# Patient Record
Sex: FEMALE | Race: WHITE | NOT HISPANIC OR LATINO | Employment: OTHER | URBAN - METROPOLITAN AREA
[De-identification: names, ages, dates, MRNs, and addresses within clinical notes are randomized per-mention and may not be internally consistent; named-entity substitution may affect disease eponyms.]

---

## 2017-01-03 ENCOUNTER — INFUSION (OUTPATIENT)
Dept: INFUSION THERAPY | Facility: HOSPITAL | Age: 77
End: 2017-01-03
Attending: INTERNAL MEDICINE
Payer: MEDICARE

## 2017-01-03 VITALS
SYSTOLIC BLOOD PRESSURE: 145 MMHG | TEMPERATURE: 98 F | RESPIRATION RATE: 18 BRPM | DIASTOLIC BLOOD PRESSURE: 74 MMHG | HEART RATE: 93 BPM

## 2017-01-03 DIAGNOSIS — C50.012 MALIGNANT NEOPLASM INVOLVING BOTH NIPPLE AND AREOLA OF LEFT BREAST IN FEMALE: Primary | ICD-10-CM

## 2017-01-03 PROCEDURE — 63600175 PHARM REV CODE 636 W HCPCS: Mod: PN | Performed by: INTERNAL MEDICINE

## 2017-01-03 PROCEDURE — 96372 THER/PROPH/DIAG INJ SC/IM: CPT | Mod: PN

## 2017-01-03 RX ADMIN — ERYTHROPOIETIN 40000 UNITS: 40000 INJECTION, SOLUTION INTRAVENOUS; SUBCUTANEOUS at 02:01

## 2017-01-03 NOTE — PLAN OF CARE
Problem: Patient Care Overview (Adult)  Goal: Plan of Care Review  Outcome: Ongoing (interventions implemented as appropriate)  Pt on Procrit 1/2 as ordered by   Pt aware   Appointments made [facility]

## 2017-01-03 NOTE — PLAN OF CARE
Problem: Patient Care Overview (Adult)  Goal: Discharge Needs Assessment  Outcome: Ongoing (interventions implemented as appropriate)  PT TOLERATED PROCRIT INJECTION WELL [facility]

## 2017-01-17 ENCOUNTER — INFUSION (OUTPATIENT)
Dept: INFUSION THERAPY | Facility: HOSPITAL | Age: 77
End: 2017-01-17
Attending: INTERNAL MEDICINE
Payer: MEDICARE

## 2017-01-17 ENCOUNTER — OFFICE VISIT (OUTPATIENT)
Dept: HEMATOLOGY/ONCOLOGY | Facility: CLINIC | Age: 77
End: 2017-01-17
Payer: MEDICARE

## 2017-01-17 VITALS
RESPIRATION RATE: 18 BRPM | TEMPERATURE: 98 F | SYSTOLIC BLOOD PRESSURE: 162 MMHG | HEART RATE: 75 BPM | DIASTOLIC BLOOD PRESSURE: 72 MMHG

## 2017-01-17 VITALS
HEIGHT: 65 IN | RESPIRATION RATE: 18 BRPM | TEMPERATURE: 98 F | WEIGHT: 173.31 LBS | SYSTOLIC BLOOD PRESSURE: 122 MMHG | DIASTOLIC BLOOD PRESSURE: 72 MMHG | HEART RATE: 77 BPM | BODY MASS INDEX: 28.87 KG/M2

## 2017-01-17 DIAGNOSIS — G89.21 CHRONIC PAIN DUE TO TRAUMA: ICD-10-CM

## 2017-01-17 DIAGNOSIS — C50.011 BILATERAL MALIGNANT NEOPLASM OF NIPPLE IN FEMALE: Primary | ICD-10-CM

## 2017-01-17 DIAGNOSIS — I10 ESSENTIAL HYPERTENSION: ICD-10-CM

## 2017-01-17 DIAGNOSIS — C50.012 BILATERAL MALIGNANT NEOPLASM OF NIPPLE IN FEMALE: Primary | ICD-10-CM

## 2017-01-17 DIAGNOSIS — C50.012 MALIGNANT NEOPLASM INVOLVING BOTH NIPPLE AND AREOLA OF LEFT BREAST IN FEMALE: ICD-10-CM

## 2017-01-17 DIAGNOSIS — C50.012 MALIGNANT NEOPLASM INVOLVING BOTH NIPPLE AND AREOLA OF LEFT BREAST IN FEMALE: Primary | ICD-10-CM

## 2017-01-17 PROCEDURE — 99215 OFFICE O/P EST HI 40 MIN: CPT | Mod: S$GLB,,, | Performed by: INTERNAL MEDICINE

## 2017-01-17 PROCEDURE — 25000003 PHARM REV CODE 250: Mod: PN | Performed by: INTERNAL MEDICINE

## 2017-01-17 PROCEDURE — 1160F RVW MEDS BY RX/DR IN RCRD: CPT | Mod: S$GLB,,, | Performed by: INTERNAL MEDICINE

## 2017-01-17 PROCEDURE — 96367 TX/PROPH/DG ADDL SEQ IV INF: CPT | Mod: PN

## 2017-01-17 PROCEDURE — 96413 CHEMO IV INFUSION 1 HR: CPT | Mod: PN

## 2017-01-17 PROCEDURE — 3074F SYST BP LT 130 MM HG: CPT | Mod: S$GLB,,, | Performed by: INTERNAL MEDICINE

## 2017-01-17 PROCEDURE — 99999 PR PBB SHADOW E&M-EST. PATIENT-LVL III: CPT | Mod: PBBFAC,,, | Performed by: INTERNAL MEDICINE

## 2017-01-17 PROCEDURE — 1159F MED LIST DOCD IN RCRD: CPT | Mod: S$GLB,,, | Performed by: INTERNAL MEDICINE

## 2017-01-17 PROCEDURE — 99499 UNLISTED E&M SERVICE: CPT | Mod: S$GLB,,, | Performed by: INTERNAL MEDICINE

## 2017-01-17 PROCEDURE — 1126F AMNT PAIN NOTED NONE PRSNT: CPT | Mod: S$GLB,,, | Performed by: INTERNAL MEDICINE

## 2017-01-17 PROCEDURE — 1157F ADVNC CARE PLAN IN RCRD: CPT | Mod: S$GLB,,, | Performed by: INTERNAL MEDICINE

## 2017-01-17 PROCEDURE — 3078F DIAST BP <80 MM HG: CPT | Mod: S$GLB,,, | Performed by: INTERNAL MEDICINE

## 2017-01-17 PROCEDURE — 63600175 PHARM REV CODE 636 W HCPCS: Mod: PN | Performed by: INTERNAL MEDICINE

## 2017-01-17 RX ORDER — HEPARIN 100 UNIT/ML
500 SYRINGE INTRAVENOUS
Status: CANCELLED | OUTPATIENT
Start: 2017-01-17

## 2017-01-17 RX ORDER — SODIUM CHLORIDE 0.9 % (FLUSH) 0.9 %
10 SYRINGE (ML) INJECTION
Status: DISCONTINUED | OUTPATIENT
Start: 2017-01-17 | End: 2017-01-17 | Stop reason: HOSPADM

## 2017-01-17 RX ORDER — FAMOTIDINE 20 MG/50ML
20 INJECTION, SOLUTION INTRAVENOUS
Status: COMPLETED | OUTPATIENT
Start: 2017-01-17 | End: 2017-01-17

## 2017-01-17 RX ORDER — ACETAMINOPHEN 500 MG
1000 TABLET ORAL
Status: COMPLETED | OUTPATIENT
Start: 2017-01-17 | End: 2017-01-17

## 2017-01-17 RX ORDER — FAMOTIDINE 20 MG/50ML
20 INJECTION, SOLUTION INTRAVENOUS
Status: CANCELLED
Start: 2017-01-17 | End: 2017-01-17

## 2017-01-17 RX ORDER — SODIUM CHLORIDE 0.9 % (FLUSH) 0.9 %
10 SYRINGE (ML) INJECTION
Status: CANCELLED | OUTPATIENT
Start: 2017-01-17

## 2017-01-17 RX ORDER — ACETAMINOPHEN 500 MG
1000 TABLET ORAL
Status: CANCELLED
Start: 2017-01-17

## 2017-01-17 RX ADMIN — TRASTUZUMAB 484 MG: KIT at 11:01

## 2017-01-17 RX ADMIN — DIPHENHYDRAMINE HYDROCHLORIDE 50 MG: 50 INJECTION, SOLUTION INTRAMUSCULAR; INTRAVENOUS at 11:01

## 2017-01-17 RX ADMIN — FAMOTIDINE 20 MG: 20 INJECTION, SOLUTION INTRAVENOUS at 10:01

## 2017-01-17 RX ADMIN — SODIUM CHLORIDE: 9 INJECTION, SOLUTION INTRAVENOUS at 10:01

## 2017-01-17 RX ADMIN — ACETAMINOPHEN 1000 MG: 500 TABLET ORAL at 10:01

## 2017-01-17 NOTE — PROGRESS NOTES
Subjective:       Patient ID: Jing Tenorio is a 76 y.o. female.    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 Oncotype DX somehow got done,    came back at 14.previous Hip sx that still hurts,dyslipidemia, HTN sees Dr. Franco for the same,   HPI:   Eyes still twitching and has runny eyes but otherwise doing well feet are swollen and nails are discolored  Eyes getting better, some numbness to feet  PHYSICAL EXAM:     Vitals:    01/17/17 0935   BP: 122/72   Pulse: 77   Resp: 18   Temp: 98.4 °F (36.9 °C)       GENERAL: Comfortable looking patient. Patient is in no distress.  Awake, alert and oriented to time, person and place.  No anxiety, or agitation.      HEENT: Normal conjunctivae and eyelids. WNL.  PERRLA 3 to 4 mm. No icterus, no pallor, no congestion, and no discharge noted.     NECK:  Supple. Trachea is central.  No crepitus.  No JVD or masses.    RESPIRATORY:  No intercostal retractions.  No dullness to percussion.  Chest is clear to auscultation.  No rales, rhonchi or wheezes.  No crepitus.  Good air entry bilaterally.    CARDIOVASCULAR:  S1 and S2 are normally heard without murmurs or gallops.  All peripheral pulses are present.    ABDOMEN:  Normal abdomen.  No hepatosplenomegaly.  No free fluid.  Bowel sounds are present.  No hernia noted. No masses.  No rebound or tenderness.  No guarding or rigidity.  Umbilicus is midline.    LYMPHATICS:  No axillary, cervical, supraclavicular, submental, or inguinal lymphadenopathy.    SKIN/MUSCULOSKELETAL:  There is no evidence of excoriation marks or ecchmosis.  No rashes.  No cyanosis.  No clubbing.  No joint or skeletal deformities noted.  Normal range of motion.    NEUROLOGIC:  Higher functions are appropriate.  No cranial nerve deficits.  Normal [place].  Normal strength.  Motor and sensory functions are  normal.  Deep tendon reflexes are normal.    GENITAL/RECTAL:  Exams are deferred.      Laboratory:     CBC:  Lab Results   Component Value Date    WBC 3.67 (L) 01/17/2017    RBC 3.59 (L) 01/17/2017    HGB 12.4 01/17/2017    HCT 38.6 01/17/2017     (H) 01/17/2017    MCH 34.5 (H) 01/17/2017    MCHC 32.1 01/17/2017    RDW 15.8 (H) 01/17/2017     (L) 01/17/2017    MPV 10.0 01/17/2017    GRAN 2.6 01/17/2017    GRAN 71.4 01/17/2017    LYMPH 0.7 (L) 01/17/2017    LYMPH 18.3 01/17/2017    MONO 0.2 (L) 01/17/2017    MONO 6.5 01/17/2017    EOS 0.1 01/17/2017    BASO 0.02 01/17/2017    EOSINOPHIL 3.3 01/17/2017    BASOPHIL 0.5 01/17/2017       BMP: BMP  Lab Results   Component Value Date     01/17/2017    K 5.1 01/17/2017    CL 98 01/17/2017    CO2 31 01/17/2017    BUN 21 (H) 01/17/2017    CREATININE 1.02 01/17/2017    CALCIUM 9.7 01/17/2017    ANIONGAP 7 (L) 01/17/2017    ESTGFRAFRICA >60 01/17/2017    EGFRNONAA 53 (A) 01/17/2017       LFT:   Lab Results   Component Value Date    ALT 33 01/17/2017    AST 47 (H) 01/17/2017    ALKPHOS 91 01/17/2017    BILITOT 0.7 01/17/2017         Assessment/Plan:     Continue with rx herceptin only to complete one yr.   Pt got iron and procirt  rtc 3 weekls for next herceptin

## 2017-01-17 NOTE — PLAN OF CARE
Problem: Patient Care Overview (Adult)  Goal: Discharge Needs Assessment  Outcome: Ongoing (interventions implemented as appropriate)  Pt tolerated well

## 2017-01-18 ENCOUNTER — TELEPHONE (OUTPATIENT)
Dept: HEMATOLOGY/ONCOLOGY | Facility: CLINIC | Age: 77
End: 2017-01-18

## 2017-01-18 NOTE — TELEPHONE ENCOUNTER
----- Message from Tia Erazo LPN sent at 1/18/2017  1:41 PM CST -----  Contact: Jing      ----- Message -----     From: Arianna Jones LPN     Sent: 1/18/2017   9:17 AM       To: Tia Erazo LPN        ----- Message -----     From: Nelson Garcia     Sent: 1/18/2017   9:05 AM       To: [facility] Staff    Requesting to speak with Tia regarding appointment she had canceled for 01/24/17. Appointment was canceled because she did not know the reason she needed the appointment, now she would like to find out details. Please call [phone]. Thank you!

## 2017-01-18 NOTE — TELEPHONE ENCOUNTER
Spoke with pt. Pt was asking why she had an infusion appointment on 1/24. Pt states she already canceled this . Pt informed that the previous 2 notes by  do not mention needing multiple doses of procrit so to disregard the appointment. Pt verbalized understanding.

## 2017-01-20 NOTE — PROGRESS NOTES
Patient, Jing Tenorio (MRN #[id number]), presented with a recent Platelet count less than 150 K/uL consistent with the definition of thrombocytopenia (ICD10 - D69.6).    Platelets   Date Value Ref Range Status   01/17/2017 125 (L) 150 - 350 K/uL Final     The patient's thrombocytopenia was monitored, evaluated, addressed and/or treated. This addendum to the medical record is made on 01/20/2017.

## 2017-02-01 ENCOUNTER — TELEPHONE (OUTPATIENT)
Dept: HEMATOLOGY/ONCOLOGY | Facility: CLINIC | Age: 77
End: 2017-02-01

## 2017-02-01 NOTE — TELEPHONE ENCOUNTER
----- Message from Arianna Jones LPN sent at 1/30/2017 10:58 AM CST -----  Contact: self [phone]      ----- Message -----     From: Laury Valverde     Sent: 1/30/2017  10:54 AM       To: [facility] Staff    She would like to know if it is OK for her to have a routine eye exam? Thank you!

## 2017-02-07 ENCOUNTER — INFUSION (OUTPATIENT)
Dept: INFUSION THERAPY | Facility: HOSPITAL | Age: 77
End: 2017-02-07
Attending: INTERNAL MEDICINE
Payer: MEDICARE

## 2017-02-07 ENCOUNTER — OFFICE VISIT (OUTPATIENT)
Dept: HEMATOLOGY/ONCOLOGY | Facility: CLINIC | Age: 77
End: 2017-02-07
Payer: MEDICARE

## 2017-02-07 VITALS
RESPIRATION RATE: 20 BRPM | HEART RATE: 80 BPM | WEIGHT: 174.19 LBS | SYSTOLIC BLOOD PRESSURE: 124 MMHG | HEIGHT: 65 IN | BODY MASS INDEX: 29.02 KG/M2 | DIASTOLIC BLOOD PRESSURE: 79 MMHG | TEMPERATURE: 99 F

## 2017-02-07 VITALS — SYSTOLIC BLOOD PRESSURE: 131 MMHG | RESPIRATION RATE: 18 BRPM | HEART RATE: 78 BPM | DIASTOLIC BLOOD PRESSURE: 66 MMHG

## 2017-02-07 DIAGNOSIS — C50.011 MALIGNANT NEOPLASM INVOLVING BOTH NIPPLE AND AREOLA OF RIGHT BREAST IN FEMALE: Primary | ICD-10-CM

## 2017-02-07 DIAGNOSIS — E78.5 HYPERLIPIDEMIA LDL GOAL <130: ICD-10-CM

## 2017-02-07 DIAGNOSIS — I10 ESSENTIAL HYPERTENSION: ICD-10-CM

## 2017-02-07 DIAGNOSIS — C50.012 MALIGNANT NEOPLASM INVOLVING BOTH NIPPLE AND AREOLA OF LEFT BREAST IN FEMALE: Primary | ICD-10-CM

## 2017-02-07 DIAGNOSIS — N18.30 CHRONIC KIDNEY DISEASE, STAGE III (MODERATE): ICD-10-CM

## 2017-02-07 PROCEDURE — 25000003 PHARM REV CODE 250: Mod: PN | Performed by: INTERNAL MEDICINE

## 2017-02-07 PROCEDURE — 1126F AMNT PAIN NOTED NONE PRSNT: CPT | Mod: S$GLB,,, | Performed by: INTERNAL MEDICINE

## 2017-02-07 PROCEDURE — 3074F SYST BP LT 130 MM HG: CPT | Mod: S$GLB,,, | Performed by: INTERNAL MEDICINE

## 2017-02-07 PROCEDURE — 63600175 PHARM REV CODE 636 W HCPCS: Mod: PN | Performed by: INTERNAL MEDICINE

## 2017-02-07 PROCEDURE — 96367 TX/PROPH/DG ADDL SEQ IV INF: CPT | Mod: PN

## 2017-02-07 PROCEDURE — 96413 CHEMO IV INFUSION 1 HR: CPT | Mod: PN

## 2017-02-07 PROCEDURE — 99499 UNLISTED E&M SERVICE: CPT | Mod: S$GLB,,, | Performed by: INTERNAL MEDICINE

## 2017-02-07 PROCEDURE — 1157F ADVNC CARE PLAN IN RCRD: CPT | Mod: S$GLB,,, | Performed by: INTERNAL MEDICINE

## 2017-02-07 PROCEDURE — 3078F DIAST BP <80 MM HG: CPT | Mod: S$GLB,,, | Performed by: INTERNAL MEDICINE

## 2017-02-07 PROCEDURE — 1159F MED LIST DOCD IN RCRD: CPT | Mod: S$GLB,,, | Performed by: INTERNAL MEDICINE

## 2017-02-07 PROCEDURE — 1160F RVW MEDS BY RX/DR IN RCRD: CPT | Mod: S$GLB,,, | Performed by: INTERNAL MEDICINE

## 2017-02-07 PROCEDURE — 99215 OFFICE O/P EST HI 40 MIN: CPT | Mod: S$GLB,,, | Performed by: INTERNAL MEDICINE

## 2017-02-07 PROCEDURE — 99999 PR PBB SHADOW E&M-EST. PATIENT-LVL III: CPT | Mod: PBBFAC,,, | Performed by: INTERNAL MEDICINE

## 2017-02-07 RX ORDER — ACETAMINOPHEN 325 MG/1
1000 TABLET ORAL
Status: CANCELLED
Start: 2017-02-07

## 2017-02-07 RX ORDER — ACETAMINOPHEN 500 MG
1000 TABLET ORAL
Status: COMPLETED | OUTPATIENT
Start: 2017-02-07 | End: 2017-02-07

## 2017-02-07 RX ORDER — SODIUM CHLORIDE 0.9 % (FLUSH) 0.9 %
10 SYRINGE (ML) INJECTION
Status: CANCELLED | OUTPATIENT
Start: 2017-02-07

## 2017-02-07 RX ORDER — HEPARIN 100 UNIT/ML
500 SYRINGE INTRAVENOUS
Status: DISCONTINUED | OUTPATIENT
Start: 2017-02-07 | End: 2017-02-07 | Stop reason: HOSPADM

## 2017-02-07 RX ORDER — FAMOTIDINE 20 MG/50ML
20 INJECTION, SOLUTION INTRAVENOUS
Status: CANCELLED
Start: 2017-02-07 | End: 2017-02-07

## 2017-02-07 RX ORDER — FAMOTIDINE 20 MG/50ML
20 INJECTION, SOLUTION INTRAVENOUS
Status: COMPLETED | OUTPATIENT
Start: 2017-02-07 | End: 2017-02-07

## 2017-02-07 RX ORDER — SODIUM CHLORIDE 0.9 % (FLUSH) 0.9 %
10 SYRINGE (ML) INJECTION
Status: DISCONTINUED | OUTPATIENT
Start: 2017-02-07 | End: 2017-02-07 | Stop reason: HOSPADM

## 2017-02-07 RX ORDER — HEPARIN 100 UNIT/ML
500 SYRINGE INTRAVENOUS
Status: CANCELLED | OUTPATIENT
Start: 2017-02-07

## 2017-02-07 RX ADMIN — SODIUM CHLORIDE, PRESERVATIVE FREE 500 UNITS: 5 INJECTION INTRAVENOUS at 01:02

## 2017-02-07 RX ADMIN — DIPHENHYDRAMINE HYDROCHLORIDE 50 MG: 50 INJECTION, SOLUTION INTRAMUSCULAR; INTRAVENOUS at 11:02

## 2017-02-07 RX ADMIN — ACETAMINOPHEN 1000 MG: 500 TABLET ORAL at 11:02

## 2017-02-07 RX ADMIN — SODIUM CHLORIDE, PRESERVATIVE FREE 10 ML: 5 INJECTION INTRAVENOUS at 11:02

## 2017-02-07 RX ADMIN — SODIUM CHLORIDE, PRESERVATIVE FREE 10 ML: 5 INJECTION INTRAVENOUS at 01:02

## 2017-02-07 RX ADMIN — Medication 484 MG: at 12:02

## 2017-02-07 RX ADMIN — FAMOTIDINE 20 MG: 20 INJECTION, SOLUTION INTRAVENOUS at 12:02

## 2017-02-07 RX ADMIN — SODIUM CHLORIDE: 0.9 INJECTION, SOLUTION INTRAVENOUS at 11:02

## 2017-02-07 NOTE — MR AVS SNAPSHOT
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    -  Primary     Essential hypertension         Chronic kidney disease, stage III (moderate)         Hyperlipidemia LDL goal <130                To Do List           Future Appointments        Provider Department Dept Phone    3/1/2017 10:30 AM LAB, [facility] [phone]    3/1/2017 11:20 AM Otoniel Ohara MD [facility] [phone]    3/1/2017 1:30 PM CHAIR [address], [facility] [phone]    2017 8:00 AM LAB, [place] [facility] [phone]    5/10/2017 9:50 AM Nakul Franco MD [facility] [phone]      Goals (5 Years of Data)     None      [facility]     [facility] Care [facility] - 24/7 Assistance  Registered nurses in the [facility] provide clinical advisement, health education, appointment booking, and other advisory services.  Call for this free service at [phone].             Medications           Message regarding Medications     Verify the changes and/or additions to your medication regime listed below are the same as discussed with your clinician today.  If any of these changes or additions are incorrect, please notify your healthcare provider.             Verify that the below list of medications is an accurate representation of the medications you are currently taking.  If none reported, the list may be blank. If incorrect, please contact your healthcare provider. Carry this list with you in case of emergency.          "  Current Medications     ASCORBATE CALCIUM (VITAMIN C ORAL) Take 500 mg by mouth once daily.     calcium-vitamin D3-vitamin K (VIACTIV) 500-500-40 mg-unit-mcg Chew Take 2 tablets by mouth once daily.     coenzyme Q10 (CO Q-10) 100 mg capsule Take 100 mg by mouth once daily.     cyanocobalamin, vitamin B-12, (VITAMIN B-12) 50 mcg Lozg Every day    DOCUSATE CALCIUM (STOOL SOFTENER ORAL) 2 (two) times daily. Twice a day    ibandronate (BONIVA) 150 mg tablet TK 1 T PO Q 30 DAYS    lidocaine-prilocaine (EMLA) cream Apply to affected area once    lisinopril 10 MG tablet Take 1 tablet (10 mg total) by mouth once daily.    loratadine (CLARITIN) 10 mg tablet Take 10 mg by mouth daily as needed.     MULTIVITAMIN ORAL once daily. Every day    naproxen sodium (ANAPROX) 220 MG tablet Take 220 mg by mouth 2 (two) times daily with meals.    omeprazole (PRILOSEC) 40 MG capsule Take 1 capsule (40 mg total) by mouth once daily.    prochlorperazine (COMPAZINE) 10 MG tablet Take 1 tablet (10 mg total) by mouth every 6 (six) hours as needed.    pyridoxine, vitamin B6, (VITAMIN B-6) 50 MG Tab Take 50 mg by mouth once daily.    simvastatin (ZOCOR) 20 MG tablet Take 1 tablet (20 mg total) by mouth nightly. Every day    tramadol (ULTRAM) 50 mg tablet Take 1 tablet (50 mg total) by mouth every 6 (six) hours as needed for Pain.    trazodone (DESYREL) 100 MG tablet Take 1 tablet (100 mg total) by mouth every evening.    triamcinolone acetonide 0.1% (KENALOG) 0.1 % ointment Apply topically 2 (two) times daily.    UNABLE TO FIND 2,000 Units. VITAMIN D 1000 IU Q DAY     VITAMIN E ACETATE (VITAMIN E ORAL) Take 400 Units by mouth once daily. Every day           Clinical Reference Information           Your Vitals Were     BP Pulse Temp Resp Height Weight    124/79 (BP Location: Right arm, Patient Position: Sitting) 80 98.7 °F (37.1 °C) (Oral) 20 5' 5" (1.651 m) 79 kg (174 lb 2.6 oz)    BMI                28.98 kg/m2          Blood Pressure       "    Most Recent Value    BP  124/79      Allergies as of 2/7/2017     Sulfa (Sulfonamide Antibiotics)    Adhesive      Immunizations Administered on Date of Encounter - 2/7/2017     None      Orders Placed During Today's Visit      Normal Orders This Visit    Ambulatory referral to Radiation Oncology     Future Labs/Procedures Expected by Expires    CBC w/ DIFF  2/7/2017 4/8/2018    CMP  2/7/2017 4/8/2018      Language Assistance Services     ATTENTION: Language assistance services are available, free of charge. Please call [phone].      ATENCIÓN: Si habla dave, tiene a keys disposición servicios gratuitos de asistencia lingüística. Llame al [phone].     CHÚ Ý: N?u b?n nói Ti?ng Vi?t, có các d?ch v? h? tr? ngôn ng? mi?n phí dành cho b?n. G?i s? [phone].         [facility] complies with applicable Federal civil rights laws and does not discriminate on the basis of race, color, national origin, age, disability, or sex.

## 2017-02-07 NOTE — PLAN OF CARE
Problem: Patient Care Overview (Adult)  Goal: Plan of Care Review  Outcome: Ongoing (interventions implemented as appropriate)  Pt. Completed treatment, tolerated without noted distress.Vtial signs stable. Patient discharged from infusion center ambulatory using cane accompanied by . Patient had all present questions answered.

## 2017-02-07 NOTE — MR AVS SNAPSHOT
Patient Information     Patient Name Sex Jing Gimenez Female 1940      Visit Information        Provider Department Dept Phone Center    2017 10:30 AM CHAIR [place] [facility] CHEMO [facility] [phone] [facility]      Patient Instructions      Hyponatremia  Hyponatremia means low sodium levels in the blood. This condition most often occurs after prolonged vomiting or diarrhea, which causes your body to lose too much water and sodium. It can also result from drinking excess amounts of water or the use of diuretics (water pills).  Mild hyponatremia causes no symptoms. It is only discovered with a blood test. As sodium levels in the blood decreases, symptoms begin to appear. This includes weakness, confusion, muscle cramping and seizures.  Home care  · Reduce your daily water intake until the problem is corrected.  · If you have been taking diuretics, you may be asked to stop taking them for a short time.  · If you are having symptoms of weakness or confusion, do not drive or operate dangerous machinery until symptoms resolve.  Follow-up care  Follow up with your healthcare provider for a repeat blood test within the next week or as advised by our staff.  When to seek medical advice  Call your healthcare provider if any of the following occur:  · Increasing weakness  · Dizziness  · Irregular heartbeat, extra beats or very fast heart rate  · Increasing confusion  · Fainting or loss of consciousness  Date Last Reviewed: 2015-2016 [employer]. [address] [place] [address]. All rights reserved. This information is not intended as a substitute for professional medical care. Always follow your healthcare professional's instructions.        Discharge Instructions for Hyponatremia  You were diagnosed with hyponatremia, which means your blood level of sodium (salt) is too low. Salt is needed for the body and brain to work. Very low blood  levels of sodium can be fatal. Symptoms can include headache, confusion, fatigue, muscle cramps, hallucinations, seizures, and coma. You have been treated to raise your blood levels of sodium. The following instructions will help you care for yourself at home as you have been instructed.  Home care  · Limit your intake of fluids. Drink only the amounts directed by your healthcare provider.  · Ask your healthcare provider what you should use to replace fluids if you are throwing up.  · Keep all follow-up appointments. Your provider needs to watch your condition closely.  To help prevent hyponatremia:  · Take all medicines exactly as directed. Certain medicines can lower blood sodium levels.  · If you have done something that makes you sweat a lot, drink fluids that contain salt and other electrolytes.   · Tell all healthcare providers what medicines you take. Mention all prescription and over-the-counter drugs and herbs.  · Have your sodium levels checked often. This is vital if you take a diuretic (medicine that helps your body get rid of water).  Follow-up  Schedule a follow-up visit as directed.  When to call your healthcare provider  Call your provider right away if you have any of the following:  · Severe tiredness  · Fainting  · Dizziness  · Loss of appetite  · Nausea or vomiting  · Confusion or forgetfullness  · Muscle spasms, cramping, or twitching  · Seizures  · Gait disturbances   Date Last Reviewed: 6/8/2015 © 2000-2016 [employer]. [address] [place] [address]. All rights reserved. This information is not intended as a substitute for professional medical care. Always follow your healthcare professional's instructions.        Preventing Falls: Moving Safely Out of a Chair and Bed     Find sturdy, high-seated chairs with arms. If you must use a chair that swivels or has wheels, back it against something stable before you sit down          If you use a walker, place it close to  the side of the bed. Don't lean on the walker to stand up. Instead, put your hands on the bed to slowly push yourself up.      You can move safely, whether youre at home, in a hospital, or out and about. Plan your movements. Dont rush. The phone or doorbell can wait. And learn how to perform daily tasks safely, like getting in and out of a chair or bed. The more careful you are, the less likely you are to fall.  Getting in and out of a chair  When you can, choose chairs with long armrests.  To sit down:  · Back up until you feel the chair against the backs of your legs.  · Grasp the armrests with both hands and sit down.  To stand up:  · Scoot to the edge of the chair. Place both feet firmly on the floor.  · Grasp the armrests or put both hands on your thighs and slowly push yourself up.  Getting out of bed  If you're using a hospital bed, make sure it's locked. Put the head of the bed up.  To get up:  · Move to the edge of the bed and roll onto your side. Push yourself up with your hand. At the same time, swing your legs over the side of the bed.  · Sit on the edge of the bed for at least 60 seconds before standing up.  · With both feet firmly on the floor, put your hands beside you on the bed and slowly push yourself up.  · If you feel lightheaded or dizzy, sit back down right away.  Date Last Reviewed: 6/12/2015 © 2000-2016 [employer], [employer]. [address] [place] [address]. All rights reserved. This information is not intended as a substitute for professional medical care. Always follow your healthcare professional's instructions.        Preventing Falls: Moving Safely Using a Cane or Walker     Keep the cane away from your feet so you dont trip.     A walking aid, such as a cane or walker, can help you stay more independent and avoid falls. Remember to keep your walking aid within easy reach when you're in a chair or in bed. And learn how to use it safely so you don't injure yourself. Be  sure the cane or walker is the correct height. Hang your arm loosely at your side, and measure the distance from your wrist to the floor. The distance should be the same as the height of your cane or walker.  Using a cane  If you have a stronger side, hold the cane on the side of your stronger leg.  1. Get your balance.  2. Move the cane and your weaker leg forward.  3. Support your weight on both the cane and your weaker side.  4. Step with your stronger leg.  5. Start again from step 1.     If youre using a folding walker, be sure you know how to lock it open. Check that its locked open before each use.   Using a walker  1. Roll the walker (or lift it, if you're using one without wheels) forward about 12 inches.  2. Step forward with your weaker leg first.  3. Use the walker to help keep your balance.  4. Bring your other foot forward to the center of the walker.  5. Start again from step 1.  Helpful tips  · Check with your health care provider about the right walking aid to use. Ask about a walker with a seat attached.  · Check the tips of your cane or walker to make sure they have nonskid covers.  · Move slowly from room to room. Don't rush.  · Sit down to get dressed.  · Use a sera pack or backpack to keep your hands free.  · Get help for jobs that mean climbing, even on a stepstool.  · Do not try going up or down stairs using a walker.   Date Last Reviewed: 6/12/2015  © 1424-6465 [employer], [employer]. [address] [place] [address], [place] [address]. All rights reserved. This information is not intended as a substitute for professional medical care. Always follow your healthcare professional's instructions.        Preventing Falls: Staying Active  Staying active is one of the best things you can do to prevent falls. Keep in mind that doing too little can be as risky as doing too much. That's because not being active can make you weaker and more likely to fall. But how much can you do safely? Start easy.  Slowly work up to doing more. Talk to your health care provider about safe ways for you to stay active.    Stay active, stay connected  Staying connected with other people can help lower your risk of falls. One way it does this is by helping to keep you from feeling isolated and depressed. Find a social activity you enjoy. Make it part of your weekly or daily routine:  · Join a club or visit a senior center.  · Go to Baptism services.  · Organize a [employer] or game of cards.  · Garden with your neighbor.  · Have a friend join you to go walking outdoors or in the mall.    How exercise helps  The list of benefits from exercise just keeps getting longer. And, as you age, you can keep reaping those rewards. Balance, flexibility, strength, and endurance all come from exercise. They all play a role in preventing falls. It's never too late to start exercising. Try organized activities. You can find these at senior centers, health clubs, or even at a Rastafari, temple, or Episcopal. This approach may work best if you've never exercised in the past or if you need company to get motivated. But you can exercise on your own if you prefer. Try an exercise video or walk in the park.  Date Last Reviewed: 6/13/2015  © 0109-1890 [employer]. [address] [place] [address], [place] [address]. All rights reserved. This information is not intended as a substitute for professional medical care. Always follow your healthcare professional's instructions.        Managing Fatigue     Family members can help with meals and chores around the house.   Fatigue is common. It can be caused by worry, lack of sleep, or poor appetite. Fatigue can also be a sign of anemia, a shortage of red blood cells. You might need medical treatment for anemia. The tips below can help you feel better.  Conserving energy  · Keep track of the times of day when you are most tired and plan around them. For instance, if you are more tired in the afternoon, try to get  tasks done in the morning.  · Decide which tasks are most important. Do those first.  · Pass tasks along to others when you need to. Ask for help.  · Accept help when its offered. Tell people what they can do to help. For instance, you may need someone to fix a meal, fold clothes, or put gas in your car.  · Plan rest times. You may want to take a nap each day. Just sitting quietly for a few minutes can make you feel more rested.  What you can do to feel better  · Relax before you try to sleep. Take a bath or read for a while.  · Form a sleep pattern. Go to bed at the same time each night and get up at the same time each morning.  · Eat well. Choose foods from all of the food groups each day.  · Exercise. Take a brisk walk to help increase your energy.  · Avoid caffeine and alcohol. Drink plenty of water or fruit juices instead.  Treating anemia  If you begin to feel more tired than normal, tell your doctor. Fatigue could be a sign of anemia. This problem is fairly common in cancer patients, especially during chemotherapy and radiation treatments. If your red blood cell count is too low, you may get a blood transfusion. In some cases, you may need medicine to increase the number of red blood cells your body makes.  When to call your healthcare provider  Call your healthcare provider if you have:  · Shortness of breath or chest pain  · A dizzy feeling when you get up from lying or sitting down  · Paler skin than normal  · Extreme tiredness that is not helped by sleep   Date Last Reviewed: 1/3/2016  © 9183-0689 [employer]. [address] [place] [address], [place] [address]. All rights reserved. This information is not intended as a substitute for professional medical care. Always follow your healthcare professional's instructions.             Your Current Medications Are     ASCORBATE CALCIUM (VITAMIN C ORAL)    calcium-vitamin D3-vitamin K (VIACTIV) 500-500-40 mg-unit-mcg Chew    coenzyme Q10 (CO Q-10) 100 mg  capsule    cyanocobalamin, vitamin B-12, (VITAMIN B-12) 50 mcg Lozg    DOCUSATE CALCIUM (STOOL SOFTENER ORAL)    ibandronate (BONIVA) 150 mg tablet    lidocaine-prilocaine (EMLA) cream    lisinopril 10 MG tablet    loratadine (CLARITIN) 10 mg tablet    MULTIVITAMIN ORAL    naproxen sodium (ANAPROX) 220 MG tablet    omeprazole (PRILOSEC) 40 MG capsule    prochlorperazine (COMPAZINE) 10 MG tablet    pyridoxine, vitamin B6, (VITAMIN B-6) 50 MG Tab    simvastatin (ZOCOR) 20 MG tablet    tramadol (ULTRAM) 50 mg tablet    trazodone (DESYREL) 100 MG tablet    triamcinolone acetonide 0.1% (KENALOG) 0.1 % ointment    UNABLE TO FIND    VITAMIN E ACETATE (VITAMIN E ORAL)      Facility-Administered Medications     acetaminophen tablet 1,000 mg    diphenhydramine IVPB 50 mg    epoetin cody injection 40,000 Units    epoetin cody injection 40,000 Units    famotidine IVPB 20 mg    heparin, porcine (PF) 100 unit/mL injection flush 500 Units    ondansetron disintegrating tablet 8 mg    sodium chloride 0.9% 100 mL flush bag    sodium chloride 0.9% flush 10 mL    sodium chloride 0.9% flush 10 mL    trastuzumab (HERCEPTIN) (HERCEPTIN) 484 mg in sodium chloride 0.9% 250 mL chemo infusion    triamcinolone acetonide injection 80 mg      Appointments for Next Year     2/14/2017 12:00 PM INFUSION 030 MIN (30 min.) [facility] CHAIR 01, [facility] CHEMO    Arrive at check-in approximately 15 minutes before your scheduled appointment time. Bring all outside medical records and imaging, along with a list of your current medications and insurance card.    1st Floor    3/1/2017 10:30 AM NON FASTING LAB (15 min.) [facility] Laboratory LAB, [facility] DRAW STATION    Arrive at check-in approximately 15 minutes before your scheduled appointment time. Bring all outside medical records and imaging, along with a list of your current medications and insurance card.    3/1/2017 11:20 AM ESTABLISHED PATIENT (20 min.) [facility]  "Hematology Otoniel Ohara MD    Arrive at check-in approximately 15 minutes before your scheduled appointment time. Bring all outside medical records and imaging, along with a list of your current medications and insurance card.    3/1/2017  1:30 PM INFUSION 120 MIN (120 min.) [facility] CHAIR 25, [facility] CHEMO    Arrive at check-in approximately 15 minutes before your scheduled appointment time. Bring all outside medical records and imaging, along with a list of your current medications and insurance card.    [facility] Floor    5/2/2017  8:00 AM FASTING LAB (15 min.) [facility] LAB, [place]    1. Do not eat or drink anything for TEN HOURS (10) PRIOR TO TEST. Do not chew gum or eat candy mints, even those claiming to be sugar free. Water is allowed but do not drink any other fluids 2. Take your regular daily medicines as your doctor has ordered. If you are diabetic, do not take your insulin or other diabetic medication until your blood is drawn and you are ready to eat. Your physician may have special instructions for diabetics. Check with your doctor if you have any questions.3. Alcoholic beverages are not allowed starting at 6:00pm the evening before your appointment.    1st Floor    5/10/2017  9:50 AM ESTABLISHED PATIENT EXTENDED (40 min.) [facility] Nakul Franco MD    Arrive at check-in approximately 15 minutes before your scheduled appointment time. Bring all outside medical records and imaging, along with a list of your current medications and insurance card.    1st Floor         Default Flowsheet Data (last 24 hours)      Amb Complex Vitals Ab        02/07/17 0917                Measurements    Weight 79 kg (174 lb 2.6 oz)        Height 5' 5" (1.651 m)        BSA (Calculated - sq m) 1.9 sq meters        BMI (Calculated) 29        /79        Temp 98.7 °F (37.1 °C)        Pulse 80        Resp 20        Pain Assessment    Pain Score Zero           "      Allergies     Sulfa (Sulfonamide Antibiotics)     Other reaction(s): Unknown  Other reaction(s): Unknown  Other reaction(s): Unknown    Adhesive Rash      Medications You Received from 02/06/2017 1208 to 02/07/2017 1208        Date/Time Order Dose Route Action     02/07/2017 1157 acetaminophen tablet 1,000 mg 1,000 mg Oral Given     02/07/2017 1158 diphenhydramine IVPB 50 mg 50 mg Intravenous New Bag     02/07/2017 1157 sodium chloride 0.9% 100 mL flush bag   Intravenous New Bag     02/07/2017 1156 sodium chloride 0.9% flush 10 mL 10 mL Intravenous Given      Current Discharge Medication List     Cannot display discharge medications since this is not an admission.

## 2017-02-07 NOTE — PROGRESS NOTES
Subjective:       Patient ID: Jing Tenorio is a 76 y.o. female.    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 Oncotype DX somehow got done,    came back at 14.previous Hip sx that still hurts,dyslipidemia, HTN sees Dr. Franco for the same,   HPI:   Eyes much better. Feeling better.concerned about nails turning black explained again today that it will get better.needx xrt referral needed.  PHYSICAL EXAM:     Vitals:    02/07/17 0917   BP: 124/79   Pulse: 80   Resp: 20   Temp: 98.7 °F (37.1 °C)       GENERAL: Comfortable looking patient. Patient is in no distress.  Awake, alert and oriented to time, person and place.  No anxiety, or agitation.      HEENT: Normal conjunctivae and eyelids. WNL.  PERRLA 3 to 4 mm. No icterus, no pallor, no congestion, and no discharge noted.     NECK:  Supple. Trachea is central.  No crepitus.  No JVD or masses.    RESPIRATORY:  No intercostal retractions.  No dullness to percussion.  Chest is clear to auscultation.  No rales, rhonchi or wheezes.  No crepitus.  Good air entry bilaterally.    CARDIOVASCULAR:  S1 and S2 are normally heard without murmurs or gallops.  All peripheral pulses are present.    ABDOMEN:  Normal abdomen.  No hepatosplenomegaly.  No free fluid.  Bowel sounds are present.  No hernia noted. No masses.  No rebound or tenderness.  No guarding or rigidity.  Umbilicus is midline.    LYMPHATICS:  No axillary, cervical, supraclavicular, submental, or inguinal lymphadenopathy.    SKIN/MUSCULOSKELETAL:  There is no evidence of excoriation marks or ecchmosis.  No rashes.  No cyanosis.  No clubbing.  No joint or skeletal deformities noted.  Normal range of motion.    NEUROLOGIC:  Higher functions are appropriate.  No cranial nerve deficits.  Normal [place].  Normal strength.  Motor and sensory functions are normal.   Deep tendon reflexes are normal.    GENITAL/RECTAL:  Exams are deferred.      Laboratory:     CBC:  Lab Results   Component Value Date    WBC 4.65 02/07/2017    RBC 3.48 (L) 02/07/2017    HGB 11.7 (L) 02/07/2017    HCT 35.7 (L) 02/07/2017     (H) 02/07/2017    MCH 33.6 (H) 02/07/2017    MCHC 32.8 02/07/2017    RDW 14.4 02/07/2017     (L) 02/07/2017    MPV 10.3 02/07/2017    GRAN 3.3 02/07/2017    GRAN 70.6 02/07/2017    LYMPH 0.8 (L) 02/07/2017    LYMPH 17.6 (L) 02/07/2017    MONO 0.4 02/07/2017    MONO 7.5 02/07/2017    EOS 0.2 02/07/2017    BASO 0.03 02/07/2017    EOSINOPHIL 3.7 02/07/2017    BASOPHIL 0.6 02/07/2017       BMP: BMP  Lab Results   Component Value Date     (L) 02/07/2017    K 5.0 02/07/2017    CL 96 02/07/2017    CO2 30 02/07/2017    BUN 19 (H) 02/07/2017    CREATININE 1.04 02/07/2017    CALCIUM 9.5 02/07/2017    ANIONGAP 8 02/07/2017    ESTGFRAFRICA 59 (A) 02/07/2017    EGFRNONAA 52 (A) 02/07/2017       LFT:   Lab Results   Component Value Date    ALT 36 02/07/2017    AST 48 (H) 02/07/2017    ALKPHOS 93 02/07/2017    BILITOT 0.6 02/07/2017         Assessment/Plan:     Continue with rx herceptin only to complete one yr.   Pt got iron and procirt  rtc 3 weeks for next herceptin   needs xrt consult  Cbc, cmp

## 2017-02-07 NOTE — PATIENT INSTRUCTIONS
Hyponatremia  Hyponatremia means low sodium levels in the blood. This condition most often occurs after prolonged vomiting or diarrhea, which causes your body to lose too much water and sodium. It can also result from drinking excess amounts of water or the use of diuretics (water pills).  Mild hyponatremia causes no symptoms. It is only discovered with a blood test. As sodium levels in the blood decreases, symptoms begin to appear. This includes weakness, confusion, muscle cramping and seizures.  Home care  · Reduce your daily water intake until the problem is corrected.  · If you have been taking diuretics, you may be asked to stop taking them for a short time.  · If you are having symptoms of weakness or confusion, do not drive or operate dangerous machinery until symptoms resolve.  Follow-up care  Follow up with your healthcare provider for a repeat blood test within the next week or as advised by our staff.  When to seek medical advice  Call your healthcare provider if any of the following occur:  · Increasing weakness  · Dizziness  · Irregular heartbeat, extra beats or very fast heart rate  · Increasing confusion  · Fainting or loss of consciousness  Date Last Reviewed: 7/26/2015  © 8314-9063 [employer]. [address] [place] [address]. All rights reserved. This information is not intended as a substitute for professional medical care. Always follow your healthcare professional's instructions.        Discharge Instructions for Hyponatremia  You were diagnosed with hyponatremia, which means your blood level of sodium (salt) is too low. Salt is needed for the body and brain to work. Very low blood levels of sodium can be fatal. Symptoms can include headache, confusion, fatigue, muscle cramps, hallucinations, seizures, and coma. You have been treated to raise your blood levels of sodium. The following instructions will help you care for yourself at home as you have been  instructed.  Home care  · Limit your intake of fluids. Drink only the amounts directed by your healthcare provider.  · Ask your healthcare provider what you should use to replace fluids if you are throwing up.  · Keep all follow-up appointments. Your provider needs to watch your condition closely.  To help prevent hyponatremia:  · Take all medicines exactly as directed. Certain medicines can lower blood sodium levels.  · If you have done something that makes you sweat a lot, drink fluids that contain salt and other electrolytes.   · Tell all healthcare providers what medicines you take. Mention all prescription and over-the-counter drugs and herbs.  · Have your sodium levels checked often. This is vital if you take a diuretic (medicine that helps your body get rid of water).  Follow-up  Schedule a follow-up visit as directed.  When to call your healthcare provider  Call your provider right away if you have any of the following:  · Severe tiredness  · Fainting  · Dizziness  · Loss of appetite  · Nausea or vomiting  · Confusion or forgetfullness  · Muscle spasms, cramping, or twitching  · Seizures  · Gait disturbances   Date Last Reviewed: 6/8/2015 © 2000-2016 [employer]. [address] [place] [address]. All rights reserved. This information is not intended as a substitute for professional medical care. Always follow your healthcare professional's instructions.        Preventing Falls: Moving Safely Out of a Chair and Bed     Find sturdy, high-seated chairs with arms. If you must use a chair that swivels or has wheels, back it against something stable before you sit down          If you use a walker, place it close to the side of the bed. Don't lean on the walker to stand up. Instead, put your hands on the bed to slowly push yourself up.      You can move safely, whether youre at home, in a hospital, or out and about. Plan your movements. Dont rush. The phone or doorbell can wait. And learn  how to perform daily tasks safely, like getting in and out of a chair or bed. The more careful you are, the less likely you are to fall.  Getting in and out of a chair  When you can, choose chairs with long armrests.  To sit down:  · Back up until you feel the chair against the backs of your legs.  · Grasp the armrests with both hands and sit down.  To stand up:  · Scoot to the edge of the chair. Place both feet firmly on the floor.  · Grasp the armrests or put both hands on your thighs and slowly push yourself up.  Getting out of bed  If you're using a hospital bed, make sure it's locked. Put the head of the bed up.  To get up:  · Move to the edge of the bed and roll onto your side. Push yourself up with your hand. At the same time, swing your legs over the side of the bed.  · Sit on the edge of the bed for at least 60 seconds before standing up.  · With both feet firmly on the floor, put your hands beside you on the bed and slowly push yourself up.  · If you feel lightheaded or dizzy, sit back down right away.  Date Last Reviewed: 6/12/2015 © 2000-2016 [employer] [address] [place] [address], [place] [address]. All rights reserved. This information is not intended as a substitute for professional medical care. Always follow your healthcare professional's instructions.        Preventing Falls: Moving Safely Using a Cane or Walker     Keep the cane away from your feet so you dont trip.     A walking aid, such as a cane or walker, can help you stay more independent and avoid falls. Remember to keep your walking aid within easy reach when you're in a chair or in bed. And learn how to use it safely so you don't injure yourself. Be sure the cane or walker is the correct height. Hang your arm loosely at your side, and measure the distance from your wrist to the floor. The distance should be the same as the height of your cane or walker.  Using a cane  If you have a stronger side, hold the cane on the side of  your stronger leg.  1. Get your balance.  2. Move the cane and your weaker leg forward.  3. Support your weight on both the cane and your weaker side.  4. Step with your stronger leg.  5. Start again from step 1.     If youre using a folding walker, be sure you know how to lock it open. Check that its locked open before each use.   Using a walker  1. Roll the walker (or lift it, if you're using one without wheels) forward about 12 inches.  2. Step forward with your weaker leg first.  3. Use the walker to help keep your balance.  4. Bring your other foot forward to the center of the walker.  5. Start again from step 1.  Helpful tips  · Check with your health care provider about the right walking aid to use. Ask about a walker with a seat attached.  · Check the tips of your cane or walker to make sure they have nonskid covers.  · Move slowly from room to room. Don't rush.  · Sit down to get dressed.  · Use a sera pack or backpack to keep your hands free.  · Get help for jobs that mean climbing, even on a stepstool.  · Do not try going up or down stairs using a walker.   Date Last Reviewed: 6/12/2015  © 2967-2390 [employer], [employer]. [address] [place] [address], [place] [address]. All rights reserved. This information is not intended as a substitute for professional medical care. Always follow your healthcare professional's instructions.        Preventing Falls: Staying Active  Staying active is one of the best things you can do to prevent falls. Keep in mind that doing too little can be as risky as doing too much. That's because not being active can make you weaker and more likely to fall. But how much can you do safely? Start easy. Slowly work up to doing more. Talk to your health care provider about safe ways for you to stay active.    Stay active, stay connected  Staying connected with other people can help lower your risk of falls. One way it does this is by helping to keep you from feeling isolated and  depressed. Find a social activity you enjoy. Make it part of your weekly or daily routine:  · Join a club or visit a senior center.  · Go to Rastafari services.  · Organize a potMytonomyk or game of cards.  · Garden with your neighbor.  · Have a friend join you to go walking outdoors or in the mall.    How exercise helps  The list of benefits from exercise just keeps getting longer. And, as you age, you can keep reaping those rewards. Balance, flexibility, strength, and endurance all come from exercise. They all play a role in preventing falls. It's never too late to start exercising. Try organized activities. You can find these at senior centers, health clubs, or even at a Mandaeism, temple, or Anabaptism. This approach may work best if you've never exercised in the past or if you need company to get motivated. But you can exercise on your own if you prefer. Try an exercise video or walk in the park.  Date Last Reviewed: 6/13/2015  © 3454-5614 [employer]. [address] [place] [address]. All rights reserved. This information is not intended as a substitute for professional medical care. Always follow your healthcare professional's instructions.        Managing Fatigue     Family members can help with meals and chores around the house.   Fatigue is common. It can be caused by worry, lack of sleep, or poor appetite. Fatigue can also be a sign of anemia, a shortage of red blood cells. You might need medical treatment for anemia. The tips below can help you feel better.  Conserving energy  · Keep track of the times of day when you are most tired and plan around them. For instance, if you are more tired in the afternoon, try to get tasks done in the morning.  · Decide which tasks are most important. Do those first.  · Pass tasks along to others when you need to. Ask for help.  · Accept help when its offered. Tell people what they can do to help. For instance, you may need someone to fix a meal, fold  clothes, or put gas in your car.  · Plan rest times. You may want to take a nap each day. Just sitting quietly for a few minutes can make you feel more rested.  What you can do to feel better  · Relax before you try to sleep. Take a bath or read for a while.  · Form a sleep pattern. Go to bed at the same time each night and get up at the same time each morning.  · Eat well. Choose foods from all of the food groups each day.  · Exercise. Take a brisk walk to help increase your energy.  · Avoid caffeine and alcohol. Drink plenty of water or fruit juices instead.  Treating anemia  If you begin to feel more tired than normal, tell your doctor. Fatigue could be a sign of anemia. This problem is fairly common in cancer patients, especially during chemotherapy and radiation treatments. If your red blood cell count is too low, you may get a blood transfusion. In some cases, you may need medicine to increase the number of red blood cells your body makes.  When to call your healthcare provider  Call your healthcare provider if you have:  · Shortness of breath or chest pain  · A dizzy feeling when you get up from lying or sitting down  · Paler skin than normal  · Extreme tiredness that is not helped by sleep   Date Last Reviewed: 1/3/2016  © 6863-2535 [facility], [employer]. [address] [place] [address], [place] [address]. All rights reserved. This information is not intended as a substitute for professional medical care. Always follow your healthcare professional's instructions.

## 2017-03-01 ENCOUNTER — OFFICE VISIT (OUTPATIENT)
Dept: HEMATOLOGY/ONCOLOGY | Facility: CLINIC | Age: 77
End: 2017-03-01
Payer: MEDICARE

## 2017-03-01 ENCOUNTER — INFUSION (OUTPATIENT)
Dept: INFUSION THERAPY | Facility: HOSPITAL | Age: 77
End: 2017-03-01
Attending: INTERNAL MEDICINE
Payer: MEDICARE

## 2017-03-01 ENCOUNTER — TELEPHONE (OUTPATIENT)
Dept: HEMATOLOGY/ONCOLOGY | Facility: CLINIC | Age: 77
End: 2017-03-01

## 2017-03-01 VITALS
WEIGHT: 175.69 LBS | BODY MASS INDEX: 29.27 KG/M2 | SYSTOLIC BLOOD PRESSURE: 159 MMHG | HEART RATE: 75 BPM | RESPIRATION RATE: 17 BRPM | DIASTOLIC BLOOD PRESSURE: 72 MMHG | TEMPERATURE: 99 F | HEIGHT: 65 IN

## 2017-03-01 VITALS
SYSTOLIC BLOOD PRESSURE: 115 MMHG | RESPIRATION RATE: 16 BRPM | TEMPERATURE: 98 F | HEART RATE: 88 BPM | DIASTOLIC BLOOD PRESSURE: 69 MMHG

## 2017-03-01 DIAGNOSIS — C50.012 MALIGNANT NEOPLASM INVOLVING BOTH NIPPLE AND AREOLA OF LEFT BREAST IN FEMALE: Primary | ICD-10-CM

## 2017-03-01 PROCEDURE — 99999 PR PBB SHADOW E&M-EST. PATIENT-LVL III: CPT | Mod: PBBFAC,,, | Performed by: INTERNAL MEDICINE

## 2017-03-01 PROCEDURE — 96367 TX/PROPH/DG ADDL SEQ IV INF: CPT | Mod: PN

## 2017-03-01 PROCEDURE — 3077F SYST BP >= 140 MM HG: CPT | Mod: S$GLB,,, | Performed by: INTERNAL MEDICINE

## 2017-03-01 PROCEDURE — 1160F RVW MEDS BY RX/DR IN RCRD: CPT | Mod: S$GLB,,, | Performed by: INTERNAL MEDICINE

## 2017-03-01 PROCEDURE — 1157F ADVNC CARE PLAN IN RCRD: CPT | Mod: S$GLB,,, | Performed by: INTERNAL MEDICINE

## 2017-03-01 PROCEDURE — 99214 OFFICE O/P EST MOD 30 MIN: CPT | Mod: S$GLB,,, | Performed by: INTERNAL MEDICINE

## 2017-03-01 PROCEDURE — 1159F MED LIST DOCD IN RCRD: CPT | Mod: S$GLB,,, | Performed by: INTERNAL MEDICINE

## 2017-03-01 PROCEDURE — 1125F AMNT PAIN NOTED PAIN PRSNT: CPT | Mod: S$GLB,,, | Performed by: INTERNAL MEDICINE

## 2017-03-01 PROCEDURE — 3078F DIAST BP <80 MM HG: CPT | Mod: S$GLB,,, | Performed by: INTERNAL MEDICINE

## 2017-03-01 PROCEDURE — 99499 UNLISTED E&M SERVICE: CPT | Mod: S$GLB,,, | Performed by: INTERNAL MEDICINE

## 2017-03-01 PROCEDURE — 63600175 PHARM REV CODE 636 W HCPCS: Mod: PN | Performed by: INTERNAL MEDICINE

## 2017-03-01 PROCEDURE — 25000003 PHARM REV CODE 250: Mod: PN | Performed by: INTERNAL MEDICINE

## 2017-03-01 PROCEDURE — 96413 CHEMO IV INFUSION 1 HR: CPT | Mod: PN

## 2017-03-01 RX ORDER — HEPARIN 100 UNIT/ML
500 SYRINGE INTRAVENOUS
Status: CANCELLED | OUTPATIENT
Start: 2017-03-01

## 2017-03-01 RX ORDER — ACETAMINOPHEN 500 MG
1000 TABLET ORAL
Status: COMPLETED | OUTPATIENT
Start: 2017-03-01 | End: 2017-03-01

## 2017-03-01 RX ORDER — SODIUM CHLORIDE 0.9 % (FLUSH) 0.9 %
10 SYRINGE (ML) INJECTION
Status: DISCONTINUED | OUTPATIENT
Start: 2017-03-01 | End: 2017-03-01 | Stop reason: HOSPADM

## 2017-03-01 RX ORDER — HEPARIN 100 UNIT/ML
500 SYRINGE INTRAVENOUS
Status: DISCONTINUED | OUTPATIENT
Start: 2017-03-01 | End: 2017-03-01 | Stop reason: HOSPADM

## 2017-03-01 RX ORDER — ACETAMINOPHEN 500 MG
1000 TABLET ORAL
Status: CANCELLED
Start: 2017-03-01

## 2017-03-01 RX ORDER — FAMOTIDINE 20 MG/50ML
20 INJECTION, SOLUTION INTRAVENOUS
Status: COMPLETED | OUTPATIENT
Start: 2017-03-01 | End: 2017-03-01

## 2017-03-01 RX ORDER — SODIUM CHLORIDE 0.9 % (FLUSH) 0.9 %
10 SYRINGE (ML) INJECTION
Status: CANCELLED | OUTPATIENT
Start: 2017-03-01

## 2017-03-01 RX ORDER — FAMOTIDINE 20 MG/50ML
20 INJECTION, SOLUTION INTRAVENOUS
Status: CANCELLED
Start: 2017-03-01 | End: 2017-03-01

## 2017-03-01 RX ADMIN — HEPARIN SODIUM (PORCINE) LOCK FLUSH IV SOLN 100 UNIT/ML 500 UNITS: 100 SOLUTION at 03:03

## 2017-03-01 RX ADMIN — Medication 484 MG: at 02:03

## 2017-03-01 RX ADMIN — SODIUM CHLORIDE: 9 INJECTION, SOLUTION INTRAVENOUS at 01:03

## 2017-03-01 RX ADMIN — Medication 10 ML: at 01:03

## 2017-03-01 RX ADMIN — DIPHENHYDRAMINE HYDROCHLORIDE 50 MG: 50 INJECTION, SOLUTION INTRAMUSCULAR; INTRAVENOUS at 01:03

## 2017-03-01 RX ADMIN — ACETAMINOPHEN 1000 MG: 500 TABLET ORAL at 01:03

## 2017-03-01 RX ADMIN — FAMOTIDINE 20 MG: 20 INJECTION, SOLUTION INTRAVENOUS at 02:03

## 2017-03-01 NOTE — MR AVS SNAPSHOT
Patient Information     Patient Name Sex Jing Gimenez Female 1940      Visit Information        Provider Department Dept Phone Center    3/1/2017 1:30 PM CHAIR [place] [facility] [phone] [facility]      Patient Instructions     None      Your Current Medications Are     ASCORBATE CALCIUM (VITAMIN C ORAL)    calcium-vitamin D3-vitamin K (VIACTIV) 500-500-40 mg-unit-mcg Chew    coenzyme Q10 (CO Q-10) 100 mg capsule    cyanocobalamin, vitamin B-12, (VITAMIN B-12) 50 mcg Lozg    DOCUSATE CALCIUM (STOOL SOFTENER ORAL)    ibandronate (BONIVA) 150 mg tablet    lidocaine-prilocaine (EMLA) cream    lisinopril 10 MG tablet    loratadine (CLARITIN) 10 mg tablet    MULTIVITAMIN ORAL    naproxen sodium (ANAPROX) 220 MG tablet    omeprazole (PRILOSEC) 40 MG capsule    prochlorperazine (COMPAZINE) 10 MG tablet    pyridoxine, vitamin B6, (VITAMIN B-6) 50 MG Tab    simvastatin (ZOCOR) 20 MG tablet    tramadol (ULTRAM) 50 mg tablet    trazodone (DESYREL) 100 MG tablet    UNABLE TO FIND    VITAMIN E ACETATE (VITAMIN E ORAL)    triamcinolone acetonide 0.1% (KENALOG) 0.1 % ointment      Facility-Administered Medications     acetaminophen tablet 1,000 mg    diphenhydramine IVPB 50 mg    epoetin cody injection 40,000 Units    epoetin cody injection 40,000 Units    famotidine IVPB 20 mg    heparin, porcine (PF) 100 unit/mL injection flush 500 Units    ondansetron disintegrating tablet 8 mg    sodium chloride 0.9% 100 mL flush bag    sodium chloride 0.9% flush 10 mL    sodium chloride 0.9% flush 10 mL    trastuzumab (HERCEPTIN) (HERCEPTIN) 484 mg in sodium chloride 0.9% 250 mL chemo infusion    triamcinolone acetonide injection 80 mg      Appointments for Next Year     3/23/2017  9:15 AM NON FASTING LAB (15 min.) [facility] LAB, [facility] DRAW STATION    Arrive at check-in approximately 15 minutes before your scheduled appointment time. Bring all outside medical records  "and imaging, along with a list of your current medications and insurance card.    3/23/2017 10:20 AM ESTABLISHED PATIENT (20 min.) [facility] Hematology Mary Smith MD    Arrive at check-in approximately 15 minutes before your scheduled appointment time. Bring all outside medical records and imaging, along with a list of your current medications and insurance card.    3/23/2017 11:30 AM INFUSION 120 MIN (120 min.) [facility] CHAIR 19, [facility] CHEMO    Arrive at check-in approximately 15 minutes before your scheduled appointment time. Bring all outside medical records and imaging, along with a list of your current medications and insurance card.    [address] [place] [address]    5/2/2017  8:00 AM FASTING LAB (15 min.) [facility] LAB, [place]    1. Do not eat or drink anything for TEN HOURS (10) PRIOR TO TEST. Do not chew gum or eat candy mints, even those claiming to be sugar free. Water is allowed but do not drink any other fluids 2. Take your regular daily medicines as your doctor has ordered. If you are diabetic, do not take your insulin or other diabetic medication until your blood is drawn and you are ready to eat. Your physician may have special instructions for diabetics. Check with your doctor if you have any questions.3. Alcoholic beverages are not allowed starting at 6:00pm the evening before your appointment.    [address] [place] [address]    5/10/2017  9:50 AM ESTABLISHED PATIENT EXTENDED (40 min.) [facility] Nakul Franco MD    Arrive at check-in approximately 15 minutes before your scheduled appointment time. Bring all outside medical records and imaging, along with a list of your current medications and insurance card.    [address] [place] [address]         Default Flowsheet Data (last 24 hours)      Amb Complex Vitals Ab        03/01/17 1116                Measurements    Weight 79.7 kg (175 lb 11.3 oz)        Height 5' 5" (1.651 m)        BSA (Calculated - sq m) 1.91 sq meters        " BMI (Calculated) 29.3        BP (!)  159/72        Temp 98.5 °F (36.9 °C)        Pulse 75        Resp 17        Pain Assessment    Pain Score Two        Pain Loc BACK                Allergies     Sulfa (Sulfonamide Antibiotics)     Other reaction(s): Unknown  Other reaction(s): Unknown  Other reaction(s): Unknown    Adhesive Rash      Medications You Received from 02/28/2017 1541 to 03/01/2017 1541        Date/Time Order Dose Route Action     03/01/2017 1336 acetaminophen tablet 1,000 mg 1,000 mg Oral Given     03/01/2017 1337 diphenhydramine IVPB 50 mg 50 mg Intravenous New Bag     03/01/2017 1401 famotidine IVPB 20 mg 20 mg Intravenous New Bag     03/01/2017 1337 sodium chloride 0.9% 100 mL flush bag   Intravenous New Bag     03/01/2017 1330 sodium chloride 0.9% flush 10 mL 10 mL Intravenous Given     03/01/2017 1430 trastuzumab (HERCEPTIN) (HERCEPTIN) 484 mg in sodium chloride 0.9% 250 mL chemo infusion 484 mg Intravenous New Bag      Current Discharge Medication List     Cannot display discharge medications since this is not an admission.

## 2017-03-01 NOTE — PLAN OF CARE
Problem: Patient Care Overview (Adult)  Goal: Discharge Needs Assessment  Outcome: Ongoing (interventions implemented as appropriate)  Adequate for discharge.   Pt tolerated infusion without noted distress.  Reviewed upcoming appointments.  All questions answered.  Ambulated from infusion with

## 2017-03-01 NOTE — MR AVS SNAPSHOT
[facility]  [address]  [facility] [address]  Phone: [phone]  Fax: [phone]                  Jing Tenorio   3/1/2017 11:20 AM   Office Visit    Description:  Female : 1940   Provider:  Otoniel Ohara MD   Department:  [facility] - Hematology           Diagnoses this Visit        Comments    Malignant neoplasm involving both nipple and areola of left breast in female    -  Primary            To Do List           Future Appointments        Provider Department Dept Phone    3/23/2017 9:15 AM LAB, [facility] [phone]    3/23/2017 10:20 AM Mary Smith MD [place] [phone]    3/23/2017 11:30 AM CHAIR 19, [facility] CHE[facility] [phone]    2017 8:00 AM [place], [place] [facility] [phone]    5/10/2017 9:50 AM Nakul Franco MD [facility] [phone]      Goals (5 Years of Data)     None      [facility]     [facility] Care [facility] -  Assistance  Registered nurses in the [facility] provide clinical advisement, health education, appointment booking, and other advisory services.  Call for this free service at [phone].             Medications           Message regarding Medications     Verify the changes and/or additions to your medication regime listed below are the same as discussed with your clinician today.  If any of these changes or additions are incorrect, please notify your healthcare provider.             Verify that the below list of medications is an accurate representation of the medications you are currently taking.  If none reported, the list may be blank. If incorrect, please contact your healthcare provider. Carry this list with you in case of emergency.           Current Medications     ASCORBATE CALCIUM (VITAMIN C ORAL) Take 500 mg by mouth once daily.     calcium-vitamin D3-vitamin K  (VIACTIV) 500-500-40 mg-unit-mcg Chew Take 2 tablets by mouth once daily.     coenzyme Q10 (CO Q-10) 100 mg capsule Take 100 mg by mouth once daily.     cyanocobalamin, vitamin B-12, (VITAMIN B-12) 50 mcg Lozg Every day    DOCUSATE CALCIUM (STOOL SOFTENER ORAL) 2 (two) times daily. Twice a day    ibandronate (BONIVA) 150 mg tablet TK 1 T PO Q 30 DAYS    lidocaine-prilocaine (EMLA) cream Apply to affected area once    lisinopril 10 MG tablet Take 1 tablet (10 mg total) by mouth once daily.    loratadine (CLARITIN) 10 mg tablet Take 10 mg by mouth daily as needed.     MULTIVITAMIN ORAL once daily. Every day    naproxen sodium (ANAPROX) 220 MG tablet Take 220 mg by mouth 2 (two) times daily with meals.    omeprazole (PRILOSEC) 40 MG capsule Take 1 capsule (40 mg total) by mouth once daily.    prochlorperazine (COMPAZINE) 10 MG tablet Take 1 tablet (10 mg total) by mouth every 6 (six) hours as needed.    pyridoxine, vitamin B6, (VITAMIN B-6) 50 MG Tab Take 50 mg by mouth once daily.    simvastatin (ZOCOR) 20 MG tablet Take 1 tablet (20 mg total) by mouth nightly. Every day    tramadol (ULTRAM) 50 mg tablet Take 1 tablet (50 mg total) by mouth every 6 (six) hours as needed for Pain.    trazodone (DESYREL) 100 MG tablet Take 1 tablet (100 mg total) by mouth every evening.    UNABLE TO FIND 2,000 Units. VITAMIN D 1000 IU Q DAY     VITAMIN E ACETATE (VITAMIN E ORAL) Take 400 Units by mouth once daily. Every day    triamcinolone acetonide 0.1% (KENALOG) 0.1 % ointment Apply topically 2 (two) times daily.           Clinical Reference Information           Your Vitals Were     BP                   159/72           Blood Pressure          Most Recent Value    BP  (!)  159/72      Allergies as of 3/1/2017     Sulfa (Sulfonamide Antibiotics)    Adhesive      Immunizations Administered on Date of Encounter - 3/1/2017     None      Orders Placed During Today's Visit     Future Labs/Procedures Expected by Expires    CBC w/ DIFF   3/1/2017 4/30/2018    CMP  3/1/2017 4/30/2018    LDH  3/1/2017 4/30/2018    MG  3/1/2017 4/30/2018      Language Assistance Services     ATTENTION: Language assistance services are available, free of charge. Please call [phone].      ATENCIÓN: Si habla bettyañol, tiene a keys disposición servicios gratuitos de asistencia lingüística. Llame al [phone].     [facility] Ý: N?u b?n nói Ti?ng Vi?t, có các d?ch v? h? tr? ngôn ng? mi?n phí dành cho b?n. G?i s? [phone].         [facility] complies with applicable Federal civil rights laws and does not discriminate on the basis of race, color, national origin, age, disability, or sex.

## 2017-03-01 NOTE — PROGRESS NOTES
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    The patient is in the midst of receiving adjuvant Herceptin and returns for   evaluation prior to next dose of therapy.  She has yet to receive her   Radiation-Oncology consultation appointment for post-lumpectomy radiation.  No   other complaints or pertinent findings on a 14-point review of systems.    PHYSICAL EXAMINATION:  VITAL SIGNS:  Documented in EMR and reviewed.   GENERAL:  The patient is a well-developed, well-nourished elderly white female,   who ambulates with the assistance of a cane and has a weight of 175-1/2 pounds.  HEENT:  Normocephalic, atraumatic.  Oral mucosa pink and moist.  Lips without   lesions.  Tongue midline.  Oropharynx clear.  Nonicteric sclerae.   NECK:  Supple, no adenopathy.  No carotid bruits, thyromegaly or thyroid nodule.     HEART:  Regular rate and rhythm without murmur, gallop or rub.   LUNGS:  Clear to auscultation bilaterally.  Normal respiratory effort.  ABDOMEN:  Soft, nontender, nondistended with positive normoactive bowel sounds,   no hepatosplenomegaly.  EXTREMITIES:  No cyanosis, clubbing or edema.  Distal pulses are intact.   AXILLAE AND GROIN:  No palpable pathologic lymphadenopathy is appreciated.  SKIN:  Intact/turgor normal.  NEUROLOGIC:  Cranial nerves II-XII grossly intact.  Motor:  Good muscle bulk and   tone.  Strength/sensory 5/5 throughout.    LABORATORY:  White count 4.5, H and H 12.7 and 35, platelets 152.  Sodium 136,   potassium 4.7, chloride 96, CO2 of 32, BUN 19, creatinine 1.1, glucose 75,   calcium 9.8.  Liver function tests unremarkable for an AST of 45.  GFR is 51.    IMPRESSION:  Triple positive invasive left breast carcinoma.    PLAN:  1.  Proceed with next dose of adjuvant Herceptin to consist of 484 mg IV with   typical premeds.  2.  Check with Radiation-Oncology on status of  consultation appointment.  3.  Return in three weeks with interval CBC, CMP, LDH and magnesium.          /ls [id number] blank(s)        AVRIL/PN  dd: 03/01/2017 11:41:04 (CST)  td: 03/01/2017 22:19:17 (CST)  Doc ID   #[id number]  Job ID #[id number]    CC:

## 2017-03-23 ENCOUNTER — OFFICE VISIT (OUTPATIENT)
Dept: HEMATOLOGY/ONCOLOGY | Facility: CLINIC | Age: 77
End: 2017-03-23
Payer: MEDICARE

## 2017-03-23 ENCOUNTER — INFUSION (OUTPATIENT)
Dept: INFUSION THERAPY | Facility: HOSPITAL | Age: 77
End: 2017-03-23
Attending: INTERNAL MEDICINE
Payer: MEDICARE

## 2017-03-23 VITALS — SYSTOLIC BLOOD PRESSURE: 128 MMHG | DIASTOLIC BLOOD PRESSURE: 67 MMHG | HEART RATE: 74 BPM | RESPIRATION RATE: 20 BRPM

## 2017-03-23 VITALS
BODY MASS INDEX: 29.49 KG/M2 | DIASTOLIC BLOOD PRESSURE: 62 MMHG | WEIGHT: 177 LBS | HEIGHT: 65 IN | SYSTOLIC BLOOD PRESSURE: 134 MMHG | TEMPERATURE: 99 F | RESPIRATION RATE: 16 BRPM | HEART RATE: 86 BPM

## 2017-03-23 DIAGNOSIS — I10 ESSENTIAL HYPERTENSION: ICD-10-CM

## 2017-03-23 DIAGNOSIS — E78.5 HYPERLIPIDEMIA LDL GOAL <130: ICD-10-CM

## 2017-03-23 DIAGNOSIS — M81.0 OSTEOPOROSIS: Primary | ICD-10-CM

## 2017-03-23 DIAGNOSIS — C50.012 MALIGNANT NEOPLASM INVOLVING BOTH NIPPLE AND AREOLA OF LEFT BREAST IN FEMALE: ICD-10-CM

## 2017-03-23 DIAGNOSIS — C50.012 MALIGNANT NEOPLASM INVOLVING BOTH NIPPLE AND AREOLA OF LEFT BREAST IN FEMALE: Primary | ICD-10-CM

## 2017-03-23 PROCEDURE — 99215 OFFICE O/P EST HI 40 MIN: CPT | Mod: S$GLB,,, | Performed by: INTERNAL MEDICINE

## 2017-03-23 PROCEDURE — 25000003 PHARM REV CODE 250: Mod: PN | Performed by: INTERNAL MEDICINE

## 2017-03-23 PROCEDURE — 3075F SYST BP GE 130 - 139MM HG: CPT | Mod: S$GLB,,, | Performed by: INTERNAL MEDICINE

## 2017-03-23 PROCEDURE — 99499 UNLISTED E&M SERVICE: CPT | Mod: S$GLB,,, | Performed by: INTERNAL MEDICINE

## 2017-03-23 PROCEDURE — 3078F DIAST BP <80 MM HG: CPT | Mod: S$GLB,,, | Performed by: INTERNAL MEDICINE

## 2017-03-23 PROCEDURE — 1159F MED LIST DOCD IN RCRD: CPT | Mod: S$GLB,,, | Performed by: INTERNAL MEDICINE

## 2017-03-23 PROCEDURE — 63600175 PHARM REV CODE 636 W HCPCS: Mod: PN | Performed by: INTERNAL MEDICINE

## 2017-03-23 PROCEDURE — 96413 CHEMO IV INFUSION 1 HR: CPT | Mod: PN

## 2017-03-23 PROCEDURE — 99999 PR PBB SHADOW E&M-EST. PATIENT-LVL III: CPT | Mod: PBBFAC,,, | Performed by: INTERNAL MEDICINE

## 2017-03-23 PROCEDURE — 1157F ADVNC CARE PLAN IN RCRD: CPT | Mod: S$GLB,,, | Performed by: INTERNAL MEDICINE

## 2017-03-23 PROCEDURE — 1160F RVW MEDS BY RX/DR IN RCRD: CPT | Mod: S$GLB,,, | Performed by: INTERNAL MEDICINE

## 2017-03-23 PROCEDURE — 1126F AMNT PAIN NOTED NONE PRSNT: CPT | Mod: S$GLB,,, | Performed by: INTERNAL MEDICINE

## 2017-03-23 PROCEDURE — 96367 TX/PROPH/DG ADDL SEQ IV INF: CPT | Mod: PN

## 2017-03-23 RX ORDER — SODIUM CHLORIDE 0.9 % (FLUSH) 0.9 %
10 SYRINGE (ML) INJECTION
Status: DISCONTINUED | OUTPATIENT
Start: 2017-03-23 | End: 2017-03-23 | Stop reason: HOSPADM

## 2017-03-23 RX ORDER — FAMOTIDINE 20 MG/50ML
20 INJECTION, SOLUTION INTRAVENOUS
Status: CANCELLED
Start: 2017-03-23 | End: 2017-03-23

## 2017-03-23 RX ORDER — SODIUM CHLORIDE 0.9 % (FLUSH) 0.9 %
10 SYRINGE (ML) INJECTION
Status: CANCELLED | OUTPATIENT
Start: 2017-03-23

## 2017-03-23 RX ORDER — ACETAMINOPHEN 500 MG
1000 TABLET ORAL
Status: COMPLETED | OUTPATIENT
Start: 2017-03-23 | End: 2017-03-23

## 2017-03-23 RX ORDER — HEPARIN 100 UNIT/ML
500 SYRINGE INTRAVENOUS
Status: CANCELLED | OUTPATIENT
Start: 2017-03-23

## 2017-03-23 RX ORDER — FAMOTIDINE 20 MG/50ML
20 INJECTION, SOLUTION INTRAVENOUS
Status: COMPLETED | OUTPATIENT
Start: 2017-03-23 | End: 2017-03-23

## 2017-03-23 RX ORDER — HEPARIN 100 UNIT/ML
500 SYRINGE INTRAVENOUS
Status: DISCONTINUED | OUTPATIENT
Start: 2017-03-23 | End: 2017-03-23 | Stop reason: HOSPADM

## 2017-03-23 RX ORDER — ACETAMINOPHEN 500 MG
1000 TABLET ORAL
Status: CANCELLED
Start: 2017-03-23

## 2017-03-23 RX ADMIN — TRASTUZUMAB 484 MG: KIT at 12:03

## 2017-03-23 RX ADMIN — DIPHENHYDRAMINE HYDROCHLORIDE 50 MG: 50 INJECTION, SOLUTION INTRAMUSCULAR; INTRAVENOUS at 11:03

## 2017-03-23 RX ADMIN — SODIUM CHLORIDE: 0.9 INJECTION, SOLUTION INTRAVENOUS at 11:03

## 2017-03-23 RX ADMIN — FAMOTIDINE 20 MG: 20 INJECTION, SOLUTION INTRAVENOUS at 11:03

## 2017-03-23 RX ADMIN — HEPARIN 500 UNITS: 100 SYRINGE at 12:03

## 2017-03-23 RX ADMIN — SODIUM CHLORIDE, PRESERVATIVE FREE 10 ML: 5 INJECTION INTRAVENOUS at 12:03

## 2017-03-23 RX ADMIN — ACETAMINOPHEN 1000 MG: 500 TABLET ORAL at 11:03

## 2017-03-23 NOTE — PLAN OF CARE
Problem: Patient Care Overview (Adult)  Goal: Plan of Care Review  Outcome: Ongoing (interventions implemented as appropriate)  Pt tolerated Herceptin infusion well.  Instructed to call MD with any problems.

## 2017-03-23 NOTE — MR AVS SNAPSHOT
Patient Information     Patient Name Sex Jing Gimenez Female 1940      Visit Information        Provider Department Dept Phone Center    3/23/2017 11:30 AM CHAIR 30, [facility] [phone] [facility]      Patient Instructions     None      Your Current Medications Are     calcium-vitamin D3-vitamin K (VIACTIV) 500-500-40 mg-unit-mcg Chew    coenzyme Q10 (CO Q-10) 100 mg capsule    cyanocobalamin, vitamin B-12, (VITAMIN B-12) 50 mcg Lozg    ibandronate (BONIVA) 150 mg tablet    lidocaine-prilocaine (EMLA) cream    lisinopril 10 MG tablet    loratadine (CLARITIN) 10 mg tablet    MULTIVITAMIN ORAL    omeprazole (PRILOSEC) 40 MG capsule    pyridoxine, vitamin B6, (VITAMIN B-6) 50 MG Tab    simvastatin (ZOCOR) 20 MG tablet    tramadol (ULTRAM) 50 mg tablet    trazodone (DESYREL) 100 MG tablet    UNABLE TO FIND    triamcinolone acetonide 0.1% (KENALOG) 0.1 % ointment    ASCORBATE CALCIUM (VITAMIN C ORAL) (Discontinued)    DOCUSATE CALCIUM (STOOL SOFTENER ORAL) (Discontinued)    naproxen sodium (ANAPROX) 220 MG tablet (Discontinued)    prochlorperazine (COMPAZINE) 10 MG tablet (Discontinued)    VITAMIN E ACETATE (VITAMIN E ORAL) (Discontinued)      Facility-Administered Medications     acetaminophen tablet 1,000 mg    diphenhydramine IVPB 50 mg    epoetin cody injection 40,000 Units    epoetin cody injection 40,000 Units    famotidine IVPB 20 mg    heparin, porcine (PF) 100 unit/mL injection flush 500 Units    ondansetron disintegrating tablet 8 mg    sodium chloride 0.9% 100 mL flush bag    sodium chloride 0.9% flush 10 mL    sodium chloride 0.9% flush 10 mL    trastuzumab (HERCEPTIN) (HERCEPTIN) 484 mg in sodium chloride 0.9% 250 mL chemo infusion    triamcinolone acetonide injection 80 mg    trastuzumab (HERCEPTIN) (HERCEPTIN) 484 mg in sodium chloride 0.9% 250 mL chemo infusion (Discontinued)      Appointments for Next Year     2017 11:25 AM NON FASTING  LAB (15 min.) [facility], [place]    Arrive at check-in approximately 15 minutes before your scheduled appointment time. Bring all outside medical records and imaging, along with a list of your current medications and insurance card.    [address] [place] [address]    4/13/2017 10:20 AM ESTABLISHED PATIENT (20 min.) [facility] Hematology Mary Smith MD    Arrive at check-in approximately 15 minutes before your scheduled appointment time. Bring all outside medical records and imaging, along with a list of your current medications and insurance card.    4/13/2017 11:30 AM INFUSION 120 MIN (120 min.) [facility] CHAIR 33, [facility]    Arrive at check-in approximately 15 minutes before your scheduled appointment time. Bring all outside medical records and imaging, along with a list of your current medications and insurance card.    [address] [place] [address]    5/2/2017  8:00 AM FASTING LAB (15 min.) [facility], [place]    1. Do not eat or drink anything for TEN HOURS (10) PRIOR TO TEST. Do not chew gum or eat candy mints, even those claiming to be sugar free. Water is allowed but do not drink any other fluids 2. Take your regular daily medicines as your doctor has ordered. If you are diabetic, do not take your insulin or other diabetic medication until your blood is drawn and you are ready to eat. Your physician may have special instructions for diabetics. Check with your doctor if you have any questions.3. Alcoholic beverages are not allowed starting at 6:00pm the evening before your appointment.    [address] [place] [address]    5/10/2017  9:50 AM ESTABLISHED PATIENT EXTENDED (40 min.) [facility] Medicine Nakul Franco MD    Arrive at check-in approximately 15 minutes before your scheduled appointment time. Bring all outside medical records and imaging, along with a list of your current medications and insurance card.    [address] [place] [address]         Default Flowsheet Data (last 24 hours)  "     Amb Complex Vitals Ab        03/23/17 1252 03/23/17 1113 03/23/17 1017          Measurements    Weight   80.3 kg (177 lb 0.5 oz)      Height   5' 5" (1.651 m)      BSA (Calculated - sq m)   1.92 sq meters      BMI (Calculated)   29.5      /67  134/62      Temp   98.6 °F (37 °C)      Pulse 74  86      Resp 20  16      Pain Assessment    Pain Score  Zero Zero              Allergies     Sulfa (Sulfonamide Antibiotics)     Other reaction(s): Unknown  Other reaction(s): Unknown  Other reaction(s): Unknown    Adhesive Rash      Medications You Received from 03/22/2017 1252 to 03/23/2017 1252        Date/Time Order Dose Route Action     03/23/2017 1133 acetaminophen tablet 1,000 mg 1,000 mg Oral Given     03/23/2017 1133 diphenhydramine IVPB 50 mg 50 mg Intravenous New Bag     03/23/2017 1150 famotidine IVPB 20 mg 20 mg Intravenous New Bag     03/23/2017 1249 heparin, porcine (PF) 100 unit/mL injection flush 500 Units 500 Units Intravenous Given     03/23/2017 1128 sodium chloride 0.9% 100 mL flush bag   Intravenous New Bag     03/23/2017 1249 sodium chloride 0.9% flush 10 mL 10 mL Intravenous Given     03/23/2017 1207 trastuzumab (HERCEPTIN) (HERCEPTIN) 484 mg in sodium chloride 0.9% 250 mL chemo infusion 484 mg Intravenous New Bag      Current Discharge Medication List     Cannot display discharge medications since this is not an admission.      "

## 2017-03-23 NOTE — PROGRESS NOTES
Subjective:       Patient ID: Jing Tenorio is a 76 y.o. female.    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 Oncotype DX somehow got done,    came back at 14.previous Hip sx that still hurts,dyslipidemia, HTN sees Dr. Franco for the same,   HPI:   Eyes much better. Feeling better.concerned about nails turning black explained again today that it will get better. Started xrt and tolerating it well  PHYSICAL EXAM:     Vitals:    03/23/17 1017   BP: 134/62   Pulse: 86   Resp: 16   Temp: 98.6 °F (37 °C)       GENERAL: Comfortable looking patient. Patient is in no distress.  Awake, alert and oriented to time, person and place.  No anxiety, or agitation.      HEENT: Normal conjunctivae and eyelids. WNL.  PERRLA 3 to 4 mm. No icterus, no pallor, no congestion, and no discharge noted.     NECK:  Supple. Trachea is central.  No crepitus.  No JVD or masses.    RESPIRATORY:  No intercostal retractions.  No dullness to percussion.  Chest is clear to auscultation.  No rales, rhonchi or wheezes.  No crepitus.  Good air entry bilaterally.    CARDIOVASCULAR:  S1 and S2 are normally heard without murmurs or gallops.  All peripheral pulses are present.    ABDOMEN:  Normal abdomen.  No hepatosplenomegaly.  No free fluid.  Bowel sounds are present.  No hernia noted. No masses.  No rebound or tenderness.  No guarding or rigidity.  Umbilicus is midline.    LYMPHATICS:  No axillary, cervical, supraclavicular, submental, or inguinal lymphadenopathy.    SKIN/MUSCULOSKELETAL:  There is no evidence of excoriation marks or ecchmosis.  No rashes.  No cyanosis.  No clubbing.  No joint or skeletal deformities noted.  Normal range of motion.    NEUROLOGIC:  Higher functions are appropriate.  No cranial nerve deficits.  Normal carolina.  Normal strength.  Motor and sensory functions are  normal.  Deep tendon reflexes are normal.    GENITAL/RECTAL:  Exams are deferred.      Laboratory:     CBC:  Lab Results   Component Value Date    WBC 4.40 03/23/2017    RBC 3.53 (L) 03/23/2017    HGB 11.5 (L) 03/23/2017    HCT 34.6 (L) 03/23/2017    MCV 98 03/23/2017    MCH 32.6 (H) 03/23/2017    MCHC 33.2 03/23/2017    RDW 14.3 03/23/2017     03/23/2017    MPV 10.3 03/23/2017    GRAN 3.1 03/23/2017    GRAN 71.1 03/23/2017    LYMPH 0.7 (L) 03/23/2017    LYMPH 15.2 (L) 03/23/2017    MONO 0.4 03/23/2017    MONO 8.9 03/23/2017    EOS 0.2 03/23/2017    BASO 0.02 03/23/2017    EOSINOPHIL 4.3 03/23/2017    BASOPHIL 0.5 03/23/2017       BMP: BMP  Lab Results   Component Value Date     03/23/2017    K 4.5 03/23/2017    CL 98 03/23/2017    CO2 32 (H) 03/23/2017    BUN 17 03/23/2017    CREATININE 1.09 03/23/2017    CALCIUM 9.9 03/23/2017    ANIONGAP 8 03/23/2017    ESTGFRAFRICA 56 (A) 03/23/2017    EGFRNONAA 49 (A) 03/23/2017       LFT:   Lab Results   Component Value Date    ALT 36 03/23/2017    AST 44 (H) 03/23/2017    ALKPHOS 95 03/23/2017    BILITOT 0.6 03/23/2017         Assessment/Plan:     Continue with rx herceptin only to complete one yr.   Pt got iron and procirt  rtc 3 weeks for next herceptin   cont  With xrt. Pt due for scans , will scan about 6 weeks after xrt completed  Cbc, cmp

## 2017-04-13 ENCOUNTER — INFUSION (OUTPATIENT)
Dept: INFUSION THERAPY | Facility: HOSPITAL | Age: 77
End: 2017-04-13
Attending: INTERNAL MEDICINE
Payer: MEDICARE

## 2017-04-13 ENCOUNTER — OFFICE VISIT (OUTPATIENT)
Dept: HEMATOLOGY/ONCOLOGY | Facility: CLINIC | Age: 77
End: 2017-04-13
Payer: MEDICARE

## 2017-04-13 VITALS
HEART RATE: 81 BPM | TEMPERATURE: 99 F | HEIGHT: 65 IN | SYSTOLIC BLOOD PRESSURE: 122 MMHG | WEIGHT: 176.81 LBS | RESPIRATION RATE: 18 BRPM | DIASTOLIC BLOOD PRESSURE: 58 MMHG | BODY MASS INDEX: 29.46 KG/M2

## 2017-04-13 VITALS
OXYGEN SATURATION: 100 % | RESPIRATION RATE: 16 BRPM | HEART RATE: 78 BPM | DIASTOLIC BLOOD PRESSURE: 69 MMHG | SYSTOLIC BLOOD PRESSURE: 161 MMHG

## 2017-04-13 DIAGNOSIS — C50.012 MALIGNANT NEOPLASM INVOLVING BOTH NIPPLE AND AREOLA OF LEFT BREAST IN FEMALE: ICD-10-CM

## 2017-04-13 DIAGNOSIS — C50.012 MALIGNANT NEOPLASM INVOLVING BOTH NIPPLE AND AREOLA OF LEFT BREAST IN FEMALE: Primary | ICD-10-CM

## 2017-04-13 DIAGNOSIS — N18.30 CHRONIC KIDNEY DISEASE, STAGE III (MODERATE): ICD-10-CM

## 2017-04-13 DIAGNOSIS — E78.5 HYPERLIPIDEMIA LDL GOAL <130: ICD-10-CM

## 2017-04-13 DIAGNOSIS — I10 ESSENTIAL HYPERTENSION: Primary | ICD-10-CM

## 2017-04-13 PROCEDURE — 99215 OFFICE O/P EST HI 40 MIN: CPT | Mod: S$GLB,,, | Performed by: INTERNAL MEDICINE

## 2017-04-13 PROCEDURE — 63600175 PHARM REV CODE 636 W HCPCS: Mod: PN | Performed by: INTERNAL MEDICINE

## 2017-04-13 PROCEDURE — 3074F SYST BP LT 130 MM HG: CPT | Mod: S$GLB,,, | Performed by: INTERNAL MEDICINE

## 2017-04-13 PROCEDURE — 25000003 PHARM REV CODE 250: Mod: PN | Performed by: INTERNAL MEDICINE

## 2017-04-13 PROCEDURE — 1125F AMNT PAIN NOTED PAIN PRSNT: CPT | Mod: S$GLB,,, | Performed by: INTERNAL MEDICINE

## 2017-04-13 PROCEDURE — 1159F MED LIST DOCD IN RCRD: CPT | Mod: S$GLB,,, | Performed by: INTERNAL MEDICINE

## 2017-04-13 PROCEDURE — 96367 TX/PROPH/DG ADDL SEQ IV INF: CPT | Mod: PN

## 2017-04-13 PROCEDURE — 96413 CHEMO IV INFUSION 1 HR: CPT | Mod: PN

## 2017-04-13 PROCEDURE — 3078F DIAST BP <80 MM HG: CPT | Mod: S$GLB,,, | Performed by: INTERNAL MEDICINE

## 2017-04-13 PROCEDURE — 1157F ADVNC CARE PLAN IN RCRD: CPT | Mod: S$GLB,,, | Performed by: INTERNAL MEDICINE

## 2017-04-13 PROCEDURE — 1160F RVW MEDS BY RX/DR IN RCRD: CPT | Mod: S$GLB,,, | Performed by: INTERNAL MEDICINE

## 2017-04-13 PROCEDURE — 99999 PR PBB SHADOW E&M-EST. PATIENT-LVL III: CPT | Mod: PBBFAC,,, | Performed by: INTERNAL MEDICINE

## 2017-04-13 PROCEDURE — 99499 UNLISTED E&M SERVICE: CPT | Mod: S$GLB,,, | Performed by: INTERNAL MEDICINE

## 2017-04-13 RX ORDER — HEPARIN 100 UNIT/ML
500 SYRINGE INTRAVENOUS
Status: DISCONTINUED | OUTPATIENT
Start: 2017-04-13 | End: 2017-04-13 | Stop reason: HOSPADM

## 2017-04-13 RX ORDER — ACETAMINOPHEN 500 MG
1000 TABLET ORAL
Status: CANCELLED
Start: 2017-04-13

## 2017-04-13 RX ORDER — BACITRACIN ZINC 500 UNIT/G
OINTMENT (GRAM) TOPICAL DAILY PRN
COMMUNITY
End: 2017-05-10

## 2017-04-13 RX ORDER — HEPARIN 100 UNIT/ML
500 SYRINGE INTRAVENOUS
Status: CANCELLED | OUTPATIENT
Start: 2017-04-13

## 2017-04-13 RX ORDER — SODIUM CHLORIDE 0.9 % (FLUSH) 0.9 %
10 SYRINGE (ML) INJECTION
Status: CANCELLED | OUTPATIENT
Start: 2017-04-13

## 2017-04-13 RX ORDER — ACETAMINOPHEN 500 MG
1000 TABLET ORAL
Status: COMPLETED | OUTPATIENT
Start: 2017-04-13 | End: 2017-04-13

## 2017-04-13 RX ORDER — FAMOTIDINE 20 MG/50ML
20 INJECTION, SOLUTION INTRAVENOUS
Status: COMPLETED | OUTPATIENT
Start: 2017-04-13 | End: 2017-04-13

## 2017-04-13 RX ORDER — FAMOTIDINE 20 MG/50ML
20 INJECTION, SOLUTION INTRAVENOUS
Status: CANCELLED
Start: 2017-04-13 | End: 2017-04-13

## 2017-04-13 RX ORDER — SODIUM CHLORIDE 0.9 % (FLUSH) 0.9 %
10 SYRINGE (ML) INJECTION
Status: DISCONTINUED | OUTPATIENT
Start: 2017-04-13 | End: 2017-04-13 | Stop reason: HOSPADM

## 2017-04-13 RX ADMIN — SODIUM CHLORIDE: 9 INJECTION, SOLUTION INTRAVENOUS at 11:04

## 2017-04-13 RX ADMIN — FAMOTIDINE 20 MG: 20 INJECTION, SOLUTION INTRAVENOUS at 11:04

## 2017-04-13 RX ADMIN — HEPARIN 500 UNITS: 100 SYRINGE at 01:04

## 2017-04-13 RX ADMIN — SODIUM CHLORIDE, PRESERVATIVE FREE 10 ML: 5 INJECTION INTRAVENOUS at 11:04

## 2017-04-13 RX ADMIN — DIPHENHYDRAMINE HYDROCHLORIDE 50 MG: 50 INJECTION, SOLUTION INTRAMUSCULAR; INTRAVENOUS at 12:04

## 2017-04-13 RX ADMIN — ACETAMINOPHEN 1000 MG: 500 TABLET ORAL at 11:04

## 2017-04-13 RX ADMIN — Medication 484 MG: at 12:04

## 2017-04-13 NOTE — PLAN OF CARE
Problem: Chemotherapy Effects (Adult)  Goal: Signs and Symptoms of Listed Potential Problems Will be Absent or Manageable (Chemotherapy Effects)  Signs and symptoms of listed potential problems will be absent or manageable by discharge/transition of care (reference Chemotherapy Effects (Adult) CPG).   Outcome: Ongoing (interventions implemented as appropriate)  No complaints, tolerated treatment well.    Problem: Patient Care Overview (Adult)  Goal: Plan of Care Review  Outcome: Ongoing (interventions implemented as appropriate)  Pt received herceptin then left with her spouse at her side.

## 2017-04-13 NOTE — PROGRESS NOTES
Subjective:       Patient ID: Jing Tenorio is a 76 y.o. female.    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 Oncotype DX somehow got done,    came back at 12.previous Hip sx that still hurts,dyslipidemia, HTN sees Dr. Franco for the same,   HPI:   Eyes much better. Feeling better.concerned about nails turning black explained again today that it will get better. Completed xrt yesterday   left arm pit red, and under breast  PHYSICAL EXAM:     Wt Readings from Last 3 Encounters:   04/13/17 80.2 kg (176 lb 12.9 oz)   03/23/17 80.3 kg (177 lb 0.5 oz)   03/01/17 79.7 kg (175 lb 11.3 oz)     Temp Readings from Last 3 Encounters:   04/13/17 98.6 °F (37 °C)   03/23/17 98.6 °F (37 °C)   03/01/17 97.8 °F (36.6 °C)     BP Readings from Last 3 Encounters:   04/13/17 (!) 122/58   03/23/17 128/67   03/23/17 134/62     Pulse Readings from Last 3 Encounters:   04/13/17 81   03/23/17 74   03/23/17 86     GENERAL: Comfortable looking patient. Patient is in no distress.  Awake, alert and oriented to time, person and place.  No anxiety, or agitation.      HEENT: Normal conjunctivae and eyelids. WNL.  PERRLA 3 to 4 mm. No icterus, no pallor, no congestion, and no discharge noted.     NECK:  Supple. Trachea is central.  No crepitus.  No JVD or masses.    RESPIRATORY:  No intercostal retractions.  No dullness to percussion.  Chest is clear to auscultation.  No rales, rhonchi or wheezes.  No crepitus.  Good air entry bilaterally.  Left armpit has some oozing yellow material, pt using topical abx cream looks clean   under left breast skin cut with no oozing , has cream on it as well.   lumpectomy scar is healed well  CARDIOVASCULAR:  S1 and S2 are normally heard without murmurs or gallops.  All peripheral pulses are present.    ABDOMEN:  Normal abdomen.  No hepatosplenomegaly.   No free fluid.  Bowel sounds are present.  No hernia noted. No masses.  No rebound or tenderness.  No guarding or rigidity.  Umbilicus is midline.    LYMPHATICS:  No axillary, cervical, supraclavicular, submental, or inguinal lymphadenopathy.    SKIN/MUSCULOSKELETAL:  There is no evidence of excoriation marks or ecchmosis.  No rashes.  No cyanosis.  No clubbing.  No joint or skeletal deformities noted.  Normal range of motion.    NEUROLOGIC:  Higher functions are appropriate.  No cranial nerve deficits.  Normal carolina.  Normal strength.  Motor and sensory functions are normal.  Deep tendon reflexes are normal.    GENITAL/RECTAL:  Exams are deferred.      Laboratory:     CBC:  Lab Results   Component Value Date    WBC 4.63 04/13/2017    RBC 3.45 (L) 04/13/2017    HGB 11.1 (L) 04/13/2017    HCT 33.4 (L) 04/13/2017    MCV 97 04/13/2017    MCH 32.2 (H) 04/13/2017    MCHC 33.2 04/13/2017    RDW 15.2 (H) 04/13/2017     04/13/2017    MPV 10.0 04/13/2017    GRAN 3.6 04/13/2017    GRAN 78.0 (H) 04/13/2017    LYMPH 0.5 (L) 04/13/2017    LYMPH 10.2 (L) 04/13/2017    MONO 0.4 04/13/2017    MONO 8.0 04/13/2017    EOS 0.2 04/13/2017    BASO 0.03 04/13/2017    EOSINOPHIL 3.2 04/13/2017    BASOPHIL 0.6 04/13/2017       BMP: BMP  Lab Results   Component Value Date     04/13/2017    K 4.7 04/13/2017    CL 98 04/13/2017    CO2 32 (H) 04/13/2017    BUN 23 (H) 04/13/2017    CREATININE 1.16 04/13/2017    CALCIUM 9.6 04/13/2017    ANIONGAP 7 (L) 04/13/2017    ESTGFRAFRICA 52 (A) 04/13/2017    EGFRNONAA 45 (A) 04/13/2017       LFT:   Lab Results   Component Value Date    ALT 31 04/13/2017    AST 42 (H) 04/13/2017    ALKPHOS 90 04/13/2017    BILITOT 0.6 04/13/2017         Assessment/Plan:     Continue with rx herceptin only to complete one yr.   Pt got iron and procirt  Proceed with herceptin  Pt due for scans , will scan about 6 weeks after xrt completed  Cbc, cmp and will need to start arimidex in about a month   cont wound  care to armpit  And under armpit   rtc 3 weeks for next herceptin

## 2017-04-13 NOTE — MR AVS SNAPSHOT
Patient Information     Patient Name Sex Jing Gimenez Female 1940      Visit Information        Provider Department Dept Phone Center    2017 11:30 AM CHAIR 21, [facility] [phone] [facility]      Patient Instructions     None      Your Current Medications Are     bacitracin 500 unit/gram Oint    calcium-vitamin D3-vitamin K (VIACTIV) 500-500-40 mg-unit-mcg Chew    coenzyme Q10 (CO Q-10) 100 mg capsule    cyanocobalamin, vitamin B-12, (VITAMIN B-12) 50 mcg Lozg    ibandronate (BONIVA) 150 mg tablet    lidocaine-prilocaine (EMLA) cream    lisinopril 10 MG tablet    loratadine (CLARITIN) 10 mg tablet    MULTIVITAMIN ORAL    omeprazole (PRILOSEC) 40 MG capsule    pyridoxine, vitamin B6, (VITAMIN B-6) 50 MG Tab    simvastatin (ZOCOR) 20 MG tablet    tramadol (ULTRAM) 50 mg tablet    trazodone (DESYREL) 100 MG tablet    UNABLE TO FIND    triamcinolone acetonide 0.1% (KENALOG) 0.1 % ointment (Discontinued)      Facility-Administered Medications     acetaminophen tablet 1,000 mg    diphenhydramine IVPB 50 mg    epoetin cody injection 40,000 Units    epoetin cody injection 40,000 Units    famotidine IVPB 20 mg    sodium chloride 0.9% 100 mL flush bag    sodium chloride 0.9% flush 10 mL    trastuzumab (HERCEPTIN) (HERCEPTIN) 484 mg in sodium chloride 0.9% 250 mL chemo infusion    triamcinolone acetonide injection 80 mg    heparin, porcine (PF) 100 unit/mL injection flush 500 Units (Discontinued)    ondansetron disintegrating tablet 8 mg (Discontinued)    sodium chloride 0.9% flush 10 mL (Discontinued)      Appointments for Next Year     2017  8:00 AM FASTING LAB (15 min.) [facility]    1. Do not eat or drink anything for TEN HOURS (10) PRIOR TO TEST. Do not chew gum or eat candy mints, even those claiming to be sugar free. Water is allowed but do not drink any other fluids 2. Take your regular daily medicines as your  "doctor has ordered. If you are diabetic, do not take your insulin or other diabetic medication until your blood is drawn and you are ready to eat. Your physician may have special instructions for diabetics. Check with your doctor if you have any questions.3. Alcoholic beverages are not allowed starting at 6:00pm the evening before your appointment.    [facility] [[email or web address]] Floor    5/4/2017  9:00 AM NON FASTING LAB (15 min.) [facility] LAB, [facility] DRAW STATION    Arrive at check-in approximately 15 minutes before your scheduled appointment time. Bring all outside medical records and imaging, along with a list of your current medications and insurance card.    5/4/2017 10:00 AM ESTABLISHED PATIENT (20 min.) [facility] Hematology Larry Moore NP    Arrive at check-in approximately 15 minutes before your scheduled appointment time. Bring all outside medical records and imaging, along with a list of your current medications and insurance card.    5/4/2017 10:30 AM INFUSION 120 MIN (120 min.) [facility] CHAIR 28, [facility] CHEMO    Arrive at check-in approximately 15 minutes before your scheduled appointment time. Bring all outside medical records and imaging, along with a list of your current medications and insurance card.    [facility] [[email or web address]] Floor    5/10/2017  9:50 AM ESTABLISHED PATIENT EXTENDED (40 min.) [facility] Medicine Nakul Franco MD    Arrive at check-in approximately 15 minutes before your scheduled appointment time. Bring all outside medical records and imaging, along with a list of your current medications and insurance card.    1st Floor         Default Flowsheet Data (last 24 hours)      Amb Complex Vitals Ab        04/13/17 1300 04/13/17 1100 04/13/17 0951          Measurements    Weight   80.2 kg (176 lb 12.9 oz)      Height   5' 5" (1.651 m)      BSA (Calculated - sq m)   1.92 sq meters      BMI (Calculated)   29.5      BP (!)  161/69  (!)  122/58      Temp   98.6 °F (37 °C)      " Pulse 78  81      Resp 16  18      SpO2 100 %        Pain Assessment    Pain Score  One Two   under left arm               Allergies     Sulfa (Sulfonamide Antibiotics)     Other reaction(s): Unknown  Other reaction(s): Unknown  Other reaction(s): Unknown    Adhesive Rash      Medications You Received from 04/12/2017 1710 to 04/13/2017 1710        Date/Time Order Dose Route Action     04/13/2017 1136 acetaminophen tablet 1,000 mg 1,000 mg Oral Given     04/13/2017 1208 diphenhydramine IVPB 50 mg 50 mg Intravenous New Bag     04/13/2017 1137 famotidine IVPB 20 mg 20 mg Intravenous New Bag     04/13/2017 1315 heparin, porcine (PF) 100 unit/mL injection flush 500 Units 500 Units Intravenous Given     04/13/2017 1137 sodium chloride 0.9% 100 mL flush bag   Intravenous New Bag     04/13/2017 1137 sodium chloride 0.9% flush 10 mL 10 mL Intravenous Given     04/13/2017 1241 trastuzumab (HERCEPTIN) (HERCEPTIN) 484 mg in sodium chloride 0.9% 250 mL chemo infusion 484 mg Intravenous New Bag      Current Discharge Medication List     Cannot display discharge medications since this is not an admission.

## 2017-04-15 ENCOUNTER — NURSE TRIAGE (OUTPATIENT)
Dept: ADMINISTRATIVE | Facility: CLINIC | Age: 77
End: 2017-04-15

## 2017-04-15 NOTE — TELEPHONE ENCOUNTER
Reason for Disposition   [1] Looks infected (spreading redness, pus) AND [2] no fever    Protocols used:  [facility]-    Pt calling requesting antibiotic.  Pt states she is having oozing, itching and redness from radiation treatment.  Pt also states that  told her if it does not get better, call back and she will give a prescription for antibiotic.  Spoke and discussed with On Call MD (ALDO Gordon MD);  MD believes it is more fungal and try Miconazole three times a day.  Pt called and notified.  Will Message MD.

## 2017-04-26 ENCOUNTER — PATIENT OUTREACH (OUTPATIENT)
Dept: ADMINISTRATIVE | Facility: HOSPITAL | Age: 77
End: 2017-04-26

## 2017-04-26 NOTE — LETTER
April 26, 2017    Jing Tenorio  [address] [place] [address]             [facility]  [address]  [facility] [address]  Phone: [phone] Dear Ms. Tenorio:    [facility] is committed to your overall health.  To help you get the most out of each of your visits, we will review your information to make sure you are up to date on all of your recommended tests and/or procedures.      Dr. Franco       has found that you may be due for:    Tetanus immunization  Shingles immunization  Pneumonia immunization    If you have had any of the above done at another facility, please bring the records or information with you so that your record at [facility] will be complete.     If you are currently taking medication, please bring it with you to your appointment for review.    If you have any questions or concerns, please don't hesitate to call.    Sincerely,    Sarah Ovalle  Clinical Care Coordinator  [facility] [address] [facility] [address]  [place] [address]  Phone: [phone]   Fax: [phone]

## 2017-05-02 ENCOUNTER — LAB VISIT (OUTPATIENT)
Dept: LAB | Facility: HOSPITAL | Age: 77
End: 2017-05-02
Attending: INTERNAL MEDICINE
Payer: MEDICARE

## 2017-05-02 DIAGNOSIS — E78.5 DYSLIPIDEMIA: ICD-10-CM

## 2017-05-02 LAB
CHOLEST/HDLC SERPL: 2.2 {RATIO}
HDL/CHOLESTEROL RATIO: 45 %
HDLC SERPL-MCNC: 149 MG/DL
HDLC SERPL-MCNC: 67 MG/DL
LDLC SERPL CALC-MCNC: 71.4 MG/DL
NONHDLC SERPL-MCNC: 82 MG/DL
TRIGL SERPL-MCNC: 53 MG/DL

## 2017-05-02 PROCEDURE — 36415 COLL VENOUS BLD VENIPUNCTURE: CPT | Mod: PO

## 2017-05-02 PROCEDURE — 80061 LIPID PANEL: CPT

## 2017-05-04 ENCOUNTER — OFFICE VISIT (OUTPATIENT)
Dept: HEMATOLOGY/ONCOLOGY | Facility: CLINIC | Age: 77
End: 2017-05-04
Payer: MEDICARE

## 2017-05-04 ENCOUNTER — INFUSION (OUTPATIENT)
Dept: INFUSION THERAPY | Facility: HOSPITAL | Age: 77
End: 2017-05-04
Attending: INTERNAL MEDICINE
Payer: MEDICARE

## 2017-05-04 ENCOUNTER — TELEPHONE (OUTPATIENT)
Dept: HEMATOLOGY/ONCOLOGY | Facility: CLINIC | Age: 77
End: 2017-05-04

## 2017-05-04 VITALS — HEART RATE: 76 BPM | DIASTOLIC BLOOD PRESSURE: 78 MMHG | SYSTOLIC BLOOD PRESSURE: 126 MMHG | RESPIRATION RATE: 18 BRPM

## 2017-05-04 VITALS
WEIGHT: 177 LBS | DIASTOLIC BLOOD PRESSURE: 70 MMHG | SYSTOLIC BLOOD PRESSURE: 149 MMHG | HEIGHT: 65 IN | BODY MASS INDEX: 29.49 KG/M2 | RESPIRATION RATE: 18 BRPM | HEART RATE: 99 BPM | TEMPERATURE: 99 F

## 2017-05-04 DIAGNOSIS — C50.012 MALIGNANT NEOPLASM INVOLVING BOTH NIPPLE AND AREOLA OF LEFT BREAST IN FEMALE: Primary | ICD-10-CM

## 2017-05-04 PROCEDURE — 96367 TX/PROPH/DG ADDL SEQ IV INF: CPT | Mod: PN

## 2017-05-04 PROCEDURE — 99499 UNLISTED E&M SERVICE: CPT | Mod: S$GLB,,, | Performed by: NURSE PRACTITIONER

## 2017-05-04 PROCEDURE — 63600175 PHARM REV CODE 636 W HCPCS: Mod: PN | Performed by: NURSE PRACTITIONER

## 2017-05-04 PROCEDURE — 1159F MED LIST DOCD IN RCRD: CPT | Mod: S$GLB,,, | Performed by: NURSE PRACTITIONER

## 2017-05-04 PROCEDURE — 99999 PR PBB SHADOW E&M-EST. PATIENT-LVL V: CPT | Mod: 25,PBBFAC,, | Performed by: NURSE PRACTITIONER

## 2017-05-04 PROCEDURE — 1126F AMNT PAIN NOTED NONE PRSNT: CPT | Mod: S$GLB,,, | Performed by: NURSE PRACTITIONER

## 2017-05-04 PROCEDURE — 25000003 PHARM REV CODE 250: Mod: PN | Performed by: NURSE PRACTITIONER

## 2017-05-04 PROCEDURE — 1160F RVW MEDS BY RX/DR IN RCRD: CPT | Mod: S$GLB,,, | Performed by: NURSE PRACTITIONER

## 2017-05-04 PROCEDURE — 3077F SYST BP >= 140 MM HG: CPT | Mod: S$GLB,,, | Performed by: NURSE PRACTITIONER

## 2017-05-04 PROCEDURE — 99214 OFFICE O/P EST MOD 30 MIN: CPT | Mod: S$GLB,,, | Performed by: NURSE PRACTITIONER

## 2017-05-04 PROCEDURE — 3078F DIAST BP <80 MM HG: CPT | Mod: S$GLB,,, | Performed by: NURSE PRACTITIONER

## 2017-05-04 PROCEDURE — 96413 CHEMO IV INFUSION 1 HR: CPT | Mod: PN

## 2017-05-04 RX ORDER — ACETAMINOPHEN 500 MG
500 TABLET ORAL EVERY 6 HOURS PRN
COMMUNITY
End: 2019-06-10

## 2017-05-04 RX ORDER — SODIUM CHLORIDE 0.9 % (FLUSH) 0.9 %
10 SYRINGE (ML) INJECTION
Status: CANCELLED | OUTPATIENT
Start: 2017-05-04

## 2017-05-04 RX ORDER — ACETAMINOPHEN 500 MG
1000 TABLET ORAL
Status: COMPLETED | OUTPATIENT
Start: 2017-05-04 | End: 2017-05-04

## 2017-05-04 RX ORDER — ACETAMINOPHEN 500 MG
1000 TABLET ORAL
Status: CANCELLED
Start: 2017-05-04

## 2017-05-04 RX ORDER — SODIUM CHLORIDE 0.9 % (FLUSH) 0.9 %
10 SYRINGE (ML) INJECTION
Status: DISCONTINUED | OUTPATIENT
Start: 2017-05-04 | End: 2017-05-04 | Stop reason: HOSPADM

## 2017-05-04 RX ORDER — HEPARIN 100 UNIT/ML
500 SYRINGE INTRAVENOUS
Status: CANCELLED | OUTPATIENT
Start: 2017-05-04

## 2017-05-04 RX ORDER — FAMOTIDINE 20 MG/50ML
20 INJECTION, SOLUTION INTRAVENOUS
Status: COMPLETED | OUTPATIENT
Start: 2017-05-04 | End: 2017-05-04

## 2017-05-04 RX ORDER — FAMOTIDINE 20 MG/50ML
20 INJECTION, SOLUTION INTRAVENOUS
Status: CANCELLED
Start: 2017-05-04 | End: 2017-05-04

## 2017-05-04 RX ORDER — HEPARIN 100 UNIT/ML
500 SYRINGE INTRAVENOUS
Status: COMPLETED | OUTPATIENT
Start: 2017-05-04 | End: 2017-05-04

## 2017-05-04 RX ADMIN — SODIUM CHLORIDE, PRESERVATIVE FREE 10 ML: 5 INJECTION INTRAVENOUS at 12:05

## 2017-05-04 RX ADMIN — DIPHENHYDRAMINE HYDROCHLORIDE 50 MG: 50 INJECTION, SOLUTION INTRAMUSCULAR; INTRAVENOUS at 10:05

## 2017-05-04 RX ADMIN — FAMOTIDINE 20 MG: 20 INJECTION, SOLUTION INTRAVENOUS at 11:05

## 2017-05-04 RX ADMIN — SODIUM CHLORIDE, PRESERVATIVE FREE 500 UNITS: 5 INJECTION INTRAVENOUS at 12:05

## 2017-05-04 RX ADMIN — ACETAMINOPHEN 1000 MG: 500 TABLET ORAL at 10:05

## 2017-05-04 RX ADMIN — SODIUM CHLORIDE, PRESERVATIVE FREE 10 ML: 5 INJECTION INTRAVENOUS at 10:05

## 2017-05-04 RX ADMIN — SODIUM CHLORIDE: 0.9 INJECTION, SOLUTION INTRAVENOUS at 10:05

## 2017-05-04 RX ADMIN — TRASTUZUMAB 484 MG: KIT at 11:05

## 2017-05-04 NOTE — MR AVS SNAPSHOT
[facility]  [address]  [facility] [address]  Phone: [phone]  Fax: [phone]                  Jing Tenorio   2017 10:00 AM   Office Visit    Description:  Female : 1940   Provider:  Larry Moore NP   Department:  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 [phone]    2017 10:30 AM CHAIR 08, [facility] [phone]    5/10/2017 9:50 AM Nakul Franco MD [facility] [phone]    2017 11:30 AM CHAIR 04, [facility] [phone]      Goals (5 Years of Data)     None      Follow-Up and Disposition     Return in about 3 weeks (around 2017) for cycle 14 Herceptin evaluation with interval CBC, CMP.      [facility]     [facility] Nurse Care Line -  Assistance  Unless otherwise directed by your provider, please contact [facility], our nurse care line that is available for  assistance.     Registered nurses in the [facility] provide: appointment scheduling, clinical advisement, health education, and other advisory services.  Call: [phone] (toll free)               Medications           Message regarding Medications     Verify the changes and/or additions to your medication regime listed below are the same as discussed with your clinician today.  If any of these changes or additions are incorrect, please notify your healthcare provider.             Verify that the below list of medications is an accurate representation of the medications you are currently taking.  If none reported, the list may be blank. If incorrect, please contact your healthcare  "provider. Carry this list with you in case of emergency.           Current Medications     acetaminophen (TYLENOL) 500 MG tablet Take 500 mg by mouth every 6 (six) hours as needed for Pain.    bacitracin 500 unit/gram Oint Apply topically daily as needed. Under her left breast and arm    calcium-vitamin D3-vitamin K (VIACTIV) 500-500-40 mg-unit-mcg Chew Take 2 tablets by mouth once daily.     coenzyme Q10 (CO Q-10) 100 mg capsule Take 100 mg by mouth once daily.     cyanocobalamin, vitamin B-12, (VITAMIN B-12) 50 mcg Lozg Every day    ibandronate (BONIVA) 150 mg tablet TK 1 T PO Q 30 DAYS    lidocaine-prilocaine (EMLA) cream Apply to affected area once    lisinopril 10 MG tablet Take 1 tablet (10 mg total) by mouth once daily.    loratadine (CLARITIN) 10 mg tablet Take 10 mg by mouth daily as needed.     MULTIVITAMIN ORAL once daily. Every day    omeprazole (PRILOSEC) 40 MG capsule Take 1 capsule (40 mg total) by mouth once daily.    pyridoxine, vitamin B6, (VITAMIN B-6) 50 MG Tab Take 50 mg by mouth once daily.    simvastatin (ZOCOR) 20 MG tablet Take 1 tablet (20 mg total) by mouth nightly. Every day    tramadol (ULTRAM) 50 mg tablet Take 1 tablet (50 mg total) by mouth every 6 (six) hours as needed for Pain.    trazodone (DESYREL) 100 MG tablet Take 1 tablet (100 mg total) by mouth every evening.    UNABLE TO FIND 2,000 Units. VITAMIN D 1000 IU Q DAY            Clinical Reference Information           Your Vitals Were     BP Pulse Temp Resp Height Weight    149/70 99 98.5 °F (36.9 °C) 18 5' 5" (1.651 m) 80.3 kg (177 lb 0.5 oz)    BMI                29.46 kg/m2          Blood Pressure          Most Recent Value    BP  (!)  149/70      Allergies as of 5/4/2017     Sulfa (Sulfonamide Antibiotics)    Adhesive      Immunizations Administered on Date of Encounter - 5/4/2017     None      Orders Placed During Today's Visit     Future Labs/Procedures Expected by Expires    CBC w/ DIFF  5/4/2017 7/3/2018    CMP  5/4/2017 " 7/3/2018      Language Assistance Services     ATTENTION: Language assistance services are available, free of charge. Please call [phone].      ATENCIÓN: Si habla dave, tiene a keys disposición servicios gratuitos de asistencia lingüística. Llame al [phone].     [facility] Ý: N?u b?n nói Ti?ng Vi?t, có các d?ch v? h? tr? ngôn ng? mi?n phí dành cho b?n. G?i s? [phone].         [facility] complies with applicable Federal civil rights laws and does not discriminate on the basis of race, color, national origin, age, disability, or sex.

## 2017-05-04 NOTE — MR AVS SNAPSHOT
Patient Information     Patient Name Sex Jing Gimenez Female 1940      Visit Information        Provider Department Dept Phone Center    2017 10:30 AM CHAIR 08, [facility] [phone] [facility]      Patient Instructions      Preventing Falls: Moving Safely Using a Cane or Walker     Keep the cane away from your feet so you dont trip.     A walking aid, such as a cane or walker, can help you stay more independent and avoid falls. Remember to keep your walking aid within easy reach when you're in a chair or in bed. And learn how to use it safely so you don't injure yourself. Be sure the cane or walker is the correct height. Hang your arm loosely at your side, and measure the distance from your wrist to the floor. The distance should be the same as the height of your cane or walker.  Using a cane  If you have a stronger side, hold the cane on the side of your stronger leg.  1. Get your balance.  2. Move the cane and your weaker leg forward.  3. Support your weight on both the cane and your weaker side.  4. Step with your stronger leg.  5. Start again from step 1.     If youre using a folding walker, be sure you know how to lock it open. Check that its locked open before each use.   Using a walker  1. Roll the walker (or lift it, if you're using one without wheels) forward about 12 inches.  2. Step forward with your weaker leg first.  3. Use the walker to help keep your balance.  4. Bring your other foot forward to the center of the walker.  5. Start again from step 1.  Helpful tips  · Check with your health care provider about the right walking aid to use. Ask about a walker with a seat attached.  · Check the tips of your cane or walker to make sure they have nonskid covers.  · Move slowly from room to room. Don't rush.  · Sit down to get dressed.  · Use a sera pack or backpack to keep your hands free.  · Get help for jobs that mean climbing, even on  a stepstool.  · Do not try going up or down stairs using a walker.   Date Last Reviewed: 6/12/2015  © 5824-4196 [employer]. [address] [place] [address], [place] [address]. All rights reserved. This information is not intended as a substitute for professional medical care. Always follow your healthcare professional's instructions.             Your Current Medications Are     acetaminophen (TYLENOL) 500 MG tablet    bacitracin 500 unit/gram Oint    calcium-vitamin D3-vitamin K (VIACTIV) 500-500-40 mg-unit-mcg Chew    coenzyme Q10 (CO Q-10) 100 mg capsule    cyanocobalamin, vitamin B-12, (VITAMIN B-12) 50 mcg Lozg    ibandronate (BONIVA) 150 mg tablet    lidocaine-prilocaine (EMLA) cream    lisinopril 10 MG tablet    loratadine (CLARITIN) 10 mg tablet    MULTIVITAMIN ORAL    omeprazole (PRILOSEC) 40 MG capsule    pyridoxine, vitamin B6, (VITAMIN B-6) 50 MG Tab    simvastatin (ZOCOR) 20 MG tablet    tramadol (ULTRAM) 50 mg tablet    trazodone (DESYREL) 100 MG tablet    UNABLE TO FIND      Facility-Administered Medications     acetaminophen tablet 1,000 mg    diphenhydramine IVPB 50 mg    epoetin cody injection 40,000 Units    epoetin cody injection 40,000 Units    famotidine IVPB 20 mg    sodium chloride 0.9% 100 mL flush bag    sodium chloride 0.9% flush 10 mL    sodium chloride 0.9% flush 10 mL    trastuzumab (HERCEPTIN) (HERCEPTIN) 484 mg in sodium chloride 0.9% 250 mL chemo infusion    triamcinolone acetonide injection 80 mg      Appointments for Next Year     5/10/2017  9:50 AM ESTABLISHED PATIENT EXTENDED (40 min.) [facility] Nakul Franco MD    Arrive at check-in approximately 15 minutes before your scheduled appointment time. Bring all outside medical records and imaging, along with a list of your current medications and insurance card.    1st Floor    5/25/2017 10:00 AM NON FASTING LAB (15 min.) [facility], [facility] DRAW STATION    Arrive at check-in approximately 15  "minutes before your scheduled appointment time. Bring all outside medical records and imaging, along with a list of your current medications and insurance card.    5/25/2017 11:00 AM ESTABLISHED PATIENT (20 min.) [facility] - Hematology Mary Smith MD    Arrive at check-in approximately 15 minutes before your scheduled appointment time. Bring all outside medical records and imaging, along with a list of your current medications and insurance card.    5/25/2017 11:30 AM INFUSION 120 MIN (120 min.) [facility] CHAIR 04, [facility]    Arrive at check-in approximately 15 minutes before your scheduled appointment time. Bring all outside medical records and imaging, along with a list of your current medications and insurance card.    1st Floor         Default Flowsheet Data (last 24 hours)      Amb Complex Vitals Ab        05/04/17 0916                Measurements    Weight 80.3 kg (177 lb 0.5 oz)        Height 5' 5" (1.651 m)        BSA (Calculated - sq m) 1.92 sq meters        BMI (Calculated) 29.5        BP (!)  149/70        Temp 98.5 °F (36.9 °C)        Pulse 99        Resp 18        Pain Assessment    Pain Score Zero                Allergies     Sulfa (Sulfonamide Antibiotics)     Other reaction(s): Unknown  Other reaction(s): Unknown  Other reaction(s): Unknown    Adhesive Rash      Medications You Received from 05/03/2017 1108 to 05/04/2017 1108        Date/Time Order Dose Route Action     05/04/2017 1056 acetaminophen tablet 1,000 mg 1,000 mg Oral Given     05/04/2017 1054 diphenhydramine IVPB 50 mg 50 mg Intravenous New Bag     05/04/2017 1053 sodium chloride 0.9% 100 mL flush bag   Intravenous New Bag     05/04/2017 1050 sodium chloride 0.9% flush 10 mL 10 mL Intravenous Given      Current Discharge Medication List     Cannot display discharge medications since this is not an admission.      "

## 2017-05-04 NOTE — PROGRESS NOTES
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  The patient is   receiving adjuvant Herceptin and returns for evaluation prior to next dose of therapy.  She has completed   post-lumpectomy radiation on 04/11/17.  She denies any new breast complaints, bone pain, fevers,  chills, unexplained weight loss, abdominal discomfort/bloating, nausea, vomiting, bleeding, difficulties  with hot flashes, diarrhea, mouth sores, etc.  No other complaints or pertinent findings on a 14-point review of systems.    PHYSICAL EXAMINATION:    GENERAL:  The patient is a well-developed, well-nourished elderly white female,   who ambulates with the assistance of a cane.  Alert & oriented x 3.  VITAL SIGNS:  Weight  177 pounds, stable.  /70, Pulse 99, Resp 18, Temp 98.58  HEENT:  Normocephalic, atraumatic.  Oral mucosa pink and moist.  Lips without   lesions.  Tongue midline.  Oropharynx clear.  Nonicteric sclerae.   NECK:  Supple, no adenopathy.  No carotid bruits, thyromegaly or thyroid nodule.  HEART:  Regular rate and rhythm without murmur, gallop or rub.   LUNGS:  Clear to auscultation bilaterally.  Normal respiratory effort.  ABDOMEN:  Soft, nontender, nondistended with positive normoactive bowel sounds,   no hepatosplenomegaly.  EXTREMITIES:  No cyanosis, clubbing or edema.  Distal pulses are intact.   AXILLAE AND GROIN:  No palpable pathologic lymphadenopathy is appreciated.  SKIN:  Intact/turgor normal.  NEUROLOGIC:  Cranial nerves II-XII grossly intact.  Motor:  Good muscle bulk and   tone.  Strength/sensory 5/5 throughout.    LABORATORY:    Lab Results   Component Value Date    WBC 4.34 05/04/2017    HGB 11.4 (L) 05/04/2017    HCT 33.7 (L) 05/04/2017    MCV 96 05/04/2017     05/04/2017     74.6% grans, 11.8% lymph  CMP  Sodium   Date Value Ref Range Status   05/04/2017 136 136 - 145 mmol/L  Final     Potassium   Date Value Ref Range Status   05/04/2017 4.6 3.5 - 5.1 mmol/L Final     Chloride   Date Value Ref Range Status   05/04/2017 97 95 - 110 mmol/L Final     CO2   Date Value Ref Range Status   05/04/2017 34 (H) 22 - 31 mmol/L Final     Glucose   Date Value Ref Range Status   05/04/2017 77 70 - 110 mg/dL Final     Comment:     The ADA recommends the following guidelines for fasting glucose:  Normal:       less than 100 mg/dL  Prediabetes:  100 mg/dL to 125 mg/dL  Diabetes:     126 mg/dL or higher       BUN, Bld   Date Value Ref Range Status   05/04/2017 20 (H) 7 - 18 mg/dL Final     Creatinine   Date Value Ref Range Status   05/04/2017 1.10 0.50 - 1.40 mg/dL Final   01/18/2013 0.9 0.5 - 1.4 mg/dL Final     Calcium   Date Value Ref Range Status   05/04/2017 10.0 8.4 - 10.2 mg/dL Final   01/18/2013 8.9 8.7 - 10.5 mg/dL Final     Total Protein   Date Value Ref Range Status   05/04/2017 7.3 6.0 - 8.4 g/dL Final     Albumin   Date Value Ref Range Status   05/04/2017 4.5 3.5 - 5.2 g/dL Final     Total Bilirubin   Date Value Ref Range Status   05/04/2017 0.6 0.2 - 1.3 mg/dL Final     Comment:     For infants and newborns, interpretation of results should be based  on gestational age, weight and in agreement with clinical  observations.  Premature Infant recommended reference ranges:  Up to 24 hours.............<8.0 mg/dL  Up to 48 hours............<12.0 mg/dL  3-5 days..................<15.0 mg/dL  6-29 days.................<15.0 mg/dL       Alkaline Phosphatase   Date Value Ref Range Status   05/04/2017 84 38 - 145 U/L Final     AST   Date Value Ref Range Status   05/04/2017 43 (H) 14 - 36 U/L Final     ALT   Date Value Ref Range Status   05/04/2017 35 10 - 44 U/L Final     Anion Gap   Date Value Ref Range Status   05/04/2017 5 (L) 8 - 16 mmol/L Final   01/18/2013 9 5 - 15 meq/L Final     eGFR if    Date Value Ref Range Status   05/04/2017 56 (A) >60 mL/min/1.73 m^2 Final     eGFR if non     Date Value Ref Range Status   05/04/2017 48 (A) >60 mL/min/1.73 m^2 Final     Comment:     Calculation used to obtain the estimated glomerular filtration  rate (eGFR) is the CKD-EPI equation. Since race is unknown   in our information system, the eGFR values for   -American and Non--American patients are given   for each creatinine result.       IMPRESSION:  Triple positive invasive left breast carcinoma.    PLAN:  1.  Proceed with next dose of adjuvant Herceptin to consist of 484 mg IV with   typical premeds.  2.  Return in three weeks with interval CBC, CMP, CT CHEST/ABD/PELVIS with contrast  for restaging and evaluation prior to Cycle 14 Herceptin & start of Arimidex.    Assessment/plan reviewed and approved by Dr. Joseph.

## 2017-05-04 NOTE — PLAN OF CARE
Problem: Patient Care Overview (Adult)  Goal: Plan of Care Review  Outcome: Ongoing (interventions implemented as appropriate)  Pt. Completed treatment, tolerated without noted distress.Vtial signs stable. Patient discharged from infusion center ambulatory with . Patient had all present questions answered.

## 2017-05-04 NOTE — PATIENT INSTRUCTIONS
Preventing Falls: Moving Safely Using a Cane or Walker     Keep the cane away from your feet so you dont trip.     A walking aid, such as a cane or walker, can help you stay more independent and avoid falls. Remember to keep your walking aid within easy reach when you're in a chair or in bed. And learn how to use it safely so you don't injure yourself. Be sure the cane or walker is the correct height. Hang your arm loosely at your side, and measure the distance from your wrist to the floor. The distance should be the same as the height of your cane or walker.  Using a cane  If you have a stronger side, hold the cane on the side of your stronger leg.  1. Get your balance.  2. Move the cane and your weaker leg forward.  3. Support your weight on both the cane and your weaker side.  4. Step with your stronger leg.  5. Start again from step 1.     If youre using a folding walker, be sure you know how to lock it open. Check that its locked open before each use.   Using a walker  1. Roll the walker (or lift it, if you're using one without wheels) forward about 12 inches.  2. Step forward with your weaker leg first.  3. Use the walker to help keep your balance.  4. Bring your other foot forward to the center of the walker.  5. Start again from step 1.  Helpful tips  · Check with your health care provider about the right walking aid to use. Ask about a walker with a seat attached.  · Check the tips of your cane or walker to make sure they have nonskid covers.  · Move slowly from room to room. Don't rush.  · Sit down to get dressed.  · Use a sera pack or backpack to keep your hands free.  · Get help for jobs that mean climbing, even on a stepstool.  · Do not try going up or down stairs using a walker.   Date Last Reviewed: 6/12/2015  © 6282-7103 [employer]. [address] [place] [address], [place] [address]. All rights reserved. This information is not intended as a substitute for professional medical care.  Always follow your healthcare professional's instructions.

## 2017-05-05 ENCOUNTER — TELEPHONE (OUTPATIENT)
Dept: HEMATOLOGY/ONCOLOGY | Facility: CLINIC | Age: 77
End: 2017-05-05

## 2017-05-05 NOTE — TELEPHONE ENCOUNTER
Called pt and all is taking care of with her appts and f/u. When asked if her labs come from her port she said no , they draw from her arm. mary

## 2017-05-10 ENCOUNTER — OFFICE VISIT (OUTPATIENT)
Dept: ORTHOPEDICS | Facility: CLINIC | Age: 77
End: 2017-05-10
Payer: MEDICARE

## 2017-05-10 ENCOUNTER — OFFICE VISIT (OUTPATIENT)
Dept: FAMILY MEDICINE | Facility: CLINIC | Age: 77
End: 2017-05-10
Payer: MEDICARE

## 2017-05-10 VITALS
BODY MASS INDEX: 29.49 KG/M2 | SYSTOLIC BLOOD PRESSURE: 128 MMHG | WEIGHT: 179 LBS | HEIGHT: 65 IN | HEART RATE: 77 BPM | HEIGHT: 65 IN | WEIGHT: 177 LBS | RESPIRATION RATE: 18 BRPM | DIASTOLIC BLOOD PRESSURE: 68 MMHG | OXYGEN SATURATION: 98 % | BODY MASS INDEX: 29.82 KG/M2

## 2017-05-10 DIAGNOSIS — E78.5 DYSLIPIDEMIA: ICD-10-CM

## 2017-05-10 DIAGNOSIS — M65.30 TRIGGER FINGER OF LEFT HAND, UNSPECIFIED FINGER: Primary | ICD-10-CM

## 2017-05-10 DIAGNOSIS — M81.0 OSTEOPOROSIS, UNSPECIFIED OSTEOPOROSIS TYPE, UNSPECIFIED PATHOLOGICAL FRACTURE PRESENCE: ICD-10-CM

## 2017-05-10 DIAGNOSIS — I10 ESSENTIAL HYPERTENSION: ICD-10-CM

## 2017-05-10 DIAGNOSIS — Z00.00 ROUTINE PHYSICAL EXAMINATION: Primary | ICD-10-CM

## 2017-05-10 DIAGNOSIS — F41.8 SITUATIONAL ANXIETY: ICD-10-CM

## 2017-05-10 PROCEDURE — 1125F AMNT PAIN NOTED PAIN PRSNT: CPT | Mod: S$GLB,,, | Performed by: ORTHOPAEDIC SURGERY

## 2017-05-10 PROCEDURE — 20550 NJX 1 TENDON SHEATH/LIGAMENT: CPT | Mod: F3,S$GLB,, | Performed by: ORTHOPAEDIC SURGERY

## 2017-05-10 PROCEDURE — 1159F MED LIST DOCD IN RCRD: CPT | Mod: S$GLB,,, | Performed by: ORTHOPAEDIC SURGERY

## 2017-05-10 PROCEDURE — 99213 OFFICE O/P EST LOW 20 MIN: CPT | Mod: 25,S$GLB,, | Performed by: ORTHOPAEDIC SURGERY

## 2017-05-10 PROCEDURE — 99999 PR PBB SHADOW E&M-EST. PATIENT-LVL II: CPT | Mod: PBBFAC,,, | Performed by: ORTHOPAEDIC SURGERY

## 2017-05-10 PROCEDURE — 1160F RVW MEDS BY RX/DR IN RCRD: CPT | Mod: S$GLB,,, | Performed by: ORTHOPAEDIC SURGERY

## 2017-05-10 PROCEDURE — 3078F DIAST BP <80 MM HG: CPT | Mod: S$GLB,,, | Performed by: ORTHOPAEDIC SURGERY

## 2017-05-10 PROCEDURE — 99999 PR PBB SHADOW E&M-EST. PATIENT-LVL III: CPT | Mod: PBBFAC,,, | Performed by: INTERNAL MEDICINE

## 2017-05-10 PROCEDURE — 3074F SYST BP LT 130 MM HG: CPT | Mod: S$GLB,,, | Performed by: INTERNAL MEDICINE

## 2017-05-10 PROCEDURE — 99499 UNLISTED E&M SERVICE: CPT | Mod: S$GLB,,, | Performed by: INTERNAL MEDICINE

## 2017-05-10 PROCEDURE — 99397 PER PM REEVAL EST PAT 65+ YR: CPT | Mod: S$GLB,,, | Performed by: INTERNAL MEDICINE

## 2017-05-10 PROCEDURE — 3078F DIAST BP <80 MM HG: CPT | Mod: S$GLB,,, | Performed by: INTERNAL MEDICINE

## 2017-05-10 PROCEDURE — 3074F SYST BP LT 130 MM HG: CPT | Mod: S$GLB,,, | Performed by: ORTHOPAEDIC SURGERY

## 2017-05-10 RX ORDER — IBANDRONATE SODIUM 150 MG/1
TABLET, FILM COATED ORAL
Qty: 1 TABLET | Refills: 11 | Status: SHIPPED | OUTPATIENT
Start: 2017-05-10 | End: 2017-07-06

## 2017-05-10 RX ORDER — SIMVASTATIN 20 MG/1
20 TABLET, FILM COATED ORAL NIGHTLY
Qty: 90 TABLET | Refills: 1 | Status: SHIPPED | OUTPATIENT
Start: 2017-05-10 | End: 2017-07-18 | Stop reason: SDUPTHER

## 2017-05-10 RX ORDER — TRIAMCINOLONE ACETONIDE 40 MG/ML
40 INJECTION, SUSPENSION INTRA-ARTICULAR; INTRAMUSCULAR
Status: COMPLETED | OUTPATIENT
Start: 2017-05-10 | End: 2017-05-10

## 2017-05-10 RX ORDER — ALPRAZOLAM 0.25 MG/1
0.25 TABLET ORAL DAILY PRN
Qty: 30 TABLET | Refills: 2 | Status: SHIPPED | OUTPATIENT
Start: 2017-05-10 | End: 2017-07-18

## 2017-05-10 RX ORDER — TRAZODONE HYDROCHLORIDE 100 MG/1
100 TABLET ORAL NIGHTLY
Qty: 90 TABLET | Refills: 3 | Status: SHIPPED | OUTPATIENT
Start: 2017-05-10 | End: 2017-11-09 | Stop reason: SDUPTHER

## 2017-05-10 RX ADMIN — TRIAMCINOLONE ACETONIDE 40 MG: 40 INJECTION, SUSPENSION INTRA-ARTICULAR; INTRAMUSCULAR at 09:05

## 2017-05-10 NOTE — MR AVS SNAPSHOT
[facility]  [address]  [facility] [place] [address]  Phone: [phone]  Fax: [phone]                  Jing Tenorio   5/10/2017 9:50 AM   Office Visit    Description:  Female : 1940   Provider:  Nakul Franco MD   Department:  [facility]           Reason for Visit     Annual Exam           Diagnoses this Visit        Comments    Routine physical examination    -  Primary     Essential hypertension         Dyslipidemia         Osteoporosis, unspecified osteoporosis type, unspecified pathological fracture presence         Situational anxiety                To Do List           Future Appointments        Provider Department Dept Phone    2017 11:30 AM [facility] CT1 LIMIT 450 LBS [facility] [phone]    2017 11:45 AM [facility] CT1 LIMIT 450 LBS [facility] [phone]    2017 10:00 AM LAB, [facility] DRAW STATION [facility] - Laboratory [phone]    2017 11:00 AM Mary Smith MD [facility] - Hematology [phone]    2017 11:30 AM CHAIR 04, [facility] CHEMO [facility] [phone]      Goals (5 Years of Data)     None      Follow-Up and Disposition     Return in about 6 months (around 11/10/2017).    Follow-up and Disposition History       These Medications        Disp Refills Start End    ibandronate (BONIVA) 150 mg tablet 1 tablet 11 5/10/2017     TK 1 T PO Q 30 DAYS    Pharmacy: [facility] [address] [place] [address] W AT [facility] [address] Ph #: [phone]       simvastatin (ZOCOR) 20 MG tablet 90 tablet 1 5/10/2017     Take 1 tablet (20 mg total) by mouth nightly. Every day - Oral    Pharmacy: [facility] [address] [place] [address] [address] [place] [address] W AT [facility] [address] Ph #: [phone]       trazodone (DESYREL) 100 MG tablet 90 tablet 3 5/10/2017     Take 1 tablet (100 mg total) by mouth every evening. - Oral     Pharmacy: [facility] [id number]  [place] - [address] AT [facility] [place] [address] Ph #: [phone]       alprazolam (XANAX) 0.25 MG tablet 30 tablet 2 5/10/2017 6/9/2017    Take 1 tablet (0.25 mg total) by mouth daily as needed for Anxiety. - Oral    Pharmacy: [facility] [id number] - [place] - [address] AT [facility] [place] [address] Ph #: [phone]         [facility]     [facility] Care [facility] - 24/7 Assistance  Unless otherwise directed by your provider, please contact [facility], our nurse care line that is available for 24/7 assistance.     Registered nurses in the [facility] provide: appointment scheduling, clinical advisement, health education, and other advisory services.  Call: [phone] (toll free)               Medications           Message regarding Medications     Verify the changes and/or additions to your medication regime listed below are the same as discussed with your clinician today.  If any of these changes or additions are incorrect, please notify your healthcare provider.        START taking these NEW medications        Refills    alprazolam (XANAX) 0.25 MG tablet 2    Sig: Take 1 tablet (0.25 mg total) by mouth daily as needed for Anxiety.    Class: Print    Route: Oral      STOP taking these medications     bacitracin 500 unit/gram Oint Apply topically daily as needed. Under her left breast and arm    lidocaine-prilocaine (EMLA) cream Apply to affected area once           Verify that the below list of medications is an accurate representation of the medications you are currently taking.  If none reported, the list may be blank. If incorrect, please contact your healthcare provider. Carry this list with you in case of emergency.           Current Medications     acetaminophen (TYLENOL) 500 MG tablet Take 500 mg by mouth every 6 (six) hours as needed for Pain.    calcium-vitamin D3-vitamin K (VIACTIV) 500-500-40  "mg-unit-mcg Chew Take 2 tablets by mouth once daily.     coenzyme Q10 (CO Q-10) 100 mg capsule Take 100 mg by mouth once daily.     cyanocobalamin, vitamin B-12, (VITAMIN B-12) 50 mcg Lozg Every day    ibandronate (BONIVA) 150 mg tablet TK 1 T PO Q 30 DAYS    lisinopril 10 MG tablet Take 1 tablet (10 mg total) by mouth once daily.    loratadine (CLARITIN) 10 mg tablet Take 10 mg by mouth daily as needed.     MULTIVITAMIN ORAL once daily. Every day    omeprazole (PRILOSEC) 40 MG capsule Take 1 capsule (40 mg total) by mouth once daily.    pyridoxine, vitamin B6, (VITAMIN B-6) 50 MG Tab Take 50 mg by mouth once daily.    simvastatin (ZOCOR) 20 MG tablet Take 1 tablet (20 mg total) by mouth nightly. Every day    tramadol (ULTRAM) 50 mg tablet Take 1 tablet (50 mg total) by mouth every 6 (six) hours as needed for Pain.    trazodone (DESYREL) 100 MG tablet Take 1 tablet (100 mg total) by mouth every evening.    UNABLE TO FIND 2,000 Units. VITAMIN D 1000 IU Q DAY     alprazolam (XANAX) 0.25 MG tablet Take 1 tablet (0.25 mg total) by mouth daily as needed for Anxiety.           Clinical Reference Information           Your Vitals Were     BP Pulse Resp Height Weight SpO2    128/68 (BP Location: Right arm, Patient Position: Sitting, BP Method: Manual) 77 18 5' 5" (1.651 m) 81.2 kg (179 lb 0.2 oz) 98%    BMI                29.79 kg/m2          Blood Pressure          Most Recent Value    BP  128/68      Allergies as of 5/10/2017     Sulfa (Sulfonamide Antibiotics)    Adhesive      Immunizations Administered on Date of Encounter - 5/10/2017     None      Orders Placed During Today's Visit     Future Labs/Procedures Expected by Expires    Lipid panel  11/6/2017 5/10/2018      Language Assistance Services     ATTENTION: Language assistance services are available, free of charge. Please call [phone].      ATENCIÓN: Si habla español, tiene a keys disposición servicios gratuitos de asistencia lingüística. Llame al " [phone].     MUKUND Ý: N?u b?n nói Ti?ng Vi?t, có các d?ch v? h? tr? ngôn ng? mi?n phí dành cho b?n. G?i s? [phone].         [facility] complies with applicable Federal civil rights laws and does not discriminate on the basis of race, color, national origin, age, disability, or sex.

## 2017-05-10 NOTE — PROGRESS NOTES
Jing Tenorio, 76 years old, locking, catching and triggering of her left   fourth finger, same when we injected a year ago, requesting repeat injection,   had symptoms now for about three weeks' time.    Exam shows she does have a lot elicitable trigger.  No signs of infection.  No   instability.  Skin is intact.  Compartments are soft.    ASSESSMENT:  Left fourth trigger finger.    PLAN:  Kenalog injection, followup as needed.    PROCEDURE NOTE:  After obtaining consent and sterile prep, the flexor sheath of   the left fourth digit was injected palmarly with direct approach with 0.25 mL of   Kenalog and 0.5 mL of lidocaine.  The patient tolerated the procedure well.      PBB/PN  dd: 05/10/2017 09:35:40 (CDT)  td: 05/10/2017 18:17:39 (CDT)  Doc ID   #[id number]  Job ID #[id number]    CC:

## 2017-05-10 NOTE — PROGRESS NOTES
Subjective:       Patient ID: Jing Tenorio is a 76 y.o. female.    Chief Complaint: Annual Exam    HPI Comments: Here for routine health maintenance.    Increased anxiety related to her son - head trauma with brain bleed and altered MS.  HTN - controlled  HLD - controlled  Breast cancer 1.9 cm, ER/KY+, HER2 +; lymph nodes negative; s/p removal, rad tx and completed 6 chemo treatments.    Breast cancer- s/p Ratx, chemo - now on hormone suppression therapy.       Review of Systems   Constitutional: Negative for appetite change and fever.   HENT: Negative for nosebleeds and trouble swallowing.    Eyes: Negative for discharge and visual disturbance.   Respiratory: Negative for choking and shortness of breath.    Cardiovascular: Negative for chest pain and palpitations.   Gastrointestinal: Negative for abdominal pain, nausea and vomiting.   Musculoskeletal: Negative for arthralgias and joint swelling.   Skin: Negative for rash and wound.   Neurological: Negative for dizziness and syncope.   Psychiatric/Behavioral: Negative for confusion and dysphoric mood. The patient is nervous/anxious.        Objective:      Vitals:    05/10/17 0949   BP: 128/68   Pulse: 77   Resp: 18     Physical Exam   Constitutional: She appears well-nourished.   Eyes: Conjunctivae and EOM are normal.   Neck: Trachea normal and normal range of motion. No thyromegaly present.   Cardiovascular: Normal heart sounds.    Edema negative   Pulmonary/Chest: Effort normal and breath sounds normal.   Abdominal: Soft. There is no hepatomegaly.   Neurological: No cranial nerve deficit.   DTR decreased bilateral   Skin: Skin is warm, dry and intact.   Psychiatric: She has a normal mood and affect.   Alert and Oriented    Vitals reviewed.        Assessment:       1. Routine physical examination    2. Essential hypertension    3. Dyslipidemia    4. Osteoporosis, unspecified osteoporosis type, unspecified pathological fracture presence    5. Situational  anxiety        Plan:       Routine physical examination    Essential hypertension    Dyslipidemia  -     simvastatin (ZOCOR) 20 MG tablet; Take 1 tablet (20 mg total) by mouth nightly. Every day  Dispense: 90 tablet; Refill: 1  -     Lipid panel; Future; Expected date: 11/6/17    Osteoporosis, unspecified osteoporosis type, unspecified pathological fracture presence  -     ibandronate (BONIVA) 150 mg tablet; TK 1 T PO Q 30 DAYS  Dispense: 1 tablet; Refill: 11    Situational anxiety  -     alprazolam (XANAX) 0.25 MG tablet; Take 1 tablet (0.25 mg total) by mouth daily as needed for Anxiety.  Dispense: 30 tablet; Refill: 2    Other orders  -     trazodone (DESYREL) 100 MG tablet; Take 1 tablet (100 mg total) by mouth every evening.  Dispense: 90 tablet; Refill: 3    wellness reviewed      Medication List with Changes/Refills   New Medications    ALPRAZOLAM (XANAX) 0.25 MG TABLET    Take 1 tablet (0.25 mg total) by mouth daily as needed for Anxiety.   Current Medications    ACETAMINOPHEN (TYLENOL) 500 MG TABLET    Take 500 mg by mouth every 6 (six) hours as needed for Pain.    CALCIUM-VITAMIN D3-VITAMIN K (VIACTIV) 500-500-40 MG-UNIT-MCG CHEW    Take 2 tablets by mouth once daily.     COENZYME Q10 (CO Q-10) 100 MG CAPSULE    Take 100 mg by mouth once daily.     CYANOCOBALAMIN, VITAMIN B-12, (VITAMIN B-12) 50 MCG LOZG    Every day    LISINOPRIL 10 MG TABLET    Take 1 tablet (10 mg total) by mouth once daily.    LORATADINE (CLARITIN) 10 MG TABLET    Take 10 mg by mouth daily as needed.     MULTIVITAMIN ORAL    once daily. Every day    OMEPRAZOLE (PRILOSEC) 40 MG CAPSULE    Take 1 capsule (40 mg total) by mouth once daily.    PYRIDOXINE, VITAMIN B6, (VITAMIN B-6) 50 MG TAB    Take 50 mg by mouth once daily.    TRAMADOL (ULTRAM) 50 MG TABLET    Take 1 tablet (50 mg total) by mouth every 6 (six) hours as needed for Pain.    UNABLE TO FIND    2,000 Units. VITAMIN D 1000 IU Q DAY    Changed and/or Refilled Medications     "Modified Medication Previous Medication    IBANDRONATE (BONIVA) 150 MG TABLET ibandronate (BONIVA) 150 mg tablet       TK 1 T PO Q 30 DAYS    TK 1 T PO Q 30 DAYS    SIMVASTATIN (ZOCOR) 20 MG TABLET simvastatin (ZOCOR) 20 MG tablet       Take 1 tablet (20 mg total) by mouth nightly. Every day    Take 1 tablet (20 mg total) by mouth nightly. Every day    TRAZODONE (DESYREL) 100 MG TABLET trazodone (DESYREL) 100 MG tablet       Take 1 tablet (100 mg total) by mouth every evening.    Take 1 tablet (100 mg total) by mouth every evening.   Discontinued Medications    ALPRAZOLAM (XANAX) 0.5 MG TABLET    Take 1 tablet (0.5 mg total) by mouth 3 (three) times daily as needed for Anxiety.    BACITRACIN 500 UNIT/GRAM OINT    Apply topically daily as needed. Under her left breast and arm    LIDOCAINE-PRILOCAINE (EMLA) CREAM    Apply to affected area once             Counseled on regular exercise, maintenance of a healthy weight, balanced diet rich in fruits/vegetables and lean protein, and avoidance of unhealthy habits like smoking and excessive alcohol intake.   Also, counseled on importance of being compliant with medication, health appointments, diet and exercise.     Return in about 6 months (around 11/10/2017).    "This note will not be shared with the patient."  "

## 2017-05-23 ENCOUNTER — HOSPITAL ENCOUNTER (OUTPATIENT)
Dept: RADIOLOGY | Facility: HOSPITAL | Age: 77
Discharge: HOME | End: 2017-05-23
Attending: NURSE PRACTITIONER
Payer: MEDICARE

## 2017-05-23 DIAGNOSIS — C50.012 MALIGNANT NEOPLASM INVOLVING BOTH NIPPLE AND AREOLA OF LEFT BREAST IN FEMALE: ICD-10-CM

## 2017-05-23 PROCEDURE — 71260 CT THORAX DX C+: CPT | Mod: 26,,, | Performed by: RADIOLOGY

## 2017-05-23 PROCEDURE — 25500020 PHARM REV CODE 255: Mod: PO | Performed by: NURSE PRACTITIONER

## 2017-05-23 PROCEDURE — 71260 CT THORAX DX C+: CPT | Mod: TC,PO

## 2017-05-23 PROCEDURE — 74177 CT ABD & PELVIS W/CONTRAST: CPT | Mod: TC,PO

## 2017-05-23 PROCEDURE — 74177 CT ABD & PELVIS W/CONTRAST: CPT | Mod: 26,,, | Performed by: RADIOLOGY

## 2017-05-23 RX ADMIN — IOHEXOL 75 ML: 350 INJECTION, SOLUTION INTRAVENOUS at 12:05

## 2017-05-23 RX ADMIN — IOHEXOL 30 ML: 350 INJECTION, SOLUTION INTRAVENOUS at 12:05

## 2017-05-25 ENCOUNTER — INFUSION (OUTPATIENT)
Dept: INFUSION THERAPY | Facility: HOSPITAL | Age: 77
End: 2017-05-25
Attending: INTERNAL MEDICINE
Payer: MEDICARE

## 2017-05-25 ENCOUNTER — OFFICE VISIT (OUTPATIENT)
Dept: HEMATOLOGY/ONCOLOGY | Facility: CLINIC | Age: 77
End: 2017-05-25
Payer: MEDICARE

## 2017-05-25 VITALS — SYSTOLIC BLOOD PRESSURE: 127 MMHG | RESPIRATION RATE: 20 BRPM | DIASTOLIC BLOOD PRESSURE: 71 MMHG | HEART RATE: 80 BPM

## 2017-05-25 VITALS
RESPIRATION RATE: 16 BRPM | WEIGHT: 177.5 LBS | TEMPERATURE: 98 F | HEART RATE: 79 BPM | HEIGHT: 65 IN | BODY MASS INDEX: 29.57 KG/M2 | SYSTOLIC BLOOD PRESSURE: 132 MMHG | DIASTOLIC BLOOD PRESSURE: 74 MMHG

## 2017-05-25 DIAGNOSIS — N18.30 CHRONIC KIDNEY DISEASE, STAGE III (MODERATE): ICD-10-CM

## 2017-05-25 DIAGNOSIS — I10 ESSENTIAL HYPERTENSION: ICD-10-CM

## 2017-05-25 DIAGNOSIS — E78.5 HYPERLIPIDEMIA LDL GOAL <130: ICD-10-CM

## 2017-05-25 DIAGNOSIS — C50.012 MALIGNANT NEOPLASM INVOLVING BOTH NIPPLE AND AREOLA OF LEFT BREAST IN FEMALE: Primary | ICD-10-CM

## 2017-05-25 DIAGNOSIS — M80.00XA OSTEOPOROSIS WITH CURRENT PATHOLOGICAL FRACTURE, UNSPECIFIED OSTEOPOROSIS TYPE, INITIAL ENCOUNTER: ICD-10-CM

## 2017-05-25 DIAGNOSIS — C50.011 MALIGNANT NEOPLASM INVOLVING BOTH NIPPLE AND AREOLA OF RIGHT BREAST IN FEMALE: Primary | ICD-10-CM

## 2017-05-25 DIAGNOSIS — M80.00XA AGE-RELATED OSTEOPOROSIS WITH CURRENT PATHOLOGICAL FRACTURE, INITIAL ENCOUNTER: ICD-10-CM

## 2017-05-25 DIAGNOSIS — C50.012 MALIGNANT NEOPLASM INVOLVING BOTH NIPPLE AND AREOLA OF LEFT BREAST IN FEMALE: ICD-10-CM

## 2017-05-25 PROCEDURE — 96367 TX/PROPH/DG ADDL SEQ IV INF: CPT | Mod: PN

## 2017-05-25 PROCEDURE — 25000003 PHARM REV CODE 250: Mod: PN | Performed by: INTERNAL MEDICINE

## 2017-05-25 PROCEDURE — 1159F MED LIST DOCD IN RCRD: CPT | Mod: S$GLB,,, | Performed by: INTERNAL MEDICINE

## 2017-05-25 PROCEDURE — 99999 PR PBB SHADOW E&M-EST. PATIENT-LVL III: CPT | Mod: PBBFAC,,, | Performed by: INTERNAL MEDICINE

## 2017-05-25 PROCEDURE — 99499 UNLISTED E&M SERVICE: CPT | Mod: S$GLB,,, | Performed by: INTERNAL MEDICINE

## 2017-05-25 PROCEDURE — 1126F AMNT PAIN NOTED NONE PRSNT: CPT | Mod: S$GLB,,, | Performed by: INTERNAL MEDICINE

## 2017-05-25 PROCEDURE — 63600175 PHARM REV CODE 636 W HCPCS: Mod: PN | Performed by: INTERNAL MEDICINE

## 2017-05-25 PROCEDURE — 99215 OFFICE O/P EST HI 40 MIN: CPT | Mod: S$GLB,,, | Performed by: INTERNAL MEDICINE

## 2017-05-25 PROCEDURE — 96413 CHEMO IV INFUSION 1 HR: CPT | Mod: PN

## 2017-05-25 RX ORDER — HEPARIN 100 UNIT/ML
500 SYRINGE INTRAVENOUS
Status: CANCELLED | OUTPATIENT
Start: 2017-05-25

## 2017-05-25 RX ORDER — FAMOTIDINE 20 MG/50ML
20 INJECTION, SOLUTION INTRAVENOUS
Status: CANCELLED
Start: 2017-05-25 | End: 2017-05-25

## 2017-05-25 RX ORDER — FAMOTIDINE 20 MG/50ML
20 INJECTION, SOLUTION INTRAVENOUS
Status: COMPLETED | OUTPATIENT
Start: 2017-05-25 | End: 2017-05-25

## 2017-05-25 RX ORDER — SODIUM CHLORIDE 0.9 % (FLUSH) 0.9 %
10 SYRINGE (ML) INJECTION
Status: DISCONTINUED | OUTPATIENT
Start: 2017-05-25 | End: 2017-05-25 | Stop reason: HOSPADM

## 2017-05-25 RX ORDER — ACETAMINOPHEN 500 MG
1000 TABLET ORAL
Status: COMPLETED | OUTPATIENT
Start: 2017-05-25 | End: 2017-05-25

## 2017-05-25 RX ORDER — ACETAMINOPHEN 500 MG
1000 TABLET ORAL
Status: CANCELLED
Start: 2017-05-25

## 2017-05-25 RX ORDER — SODIUM CHLORIDE 0.9 % (FLUSH) 0.9 %
10 SYRINGE (ML) INJECTION
Status: CANCELLED | OUTPATIENT
Start: 2017-05-25

## 2017-05-25 RX ORDER — HEPARIN 100 UNIT/ML
500 SYRINGE INTRAVENOUS
Status: DISCONTINUED | OUTPATIENT
Start: 2017-05-25 | End: 2017-05-25 | Stop reason: HOSPADM

## 2017-05-25 RX ADMIN — HEPARIN 500 UNITS: 100 SYRINGE at 01:05

## 2017-05-25 RX ADMIN — DIPHENHYDRAMINE HYDROCHLORIDE 50 MG: 50 INJECTION, SOLUTION INTRAMUSCULAR; INTRAVENOUS at 12:05

## 2017-05-25 RX ADMIN — ACETAMINOPHEN 1000 MG: 500 TABLET ORAL at 12:05

## 2017-05-25 RX ADMIN — SODIUM CHLORIDE: 0.9 INJECTION, SOLUTION INTRAVENOUS at 12:05

## 2017-05-25 RX ADMIN — SODIUM CHLORIDE, PRESERVATIVE FREE 10 ML: 5 INJECTION INTRAVENOUS at 12:05

## 2017-05-25 RX ADMIN — TRASTUZUMAB 484 MG: KIT at 12:05

## 2017-05-25 RX ADMIN — FAMOTIDINE 20 MG: 20 INJECTION, SOLUTION INTRAVENOUS at 12:05

## 2017-05-25 NOTE — PROGRESS NOTES
Subjective:       Patient ID: Jing Tenorio is a 76 y.o. female.    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 Oncotype DX somehow got done,    came back at 14.previous Hip sx that still hurts,dyslipidemia, HTN sees Dr. Franco for the same,   HPI:   Eyes much better. Feeling better.concerned about nails turning black explained again today that it will get better. Completed xrt should be starting arimidex soon  PHYSICAL EXAM:     Wt Readings from Last 3 Encounters:   05/10/17 81.2 kg (179 lb 0.2 oz)   05/10/17 80.3 kg (177 lb)   05/04/17 80.3 kg (177 lb 0.5 oz)     Temp Readings from Last 3 Encounters:   05/04/17 98.5 °F (36.9 °C)   04/13/17 98.6 °F (37 °C)   03/23/17 98.6 °F (37 °C)     BP Readings from Last 3 Encounters:   05/10/17 128/68   05/04/17 126/78   05/04/17 (!) 149/70     Pulse Readings from Last 3 Encounters:   05/10/17 77   05/04/17 76   05/04/17 99     GENERAL: Comfortable looking patient. Patient is in no distress.  Awake, alert and oriented to time, person and place.  No anxiety, or agitation.      HEENT: Normal conjunctivae and eyelids. WNL.  PERRLA 3 to 4 mm. No icterus, no pallor, no congestion, and no discharge noted.     NECK:  Supple. Trachea is central.  No crepitus.  No JVD or masses.    RESPIRATORY:  No intercostal retractions.  No dullness to percussion.  Chest is clear to auscultation.  No rales, rhonchi or wheezes.  No crepitus.  Good air entry bilaterally.  Left armpit has some oozing yellow material, pt using topical abx cream looks clean   under left breast skin cut with no oozing , has cream on it as well.   lumpectomy scar is healed well, armpit burns have also healed well  CARDIOVASCULAR:  S1 and S2 are normally heard without murmurs or gallops.  All peripheral pulses are present.    ABDOMEN:  Normal abdomen.  No  hepatosplenomegaly.  No free fluid.  Bowel sounds are present.  No hernia noted. No masses.  No rebound or tenderness.  No guarding or rigidity.  Umbilicus is midline.    LYMPHATICS:  No axillary, cervical, supraclavicular, submental, or inguinal lymphadenopathy.    SKIN/MUSCULOSKELETAL:  There is no evidence of excoriation marks or ecchmosis.  No rashes.  No cyanosis.  No clubbing.  No joint or skeletal deformities noted.  Normal range of motion.    NEUROLOGIC:  Higher functions are appropriate.  No cranial nerve deficits.  Normal [place]ol[place].  Normal strength.  Motor and sensory functions are normal.  Deep tendon reflexes are normal.    GENITAL/RECTAL:  Exams are deferred.      Laboratory:     CBC:  Lab Results   Component Value Date    WBC 4.34 05/04/2017    RBC 3.50 (L) 05/04/2017    HGB 11.4 (L) 05/04/2017    HCT 33.7 (L) 05/04/2017    MCV 96 05/04/2017    MCH 32.6 (H) 05/04/2017    MCHC 33.8 05/04/2017    RDW 15.3 (H) 05/04/2017     05/04/2017    MPV 9.9 05/04/2017    GRAN 3.2 05/04/2017    GRAN 74.6 (H) 05/04/2017    LYMPH 0.5 (L) 05/04/2017    LYMPH 11.8 (L) 05/04/2017    MONO 0.4 05/04/2017    MONO 8.8 05/04/2017    EOS 0.2 05/04/2017    BASO 0.03 05/04/2017    EOSINOPHIL 4.1 05/04/2017    BASOPHIL 0.7 05/04/2017       BMP: BMP  Lab Results   Component Value Date     05/04/2017    K 4.6 05/04/2017    CL 97 05/04/2017    CO2 34 (H) 05/04/2017    BUN 20 (H) 05/04/2017    CREATININE 1.10 05/04/2017    CALCIUM 10.0 05/04/2017    ANIONGAP 5 (L) 05/04/2017    ESTGFRAFRICA 56 (A) 05/04/2017    EGFRNONAA 48 (A) 05/04/2017       LFT:   Lab Results   Component Value Date    ALT 35 05/04/2017    AST 43 (H) 05/04/2017    ALKPHOS 84 05/04/2017    BILITOT 0.6 05/04/2017 5/2017:  Impression         1. There is a new 4 mm pulmonary nodule along the right major fissure with a new region of consolidation along the left upper lobe subpleurally. Followup in 6 months is suggested is these are nonspecific    2.  Postsurgical changes suggestive of breast surgery on the left with radiation      3. Hiatal hernia along with small umbilical hernia    4. Moderate quantity of stool in the region of the colon, please correlate for constipation.      Electronically signed by: Hari Cruz MD  Date: 05/23/17  Time: 13:02     Encounter     View Encounter                Assessment/Plan:     Continue with rx herceptin only to complete one yr.   Osteoporosis: takes boniva and calcium , last bone density was 10/2015, would like to rpt this prior to start of arimidex, if worse will change to prolia after above I can give rx for arimidex  Pt completed xrt , no issues, Ct scans neg for mets , small pulmonary nodules with be followed with periodic scans   will get ECHO to cont to monitor cardiac function while on tatotere.   due for mammogram : ordered.   rtc 3 [id number] for next herceptin with above

## 2017-05-26 ENCOUNTER — TELEPHONE (OUTPATIENT)
Dept: HEMATOLOGY/ONCOLOGY | Facility: CLINIC | Age: 77
End: 2017-05-26

## 2017-05-26 DIAGNOSIS — Z51.89 ENCOUNTER FOR ONGOING OSTEOPOROSIS THERAPY: ICD-10-CM

## 2017-05-26 DIAGNOSIS — M81.0 ENCOUNTER FOR ONGOING OSTEOPOROSIS THERAPY: ICD-10-CM

## 2017-05-26 DIAGNOSIS — C50.011 MALIGNANT NEOPLASM INVOLVING BOTH NIPPLE AND AREOLA OF RIGHT BREAST IN FEMALE: Primary | ICD-10-CM

## 2017-05-26 NOTE — TELEPHONE ENCOUNTER
----- Message from Sri Solano LPN sent at 5/26/2017  3:55 PM CDT -----  Contact: self [phone]      ----- Message -----  From: Laury Valverde  Sent: 5/26/2017   3:14 PM  To: [facility] Staff    Please call her with upcoming appt dates and times for labs and to see you! thank you!

## 2017-05-26 NOTE — TELEPHONE ENCOUNTER
Returned call to patient. Discussed follow up, lab and infusion appts on 6/15/17. Also advised in process of scheduling echo, mammo, and bone mineral density for 3/2/17, but awaiting ECHO scheduling and will confirm with patient after scheduling that. Patient voiced understanding and appreciation

## 2017-06-01 ENCOUNTER — TELEPHONE (OUTPATIENT)
Dept: HEMATOLOGY/ONCOLOGY | Facility: CLINIC | Age: 77
End: 2017-06-01

## 2017-06-01 NOTE — TELEPHONE ENCOUNTER
Per request of Ann/Dr. Villanueva at [facility]//called mammo, spoke with Graciela and changed bilateral mammo orders to Right breast mammo ONLY on 6/2/17 as left breast was diagnosed with ca and recently completed xrt with Dr. Villanueva therefore mammo to left breast due in 6 months. Called patient and confirmed that orders are now corrected as per requested by Dr. Villanueva. Patient voiced understanding and appreciation.

## 2017-06-01 NOTE — TELEPHONE ENCOUNTER
----- Message from Sri Solano LPN sent at 6/1/2017  2:02 PM CDT -----  Contact: self      ----- Message -----  From: Carlene Diego  Sent: 6/1/2017   1:41 PM  To: [facility] Staff    Patient would like a call back from Tia regarding her mammogram appointment. Please call patient at [phone]. Thanks!

## 2017-06-02 ENCOUNTER — HOSPITAL ENCOUNTER (OUTPATIENT)
Dept: RADIOLOGY | Facility: HOSPITAL | Age: 77
Discharge: HOME | End: 2017-06-02
Attending: INTERNAL MEDICINE
Payer: MEDICARE

## 2017-06-02 ENCOUNTER — CLINICAL SUPPORT (OUTPATIENT)
Dept: CARDIOLOGY | Facility: CLINIC | Age: 77
End: 2017-06-02
Payer: MEDICARE

## 2017-06-02 DIAGNOSIS — M81.0 ENCOUNTER FOR ONGOING OSTEOPOROSIS THERAPY: ICD-10-CM

## 2017-06-02 DIAGNOSIS — I36.9 NONRHEUMATIC TRICUSPID VALVE DISORDER: ICD-10-CM

## 2017-06-02 DIAGNOSIS — C50.011 MALIGNANT NEOPLASM INVOLVING BOTH NIPPLE AND AREOLA OF RIGHT BREAST IN FEMALE: ICD-10-CM

## 2017-06-02 DIAGNOSIS — Z51.89 ENCOUNTER FOR ONGOING OSTEOPOROSIS THERAPY: ICD-10-CM

## 2017-06-02 DIAGNOSIS — C50.919 MALIGNANT NEOPLASM OF FEMALE BREAST: ICD-10-CM

## 2017-06-02 LAB
AORTIC VALVE REGURGITATION: ABNORMAL
DIASTOLIC DYSFUNCTION: YES
ESTIMATED PA SYSTOLIC PRESSURE: 31.09
MITRAL VALVE REGURGITATION: ABNORMAL
RETIRED EF AND QEF - SEE NOTES: 55 (ref 55–65)
TRICUSPID VALVE REGURGITATION: ABNORMAL

## 2017-06-02 PROCEDURE — 77080 DXA BONE DENSITY AXIAL: CPT | Mod: TC,PO

## 2017-06-02 PROCEDURE — 93306 TTE W/DOPPLER COMPLETE: CPT | Mod: S$GLB,,, | Performed by: INTERNAL MEDICINE

## 2017-06-02 PROCEDURE — 77065 DX MAMMO INCL CAD UNI: CPT | Mod: 26,,, | Performed by: RADIOLOGY

## 2017-06-02 PROCEDURE — 77061 BREAST TOMOSYNTHESIS UNI: CPT | Mod: 26,,, | Performed by: RADIOLOGY

## 2017-06-02 PROCEDURE — 77061 BREAST TOMOSYNTHESIS UNI: CPT | Mod: TC

## 2017-06-02 PROCEDURE — 77080 DXA BONE DENSITY AXIAL: CPT | Mod: 26,,, | Performed by: RADIOLOGY

## 2017-06-06 ENCOUNTER — TELEPHONE (OUTPATIENT)
Dept: HEMATOLOGY/ONCOLOGY | Facility: CLINIC | Age: 77
End: 2017-06-06

## 2017-06-06 DIAGNOSIS — R93.1 ABNORMAL ECHOCARDIOGRAM: Primary | ICD-10-CM

## 2017-06-06 NOTE — TELEPHONE ENCOUNTER
----- Message from Nelson Garcia sent at 6/5/2017  4:24 PM CDT -----  Contact: Jing  Calling back regarding referral to cardiology. Said she was waiting on call per conversation with doctor on Friday. [phone] (home)   Thanks!

## 2017-06-06 NOTE — TELEPHONE ENCOUNTER
I rtc to pt and she said Dr MARV Smith told her the echo done shows a change from previous and will need to see cardiologist. I scheduled her to see Dr Benoit but she can not go at that time of day. Told her I would send a req to cardiology to make her an appt. She agreed. cm

## 2017-06-07 NOTE — TELEPHONE ENCOUNTER
----- Message from Jim Malave MD sent at 6/6/2017  3:09 PM CDT -----  Have pt see first available cardiologist. She has never seen me  ----- Message -----  From: Obdulia Magaña LPN  Sent: 6/6/2017  12:46 PM  To: [facility]    Would you please call pt and schedule her an appt . I placed the ref in the system. Dr MARV Smith is asking her to see cardiologist due to change in her echo when compared to previous one. Please let me know when done. Thank you so much. Obdulia

## 2017-06-14 ENCOUNTER — OFFICE VISIT (OUTPATIENT)
Dept: CARDIOLOGY | Facility: CLINIC | Age: 77
End: 2017-06-14
Payer: MEDICARE

## 2017-06-14 VITALS
HEIGHT: 65 IN | DIASTOLIC BLOOD PRESSURE: 78 MMHG | BODY MASS INDEX: 29.68 KG/M2 | SYSTOLIC BLOOD PRESSURE: 142 MMHG | WEIGHT: 178.13 LBS | HEART RATE: 86 BPM

## 2017-06-14 DIAGNOSIS — C50.012 MALIGNANT NEOPLASM INVOLVING BOTH NIPPLE AND AREOLA OF LEFT BREAST IN FEMALE: ICD-10-CM

## 2017-06-14 DIAGNOSIS — I10 ESSENTIAL HYPERTENSION: ICD-10-CM

## 2017-06-14 DIAGNOSIS — E78.5 HYPERLIPIDEMIA LDL GOAL <130: ICD-10-CM

## 2017-06-14 PROBLEM — C50.912 MALIGNANT NEOPLASM OF LEFT BREAST: Status: ACTIVE | Noted: 2017-06-14

## 2017-06-14 PROCEDURE — 1126F AMNT PAIN NOTED NONE PRSNT: CPT | Mod: S$GLB,,, | Performed by: INTERNAL MEDICINE

## 2017-06-14 PROCEDURE — 99999 PR PBB SHADOW E&M-EST. PATIENT-LVL III: CPT | Mod: PBBFAC,,, | Performed by: INTERNAL MEDICINE

## 2017-06-14 PROCEDURE — 1159F MED LIST DOCD IN RCRD: CPT | Mod: S$GLB,,, | Performed by: INTERNAL MEDICINE

## 2017-06-14 PROCEDURE — 99204 OFFICE O/P NEW MOD 45 MIN: CPT | Mod: S$GLB,,, | Performed by: INTERNAL MEDICINE

## 2017-06-14 PROCEDURE — 99499 UNLISTED E&M SERVICE: CPT | Mod: S$GLB,,, | Performed by: INTERNAL MEDICINE

## 2017-06-14 RX ORDER — OMEPRAZOLE 40 MG/1
40 CAPSULE, DELAYED RELEASE ORAL DAILY
COMMUNITY
End: 2017-09-10 | Stop reason: SDUPTHER

## 2017-06-14 NOTE — PROGRESS NOTES
Subjective:    Patient ID:  Jing Tenorio is a 76 y.o. female who presents for evaluation of Hypertension (abnormal Echo- changes since chemo) and Breast Cancer      Pt with breast CA has had chemo therapy and a recent Echo was done. Previous Echo estimated EF was 60-65%; present study 55-60%. She was referred for evaluation. She reports no previous cardiac issues. She reports no new or changing symptoms. She will have mild CHRISTIAN if she rushes but this is not a new change. She denies any palpitations, dizziness, syncope or pre-syncope.She denies any chest pain, SOB, PND, orthopnea. She denies claudication, lower extremity edema.         Review of Systems   Constitution: Negative for weight gain and weight loss.   HENT: Negative.    Eyes: Negative.    Cardiovascular: Negative for chest pain, claudication, cyanosis, irregular heartbeat, leg swelling, near-syncope, orthopnea (no PND), palpitations and syncope.   Respiratory: Negative for cough, hemoptysis, shortness of breath and snoring.    Endocrine: Negative.    Skin: Negative.    Musculoskeletal: Negative for joint pain, muscle cramps, muscle weakness and myalgias.   Gastrointestinal: Negative for diarrhea, hematemesis, nausea and vomiting.   Genitourinary: Negative.    Neurological: Negative for dizziness, focal weakness, light-headedness, loss of balance, numbness, paresthesias and seizures.   Psychiatric/Behavioral: Negative.         Objective:    Physical Exam   Constitutional: She is oriented to person, place, and time. She appears well-developed and well-nourished.   Eyes: Pupils are equal, round, and reactive to light.   Neck: Normal range of motion. No thyromegaly present.   Cardiovascular: Normal rate, regular rhythm, S1 normal, S2 normal, normal heart sounds, intact distal pulses and normal pulses.   No extrasystoles are present. PMI is not displaced.  Exam reveals no friction rub.    No murmur heard.  Pulmonary/Chest: Effort normal and breath sounds  normal. She has no wheezes. She has no rales. She exhibits no tenderness.   Abdominal: Soft. Bowel sounds are normal. She exhibits no distension and no mass. There is no tenderness.   Musculoskeletal: Normal range of motion. She exhibits no edema.   Neurological: She is alert and oriented to person, place, and time.   Skin: Skin is warm and dry.   Vitals reviewed.      Test(s) Reviewed  I have reviewed the following in detail:  [] Stress test   [] Angiography   [x] Echocardiogram - images reviewed   [] Labs   [] Other:         Assessment:       1. Essential hypertension    2. Malignant neoplasm involving both nipple and areola of left breast in female    3. Hyperlipidemia LDL goal <130         Plan:       Do not feel present EF range 55-60% vs. 60-65% represents a significant change.  She is asymptomatic and has no clinical signs of heart failure.  She will monitor her BP and we will do f/u Echo in 6 months.

## 2017-06-15 ENCOUNTER — OFFICE VISIT (OUTPATIENT)
Dept: HEMATOLOGY/ONCOLOGY | Facility: CLINIC | Age: 77
End: 2017-06-15
Payer: MEDICARE

## 2017-06-15 ENCOUNTER — INFUSION (OUTPATIENT)
Dept: INFUSION THERAPY | Facility: HOSPITAL | Age: 77
End: 2017-06-15
Attending: INTERNAL MEDICINE
Payer: MEDICARE

## 2017-06-15 VITALS
BODY MASS INDEX: 29.53 KG/M2 | HEIGHT: 65 IN | HEART RATE: 75 BPM | RESPIRATION RATE: 20 BRPM | SYSTOLIC BLOOD PRESSURE: 145 MMHG | DIASTOLIC BLOOD PRESSURE: 85 MMHG | WEIGHT: 177.25 LBS | TEMPERATURE: 98 F

## 2017-06-15 VITALS
DIASTOLIC BLOOD PRESSURE: 60 MMHG | WEIGHT: 177.25 LBS | TEMPERATURE: 98 F | RESPIRATION RATE: 20 BRPM | SYSTOLIC BLOOD PRESSURE: 146 MMHG | HEIGHT: 65 IN | BODY MASS INDEX: 29.53 KG/M2

## 2017-06-15 DIAGNOSIS — R91.8 PULMONARY NODULES: ICD-10-CM

## 2017-06-15 DIAGNOSIS — I10 ESSENTIAL HYPERTENSION: ICD-10-CM

## 2017-06-15 DIAGNOSIS — C50.012 MALIGNANT NEOPLASM INVOLVING BOTH NIPPLE AND AREOLA OF LEFT BREAST IN FEMALE: ICD-10-CM

## 2017-06-15 DIAGNOSIS — M80.00XA AGE-RELATED OSTEOPOROSIS WITH CURRENT PATHOLOGICAL FRACTURE, INITIAL ENCOUNTER: ICD-10-CM

## 2017-06-15 DIAGNOSIS — M17.0 OSTEOARTHRITIS OF BOTH KNEES, UNSPECIFIED OSTEOARTHRITIS TYPE: ICD-10-CM

## 2017-06-15 DIAGNOSIS — E78.5 HYPERLIPIDEMIA LDL GOAL <130: ICD-10-CM

## 2017-06-15 DIAGNOSIS — C50.011 MALIGNANT NEOPLASM INVOLVING BOTH NIPPLE AND AREOLA OF RIGHT BREAST IN FEMALE: Primary | ICD-10-CM

## 2017-06-15 DIAGNOSIS — M81.0 AGE RELATED OSTEOPOROSIS, UNSPECIFIED PATHOLOGICAL FRACTURE PRESENCE: ICD-10-CM

## 2017-06-15 DIAGNOSIS — C50.012 MALIGNANT NEOPLASM INVOLVING BOTH NIPPLE AND AREOLA OF LEFT BREAST IN FEMALE: Primary | ICD-10-CM

## 2017-06-15 PROCEDURE — 63600175 PHARM REV CODE 636 W HCPCS: Mod: PN | Performed by: INTERNAL MEDICINE

## 2017-06-15 PROCEDURE — 99499 UNLISTED E&M SERVICE: CPT | Mod: S$GLB,,, | Performed by: INTERNAL MEDICINE

## 2017-06-15 PROCEDURE — 25000003 PHARM REV CODE 250: Mod: PN | Performed by: INTERNAL MEDICINE

## 2017-06-15 PROCEDURE — 99999 PR PBB SHADOW E&M-EST. PATIENT-LVL IV: CPT | Mod: PBBFAC,,, | Performed by: INTERNAL MEDICINE

## 2017-06-15 PROCEDURE — 96413 CHEMO IV INFUSION 1 HR: CPT | Mod: PN

## 2017-06-15 PROCEDURE — 99215 OFFICE O/P EST HI 40 MIN: CPT | Mod: S$GLB,,, | Performed by: INTERNAL MEDICINE

## 2017-06-15 PROCEDURE — 1159F MED LIST DOCD IN RCRD: CPT | Mod: S$GLB,,, | Performed by: INTERNAL MEDICINE

## 2017-06-15 PROCEDURE — 1126F AMNT PAIN NOTED NONE PRSNT: CPT | Mod: S$GLB,,, | Performed by: INTERNAL MEDICINE

## 2017-06-15 PROCEDURE — 96367 TX/PROPH/DG ADDL SEQ IV INF: CPT | Mod: PN

## 2017-06-15 RX ORDER — ANASTROZOLE 1 MG/1
1 TABLET ORAL DAILY
Qty: 30 TABLET | Refills: 12 | Status: SHIPPED | OUTPATIENT
Start: 2017-06-15 | End: 2018-05-18 | Stop reason: SDUPTHER

## 2017-06-15 RX ORDER — ACETAMINOPHEN 500 MG
1000 TABLET ORAL
Status: CANCELLED
Start: 2017-07-06

## 2017-06-15 RX ORDER — SODIUM CHLORIDE 0.9 % (FLUSH) 0.9 %
10 SYRINGE (ML) INJECTION
Status: COMPLETED | OUTPATIENT
Start: 2017-06-15 | End: 2017-06-15

## 2017-06-15 RX ORDER — ACETAMINOPHEN 500 MG
1000 TABLET ORAL
Status: CANCELLED
Start: 2017-06-15

## 2017-06-15 RX ORDER — HEPARIN 100 UNIT/ML
500 SYRINGE INTRAVENOUS
Status: CANCELLED | OUTPATIENT
Start: 2017-06-15

## 2017-06-15 RX ORDER — FAMOTIDINE 20 MG/50ML
20 INJECTION, SOLUTION INTRAVENOUS
Status: CANCELLED
Start: 2017-07-06 | End: 2017-07-06

## 2017-06-15 RX ORDER — FAMOTIDINE 20 MG/50ML
20 INJECTION, SOLUTION INTRAVENOUS
Status: COMPLETED | OUTPATIENT
Start: 2017-06-15 | End: 2017-06-15

## 2017-06-15 RX ORDER — SODIUM CHLORIDE 0.9 % (FLUSH) 0.9 %
10 SYRINGE (ML) INJECTION
Status: CANCELLED | OUTPATIENT
Start: 2017-07-06

## 2017-06-15 RX ORDER — ACETAMINOPHEN 500 MG
1000 TABLET ORAL
Status: COMPLETED | OUTPATIENT
Start: 2017-06-15 | End: 2017-06-15

## 2017-06-15 RX ORDER — FAMOTIDINE 20 MG/50ML
20 INJECTION, SOLUTION INTRAVENOUS
Status: CANCELLED
Start: 2017-06-15 | End: 2017-06-15

## 2017-06-15 RX ORDER — HEPARIN 100 UNIT/ML
500 SYRINGE INTRAVENOUS
Status: CANCELLED | OUTPATIENT
Start: 2017-07-06

## 2017-06-15 RX ORDER — HEPARIN 100 UNIT/ML
500 SYRINGE INTRAVENOUS
Status: COMPLETED | OUTPATIENT
Start: 2017-06-15 | End: 2017-06-15

## 2017-06-15 RX ORDER — SODIUM CHLORIDE 0.9 % (FLUSH) 0.9 %
10 SYRINGE (ML) INJECTION
Status: CANCELLED | OUTPATIENT
Start: 2017-06-15

## 2017-06-15 RX ADMIN — SODIUM CHLORIDE: 0.9 INJECTION, SOLUTION INTRAVENOUS at 10:06

## 2017-06-15 RX ADMIN — TRASTUZUMAB 484 MG: KIT at 11:06

## 2017-06-15 RX ADMIN — ACETAMINOPHEN 1000 MG: 500 TABLET ORAL at 10:06

## 2017-06-15 RX ADMIN — FAMOTIDINE 20 MG: 20 INJECTION, SOLUTION INTRAVENOUS at 10:06

## 2017-06-15 RX ADMIN — SODIUM CHLORIDE, PRESERVATIVE FREE 10 ML: 5 INJECTION INTRAVENOUS at 10:06

## 2017-06-15 RX ADMIN — HEPARIN 500 UNITS: 100 SYRINGE at 11:06

## 2017-06-15 RX ADMIN — DIPHENHYDRAMINE HYDROCHLORIDE 50 MG: 50 INJECTION, SOLUTION INTRAMUSCULAR; INTRAVENOUS at 10:06

## 2017-06-15 NOTE — PROGRESS NOTES
An 76-year-old  woman well known to me.  The patient was diagnosed n   May 2016 with triple positive breast cancer.  The patient had a 1.9 cm   malignancy of the left breast, sentinel lymph node negative in association with   DCIS.  She underwent lumpectomy, underwent adjuvant TCH chemotherapy for six   cycles and now continues with Herceptin alone, has done well, but she has   multiple other issues in association.  The patient had a recent echocardiogram   and follows with Dr. Gray.  Echocardiogram at this visit shows a drop by 10%   from an ejection fraction of 65% to 55%.  Dr. Gray has cleared her to continue   with Herceptin and with the short-term echocardiogram follow up as the patient   has remained asymptomatic.  The patient also had some pulmonary nodules that are   being followed by a CT scan.  They are deemed to be benign, but need ongoing   followup.  She has been taking Boniva for postmenopausal osteopenia/osteoporosis   along with calcium and the patient has also had increased density on mammogram.    Recently had a repeat mammogram, which Radiology recommends short-term   followup within three months on the right side.  She is due for a followup   mammogram on the left side three months following that, which will be towards   the end of year.  The patient comes in today with her significant other without   any specific complaints.  She does have some amount of mild pain to her right   leg, which has been chronic, occasional mild shortness of breath, but that has   not changed since prior to start of chemo treatments and I have advised her to   watch this closely.  She denies any chest pain, palpitation, dizziness, passing   out episodes, nausea, vomiting, diarrhea, altered bowel or bladder habits,   weight changes, or loss of appetite.    PHYSICAL EXAMINATION:  GENERAL:  Elderly woman.  Alert, awake, oriented.  VITAL SIGNS:  At 5 feet 5 inches, 177 pounds, BSA 1.93.  HEENT:  Hair has  started coming back.  Showed no congestion.  NECK:  Supple, without JVD.  Trachea is central.  LUNGS:  Clear to auscultation.  No rales, rhonchi or crepitations.  HEART:  S1 is normally heard.  No murmurs or gallops.  ABDOMEN:  Soft, without rebound, guarding or rigidity.  EXTREMITIES:  Showed no edema.  NEUROLOGIC:  She demonstrates no neurological deficits.    LABORATORY EVALUATION:  Shows mild renal insufficiency, which has been chronic   and has remained unchanged.  Sodium slightly low at 135, glucose 71.    Comprehensive metabolic panel is otherwise normal.  GFR is slightly diminished   at 47.  The patient's CBC reveals a white count of 4.4, H and H 11.2 and 33.6,   platelets of 168.  Echocardiogram, ejection fraction has dropped to 55%, but   still in the normal range.  The most recent CT of the chest, abdomen and pelvis   done on 05/23/2017, new 4 mm pulmonary nodule along the right major fissure with   a new region of consolidation on the left upper lobe subpleurally, six-month   followup, i.e., in November 2017 recommended.  She has post-surgical changes in   the breast.  The patient's most recent mammogram done in May 2017 on the right   side, shows the fibroglandular densities.  Three-month recommendation has been   made.  This is also going to be scheduled today for August 2017.  Her left   mammogram will be due coming up as well.    IMPRESSION:  1.  Triple positive breast cancer, T1, N0, M0, underwent TCH chemotherapy,   currently only on Herceptin.  EF dropped somewhat followed by Cardiology.  No   need to hold off on Herceptin continue.  Return to clinic in three weeks with   CBC and CMP for ongoing Herceptin treatment.  Watch for symptoms of cardiac   failure and refer to Cardiology as needed.  2.  Short-term followup of mammogram recommended scheduled for August.  3.  The patient has significant osteoporosis on bone density.  She has been on   Boniva for the past several years.  We will change this  to Prolia.  Calcium   supplementation minimum 1200 mg along with dental precautions.  Orders placed   for Prolia.  4.  Discontinue Boniva, does not seem to be effective.  The patient has   attempted Actonel prior to that.  5.  She needs to start Arimidex due to her ER positive status.  This will have a   side effect of worsening osteoporosis.  The patient voices understanding.    Options of treatment discussed with her today.  She would like to try the   Arimidex and continue monitoring the bone density closely.  Prescription for   Arimidex sent to pharmacy.  6.  The patient has dyslipidemia.  Continue with Zocor and primary care follow   up with Dr. Franco.  7.  Degenerative joint pains.  Continue with Ultram as needed, take trazodone   for sleep and anxiety along with Xanax.  8.  Mild CKD.  Continue to monitor.  No intervention necessary at this time.  9.  Gastroesophageal reflux disease.  Continue with omeprazole.  No changes or   worsening of symptoms at this point.      SSS/PN  dd: 06/15/2017 10:22:06 (CDT)  td: 06/15/2017 18:02:52 (CDT)  Doc ID   #[id number]  Job ID #[id number]    CC:

## 2017-06-15 NOTE — PLAN OF CARE
Problem: Patient Care Overview (Adult)  Goal: Plan of Care Review  Outcome: Ongoing (interventions implemented as appropriate)  Tolerated treatment well, VSS, schedule given to pt

## 2017-06-21 DIAGNOSIS — Z85.3 HISTORY OF LEFT BREAST CANCER: Primary | ICD-10-CM

## 2017-06-26 ENCOUNTER — TELEPHONE (OUTPATIENT)
Dept: HEMATOLOGY/ONCOLOGY | Facility: CLINIC | Age: 77
End: 2017-06-26

## 2017-06-26 NOTE — TELEPHONE ENCOUNTER
"Received call from patient who stated she started a new medication prescribed by , Arimidex. She is experiencing "feeling real warm and nervousness, but both go away". Explained to patient that these are side effects of the medication and are expected, but should the symptoms worsen or any other symptoms occur, please call back. Patient voiced understanding and appreciation.  "

## 2017-07-06 ENCOUNTER — OFFICE VISIT (OUTPATIENT)
Dept: HEMATOLOGY/ONCOLOGY | Facility: CLINIC | Age: 77
End: 2017-07-06
Payer: MEDICARE

## 2017-07-06 ENCOUNTER — INFUSION (OUTPATIENT)
Dept: INFUSION THERAPY | Facility: HOSPITAL | Age: 77
End: 2017-07-06
Attending: INTERNAL MEDICINE
Payer: MEDICARE

## 2017-07-06 VITALS
HEIGHT: 65 IN | BODY MASS INDEX: 29.79 KG/M2 | HEART RATE: 72 BPM | RESPIRATION RATE: 16 BRPM | WEIGHT: 178.81 LBS | DIASTOLIC BLOOD PRESSURE: 65 MMHG | SYSTOLIC BLOOD PRESSURE: 136 MMHG | TEMPERATURE: 98 F

## 2017-07-06 VITALS
TEMPERATURE: 98 F | RESPIRATION RATE: 16 BRPM | HEART RATE: 72 BPM | DIASTOLIC BLOOD PRESSURE: 65 MMHG | WEIGHT: 178.81 LBS | BODY MASS INDEX: 29.79 KG/M2 | SYSTOLIC BLOOD PRESSURE: 136 MMHG | HEIGHT: 65 IN

## 2017-07-06 DIAGNOSIS — C50.012 MALIGNANT NEOPLASM INVOLVING BOTH NIPPLE AND AREOLA OF LEFT BREAST IN FEMALE: Primary | ICD-10-CM

## 2017-07-06 DIAGNOSIS — C50.012 CARCINOMA OF NIPPLE AND AREOLA OF FEMALE BREAST, LEFT: Primary | ICD-10-CM

## 2017-07-06 DIAGNOSIS — M81.0 OSTEOPOROSIS, SENILE: ICD-10-CM

## 2017-07-06 DIAGNOSIS — C50.012 MALIGNANT NEOPLASM OF NIPPLE OF LEFT BREAST IN FEMALE: ICD-10-CM

## 2017-07-06 PROCEDURE — 99999 PR PBB SHADOW E&M-EST. PATIENT-LVL IV: CPT | Mod: PBBFAC,,, | Performed by: NURSE PRACTITIONER

## 2017-07-06 PROCEDURE — 96367 TX/PROPH/DG ADDL SEQ IV INF: CPT | Mod: PN

## 2017-07-06 PROCEDURE — 96413 CHEMO IV INFUSION 1 HR: CPT | Mod: PN

## 2017-07-06 PROCEDURE — 99214 OFFICE O/P EST MOD 30 MIN: CPT | Mod: S$GLB,,, | Performed by: NURSE PRACTITIONER

## 2017-07-06 PROCEDURE — 1159F MED LIST DOCD IN RCRD: CPT | Mod: S$GLB,,, | Performed by: NURSE PRACTITIONER

## 2017-07-06 PROCEDURE — 96401 CHEMO ANTI-NEOPL SQ/IM: CPT | Mod: PN

## 2017-07-06 PROCEDURE — 1125F AMNT PAIN NOTED PAIN PRSNT: CPT | Mod: S$GLB,,, | Performed by: NURSE PRACTITIONER

## 2017-07-06 PROCEDURE — 63600175 PHARM REV CODE 636 W HCPCS: Mod: JW,PN | Performed by: INTERNAL MEDICINE

## 2017-07-06 PROCEDURE — 99499 UNLISTED E&M SERVICE: CPT | Mod: S$GLB,,, | Performed by: NURSE PRACTITIONER

## 2017-07-06 PROCEDURE — 25000003 PHARM REV CODE 250: Mod: PN | Performed by: INTERNAL MEDICINE

## 2017-07-06 RX ORDER — CHOLECALCIFEROL (VITAMIN D3) 25 MCG
2000 TABLET ORAL DAILY
COMMUNITY

## 2017-07-06 RX ORDER — HEPARIN 100 UNIT/ML
500 SYRINGE INTRAVENOUS
Status: DISCONTINUED | OUTPATIENT
Start: 2017-07-06 | End: 2017-07-06 | Stop reason: HOSPADM

## 2017-07-06 RX ORDER — FAMOTIDINE 20 MG/50ML
20 INJECTION, SOLUTION INTRAVENOUS
Status: COMPLETED | OUTPATIENT
Start: 2017-07-06 | End: 2017-07-06

## 2017-07-06 RX ORDER — SODIUM CHLORIDE 0.9 % (FLUSH) 0.9 %
10 SYRINGE (ML) INJECTION
Status: DISCONTINUED | OUTPATIENT
Start: 2017-07-06 | End: 2017-07-06 | Stop reason: HOSPADM

## 2017-07-06 RX ORDER — ACETAMINOPHEN 500 MG
1000 TABLET ORAL
Status: COMPLETED | OUTPATIENT
Start: 2017-07-06 | End: 2017-07-06

## 2017-07-06 RX ADMIN — FAMOTIDINE 20 MG: 20 INJECTION, SOLUTION INTRAVENOUS at 11:07

## 2017-07-06 RX ADMIN — SODIUM CHLORIDE, PRESERVATIVE FREE 10 ML: 5 INJECTION INTRAVENOUS at 11:07

## 2017-07-06 RX ADMIN — ACETAMINOPHEN 1000 MG: 500 TABLET ORAL at 11:07

## 2017-07-06 RX ADMIN — TRASTUZUMAB 484 MG: 150 INJECTION, POWDER, LYOPHILIZED, FOR SOLUTION INTRAVENOUS at 12:07

## 2017-07-06 RX ADMIN — DIPHENHYDRAMINE HYDROCHLORIDE 50 MG: 50 INJECTION, SOLUTION INTRAMUSCULAR; INTRAVENOUS at 11:07

## 2017-07-06 RX ADMIN — HEPARIN 500 UNITS: 100 SYRINGE at 12:07

## 2017-07-06 RX ADMIN — SODIUM CHLORIDE: 0.9 INJECTION, SOLUTION INTRAVENOUS at 11:07

## 2017-07-06 RX ADMIN — DENOSUMAB 60 MG: 60 INJECTION SUBCUTANEOUS at 11:07

## 2017-07-06 NOTE — PROGRESS NOTES
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  The patient is   receiving adjuvant Herceptin and returns for evaluation prior to next dose of therapy.  She completed   post-lumpectomy radiation on 04/11/17.  She denies any new breast complaints, bone pain, fevers,  chills, unexplained weight loss, abdominal discomfort/bloating, nausea, vomiting, bleeding, difficulties  with hot flashes, diarrhea, mouth sores, etc.  Patient is to start Prolia today.  She is compliant with   calcium, vitamin D supplementation and Arimidex.  She denies any invasive procedures in the last three   months.  No other complaints or pertinent findings on a 14-point review of systems.    PHYSICAL EXAMINATION:  GENERAL:  The patient is a well-developed, well-nourished elderly white female,   Alert & oriented x 3.  VITAL SIGNS:  Weight  178.8 pounds (increase of 1 1/2 pounds/3 weeks)  /65, Pulse 72, Resp 16, Temp 98.0  HEENT:  Normocephalic, atraumatic.  Oral mucosa pink and moist.  Lips without   lesions.  Tongue midline.  Oropharynx clear.  Nonicteric sclerae.   NECK:  Supple, no adenopathy.  No carotid bruits, thyromegaly or thyroid nodule.  HEART:  Regular rate and rhythm without murmur, gallop or rub.   LUNGS:  Clear to auscultation bilaterally.  Normal respiratory effort.  ABDOMEN:  Soft, nontender, nondistended with positive normoactive bowel sounds,   no hepatosplenomegaly.  EXTREMITIES:  No cyanosis, clubbing or edema.  Distal pulses are intact.   AXILLAE AND GROIN:  No palpable pathologic lymphadenopathy is appreciated.  SKIN:  Intact/turgor normal.  NEUROLOGIC:  Cranial nerves II-XII grossly intact.  Motor:  Good muscle bulk and   tone.  Strength/sensory 5/5 throughout.    LABORATORY:    Lab Results   Component Value Date    WBC 5.09 07/06/2017    HGB 11.9 (L) 07/06/2017    HCT 35.2 (L) 07/06/2017     MCV 98 07/06/2017     07/06/2017     73.2% grans, 14.3% lymph    CMP  Sodium   Date Value Ref Range Status   07/06/2017 135 (L) 136 - 145 mmol/L Final     Potassium   Date Value Ref Range Status   07/06/2017 4.8 3.5 - 5.1 mmol/L Final     Chloride   Date Value Ref Range Status   07/06/2017 95 95 - 110 mmol/L Final     CO2   Date Value Ref Range Status   07/06/2017 33 (H) 22 - 31 mmol/L Final     Glucose   Date Value Ref Range Status   07/06/2017 71 70 - 110 mg/dL Final     Comment:     The ADA recommends the following guidelines for fasting glucose:  Normal:       less than 100 mg/dL  Prediabetes:  100 mg/dL to 125 mg/dL  Diabetes:     126 mg/dL or higher       BUN, Bld   Date Value Ref Range Status   07/06/2017 20 (H) 7 - 18 mg/dL Final     Creatinine   Date Value Ref Range Status   07/06/2017 1.08 0.50 - 1.40 mg/dL Final   01/18/2013 0.9 0.5 - 1.4 mg/dL Final     Calcium   Date Value Ref Range Status   07/06/2017 10.0 8.4 - 10.2 mg/dL Final   01/18/2013 8.9 8.7 - 10.5 mg/dL Final     Total Protein   Date Value Ref Range Status   07/06/2017 7.2 6.0 - 8.4 g/dL Final     Albumin   Date Value Ref Range Status   07/06/2017 4.5 3.5 - 5.2 g/dL Final     Total Bilirubin   Date Value Ref Range Status   07/06/2017 0.6 0.2 - 1.3 mg/dL Final     Comment:     For infants and newborns, interpretation of results should be based  on gestational age, weight and in agreement with clinical  observations.  Premature Infant recommended reference ranges:  Up to 24 hours.............<8.0 mg/dL  Up to 48 hours............<12.0 mg/dL  3-5 days..................<15.0 mg/dL  6-29 days.................<15.0 mg/dL       Alkaline Phosphatase   Date Value Ref Range Status   07/06/2017 83 38 - 145 U/L Final     AST   Date Value Ref Range Status   07/06/2017 41 (H) 14 - 36 U/L Final     ALT   Date Value Ref Range Status   07/06/2017 27 10 - 44 U/L Final     Anion Gap   Date Value Ref Range Status   07/06/2017 7 (L) 8 - 16 mmol/L Final    01/18/2013 9 5 - 15 meq/L Final     eGFR if    Date Value Ref Range Status   07/06/2017 57 (A) >60 mL/min/1.73 m^2 Final     eGFR if non    Date Value Ref Range Status   07/06/2017 49 (A) >60 mL/min/1.73 m^2 Final     Comment:     Calculation used to obtain the estimated glomerular filtration  rate (eGFR) is the CKD-EPI equation. Since race is unknown   in our information system, the eGFR values for   -American and Non--American patients are given   for each creatinine result.       IMPRESSION:    1.  Triple positive invasive left breast carcinoma.  2.  Osteoporosis, senile    PLAN:  1.  Proceed with next dose of adjuvant Herceptin to consist of 484 mg IV with typical premeds.  2.  Start Prolia 60 mg SQ today.  Continue calcium & vitamin D supplementation.  3.  Report any needed invasive dental work to the office.  4.  Return in three weeks with interval CBC, CMP prior for evaluation prior to Herceptin.    Assessment/plan reviewed and approved by Dr. Joseph.

## 2017-07-17 ENCOUNTER — NURSE TRIAGE (OUTPATIENT)
Dept: ADMINISTRATIVE | Facility: CLINIC | Age: 77
End: 2017-07-17

## 2017-07-17 NOTE — TELEPHONE ENCOUNTER
Reason for Disposition   BP > 140/90 and is taking BP medications    Protocols used: ST HIGH BLOOD PRESSURE-A-OH    Call transferred from the phone room.  Pt calling with concerns of high blood pressure readings of 170/90 and 160/90 Pulse 82 yesterday.  Today blood pressure readings of 143/84 Pulse 89.  Care advice given.  Appointment scheduled with MD tomorrow.

## 2017-07-18 ENCOUNTER — OFFICE VISIT (OUTPATIENT)
Dept: FAMILY MEDICINE | Facility: CLINIC | Age: 77
End: 2017-07-18
Payer: MEDICARE

## 2017-07-18 VITALS
OXYGEN SATURATION: 98 % | SYSTOLIC BLOOD PRESSURE: 134 MMHG | BODY MASS INDEX: 29.82 KG/M2 | DIASTOLIC BLOOD PRESSURE: 70 MMHG | HEART RATE: 81 BPM | WEIGHT: 179 LBS | RESPIRATION RATE: 18 BRPM | HEIGHT: 65 IN

## 2017-07-18 DIAGNOSIS — E78.5 DYSLIPIDEMIA: ICD-10-CM

## 2017-07-18 DIAGNOSIS — C50.919 MALIGNANT NEOPLASM OF FEMALE BREAST, UNSPECIFIED ESTROGEN RECEPTOR STATUS, UNSPECIFIED LATERALITY, UNSPECIFIED SITE OF BREAST: ICD-10-CM

## 2017-07-18 DIAGNOSIS — Z71.85 VACCINE COUNSELING: ICD-10-CM

## 2017-07-18 DIAGNOSIS — I10 ESSENTIAL HYPERTENSION: Primary | ICD-10-CM

## 2017-07-18 PROCEDURE — 1126F AMNT PAIN NOTED NONE PRSNT: CPT | Mod: S$GLB,,, | Performed by: INTERNAL MEDICINE

## 2017-07-18 PROCEDURE — 1159F MED LIST DOCD IN RCRD: CPT | Mod: S$GLB,,, | Performed by: INTERNAL MEDICINE

## 2017-07-18 PROCEDURE — 99499 UNLISTED E&M SERVICE: CPT | Mod: S$GLB,,, | Performed by: INTERNAL MEDICINE

## 2017-07-18 PROCEDURE — 99214 OFFICE O/P EST MOD 30 MIN: CPT | Mod: S$GLB,,, | Performed by: INTERNAL MEDICINE

## 2017-07-18 PROCEDURE — 99999 PR PBB SHADOW E&M-EST. PATIENT-LVL III: CPT | Mod: PBBFAC,,, | Performed by: INTERNAL MEDICINE

## 2017-07-18 RX ORDER — SIMVASTATIN 20 MG/1
TABLET, FILM COATED ORAL
Qty: 90 TABLET | Refills: 2 | Status: SHIPPED | OUTPATIENT
Start: 2017-07-18 | End: 2017-11-09 | Stop reason: SDUPTHER

## 2017-07-18 RX ORDER — ASCORBIC ACID 500 MG
500 TABLET ORAL DAILY
COMMUNITY

## 2017-07-18 RX ORDER — VITAMIN E 268 MG
400 CAPSULE ORAL DAILY
COMMUNITY

## 2017-07-18 RX ORDER — LISINOPRIL 10 MG/1
10 TABLET ORAL 2 TIMES DAILY
Qty: 180 TABLET | Refills: 3 | Status: SHIPPED | OUTPATIENT
Start: 2017-07-18 | End: 2017-11-09 | Stop reason: SDUPTHER

## 2017-07-18 RX ORDER — MULTIVIT WITH MINERALS/HERBS
1 TABLET ORAL DAILY
COMMUNITY

## 2017-07-18 NOTE — PROGRESS NOTES
Subjective:       Patient ID: Jing Tenorio is a 76 y.o. female.    Chief Complaint: Hypertension      HTN - uncontrolled when wakes in the morning.  One episode of 10 min palpitations associated with sbp 170.    Wanted to discuss vaccines needed.  Per , will hold off on Zoster for now while on chemo    HLD - controlled    Breast caner - now on anastrozole for 1 mo and feels bad or run down on this w some mild nausea.  Takes in am.   Breast cancer 1.9 cm, ER/N[place]+, HER2 +; lymph nodes negative; s/p removal, rad tx and completed 6 chemo treatments.    Breast cancer- s/p Ratx, chemo - now on hormone suppression therapy.       Review of Systems   Constitutional: Positive for fatigue. Negative for appetite change and fever.   HENT: Negative for nosebleeds and trouble swallowing.    Eyes: Negative for discharge and visual disturbance.   Respiratory: Negative for choking and shortness of breath.    Cardiovascular: Negative for chest pain and palpitations.   Gastrointestinal: Negative for abdominal pain, nausea and vomiting.   Musculoskeletal: Negative for arthralgias and joint swelling.   Skin: Negative for rash and wound.   Neurological: Negative for dizziness and syncope.   Psychiatric/Behavioral: Negative for confusion and dysphoric mood.       Objective:      Vitals:    07/18/17 0855   BP: 134/70   Pulse: 81   Resp: 18     Physical Exam   Constitutional: She appears well-nourished.   Eyes: Conjunctivae and EOM are normal.   Neck: Normal range of motion.   Cardiovascular: Normal rate and regular rhythm.    Pulmonary/Chest: Effort normal and breath sounds normal.   Musculoskeletal:   Normal ROM bilateral    Neurological: No cranial nerve deficit (grossly intact).   Skin: Skin is warm and dry.   Psychiatric: She has a normal mood and affect.   Alert and orientated   Vitals reviewed.        Assessment:       1. Essential hypertension    2. Malignant neoplasm of female breast, unspecified estrogen receptor  status, unspecified laterality, unspecified site of breast    3. Vaccine counseling        Plan:       Essential hypertension  -     lisinopril 10 MG tablet; Take 1 tablet (10 mg total) by mouth 2 (two) times daily.  Dispense: 180 tablet; Refill: 3    Malignant neoplasm of female breast, unspecified estrogen receptor status, unspecified laterality, unspecified site of breast    Vaccine counseling    tdap and pn 23 vac rx given  Take anastozole in pm w dinner    Medication List with Changes/Refills   Current Medications    ACETAMINOPHEN (TYLENOL) 500 MG TABLET    Take 500 mg by mouth every 6 (six) hours as needed for Pain.    ANASTROZOLE (ARIMIDEX) 1 MG TAB    Take 1 tablet (1 mg total) by mouth once daily.    ASCORBIC ACID, VITAMIN C, (VITAMIN C) 500 MG TABLET    Take 500 mg by mouth once daily.    B COMPLEX VITAMINS TABLET    Take 1 tablet by mouth once daily.    CALCIUM-VITAMIN D3-VITAMIN K (VIACTIV) 500-500-40 MG-UNIT-MCG CHEW    Take 2 tablets by mouth once daily.     COENZYME Q10 (CO Q-10) 100 MG CAPSULE    Take 100 mg by mouth once daily.     DENOSUMAB (PROLIA) 60 MG/ML SYRG    Inject 60 mg into the skin every 6 (six) months.    MULTIVITAMIN ORAL    once daily. Every day    OMEPRAZOLE (PRILOSEC) 40 MG CAPSULE    Take 40 mg by mouth once daily.    SIMVASTATIN (ZOCOR) 20 MG TABLET    Take 1 tablet (20 mg total) by mouth nightly. Every day    TRAZODONE (DESYREL) 100 MG TABLET    Take 1 tablet (100 mg total) by mouth every evening.    VITAMIN D (VITAMIN D3) 1000 UNITS TAB    Take 2,000 Units by mouth once daily.    VITAMIN E 400 UNIT CAPSULE    Take 400 Units by mouth once daily.   Changed and/or Refilled Medications    Modified Medication Previous Medication    LISINOPRIL 10 MG TABLET lisinopril 10 MG tablet       Take 1 tablet (10 mg total) by mouth 2 (two) times daily.    Take 1 tablet (10 mg total) by mouth once daily.   Discontinued Medications    ALPRAZOLAM (XANAX) 0.25 MG TABLET    Take 1 tablet (0.25 mg  "total) by mouth daily as needed for Anxiety.    CYANOCOBALAMIN, VITAMIN B-12, (VITAMIN B-12) 50 MCG LOZG    Every day    PYRIDOXINE, VITAMIN B6, (VITAMIN B-6) 50 MG TAB    Take 50 mg by mouth once daily.    TRAMADOL (ULTRAM) 50 MG TABLET    Take 1 tablet (50 mg total) by mouth every 6 (six) hours as needed for Pain.    UNABLE TO FIND    2,000 Units. VITAMIN D 1000 IU Q DAY              Counseled on regular exercise, maintenance of a healthy weight, balanced diet rich in fruits/vegetables and lean protein, and avoidance of unhealthy habits like smoking and excessive alcohol intake.   Also, counseled on importance of being compliant with medication, health appointments, diet and exercise.     No Follow-up on file.    "This note will not be shared with the patient."  "

## 2017-07-27 ENCOUNTER — INFUSION (OUTPATIENT)
Dept: INFUSION THERAPY | Facility: HOSPITAL | Age: 77
End: 2017-07-27
Attending: INTERNAL MEDICINE
Payer: MEDICARE

## 2017-07-27 ENCOUNTER — OFFICE VISIT (OUTPATIENT)
Dept: HEMATOLOGY/ONCOLOGY | Facility: CLINIC | Age: 77
End: 2017-07-27
Payer: MEDICARE

## 2017-07-27 VITALS
HEIGHT: 65 IN | BODY MASS INDEX: 29.82 KG/M2 | HEART RATE: 82 BPM | SYSTOLIC BLOOD PRESSURE: 151 MMHG | WEIGHT: 179 LBS | TEMPERATURE: 98 F | DIASTOLIC BLOOD PRESSURE: 68 MMHG | RESPIRATION RATE: 169 BRPM

## 2017-07-27 VITALS — RESPIRATION RATE: 17 BRPM | DIASTOLIC BLOOD PRESSURE: 65 MMHG | SYSTOLIC BLOOD PRESSURE: 165 MMHG | HEART RATE: 85 BPM

## 2017-07-27 DIAGNOSIS — C50.012 MALIGNANT NEOPLASM INVOLVING BOTH NIPPLE AND AREOLA OF LEFT BREAST IN FEMALE, UNSPECIFIED ESTROGEN RECEPTOR STATUS: Primary | ICD-10-CM

## 2017-07-27 DIAGNOSIS — M81.0 OSTEOPOROSIS, SENILE: ICD-10-CM

## 2017-07-27 DIAGNOSIS — E78.5 HYPERLIPIDEMIA LDL GOAL <130: ICD-10-CM

## 2017-07-27 PROCEDURE — S0028 INJECTION, FAMOTIDINE, 20 MG: HCPCS | Mod: PN | Performed by: INTERNAL MEDICINE

## 2017-07-27 PROCEDURE — A4216 STERILE WATER/SALINE, 10 ML: HCPCS | Mod: PN | Performed by: INTERNAL MEDICINE

## 2017-07-27 PROCEDURE — 25000003 PHARM REV CODE 250: Mod: PN | Performed by: INTERNAL MEDICINE

## 2017-07-27 PROCEDURE — 99999 PR PBB SHADOW E&M-EST. PATIENT-LVL III: CPT | Mod: PBBFAC,,, | Performed by: INTERNAL MEDICINE

## 2017-07-27 PROCEDURE — 96413 CHEMO IV INFUSION 1 HR: CPT | Mod: PN

## 2017-07-27 PROCEDURE — 99499 UNLISTED E&M SERVICE: CPT | Mod: S$GLB,,, | Performed by: INTERNAL MEDICINE

## 2017-07-27 PROCEDURE — 63600175 PHARM REV CODE 636 W HCPCS: Mod: PN | Performed by: INTERNAL MEDICINE

## 2017-07-27 PROCEDURE — 96367 TX/PROPH/DG ADDL SEQ IV INF: CPT | Mod: PN

## 2017-07-27 PROCEDURE — 1159F MED LIST DOCD IN RCRD: CPT | Mod: S$GLB,,, | Performed by: INTERNAL MEDICINE

## 2017-07-27 PROCEDURE — 99215 OFFICE O/P EST HI 40 MIN: CPT | Mod: S$GLB,,, | Performed by: INTERNAL MEDICINE

## 2017-07-27 PROCEDURE — 1125F AMNT PAIN NOTED PAIN PRSNT: CPT | Mod: S$GLB,,, | Performed by: INTERNAL MEDICINE

## 2017-07-27 RX ORDER — SODIUM CHLORIDE 0.9 % (FLUSH) 0.9 %
10 SYRINGE (ML) INJECTION
Status: DISCONTINUED | OUTPATIENT
Start: 2017-07-27 | End: 2017-07-27 | Stop reason: HOSPADM

## 2017-07-27 RX ORDER — SODIUM CHLORIDE 0.9 % (FLUSH) 0.9 %
10 SYRINGE (ML) INJECTION
Status: CANCELLED | OUTPATIENT
Start: 2017-07-27

## 2017-07-27 RX ORDER — ACETAMINOPHEN 500 MG
1000 TABLET ORAL
Status: CANCELLED
Start: 2017-07-27

## 2017-07-27 RX ORDER — FAMOTIDINE 20 MG/50ML
20 INJECTION, SOLUTION INTRAVENOUS
Status: COMPLETED | OUTPATIENT
Start: 2017-07-27 | End: 2017-07-27

## 2017-07-27 RX ORDER — ACETAMINOPHEN 500 MG
1000 TABLET ORAL
Status: COMPLETED | OUTPATIENT
Start: 2017-07-27 | End: 2017-07-27

## 2017-07-27 RX ORDER — HEPARIN 100 UNIT/ML
500 SYRINGE INTRAVENOUS
Status: CANCELLED | OUTPATIENT
Start: 2017-07-27

## 2017-07-27 RX ORDER — FAMOTIDINE 20 MG/50ML
20 INJECTION, SOLUTION INTRAVENOUS
Status: CANCELLED
Start: 2017-07-27 | End: 2017-07-27

## 2017-07-27 RX ORDER — HEPARIN 100 UNIT/ML
500 SYRINGE INTRAVENOUS
Status: DISCONTINUED | OUTPATIENT
Start: 2017-07-27 | End: 2017-07-27 | Stop reason: HOSPADM

## 2017-07-27 RX ADMIN — DIPHENHYDRAMINE HYDROCHLORIDE 50 MG: 50 INJECTION, SOLUTION INTRAMUSCULAR; INTRAVENOUS at 12:07

## 2017-07-27 RX ADMIN — SODIUM CHLORIDE, PRESERVATIVE FREE 10 ML: 5 INJECTION INTRAVENOUS at 11:07

## 2017-07-27 RX ADMIN — SODIUM CHLORIDE, PRESERVATIVE FREE 500 UNITS: 5 INJECTION INTRAVENOUS at 01:07

## 2017-07-27 RX ADMIN — TRASTUZUMAB 484 MG: 150 INJECTION, POWDER, LYOPHILIZED, FOR SOLUTION INTRAVENOUS at 12:07

## 2017-07-27 RX ADMIN — FAMOTIDINE 20 MG: 20 INJECTION, SOLUTION INTRAVENOUS at 11:07

## 2017-07-27 RX ADMIN — ACETAMINOPHEN 1000 MG: 500 TABLET ORAL at 11:07

## 2017-07-27 NOTE — PROGRESS NOTES
An 76-year-old  woman well known to me.  The patient was diagnosed n   May 2016 with triple positive breast cancer.  The patient had a 1.9 cm   malignancy of the left breast, sentinel lymph node negative in association with   DCIS.  She underwent lumpectomy, underwent adjuvant TCH chemotherapy for six   cycles and now continues with Herceptin alone, has done well, but she has   multiple other issues in association.  The patient had a recent echocardiogram   and follows with Dr. Gray.  Echocardiogram at this visit shows a drop by 10%   from an ejection fraction of 65% to 55%.  Dr. Gray has cleared her to continue   with Herceptin and with the short-term echocardiogram follow up as the patient   has remained asymptomatic.  The patient also had some pulmonary nodules that are   being followed by a CT scan.  They are deemed to be benign, but need ongoing   followup.  She has been taking Boniva for postmenopausal osteopenia/osteoporosis   along with calcium and the patient has also had increased density on mammogram.    Recently had a repeat mammogram, which Radiology recommends short-term   followup within three months on the right side.  She is due for a followup   mammogram on the left side three months following that, which will be towards   the end of year.  She started arimidex and feels she is having a lot of side effects, her BP went up,   she had work spells,her neuropathy feels bad.counseled extensively and would like to change the arimidex    PHYSICAL EXAMINATION:  GENERAL:  Elderly woman.  Alert, awake, oriented.  VITAL SIGNS:  At 5 feet 5 inches, 179 pounds, BSA 1.93.  HEENT:  Hair has started coming back.  Showed no congestion.  NECK:  Supple, without JVD.  Trachea is central.  LUNGS:  Clear to auscultation.  No rales, rhonchi or crepitations.  HEART:  S1 is normally heard.  No murmurs or gallops.  ABDOMEN:  Soft, without rebound, guarding or rigidity.  EXTREMITIES:  Showed no edema.  NEUROLOGIC:   She demonstrates no neurological deficits.    LABORATORY EVALUATION:    Lab Results   Component Value Date    WBC 5.27 07/27/2017    HGB 11.9 (L) 07/27/2017    HCT 35.9 (L) 07/27/2017    MCV 98 07/27/2017     07/27/2017     CMP  Sodium   Date Value Ref Range Status   07/27/2017 136 136 - 145 mmol/L Final     Potassium   Date Value Ref Range Status   07/27/2017 5.2 (H) 3.5 - 5.1 mmol/L Final     Chloride   Date Value Ref Range Status   07/27/2017 98 95 - 110 mmol/L Final     CO2   Date Value Ref Range Status   07/27/2017 30 22 - 31 mmol/L Final     Glucose   Date Value Ref Range Status   07/27/2017 83 70 - 110 mg/dL Final     Comment:     The ADA recommends the following guidelines for fasting glucose:  Normal:       less than 100 mg/dL  Prediabetes:  100 mg/dL to 125 mg/dL  Diabetes:     126 mg/dL or higher       BUN, Bld   Date Value Ref Range Status   07/27/2017 19 (H) 7 - 18 mg/dL Final     Creatinine   Date Value Ref Range Status   07/27/2017 1.01 0.50 - 1.40 mg/dL Final   01/18/2013 0.9 0.5 - 1.4 mg/dL Final     Calcium   Date Value Ref Range Status   07/27/2017 9.8 8.4 - 10.2 mg/dL Final   01/18/2013 8.9 8.7 - 10.5 mg/dL Final     Total Protein   Date Value Ref Range Status   07/27/2017 7.4 6.0 - 8.4 g/dL Final     Albumin   Date Value Ref Range Status   07/27/2017 4.5 3.5 - 5.2 g/dL Final     Total Bilirubin   Date Value Ref Range Status   07/27/2017 0.6 0.2 - 1.3 mg/dL Final     Comment:     For infants and newborns, interpretation of results should be based  on gestational age, weight and in agreement with clinical  observations.  Premature Infant recommended reference ranges:  Up to 24 hours.............<8.0 mg/dL  Up to 48 hours............<12.0 mg/dL  3-5 days..................<15.0 mg/dL  6-29 days.................<15.0 mg/dL       Alkaline Phosphatase   Date Value Ref Range Status   07/27/2017 76 38 - 145 U/L Final     AST   Date Value Ref Range Status   07/27/2017 42 (H) 14 - 36 U/L Final      ALT   Date Value Ref Range Status   07/27/2017 27 10 - 44 U/L Final     Anion Gap   Date Value Ref Range Status   07/27/2017 8 8 - 16 mmol/L Final   01/18/2013 9 5 - 15 meq/L Final     eGFR if    Date Value Ref Range Status   07/27/2017 >60 >60 mL/min/1.73 m^2 Final     eGFR if non    Date Value Ref Range Status   07/27/2017 53 (A) >60 mL/min/1.73 m^2 Final     Comment:     Calculation used to obtain the estimated glomerular filtration  rate (eGFR) is the CKD-EPI equation. Since race is unknown   in our information system, the eGFR values for   -American and Non--American patients are given   for each creatinine result.       Echocardiogram, ejection fraction has dropped to 55%, but   still in the normal range.  The most recent CT of the chest, abdomen and pelvis   done on 05/23/2017, new 4 mm pulmonary nodule along the right major fissure with   a new region of consolidation on the left upper lobe subpleurally, six-month   followup, i.e., in November 2017 recommended.  She has post-surgical changes in   the breast.  The patient's most recent mammogram done in May 2017 on the right   side, shows the fibroglandular densities.  Three-month recommendation has been   made.  This is also going to be scheduled today for August 2017.  Her left   mammogram will be due coming up as well. In 10/2017 ( 6 months after completion of xrt)    IMPRESSION:  1.  Triple positive breast cancer, T1, N0, M0, underwent TCH chemotherapy,   currently only on Herceptin.ok for rx , this may be her 17th and last, will check with infusion    EF dropped somewhat followed by Cardiology.  No   need to hold off on Herceptin continue.  Return to clinic in three weeks with   CBC and CMP for ongoing Herceptin treatment.  Watch for symptoms of cardiac   failure and refer to Cardiology as needed.  2.  Short-term followup of mammogram recommended scheduled for August.  3.  The patient has significant  osteoporosis on bone density.  She has been on   Boniva for the past several years.  We will change this to Prolia.  Calcium   supplementation minimum 1200 mg along with dental precautions.  Orders placed   for Prolia.  4.  Discontinue Boniva, does not seem to be effective.  The patient has   attempted Actonel prior to that.  5.  She wants to try again on being on the arimidex This will have a   side effect of worsening osteoporosis.  The patient voices understanding.    Options of treatment discussed with her today.    6.  The patient has dyslipidemia.  Continue with Zocor and primary care follow   up with Dr. Franco.  7.  Degenerative joint pains.  Continue with Ultram as needed, take trazodone   for sleep and anxiety along with Xanax.  8.  Mild CKD.  Continue to monitor.  No intervention necessary at this time.  9.  Gastroesophageal reflux disease.  Continue with omeprazole.  No changes or   worsening of symptoms at this point.

## 2017-08-01 ENCOUNTER — TELEPHONE (OUTPATIENT)
Dept: HEMATOLOGY/ONCOLOGY | Facility: CLINIC | Age: 77
End: 2017-08-01

## 2017-08-01 NOTE — TELEPHONE ENCOUNTER
Returned call to patient, informed that her Herceptin treatments are completed, she will need a 3 week follow up with Dr. Olvera with  right breast mammo, no labs prior. I will schedule and call patient tomorrow with those appointments. Patient stated that if she did not answer to please leave appointment information on her voice mail

## 2017-08-01 NOTE — TELEPHONE ENCOUNTER
----- Message from Debby Callaway sent at 8/1/2017  8:14 AM CDT -----  Patient would like to speak to office because doctor had told her on 7/27/17 that she would be seeing patient in three weeks but patient has not heard from office concerning her next appointment. Please call to schedule [phone].

## 2017-08-01 NOTE — TELEPHONE ENCOUNTER
----- Message from Yris Rosen sent at 8/1/2017  9:37 AM CDT -----  Contact: Patient  Patient states that she was to hear back from you about an appointment that was to be scheduled.  There are appointments scheduled but she said that she would like to know if she had her last chemo.  And an appointment has to be made with  also for a follow up on her medications.    Can you please call her back at [phone].  Thank you

## 2017-08-01 NOTE — TELEPHONE ENCOUNTER
----- Message from Luanne Panchal sent at 8/1/2017  3:17 PM CDT -----  Patient states that she has called several times and need to schedule an appointment and has a question to ask.  Call [phone].

## 2017-08-07 ENCOUNTER — TELEPHONE (OUTPATIENT)
Dept: FAMILY MEDICINE | Facility: CLINIC | Age: 77
End: 2017-08-07

## 2017-08-07 DIAGNOSIS — R53.1 WEAKNESS: Primary | ICD-10-CM

## 2017-08-07 NOTE — TELEPHONE ENCOUNTER
----- Message from Anh Dewitt sent at 8/7/2017  9:06 AM CDT -----  Contact: pt [phone]  Patient  Called and asked if the nurse will call her back getting a transfer chair for the bathtub she is asking for a new one. [facility] fax [phone] phone is [phone]  Patient is asking for a call back please.

## 2017-08-17 ENCOUNTER — OFFICE VISIT (OUTPATIENT)
Dept: HEMATOLOGY/ONCOLOGY | Facility: CLINIC | Age: 77
End: 2017-08-17
Payer: MEDICARE

## 2017-08-17 VITALS
RESPIRATION RATE: 20 BRPM | HEIGHT: 65 IN | TEMPERATURE: 99 F | BODY MASS INDEX: 30.16 KG/M2 | WEIGHT: 181 LBS | DIASTOLIC BLOOD PRESSURE: 78 MMHG | SYSTOLIC BLOOD PRESSURE: 137 MMHG | HEART RATE: 86 BPM

## 2017-08-17 DIAGNOSIS — I10 ESSENTIAL HYPERTENSION: Primary | ICD-10-CM

## 2017-08-17 DIAGNOSIS — C50.012 MALIGNANT NEOPLASM INVOLVING BOTH NIPPLE AND AREOLA OF LEFT BREAST IN FEMALE, UNSPECIFIED ESTROGEN RECEPTOR STATUS: ICD-10-CM

## 2017-08-17 DIAGNOSIS — M80.00XA AGE-RELATED OSTEOPOROSIS WITH CURRENT PATHOLOGICAL FRACTURE, INITIAL ENCOUNTER: ICD-10-CM

## 2017-08-17 DIAGNOSIS — N18.30 CHRONIC KIDNEY DISEASE, STAGE III (MODERATE): ICD-10-CM

## 2017-08-17 PROCEDURE — 3008F BODY MASS INDEX DOCD: CPT | Mod: S$GLB,,, | Performed by: INTERNAL MEDICINE

## 2017-08-17 PROCEDURE — 3078F DIAST BP <80 MM HG: CPT | Mod: S$GLB,,, | Performed by: INTERNAL MEDICINE

## 2017-08-17 PROCEDURE — 99499 UNLISTED E&M SERVICE: CPT | Mod: S$GLB,,, | Performed by: INTERNAL MEDICINE

## 2017-08-17 PROCEDURE — 99999 PR PBB SHADOW E&M-EST. PATIENT-LVL III: CPT | Mod: PBBFAC,,, | Performed by: INTERNAL MEDICINE

## 2017-08-17 PROCEDURE — 99214 OFFICE O/P EST MOD 30 MIN: CPT | Mod: S$GLB,,, | Performed by: INTERNAL MEDICINE

## 2017-08-17 PROCEDURE — 1125F AMNT PAIN NOTED PAIN PRSNT: CPT | Mod: S$GLB,,, | Performed by: INTERNAL MEDICINE

## 2017-08-17 PROCEDURE — 3075F SYST BP GE 130 - 139MM HG: CPT | Mod: S$GLB,,, | Performed by: INTERNAL MEDICINE

## 2017-08-17 PROCEDURE — 1159F MED LIST DOCD IN RCRD: CPT | Mod: S$GLB,,, | Performed by: INTERNAL MEDICINE

## 2017-08-17 NOTE — PROGRESS NOTES
An 76-year-old  woman well known to me.  The patient was diagnosed n   May 2016 with triple positive breast cancer.  The patient had a 1.9 cm   malignancy of the left breast, sentinel lymph node negative in association with   DCIS.  She underwent lumpectomy, underwent adjuvant TCH chemotherapy for six   cycles and now continues with Herceptin alone, has done well, but she has   multiple other issues in association.  The patient had a recent echocardiogram   and follows with Dr. Gray.  Echocardiogram at this visit shows a drop by 10%   from an ejection fraction of 65% to 55%.  Dr. Gray has cleared her to continue   with Herceptin and with the short-term echocardiogram follow up as the patient   has remained asymptomatic.  The patient also had some pulmonary nodules that are   being followed by a CT scan.  They are deemed to be benign, but need ongoing   followup.  She had been taking Boniva for postmenopausal osteopenia/osteoporosis   along with calcium and the patient has also had increased density on mammogram.    Recently had a repeat mammogram, which Radiology recommends short-term   followup within three months on the right side.  She is due for a followup   mammogram on the left side three months following that, which will be towards   the end of year.  She started arimidex again and is having similar symptoms, is due to have the recheck mammogram in 2 weeks  PHYSICAL EXAMINATION:  GENERAL:  Elderly woman.  Alert, awake, oriented.  VITAL SIGNS:  At 5 feet 5 inches, 179 pounds, BSA 1.93.  HEENT:  Hair has started coming back.  Showed no congestion.  NECK:  Supple, without JVD.  Trachea is central.  LUNGS:  Clear to auscultation.  No rales, rhonchi or crepitations.  HEART:  S1 is normally heard.  No murmurs or gallops.  ABDOMEN:  Soft, without rebound, guarding or rigidity.  EXTREMITIES:  Showed no edema.  NEUROLOGIC:  She demonstrates no neurological deficits.    LABORATORY EVALUATION:    Lab Results    Component Value Date    WBC 5.27 07/27/2017    HGB 11.9 (L) 07/27/2017    HCT 35.9 (L) 07/27/2017    MCV 98 07/27/2017     07/27/2017     CMP  Sodium   Date Value Ref Range Status   07/27/2017 136 136 - 145 mmol/L Final     Potassium   Date Value Ref Range Status   07/27/2017 5.2 (H) 3.5 - 5.1 mmol/L Final     Chloride   Date Value Ref Range Status   07/27/2017 98 95 - 110 mmol/L Final     CO2   Date Value Ref Range Status   07/27/2017 30 22 - 31 mmol/L Final     Glucose   Date Value Ref Range Status   07/27/2017 83 70 - 110 mg/dL Final     Comment:     The ADA recommends the following guidelines for fasting glucose:  Normal:       less than 100 mg/dL  Prediabetes:  100 mg/dL to 125 mg/dL  Diabetes:     126 mg/dL or higher       BUN, Bld   Date Value Ref Range Status   07/27/2017 19 (H) 7 - 18 mg/dL Final     Creatinine   Date Value Ref Range Status   07/27/2017 1.01 0.50 - 1.40 mg/dL Final   01/18/2013 0.9 0.5 - 1.4 mg/dL Final     Calcium   Date Value Ref Range Status   07/27/2017 9.8 8.4 - 10.2 mg/dL Final   01/18/2013 8.9 8.7 - 10.5 mg/dL Final     Total Protein   Date Value Ref Range Status   07/27/2017 7.4 6.0 - 8.4 g/dL Final     Albumin   Date Value Ref Range Status   07/27/2017 4.5 3.5 - 5.2 g/dL Final     Total Bilirubin   Date Value Ref Range Status   07/27/2017 0.6 0.2 - 1.3 mg/dL Final     Comment:     For infants and newborns, interpretation of results should be based  on gestational age, weight and in agreement with clinical  observations.  Premature Infant recommended reference ranges:  Up to 24 hours.............<8.0 mg/dL  Up to 48 hours............<12.0 mg/dL  3-5 days..................<15.0 mg/dL  6-29 days.................<15.0 mg/dL       Alkaline Phosphatase   Date Value Ref Range Status   07/27/2017 76 38 - 145 U/L Final     AST   Date Value Ref Range Status   07/27/2017 42 (H) 14 - 36 U/L Final     ALT   Date Value Ref Range Status   07/27/2017 27 10 - 44 U/L Final     Anion Gap    Date Value Ref Range Status   07/27/2017 8 8 - 16 mmol/L Final   01/18/2013 9 5 - 15 meq/L Final     eGFR if    Date Value Ref Range Status   07/27/2017 >60 >60 mL/min/1.73 m^2 Final     eGFR if non    Date Value Ref Range Status   07/27/2017 53 (A) >60 mL/min/1.73 m^2 Final     Comment:     Calculation used to obtain the estimated glomerular filtration  rate (eGFR) is the CKD-EPI equation. Since race is unknown   in our information system, the eGFR values for   -American and Non--American patients are given   for each creatinine result.       Echocardiogram, ejection fraction has dropped to 55%, but   still in the normal range.  The most recent CT of the chest, abdomen and pelvis   done on 05/23/2017, new 4 mm pulmonary nodule along the right major fissure with   a new region of consolidation on the left upper lobe subpleurally, six-month   followup, i.e., in November 2017 recommended.  She has post-surgical changes in   the breast.  The patient's most recent mammogram done in May 2017 on the right   side, shows the fibroglandular densities.  Three-month recommendation has been   made.  This is also going to be scheduled today for August 2017.  Her left   mammogram will be due coming up as well. In 10/2017 ( 6 months after completion of xrt)    IMPRESSION:  1.  Triple positive breast cancer, T1, N0, M0, underwent TCH chemotherapy,   currently only on Herceptin.ok for rx , this may be her 17th and last, will check with infusion    EF dropped somewhat followed by Cardiology.  No   need to hold off on Herceptin continue.  Return to clinic in three weeks with   CBC and CMP for ongoing Herceptin treatment.  Watch for symptoms of cardiac   failure and refer to Cardiology as needed.  2.  Short-term followup of mammogram recommended scheduled for August.  3.  The patient has significant osteoporosis on bone density.  .boniva changed this to Prolia.  Calcium   supplementation  minimum 1200 mg along with dental precautions.  Orders placed   for Prolia.going for dental cleaning soon  4.  Has dicontnued boniva and recd a prolia, feels the back pain may be related to prolia.  5.  May come off the arimidex till she sees me in 2 weeks and I may have to change to aromasin. This is the second time she is attempting arimidex and the bone pain may be related and arimidex may have to be dced permanently  6.  The patient has dyslipidemia.  Continue with Zocor and primary care follow   up with Dr. Franco.  7.  Degenerative joint pains.  Continue with Ultram as needed, take trazodone   for sleep and anxiety along with Xanax.  8.  Mild CKD.  Continue to monitor.  No intervention necessary at this time.  9.  Gastroesophageal reflux disease.  Continue with omeprazole.  No changes or   worsening of symptoms at this point.

## 2017-08-21 ENCOUNTER — OFFICE VISIT (OUTPATIENT)
Dept: ORTHOPEDICS | Facility: CLINIC | Age: 77
End: 2017-08-21
Payer: MEDICARE

## 2017-08-21 ENCOUNTER — HOSPITAL ENCOUNTER (OUTPATIENT)
Dept: RADIOLOGY | Facility: HOSPITAL | Age: 77
Discharge: HOME | End: 2017-08-21
Attending: ORTHOPAEDIC SURGERY
Payer: MEDICARE

## 2017-08-21 VITALS — WEIGHT: 181 LBS | HEIGHT: 65 IN | BODY MASS INDEX: 30.16 KG/M2

## 2017-08-21 DIAGNOSIS — M25.551 BILATERAL HIP PAIN: ICD-10-CM

## 2017-08-21 DIAGNOSIS — M25.552 BILATERAL HIP PAIN: Primary | ICD-10-CM

## 2017-08-21 DIAGNOSIS — M25.552 BILATERAL HIP PAIN: ICD-10-CM

## 2017-08-21 DIAGNOSIS — M51.36 DDD (DEGENERATIVE DISC DISEASE), LUMBAR: ICD-10-CM

## 2017-08-21 DIAGNOSIS — M25.551 BILATERAL HIP PAIN: Primary | ICD-10-CM

## 2017-08-21 DIAGNOSIS — M25.552 LEFT HIP PAIN: Primary | ICD-10-CM

## 2017-08-21 PROCEDURE — 99999 PR PBB SHADOW E&M-EST. PATIENT-LVL II: CPT | Mod: PBBFAC,,, | Performed by: ORTHOPAEDIC SURGERY

## 2017-08-21 PROCEDURE — 99214 OFFICE O/P EST MOD 30 MIN: CPT | Mod: 25,S$GLB,, | Performed by: ORTHOPAEDIC SURGERY

## 2017-08-21 PROCEDURE — 1125F AMNT PAIN NOTED PAIN PRSNT: CPT | Mod: S$GLB,,, | Performed by: ORTHOPAEDIC SURGERY

## 2017-08-21 PROCEDURE — 73521 X-RAY EXAM HIPS BI 2 VIEWS: CPT | Mod: 26,,, | Performed by: RADIOLOGY

## 2017-08-21 PROCEDURE — 1159F MED LIST DOCD IN RCRD: CPT | Mod: S$GLB,,, | Performed by: ORTHOPAEDIC SURGERY

## 2017-08-21 PROCEDURE — 3008F BODY MASS INDEX DOCD: CPT | Mod: S$GLB,,, | Performed by: ORTHOPAEDIC SURGERY

## 2017-08-21 PROCEDURE — 20610 DRAIN/INJ JOINT/BURSA W/O US: CPT | Mod: LT,S$GLB,, | Performed by: ORTHOPAEDIC SURGERY

## 2017-08-21 RX ORDER — TRIAMCINOLONE ACETONIDE 40 MG/ML
40 INJECTION, SUSPENSION INTRA-ARTICULAR; INTRAMUSCULAR
Status: DISCONTINUED | OUTPATIENT
Start: 2017-08-21 | End: 2017-08-21 | Stop reason: HOSPADM

## 2017-08-21 RX ORDER — ALPRAZOLAM 0.25 MG/1
TABLET ORAL
Refills: 2 | Status: ON HOLD | COMMUNITY
Start: 2017-08-01 | End: 2018-01-29 | Stop reason: CLARIF

## 2017-08-21 RX ADMIN — TRIAMCINOLONE ACETONIDE 40 MG: 40 INJECTION, SUSPENSION INTRA-ARTICULAR; INTRAMUSCULAR at 12:08

## 2017-08-21 NOTE — PROCEDURES
Large Joint Aspiration/Injection  Date/Time: 8/21/2017 12:19 PM  Performed by: ADRIAN AGRAWAL  Authorized by: ADRIAN AGRAWAL     Consent Done?:  Yes (Verbal)  Indications:  Pain  Procedure site marked: Yes    Timeout: Prior to procedure the correct patient, procedure, and site was verified      Location:  Hip  Site:  L greater trochanteric bursa  Prep: Patient was prepped and draped in usual sterile fashion    Ultrasonic Guidance for needle placement: No  Needle size:  22 G  Approach:  Posterior  Medications:  40 mg triamcinolone acetonide 40 mg/mL  Patient tolerance:  Patient tolerated the procedure well with no immediate complications
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
## 2017-08-31 ENCOUNTER — TELEPHONE (OUTPATIENT)
Dept: ORTHOPEDICS | Facility: CLINIC | Age: 77
End: 2017-08-31

## 2017-08-31 NOTE — TELEPHONE ENCOUNTER
----- Message from Liberty Mitchell sent at 8/31/2017  8:51 AM CDT -----  Contact: self  Would like to be referred to another doctor for her back since you do not treat that area.  Please call back at [phone] (home)

## 2017-09-06 ENCOUNTER — HOSPITAL ENCOUNTER (OUTPATIENT)
Dept: RADIOLOGY | Facility: HOSPITAL | Age: 77
Discharge: HOME | End: 2017-09-06
Attending: INTERNAL MEDICINE
Payer: MEDICARE

## 2017-09-06 DIAGNOSIS — Z85.3 HISTORY OF LEFT BREAST CANCER: ICD-10-CM

## 2017-09-06 PROCEDURE — 77065 DX MAMMO INCL CAD UNI: CPT | Mod: 26,,, | Performed by: RADIOLOGY

## 2017-09-06 PROCEDURE — 77061 BREAST TOMOSYNTHESIS UNI: CPT | Mod: 26,,, | Performed by: RADIOLOGY

## 2017-09-06 PROCEDURE — 77061 BREAST TOMOSYNTHESIS UNI: CPT | Mod: TC

## 2017-09-07 ENCOUNTER — OFFICE VISIT (OUTPATIENT)
Dept: HEMATOLOGY/ONCOLOGY | Facility: CLINIC | Age: 77
End: 2017-09-07
Payer: MEDICARE

## 2017-09-07 ENCOUNTER — INFUSION (OUTPATIENT)
Dept: INFUSION THERAPY | Facility: HOSPITAL | Age: 77
End: 2017-09-07
Attending: INTERNAL MEDICINE
Payer: MEDICARE

## 2017-09-07 VITALS
RESPIRATION RATE: 18 BRPM | WEIGHT: 179.25 LBS | TEMPERATURE: 98 F | HEART RATE: 77 BPM | SYSTOLIC BLOOD PRESSURE: 153 MMHG | BODY MASS INDEX: 29.87 KG/M2 | HEIGHT: 65 IN | DIASTOLIC BLOOD PRESSURE: 73 MMHG

## 2017-09-07 DIAGNOSIS — C50.011 MALIGNANT NEOPLASM OF NIPPLE OF RIGHT BREAST IN FEMALE, UNSPECIFIED ESTROGEN RECEPTOR STATUS: Primary | ICD-10-CM

## 2017-09-07 DIAGNOSIS — I10 ESSENTIAL HYPERTENSION: ICD-10-CM

## 2017-09-07 DIAGNOSIS — M80.00XA AGE-RELATED OSTEOPOROSIS WITH CURRENT PATHOLOGICAL FRACTURE, INITIAL ENCOUNTER: ICD-10-CM

## 2017-09-07 DIAGNOSIS — C50.012 MALIGNANT NEOPLASM INVOLVING BOTH NIPPLE AND AREOLA OF LEFT BREAST IN FEMALE, UNSPECIFIED ESTROGEN RECEPTOR STATUS: ICD-10-CM

## 2017-09-07 DIAGNOSIS — C50.012 MALIGNANT NEOPLASM OF NIPPLE OF LEFT BREAST IN FEMALE, ESTROGEN RECEPTOR NEGATIVE: Primary | ICD-10-CM

## 2017-09-07 DIAGNOSIS — Z17.1 MALIGNANT NEOPLASM OF NIPPLE OF LEFT BREAST IN FEMALE, ESTROGEN RECEPTOR NEGATIVE: Primary | ICD-10-CM

## 2017-09-07 PROCEDURE — 3078F DIAST BP <80 MM HG: CPT | Mod: S$GLB,,, | Performed by: INTERNAL MEDICINE

## 2017-09-07 PROCEDURE — 3008F BODY MASS INDEX DOCD: CPT | Mod: S$GLB,,, | Performed by: INTERNAL MEDICINE

## 2017-09-07 PROCEDURE — 99214 OFFICE O/P EST MOD 30 MIN: CPT | Mod: S$GLB,,, | Performed by: INTERNAL MEDICINE

## 2017-09-07 PROCEDURE — 99499 UNLISTED E&M SERVICE: CPT | Mod: S$GLB,,, | Performed by: INTERNAL MEDICINE

## 2017-09-07 PROCEDURE — A4216 STERILE WATER/SALINE, 10 ML: HCPCS | Mod: PN | Performed by: INTERNAL MEDICINE

## 2017-09-07 PROCEDURE — 3077F SYST BP >= 140 MM HG: CPT | Mod: S$GLB,,, | Performed by: INTERNAL MEDICINE

## 2017-09-07 PROCEDURE — 99999 PR PBB SHADOW E&M-EST. PATIENT-LVL IV: CPT | Mod: PBBFAC,,, | Performed by: INTERNAL MEDICINE

## 2017-09-07 PROCEDURE — 96523 IRRIG DRUG DELIVERY DEVICE: CPT | Mod: PN

## 2017-09-07 PROCEDURE — 1125F AMNT PAIN NOTED PAIN PRSNT: CPT | Mod: S$GLB,,, | Performed by: INTERNAL MEDICINE

## 2017-09-07 PROCEDURE — 1159F MED LIST DOCD IN RCRD: CPT | Mod: S$GLB,,, | Performed by: INTERNAL MEDICINE

## 2017-09-07 PROCEDURE — 25000003 PHARM REV CODE 250: Mod: PN | Performed by: INTERNAL MEDICINE

## 2017-09-07 PROCEDURE — 63600175 PHARM REV CODE 636 W HCPCS: Mod: PN | Performed by: INTERNAL MEDICINE

## 2017-09-07 RX ORDER — SODIUM CHLORIDE 0.9 % (FLUSH) 0.9 %
10 SYRINGE (ML) INJECTION
Status: COMPLETED | OUTPATIENT
Start: 2017-09-07 | End: 2017-09-07

## 2017-09-07 RX ORDER — HEPARIN 100 UNIT/ML
500 SYRINGE INTRAVENOUS
Status: COMPLETED | OUTPATIENT
Start: 2017-09-07 | End: 2017-09-07

## 2017-09-07 RX ADMIN — HEPARIN 500 UNITS: 100 SYRINGE at 02:09

## 2017-09-07 RX ADMIN — SODIUM CHLORIDE, PRESERVATIVE FREE 10 ML: 5 INJECTION INTRAVENOUS at 02:09

## 2017-09-07 NOTE — PROGRESS NOTES
An 76-year-old  woman well known to me.  The patient was diagnosed n   May 2016 with triple positive breast cancer.  The patient had a 1.9 cm   malignancy of the left breast, sentinel lymph node negative in association with   DCIS.  She underwent lumpectomy, underwent adjuvant TCH chemotherapy for six   cycles and now continues with Herceptin alone, has done well, but she has   multiple other issues in association.  The patient had a recent echocardiogram   and follows with Dr. Gray.  Echocardiogram at this visit shows a drop by 10%   from an ejection fraction of 65% to 55%.  Dr. Gray has cleared her to continue   with Herceptin and with the short-term echocardiogram follow up as the patient   has remained asymptomatic.  The patient also had some pulmonary nodules that are   being followed by a CT scan.  They are deemed to be benign, but need ongoing   followup.  She had been taking Boniva for postmenopausal osteopenia/osteoporosis   along with calcium and the patient has also had increased density on mammogram.    Recently had a repeat mammogram, which Radiology recommends short-term   followup within three months on the right side.  She is due for a followup   mammogram on the left side three months following that, which will be towards   the end of year.  Pt has been off the arimidex but has seen Dr. dunbar and got a shot harris been referd to DR. barrios for spine evaluation , she doesn't believe the arimidex is the problame  PHYSICAL EXAMINATION:  GENERAL:  Elderly woman.  Alert, awake, oriented.  VITAL SIGNS:    Wt Readings from Last 3 Encounters:   09/07/17 81.3 kg (179 lb 3.7 oz)   08/21/17 82.1 kg (181 lb)   08/17/17 82.1 kg (181 lb)     Temp Readings from Last 3 Encounters:   09/07/17 97.7 °F (36.5 °C)   08/17/17 98.9 °F (37.2 °C)   07/27/17 98.1 °F (36.7 °C)     BP Readings from Last 3 Encounters:   09/07/17 (!) 153/73   08/17/17 137/78   07/27/17 (!) 165/65     Pulse Readings from Last 3  Encounters:   09/07/17 77   08/17/17 86   07/27/17 85   HEENT:  Hair has started coming back.  Showed no congestion.  NECK:  Supple, without JVD.  Trachea is central.  LUNGS:  Clear to auscultation.  No rales, rhonchi or crepitations.  HEART:  S1 is normally heard.  No murmurs or gallops.  ABDOMEN:  Soft, without rebound, guarding or rigidity.  EXTREMITIES:  Showed no edema.  NEUROLOGIC:  She demonstrates no neurological deficits.    LABORATORY EVALUATION:    Lab Results   Component Value Date    WBC 5.27 07/27/2017    HGB 11.9 (L) 07/27/2017    HCT 35.9 (L) 07/27/2017    MCV 98 07/27/2017     07/27/2017     CMP  Sodium   Date Value Ref Range Status   07/27/2017 136 136 - 145 mmol/L Final     Potassium   Date Value Ref Range Status   07/27/2017 5.2 (H) 3.5 - 5.1 mmol/L Final     Chloride   Date Value Ref Range Status   07/27/2017 98 95 - 110 mmol/L Final     CO2   Date Value Ref Range Status   07/27/2017 30 22 - 31 mmol/L Final     Glucose   Date Value Ref Range Status   07/27/2017 83 70 - 110 mg/dL Final     Comment:     The ADA recommends the following guidelines for fasting glucose:  Normal:       less than 100 mg/dL  Prediabetes:  100 mg/dL to 125 mg/dL  Diabetes:     126 mg/dL or higher       BUN, Bld   Date Value Ref Range Status   07/27/2017 19 (H) 7 - 18 mg/dL Final     Creatinine   Date Value Ref Range Status   07/27/2017 1.01 0.50 - 1.40 mg/dL Final   01/18/2013 0.9 0.5 - 1.4 mg/dL Final     Calcium   Date Value Ref Range Status   07/27/2017 9.8 8.4 - 10.2 mg/dL Final   01/18/2013 8.9 8.7 - 10.5 mg/dL Final     Total Protein   Date Value Ref Range Status   07/27/2017 7.4 6.0 - 8.4 g/dL Final     Albumin   Date Value Ref Range Status   07/27/2017 4.5 3.5 - 5.2 g/dL Final     Total Bilirubin   Date Value Ref Range Status   07/27/2017 0.6 0.2 - 1.3 mg/dL Final     Comment:     For infants and newborns, interpretation of results should be based  on gestational age, weight and in agreement with  clinical  observations.  Premature Infant recommended reference ranges:  Up to 24 hours.............<8.0 mg/dL  Up to 48 hours............<12.0 mg/dL  3-5 days..................<15.0 mg/dL  6-29 days.................<15.0 mg/dL       Alkaline Phosphatase   Date Value Ref Range Status   07/27/2017 76 38 - 145 U/L Final     AST   Date Value Ref Range Status   07/27/2017 42 (H) 14 - 36 U/L Final     ALT   Date Value Ref Range Status   07/27/2017 27 10 - 44 U/L Final     Anion Gap   Date Value Ref Range Status   07/27/2017 8 8 - 16 mmol/L Final   01/18/2013 9 5 - 15 meq/L Final     eGFR if    Date Value Ref Range Status   07/27/2017 >60 >60 mL/min/1.73 m^2 Final     eGFR if non    Date Value Ref Range Status   07/27/2017 53 (A) >60 mL/min/1.73 m^2 Final     Comment:     Calculation used to obtain the estimated glomerular filtration  rate (eGFR) is the CKD-EPI equation. Since race is unknown   in our information system, the eGFR values for   -American and Non--American patients are given   for each creatinine result.       Echocardiogram, ejection fraction has dropped to 55%, but   still in the normal range.  The most recent CT of the chest, abdomen and pelvis   done on 05/23/2017, new 4 mm pulmonary nodule along the right major fissure with   a new region of consolidation on the left upper lobe subpleurally, six-month   followup, i.e., in November 2017 recommended.  She has post-surgical changes in   the breast.  The patient's most recent mammogram done in May 2017 on the right   side, shows the fibroglandular densities. Repeated in 8/2017 and is good  IMPRESSION:  1.  Triple positive breast cancer, T1, N0, M0, underwent [facility] chemotherapy,   Completed 1 year of herceptin    EF dropped somewhat followed by Cardiology.    2. The patient has significant osteoporosis on bone density.  .boniva changed this to Prolia.  Calcium   supplementation minimum 1200 mg along with dental  precautions.  Orders placed   for Prolia.going for dental cleaning soon  3. Get back on arimidex and stay on it. Not the cause of her pain  6.  The patient has dyslipidemia.  Continue with Zocor and primary care follow   up with Dr. Franco.  7.  Degenerative joint pains.  Continue with Ultram as needed, take trazodone   for sleep and anxiety along with Xanax.  8.  Mild CKD.  Continue to monitor.  No intervention necessary at this time.  9.  Gastroesophageal reflux disease.  Continue with omeprazole.  No changes or   worsening of symptoms at this point.   Ct of chest and see me in 11/2017   PT referral for back

## 2017-09-11 RX ORDER — OMEPRAZOLE 40 MG/1
CAPSULE, DELAYED RELEASE ORAL
Qty: 90 CAPSULE | Refills: 3 | Status: SHIPPED | OUTPATIENT
Start: 2017-09-11 | End: 2018-09-13 | Stop reason: SDUPTHER

## 2017-09-18 ENCOUNTER — INITIAL CONSULT (OUTPATIENT)
Dept: SPINE | Facility: CLINIC | Age: 77
End: 2017-09-18
Payer: MEDICARE

## 2017-09-18 VITALS
DIASTOLIC BLOOD PRESSURE: 70 MMHG | HEIGHT: 66 IN | BODY MASS INDEX: 28.91 KG/M2 | WEIGHT: 179.88 LBS | SYSTOLIC BLOOD PRESSURE: 143 MMHG | HEART RATE: 88 BPM

## 2017-09-18 DIAGNOSIS — M81.0 OSTEOPOROSIS, SENILE: ICD-10-CM

## 2017-09-18 DIAGNOSIS — C50.012 MALIGNANT NEOPLASM INVOLVING BOTH NIPPLE AND AREOLA OF LEFT BREAST IN FEMALE, UNSPECIFIED ESTROGEN RECEPTOR STATUS: ICD-10-CM

## 2017-09-18 DIAGNOSIS — M54.50 ACUTE MIDLINE LOW BACK PAIN WITHOUT SCIATICA: ICD-10-CM

## 2017-09-18 DIAGNOSIS — Z98.890 HISTORY OF LUMBAR SURGERY: Primary | ICD-10-CM

## 2017-09-18 PROCEDURE — 3078F DIAST BP <80 MM HG: CPT | Mod: S$GLB,,, | Performed by: PHYSICIAN ASSISTANT

## 2017-09-18 PROCEDURE — 1125F AMNT PAIN NOTED PAIN PRSNT: CPT | Mod: S$GLB,,, | Performed by: PHYSICIAN ASSISTANT

## 2017-09-18 PROCEDURE — 99203 OFFICE O/P NEW LOW 30 MIN: CPT | Mod: S$GLB,,, | Performed by: PHYSICIAN ASSISTANT

## 2017-09-18 PROCEDURE — 3008F BODY MASS INDEX DOCD: CPT | Mod: S$GLB,,, | Performed by: PHYSICIAN ASSISTANT

## 2017-09-18 PROCEDURE — 1159F MED LIST DOCD IN RCRD: CPT | Mod: S$GLB,,, | Performed by: PHYSICIAN ASSISTANT

## 2017-09-18 PROCEDURE — 99999 PR PBB SHADOW E&M-EST. PATIENT-LVL IV: CPT | Mod: PBBFAC,,, | Performed by: PHYSICIAN ASSISTANT

## 2017-09-18 PROCEDURE — 3077F SYST BP >= 140 MM HG: CPT | Mod: S$GLB,,, | Performed by: PHYSICIAN ASSISTANT

## 2017-09-18 PROCEDURE — 99499 UNLISTED E&M SERVICE: CPT | Mod: S$GLB,,, | Performed by: PHYSICIAN ASSISTANT

## 2017-09-18 RX ORDER — TRAMADOL HYDROCHLORIDE 50 MG/1
TABLET ORAL
Refills: 0 | COMMUNITY
Start: 2017-08-21 | End: 2017-11-09

## 2017-09-20 NOTE — PROGRESS NOTES
Neurosurgery History & Physical    Patient ID: Jing Tenorio is a 76 y.o. female.    Chief Complaint   Patient presents with    Low-back Pain       Review of Systems   Constitutional: Negative for activity change, appetite change, chills, fever and unexpected weight change.   HENT: Negative for tinnitus, trouble swallowing and voice change.    Respiratory: Negative for apnea, cough, chest tightness and shortness of breath.    Cardiovascular: Negative for chest pain and palpitations.   Gastrointestinal: Negative for constipation, diarrhea, nausea and vomiting.   Genitourinary: Negative for difficulty urinating, dysuria, frequency and urgency.   Musculoskeletal: Positive for arthralgias and back pain. Negative for gait problem, neck pain and neck stiffness.   Skin: Negative for wound.   Neurological: Negative for dizziness, tremors, seizures, facial asymmetry, speech difficulty, weakness, light-headedness, numbness and headaches.   Psychiatric/Behavioral: Negative for confusion and decreased concentration.       Past Medical History:   Diagnosis Date    Allergy     Anxiety     Arthritis     Back pain DDD    Breast cancer 2016    invasive ductal carcinoma    Cancer     LEFT BREAST 5-16    Cataract     Bilateral    DDD (degenerative disc disease)     DDD (degenerative disc disease), lumbar     Disorder of kidney and ureter     GERD (gastroesophageal reflux disease)     Hyperlipidemia     Hypertension     Insomnia     Osteoporosis     Seizures     Stroke     Thyroid disease      Social History     Social History    Marital status:      Spouse name: N/A    Number of children: N/A    Years of education: N/A     Occupational History    Not on file.     Social History Main Topics    Smoking status: Never Smoker    Smokeless tobacco: Never Used    Alcohol use Yes      Comment: rarely    Drug use: No    Sexual activity: Not on file     Other Topics Concern    Not on file     Social  History Narrative    No narrative on file     Family History   Problem Relation Age of Onset    Cancer Mother 69     Breast    Breast cancer Mother 69    Heart disease Father     Breast cancer Paternal Aunt 60    Diabetes Neg Hx      Review of patient's allergies indicates:   Allergen Reactions    Sulfa (sulfonamide antibiotics)      Other reaction(s): Unknown  Other reaction(s): Unknown  Other reaction(s): Unknown    Adhesive Rash       Current Outpatient Prescriptions:     acetaminophen (TYLENOL) 500 MG tablet, Take 500 mg by mouth every 6 (six) hours as needed for Pain., Disp: , Rfl:     alprazolam (XANAX) 0.25 MG tablet, TK 1 T PO QD PRF ANXIETY, Disp: , Rfl: 2    anastrozole (ARIMIDEX) 1 mg Tab, Take 1 tablet (1 mg total) by mouth once daily., Disp: 30 tablet, Rfl: 12    ascorbic acid, vitamin C, (VITAMIN C) 500 MG tablet, Take 500 mg by mouth once daily., Disp: , Rfl:     b complex vitamins tablet, Take 1 tablet by mouth once daily., Disp: , Rfl:     calcium-vitamin D3-vitamin K (VIACTIV) 500-500-40 mg-unit-mcg Chew, Take 2 tablets by mouth once daily. , Disp: , Rfl:     coenzyme Q10 (CO Q-10) 100 mg capsule, Take 100 mg by mouth once daily. , Disp: , Rfl:     denosumab (PROLIA) 60 mg/mL Syrg, Inject 60 mg into the skin every 6 (six) months., Disp: , Rfl:     lisinopril 10 MG tablet, Take 1 tablet (10 mg total) by mouth 2 (two) times daily. (Patient taking differently: Take 20 mg by mouth 2 (two) times daily. ), Disp: 180 tablet, Rfl: 3    MULTIVITAMIN ORAL, once daily. Every day, Disp: , Rfl:     omeprazole (PRILOSEC) 40 MG capsule, TAKE 1 CAPSULE BY MOUTH EVERY DAY, Disp: 90 capsule, Rfl: 3    simvastatin (ZOCOR) 20 MG tablet, TAKE 1 TABLET BY MOUTH EVERY DAY IN THE EVENING, Disp: 90 tablet, Rfl: 2    trazodone (DESYREL) 100 MG tablet, Take 1 tablet (100 mg total) by mouth every evening., Disp: 90 tablet, Rfl: 3    vitamin D (VITAMIN D3) 1000 units Tab, Take 2,000 Units by mouth once  "daily., Disp: , Rfl:     vitamin E 400 UNIT capsule, Take 400 Units by mouth once daily., Disp: , Rfl:     tramadol (ULTRAM) 50 mg tablet, , Disp: , Rfl: 0    Current Facility-Administered Medications:     sodium chloride 0.9% flush 10 mL, 10 mL, Intravenous, PRN, Paper Order, 10 mL at 11/02/16 1500    triamcinolone acetonide injection 80 mg, 80 mg, Intra-articular, 1 time in Clinic/HOD, Anam Moscoso MD    Vitals:    09/18/17 1524   BP: (!) 143/70   BP Location: Left arm   Patient Position: Sitting   BP Method: Large (Automatic)   Pulse: 88   Weight: 81.6 kg (179 lb 14.3 oz)   Height: 5' 5.5" (1.664 m)       Physical Exam   Constitutional: She is oriented to person, place, and time. She appears well-developed and well-nourished.   HENT:   Head: Normocephalic and atraumatic.   Eyes: Pupils are equal, round, and reactive to light.   Neck: Normal range of motion. Neck supple.   Cardiovascular: Normal rate.    Pulmonary/Chest: Effort normal.   Musculoskeletal: Normal range of motion. She exhibits no edema.   Neurological: She is alert and oriented to person, place, and time. She has a normal Finger-Nose-Finger Test, a normal Heel to Shin Test, a normal Romberg Test and a normal Tandem Gait Test. Gait normal.   Reflex Scores:       Tricep reflexes are 2+ on the right side and 2+ on the left side.       Bicep reflexes are 2+ on the right side and 2+ on the left side.       Brachioradialis reflexes are 2+ on the right side and 2+ on the left side.       Patellar reflexes are 2+ on the right side and 2+ on the left side.       Achilles reflexes are 2+ on the right side and 2+ on the left side.  Skin: Skin is warm, dry and intact.   Psychiatric: She has a normal mood and affect. Her speech is normal and behavior is normal. Judgment and thought content normal.   Nursing note and vitals reviewed.      Neurologic Exam     Mental Status   Oriented to person, place, and time.   Oriented to person.   Oriented to place. "   Oriented to time.   Follows 3 step commands.   Attention: normal. Concentration: normal.   Speech: speech is normal   Level of consciousness: alert  Knowledge: consistent with education.   Able to name object. Able to read. Able to repeat. Able to write. Normal comprehension.     Cranial Nerves     CN II   Visual acuity: normal  Right visual field deficit: none  Left visual field deficit: none     CN III, IV, VI   Pupils are equal, round, and reactive to light.  Right pupil: Size: 3 mm. Shape: regular. Reactivity: brisk. Consensual response: intact.   Left pupil: Size: 3 mm. Shape: regular. Reactivity: brisk. Consensual response: intact.   CN III: no CN III palsy  CN VI: no CN VI palsy  Nystagmus: none   Diplopia: none  Ophthalmoparesis: none  Conjugate gaze: present    CN V   Right facial sensation deficit: none  Left facial sensation deficit: none    CN VII   Right facial weakness: none  Left facial weakness: none    CN VIII   Hearing: intact    CN IX, X   CN IX normal.   CN X normal.     CN XI   Right sternocleidomastoid strength: normal  Left sternocleidomastoid strength: normal  Right trapezius strength: normal  Left trapezius strength: normal    CN XII   Fasciculations: absent  Tongue deviation: none    Motor Exam   Muscle bulk: normal  Overall muscle tone: normal  Right arm pronator drift: absent  Left arm pronator drift: absent    Strength   Right neck flexion: 5/5  Left neck flexion: 5/5  Right neck extension: 5/5  Left neck extension: 5/5  Right deltoid: 5/5  Left deltoid: 5/5  Right biceps: 5/5  Left biceps: 5/5  Right triceps: 5/5  Left triceps: 5/5  Right wrist flexion: 5/5  Left wrist flexion: 5/5  Right wrist extension: 5/5  Left wrist extension: 5/5  Right interossei: 5/5  Left interossei: 5/5  Right abdominals: 5/5  Left abdominals: 5/5  Right iliopsoas: 5/5  Left iliopsoas: 5/5  Right quadriceps: 5/5  Left quadriceps: 5/5  Right hamstrin/5  Left hamstrin/5  Right glutei: 5/5  Left glutei:  5/5  Right anterior tibial: 5/5  Left anterior tibial: 5/5  Right posterior tibial: 5/5  Left posterior tibial: 5/5  Right peroneal: 5/5  Left peroneal: 5/5  Right gastroc: 5/5  Left gastroc: 5/5    Sensory Exam   Right arm light touch: normal  Left arm light touch: normal  Right leg light touch: normal  Left leg light touch: normal  Right arm vibration: normal  Left arm vibration: normal  Right arm pinprick: normal  Left arm pinprick: normal    Gait, Coordination, and Reflexes     Gait  Gait: normal    Coordination   Romberg: negative  Finger to nose coordination: normal  Heel to shin coordination: normal  Tandem walking coordination: normal    Tremor   Resting tremor: absent  Intention tremor: absent  Action tremor: absent    Reflexes   Right brachioradialis: 2+  Left brachioradialis: 2+  Right biceps: 2+  Left biceps: 2+  Right triceps: 2+  Left triceps: 2+  Right patellar: 2+  Left patellar: 2+  Right achilles: 2+  Left achilles: 2+  Right Kelley: absent  Left Kelley: absent  Right ankle clonus: absent  Left ankle clonus: absent      Provider dictation:  76 year old female with histoyr of lumbar surgery, breast cancer and osteoporosis is referred by Dr. Garcia for evaluation of back pain.  She is status post 1-16-13 bialteral L4/5 laminotomies, medial facetectomies and foraminotomies for right greater than left L5 radiculopathy due to L4/5 spondylolisthesis and stenosis.  She had good results with resolution of all pain.  She was diagnosed with breast cancer 1 year ago and has undergone chemotherapy and radiation treatments.  She is taking prolia for osteoprosis.  About 5-6 weeks ago she had recurrence of focal lower back and hip pain.  Hip pain resolved after bursitis injection by Dr. Garcia, but focal lower back pain has persisted.  She denies radicular leg pain, numbness and tingling.  Pain increased with standing, improving with walking.  Oswestry score: 24%.  PHQ:  1.    On exam, she is tender to  palpation over the lower lumbar area midline.  She has 2+ muscle stretch reflexes with 5/5 strength and no sensory deficits.    She has not had any recent imaging.    Focal back pain without radiculopathy or neuroglogical deficit is most likely myofasical in nature.  However with her history of cancer, osteoporosis and current tenderness to pallpation along the spine, I would like to rule out any metastatic lesions and fractures in the lumbar region with an MRI lumbar spine.  I will call with results.  If there is no significant abnormality, we will refer her to PT to help with muscle pain.    Visit Diagnosis:  History of lumbar surgery  -     MRI Lumbar Spine Without Contrast; Future; Expected date: 09/18/2017    Acute midline low back pain without sciatica  -     MRI Lumbar Spine Without Contrast; Future; Expected date: 09/18/2017    Malignant neoplasm involving both nipple and areola of left breast in female, unspecified estrogen receptor status  -     MRI Lumbar Spine Without Contrast; Future; Expected date: 09/18/2017    Osteoporosis, senile  -     MRI Lumbar Spine Without Contrast; Future; Expected date: 09/18/2017        Total time spent counseling greater than fifty percent of total visit time.  Counseling included discussion regarding imaging findings, diagnosis possibilities, treatment options, risks and benefits.   The patient had many questions regarding the options and long-term effects.

## 2017-09-26 ENCOUNTER — TELEPHONE (OUTPATIENT)
Dept: SPINE | Facility: CLINIC | Age: 77
End: 2017-09-26

## 2017-09-26 NOTE — TELEPHONE ENCOUNTER
Authorization given to Eva at [facility] for the MRI scheduled for tomorrow. She indicated understanding.

## 2017-09-26 NOTE — TELEPHONE ENCOUNTER
----- Message from Heike Shore sent at 9/26/2017  2:31 PM CDT -----  Contact: Eva mclean/ [facility]  Eva mclean/ [facility] calling in regards to the authorization she is requesting. She has sent over 2 other messages with no response. Please advise. Call to pod. Call connected to pod. Warm transferred.  Thanks!

## 2017-10-03 DIAGNOSIS — M54.50 ACUTE BILATERAL LOW BACK PAIN WITHOUT SCIATICA: Primary | ICD-10-CM

## 2017-10-03 DIAGNOSIS — Z85.3 HISTORY OF BREAST CANCER: ICD-10-CM

## 2017-10-03 NOTE — PROGRESS NOTES
I have reviewed the results of her recent lumbar MRI wihtout contrast.    She is s/p 1-16-13 bilateral L4/5 lumbar surgery with complete relief of opain.  She has history of recent breast cancer and osteoporosis.    She has onset of acute lower back pain withotu radiculopathy.    Outside MRI revealed post op changes, multi level degenerative changes, L4 anterolisthesis on L5 (that was present pre-operatively), an L3 hemangioma and a lobulated area of hyperintensity on T2 imaging in the left psoas area that is possibly a schwannoma or neuroma.    I have ordered an MRI lumbar spine with contrast to further assess the lubulated area and we will arrange for her to be seen in clinic following the mri.

## 2017-10-04 ENCOUNTER — LAB VISIT (OUTPATIENT)
Dept: LAB | Facility: HOSPITAL | Age: 77
End: 2017-10-04
Attending: PHYSICIAN ASSISTANT
Payer: MEDICARE

## 2017-10-04 DIAGNOSIS — Z85.3 HISTORY OF BREAST CANCER: ICD-10-CM

## 2017-10-04 DIAGNOSIS — M54.50 ACUTE BILATERAL LOW BACK PAIN WITHOUT SCIATICA: ICD-10-CM

## 2017-10-04 LAB
CREAT SERPL-MCNC: 1.1 MG/DL
EST. GFR  (AFRICAN AMERICAN): 55.6 ML/MIN/1.73 M^2
EST. GFR  (NON AFRICAN AMERICAN): 48.2 ML/MIN/1.73 M^2

## 2017-10-04 PROCEDURE — 36415 COLL VENOUS BLD VENIPUNCTURE: CPT | Mod: PO

## 2017-10-04 PROCEDURE — 82565 ASSAY OF CREATININE: CPT

## 2017-10-09 ENCOUNTER — TELEPHONE (OUTPATIENT)
Dept: SPINE | Facility: CLINIC | Age: 77
End: 2017-10-09

## 2017-10-09 NOTE — TELEPHONE ENCOUNTER
----- Message from Dian Jett PA-C sent at 10/3/2017 11:42 AM CDT -----  Please fax MRI order for MRI with contrast to [facility] where she had her prior MRI.    She will need a serum creatinine prior to the MRI (order entered).    Please arrange for a follow up in clinic with me after the MRI is complete.  She will need to bring the images with her.

## 2017-10-12 ENCOUNTER — TELEPHONE (OUTPATIENT)
Dept: NEUROSURGERY | Facility: CLINIC | Age: 77
End: 2017-10-12

## 2017-10-12 ENCOUNTER — TELEPHONE (OUTPATIENT)
Dept: SPINE | Facility: CLINIC | Age: 77
End: 2017-10-12

## 2017-10-12 NOTE — TELEPHONE ENCOUNTER
----- Message from Debby Callaway sent at 10/12/2017  9:56 AM CDT -----  [facility] [phone] is calling for office to send in order for a MRI Lumbar with and without to be sent to our Pre-Certification Department. Patient is coming in 10/16/17 for this test and it has not been authorized yet.

## 2017-10-12 NOTE — TELEPHONE ENCOUNTER
----- Message from Yris Rosen sent at 10/12/2017 11:48 AM CDT -----  Contact: [facility]  States that the wrong authorization number that was given from the last MRI that was done but a new authorization number has to be given for the new MRI,  Please call her back at [phone].  Thank you

## 2017-10-16 ENCOUNTER — TELEPHONE (OUTPATIENT)
Dept: HEMATOLOGY/ONCOLOGY | Facility: CLINIC | Age: 77
End: 2017-10-16

## 2017-10-16 NOTE — TELEPHONE ENCOUNTER
----- Message from Nelson Crouch sent at 10/16/2017  8:23 AM CDT -----  Contact: patient  Patient called requesting appointment for 10/19/17.please call back at [phone] to advise. Thanks,

## 2017-10-16 NOTE — TELEPHONE ENCOUNTER
I called and scheduled her an appt as she requested and inf was able to move her port flush up. mary

## 2017-10-18 RX ORDER — HEPARIN 100 UNIT/ML
500 SYRINGE INTRAVENOUS
Status: CANCELLED | OUTPATIENT
Start: 2017-10-18

## 2017-10-18 RX ORDER — SODIUM CHLORIDE 0.9 % (FLUSH) 0.9 %
10 SYRINGE (ML) INJECTION
Status: CANCELLED | OUTPATIENT
Start: 2017-10-18

## 2017-10-19 ENCOUNTER — OFFICE VISIT (OUTPATIENT)
Dept: HEMATOLOGY/ONCOLOGY | Facility: CLINIC | Age: 77
End: 2017-10-19
Payer: MEDICARE

## 2017-10-19 ENCOUNTER — INFUSION (OUTPATIENT)
Dept: INFUSION THERAPY | Facility: HOSPITAL | Age: 77
End: 2017-10-19
Attending: INTERNAL MEDICINE
Payer: MEDICARE

## 2017-10-19 VITALS
SYSTOLIC BLOOD PRESSURE: 145 MMHG | HEART RATE: 83 BPM | WEIGHT: 177.5 LBS | BODY MASS INDEX: 29.57 KG/M2 | HEIGHT: 65 IN | TEMPERATURE: 98 F | DIASTOLIC BLOOD PRESSURE: 66 MMHG

## 2017-10-19 DIAGNOSIS — C50.012 MALIGNANT NEOPLASM OF NIPPLE OF LEFT BREAST IN FEMALE, ESTROGEN RECEPTOR NEGATIVE: Primary | ICD-10-CM

## 2017-10-19 DIAGNOSIS — I10 ESSENTIAL HYPERTENSION: ICD-10-CM

## 2017-10-19 DIAGNOSIS — D36.10 SCHWANNOMA: Primary | ICD-10-CM

## 2017-10-19 DIAGNOSIS — Z17.1 MALIGNANT NEOPLASM OF NIPPLE OF LEFT BREAST IN FEMALE, ESTROGEN RECEPTOR NEGATIVE: Primary | ICD-10-CM

## 2017-10-19 DIAGNOSIS — C50.012 MALIGNANT NEOPLASM INVOLVING BOTH NIPPLE AND AREOLA OF LEFT BREAST IN FEMALE, UNSPECIFIED ESTROGEN RECEPTOR STATUS: ICD-10-CM

## 2017-10-19 DIAGNOSIS — M81.0 OSTEOPOROSIS, SENILE: ICD-10-CM

## 2017-10-19 DIAGNOSIS — N18.30 CHRONIC KIDNEY DISEASE, STAGE III (MODERATE): ICD-10-CM

## 2017-10-19 DIAGNOSIS — C50.011 MALIGNANT NEOPLASM OF NIPPLE OF RIGHT BREAST IN FEMALE, UNSPECIFIED ESTROGEN RECEPTOR STATUS: ICD-10-CM

## 2017-10-19 PROCEDURE — 25000003 PHARM REV CODE 250: Mod: PN | Performed by: INTERNAL MEDICINE

## 2017-10-19 PROCEDURE — A4216 STERILE WATER/SALINE, 10 ML: HCPCS | Mod: PN | Performed by: INTERNAL MEDICINE

## 2017-10-19 PROCEDURE — 38242 TRANSPLT ALLO LYMPHOCYTES: CPT | Mod: PN

## 2017-10-19 PROCEDURE — 99999 PR PBB SHADOW E&M-EST. PATIENT-LVL III: CPT | Mod: PBBFAC,,, | Performed by: INTERNAL MEDICINE

## 2017-10-19 PROCEDURE — 63600175 PHARM REV CODE 636 W HCPCS: Mod: PN | Performed by: INTERNAL MEDICINE

## 2017-10-19 PROCEDURE — 99499 UNLISTED E&M SERVICE: CPT | Mod: S$GLB,,, | Performed by: INTERNAL MEDICINE

## 2017-10-19 PROCEDURE — 96523 IRRIG DRUG DELIVERY DEVICE: CPT | Mod: PN

## 2017-10-19 PROCEDURE — 99214 OFFICE O/P EST MOD 30 MIN: CPT | Mod: S$GLB,,, | Performed by: INTERNAL MEDICINE

## 2017-10-19 RX ORDER — HEPARIN 100 UNIT/ML
500 SYRINGE INTRAVENOUS
Status: CANCELLED | OUTPATIENT
Start: 2017-10-19

## 2017-10-19 RX ORDER — SODIUM CHLORIDE 0.9 % (FLUSH) 0.9 %
10 SYRINGE (ML) INJECTION
Status: COMPLETED | OUTPATIENT
Start: 2017-10-19 | End: 2017-10-19

## 2017-10-19 RX ORDER — HEPARIN 100 UNIT/ML
500 SYRINGE INTRAVENOUS
Status: COMPLETED | OUTPATIENT
Start: 2017-10-19 | End: 2017-10-19

## 2017-10-19 RX ORDER — SODIUM CHLORIDE 0.9 % (FLUSH) 0.9 %
10 SYRINGE (ML) INJECTION
Status: CANCELLED | OUTPATIENT
Start: 2017-10-19

## 2017-10-19 RX ADMIN — HEPARIN 500 UNITS: 100 SYRINGE at 10:10

## 2017-10-19 RX ADMIN — SODIUM CHLORIDE, PRESERVATIVE FREE 10 ML: 5 INJECTION INTRAVENOUS at 10:10

## 2017-10-19 NOTE — PROGRESS NOTES
An 76-year-old  woman well known to me.  The patient was diagnosed n   May 2016 with triple positive breast cancer.  The patient had a 1.9 cm   malignancy of the left breast, sentinel lymph node negative in association with   DCIS.  She underwent lumpectomy, underwent adjuvant TCH chemotherapy for six   cycles and now continues with Herceptin alone, has done well, but she has   multiple other issues in association.  The patient had a recent echocardiogram   and follows with Dr. Gray.  Echocardiogram at this visit shows a drop by 10%   from an ejection fraction of 65% to 55%.  Dr. Gray has cleared her to continue   with Herceptin and with the short-term echocardiogram follow up as the patient   has remained asymptomatic.  The patient also had some pulmonary nodules that are   being followed by a CT scan.  They are deemed to be benign, but need ongoing   followup.  She had been taking Boniva for postmenopausal osteopenia/osteoporosis   along with calcium and the patient has also had increased density on mammogram.    Recently had a repeat mammogram, which Radiology recommends short-term   followup within three months on the right side.  She is due for a followup   mammogram on the left side three months following that, which will be towards   the end of year.  Pt has been off the arimidex but has seen Dr. dunbar and got a shot harris been referd to DR. barrios for spine evaluation , she doesn't believe the arimidex is the problem. The pt is making an unscheduled visit today after seeing his PA about the MRI that was done, pt was told she needs another MRI with contrast pt has concerns about the contrast  PHYSICAL EXAMINATION:  GENERAL:  Elderly woman.  Alert, awake, oriented.  VITAL SIGNS:    Wt Readings from Last 3 Encounters:   10/19/17 80.5 kg (177 lb 7.5 oz)   09/18/17 81.6 kg (179 lb 14.3 oz)   09/07/17 81.3 kg (179 lb 3.7 oz)     Temp Readings from Last 3 Encounters:   10/19/17 98.1 °F (36.7 °C)   09/07/17  97.7 °F (36.5 °C)   08/17/17 98.9 °F (37.2 °C)     BP Readings from Last 3 Encounters:   10/19/17 (!) 145/66   09/18/17 (!) 143/70   09/07/17 (!) 153/73     Pulse Readings from Last 3 Encounters:   10/19/17 83   09/18/17 88   09/07/17 77   HEENT:  Hair has started coming back.  Showed no congestion.  NECK:  Supple, without JVD.  Trachea is central.  LUNGS:  Clear to auscultation.  No rales, rhonchi or crepitations.  HEART:  S1 is normally heard.  No murmurs or gallops.  ABDOMEN:  Soft, without rebound, guarding or rigidity.  EXTREMITIES:  Showed no edema.  NEUROLOGIC:  She demonstrates no neurological deficits.    LABORATORY EVALUATION:    Lab Results   Component Value Date    WBC 5.27 07/27/2017    HGB 11.9 (L) 07/27/2017    HCT 35.9 (L) 07/27/2017    MCV 98 07/27/2017     07/27/2017     CMP  Sodium   Date Value Ref Range Status   07/27/2017 136 136 - 145 mmol/L Final     Potassium   Date Value Ref Range Status   07/27/2017 5.2 (H) 3.5 - 5.1 mmol/L Final     Chloride   Date Value Ref Range Status   07/27/2017 98 95 - 110 mmol/L Final     CO2   Date Value Ref Range Status   07/27/2017 30 22 - 31 mmol/L Final     Glucose   Date Value Ref Range Status   07/27/2017 83 70 - 110 mg/dL Final     Comment:     The ADA recommends the following guidelines for fasting glucose:  Normal:       less than 100 mg/dL  Prediabetes:  100 mg/dL to 125 mg/dL  Diabetes:     126 mg/dL or higher       BUN, Bld   Date Value Ref Range Status   07/27/2017 19 (H) 7 - 18 mg/dL Final     Creatinine   Date Value Ref Range Status   10/04/2017 1.1 0.5 - 1.4 mg/dL Final   01/18/2013 0.9 0.5 - 1.4 mg/dL Final     Calcium   Date Value Ref Range Status   07/27/2017 9.8 8.4 - 10.2 mg/dL Final   01/18/2013 8.9 8.7 - 10.5 mg/dL Final     Total Protein   Date Value Ref Range Status   07/27/2017 7.4 6.0 - 8.4 g/dL Final     Albumin   Date Value Ref Range Status   07/27/2017 4.5 3.5 - 5.2 g/dL Final     Total Bilirubin   Date Value Ref Range Status    07/27/2017 0.6 0.2 - 1.3 mg/dL Final     Comment:     For infants and newborns, interpretation of results should be based  on gestational age, weight and in agreement with clinical  observations.  Premature Infant recommended reference ranges:  Up to 24 hours.............<8.0 mg/dL  Up to 48 hours............<12.0 mg/dL  3-5 days..................<15.0 mg/dL  6-29 days.................<15.0 mg/dL       Alkaline Phosphatase   Date Value Ref Range Status   07/27/2017 76 38 - 145 U/L Final     AST   Date Value Ref Range Status   07/27/2017 42 (H) 14 - 36 U/L Final     ALT   Date Value Ref Range Status   07/27/2017 27 10 - 44 U/L Final     Anion Gap   Date Value Ref Range Status   07/27/2017 8 8 - 16 mmol/L Final   01/18/2013 9 5 - 15 meq/L Final     eGFR if    Date Value Ref Range Status   10/04/2017 55.6 (A) >60 mL/min/1.73 m^2 Final     eGFR if non    Date Value Ref Range Status   10/04/2017 48.2 (A) >60 mL/min/1.73 m^2 Final     Comment:     Calculation used to obtain the estimated glomerular filtration  rate (eGFR) is the CKD-EPI equation. Since race is unknown   in our information system, the eGFR values for   -American and Non--American patients are given   for each creatinine result.       Echocardiogram, ejection fraction has dropped to 55%, but   still in the normal range.  The most recent CT of the chest, abdomen and pelvis   done on 05/23/2017, new 4 mm pulmonary nodule along the right major fissure with   a new region of consolidation on the left upper lobe subpleurally, six-month   followup, i.e., in November 2017 recommended.  She has post-surgical changes in   the breast.  The patient's most recent mammogram done in May 2017 on the right   side, shows the fibroglandular densities. Repeated in 8/2017 and is good  IMPRESSION:  1.  Triple positive breast cancer, T1, N0, M0, underwent TCH chemotherapy,   Completed 1 year of herceptin    EF dropped somewhat  followed by Cardiology.    2. The patient has significant osteoporosis on bone density.  .boniva changed this to Prolia.  Calcium   supplementation minimum 1200 mg along with dental precautions.  Orders placed   for Prolia.going for dental cleaning soon  3. Get back on arimidex and stay on it. Not the cause of her pain  6.  The patient has dyslipidemia.  Continue with Zocor and primary care follow   up with Dr. Franco.  7.  Degenerative joint pains.  Continue with Ultram as needed, take trazodone   for sleep and anxiety along with Xanax.  8.  Mild CKD.  Continue to monitor.  No intervention necessary at this time.  9.  Gastroesophageal reflux disease.  Continue with omeprazole.  No changes or   worsening of symptoms at this point.   Ct of chest and see me in 11/2017  10.psoas muscle  Tumor possibly a schwannoma or something benign but a contrast mri has been recommended. Pt canceld that appt , will change ct abd and pelvis to MRI of abd to see that elizabeth suffice , pt doesn't want to go to different location and has ct scheduled already for November, will message hitesh Jett to that effect

## 2017-10-20 ENCOUNTER — TELEPHONE (OUTPATIENT)
Dept: HEMATOLOGY/ONCOLOGY | Facility: CLINIC | Age: 77
End: 2017-10-20

## 2017-10-20 NOTE — TELEPHONE ENCOUNTER
I called  and spoke with Gricelda. She tells me the MRI of back was ordered by Dian Jett with Dr Tovar. She will call Ms Ch back and discuss that test. Told her thank you. cm

## 2017-10-20 NOTE — TELEPHONE ENCOUNTER
I rtc to her. She wants to go back to doing the CT Scan of the chest, abd and pelvis. Does not want to do MRI, can not do that test. She may have misunderstood. Please ask  to change back. Told her I would, but the time slot I had earlier may not be available. I will try to call her back today, but it maybe on Mon. She agreed. Cm

## 2017-10-20 NOTE — TELEPHONE ENCOUNTER
----- Message from Arely Coppola sent at 10/20/2017  9:12 AM CDT -----  Please call patient in regards to test that are scheduled, she wants to discuss issues she has with all of these test, [phone] (home)

## 2017-11-02 ENCOUNTER — TELEPHONE (OUTPATIENT)
Dept: HEMATOLOGY/ONCOLOGY | Facility: CLINIC | Age: 77
End: 2017-11-02

## 2017-11-02 DIAGNOSIS — D36.10 SCHWANNOMA: Primary | ICD-10-CM

## 2017-11-02 NOTE — TELEPHONE ENCOUNTER
I rescheduled her CT of abd and pelvis, she does not want to do the MRI of abd and pelvis( that she req prior and CT was changed to MRI)  I scheduled for the same day of the CT of chest on 11/14/17 with stat creatinine before. I called and left a v/m for her to rtc to me at [phone] ask for Obdulia

## 2017-11-02 NOTE — TELEPHONE ENCOUNTER
Pt called back and I went over the instructions for both CT's and lab needed prior to do CT's. Both at [address] on 11/14/17 Must fast x 4 hours prior to test. Arrive at 9am for lab, then will have to drink contrast for one of the CT's. The dept will decide which CT will be done first. I told her I mailed her f/u  appt as well as her appt for her mammogram. She agreed to all.

## 2017-11-03 ENCOUNTER — DOCUMENTATION ONLY (OUTPATIENT)
Dept: SPINE | Facility: CLINIC | Age: 77
End: 2017-11-03

## 2017-11-03 DIAGNOSIS — M54.50 ACUTE RIGHT-SIDED LOW BACK PAIN WITHOUT SCIATICA: Primary | ICD-10-CM

## 2017-11-06 ENCOUNTER — OFFICE VISIT (OUTPATIENT)
Dept: FAMILY MEDICINE | Facility: CLINIC | Age: 77
End: 2017-11-06
Payer: MEDICARE

## 2017-11-06 ENCOUNTER — LAB VISIT (OUTPATIENT)
Dept: LAB | Facility: HOSPITAL | Age: 77
End: 2017-11-06
Attending: INTERNAL MEDICINE
Payer: MEDICARE

## 2017-11-06 VITALS
SYSTOLIC BLOOD PRESSURE: 158 MMHG | BODY MASS INDEX: 29.49 KG/M2 | WEIGHT: 177 LBS | DIASTOLIC BLOOD PRESSURE: 82 MMHG | HEIGHT: 65 IN | HEART RATE: 93 BPM

## 2017-11-06 DIAGNOSIS — E78.5 DYSLIPIDEMIA: ICD-10-CM

## 2017-11-06 DIAGNOSIS — Z00.00 ENCOUNTER FOR PREVENTIVE HEALTH EXAMINATION: Primary | ICD-10-CM

## 2017-11-06 DIAGNOSIS — R91.8 PULMONARY NODULES: ICD-10-CM

## 2017-11-06 DIAGNOSIS — C50.012 MALIGNANT NEOPLASM OF NIPPLE OF LEFT BREAST IN FEMALE, ESTROGEN RECEPTOR NEGATIVE: ICD-10-CM

## 2017-11-06 DIAGNOSIS — M81.0 OSTEOPOROSIS, SENILE: ICD-10-CM

## 2017-11-06 DIAGNOSIS — M51.36 DDD (DEGENERATIVE DISC DISEASE), LUMBAR: ICD-10-CM

## 2017-11-06 DIAGNOSIS — I77.9 BILATERAL CAROTID ARTERY DISEASE: ICD-10-CM

## 2017-11-06 DIAGNOSIS — I10 ESSENTIAL HYPERTENSION: ICD-10-CM

## 2017-11-06 DIAGNOSIS — K21.9 GASTROESOPHAGEAL REFLUX DISEASE, ESOPHAGITIS PRESENCE NOT SPECIFIED: ICD-10-CM

## 2017-11-06 DIAGNOSIS — Z17.1 MALIGNANT NEOPLASM OF NIPPLE OF LEFT BREAST IN FEMALE, ESTROGEN RECEPTOR NEGATIVE: ICD-10-CM

## 2017-11-06 DIAGNOSIS — G47.00 INSOMNIA, UNSPECIFIED TYPE: ICD-10-CM

## 2017-11-06 DIAGNOSIS — I70.0 AORTIC ATHEROSCLEROSIS: ICD-10-CM

## 2017-11-06 DIAGNOSIS — N18.30 CHRONIC KIDNEY DISEASE, STAGE III (MODERATE): ICD-10-CM

## 2017-11-06 DIAGNOSIS — E78.5 HYPERLIPIDEMIA LDL GOAL <130: ICD-10-CM

## 2017-11-06 DIAGNOSIS — M43.10 SPONDYLOLISTHESIS, UNSPECIFIED SPINAL REGION: ICD-10-CM

## 2017-11-06 DIAGNOSIS — C50.012 MALIGNANT NEOPLASM INVOLVING BOTH NIPPLE AND AREOLA OF LEFT BREAST IN FEMALE, UNSPECIFIED ESTROGEN RECEPTOR STATUS: ICD-10-CM

## 2017-11-06 LAB
CHOLEST SERPL-MCNC: 163 MG/DL
CHOLEST/HDLC SERPL: 2.2 {RATIO}
HDLC SERPL-MCNC: 73 MG/DL
HDLC SERPL: 44.8 %
LDLC SERPL CALC-MCNC: 78 MG/DL
NONHDLC SERPL-MCNC: 90 MG/DL
TRIGL SERPL-MCNC: 60 MG/DL

## 2017-11-06 PROCEDURE — 99999 PR PBB SHADOW E&M-EST. PATIENT-LVL IV: CPT | Mod: PBBFAC,,, | Performed by: NURSE PRACTITIONER

## 2017-11-06 PROCEDURE — G0439 PPPS, SUBSEQ VISIT: HCPCS | Mod: S$GLB,,, | Performed by: NURSE PRACTITIONER

## 2017-11-06 PROCEDURE — 99499 UNLISTED E&M SERVICE: CPT | Mod: S$GLB,,, | Performed by: NURSE PRACTITIONER

## 2017-11-06 PROCEDURE — 80061 LIPID PANEL: CPT

## 2017-11-06 PROCEDURE — 36415 COLL VENOUS BLD VENIPUNCTURE: CPT | Mod: PO

## 2017-11-06 PROCEDURE — G0008 ADMIN INFLUENZA VIRUS VAC: HCPCS | Mod: S$GLB,,, | Performed by: INTERNAL MEDICINE

## 2017-11-06 PROCEDURE — 90662 IIV NO PRSV INCREASED AG IM: CPT | Mod: S$GLB,,, | Performed by: INTERNAL MEDICINE

## 2017-11-06 NOTE — PROGRESS NOTES
"Jing Tenorio presented for a  Medicare AWV and comprehensive Health Risk Assessment today. The following components were reviewed and updated:    · Medical history  · Family History  · Social history  · Allergies and Current Medications  · Health Risk Assessment  · Health Maintenance  · Care Team     ** See Completed Assessments for Annual Wellness Visit within the encounter summary.**       The following assessments were completed:  · Living Situation  · CAGE  · Depression Screening  · Timed Get Up and Go  · Whisper Test  · Cognitive Function Screening          · Nutrition Screening  · ADL Screening  · PAQ Screening    Did not take BP meds this am, was fasting for labs    Vitals:    11/06/17 0939   BP: (!) 158/82   BP Location: Right arm   Patient Position: Sitting   BP Method: Medium (Automatic)   Pulse: 93   Weight: 80.3 kg (177 lb 0.5 oz)   Height: 5' 5" (1.651 m)     Body mass index is 29.46 kg/m².  Physical Exam   Constitutional: She is oriented to person, place, and time. She appears well-developed and well-nourished. No distress.   Uses cane   HENT:   Head: Normocephalic and atraumatic.   Eyes: Conjunctivae are normal.   Neck: Carotid bruit is not present.   Cardiovascular: Normal rate, regular rhythm and normal heart sounds.    No murmur heard.  Pulmonary/Chest: Effort normal and breath sounds normal. No respiratory distress. She has no wheezes.   Neurological: She is alert and oriented to person, place, and time.   Skin: Skin is warm and dry.   Psychiatric: She has a normal mood and affect. Her behavior is normal. Judgment and thought content normal.         Diagnoses and health risks identified today and associated recommendations/orders:    1. Encounter for preventive health examination  Reviewed health maintenance and provided recommendations        2. Malignant neoplasm of nipple of left breast in female, estrogen receptor negative  Stable.   Taking arimedex  Followed by Luis.       3. Aortic " atherosclerosis  Continue to monitor lipids  Taking statin  Followed by Nakul Franco MD .   CXR 5/23/16    4. Bilateral carotid artery disease  Continue to monitor with carotid US  Followed by Nakul Franco MD .       5. Osteoporosis, senile  Continue to monitor with dxa  Receiving prolia  Followed by Luis.       6. Malignant neoplasm involving both nipple and areola of left breast in female, unspecified estrogen receptor status  Stable.   Taking arimedex  Has CT scheduled for next week  Followed by Luis.       7. Chronic kidney disease, stage III (moderate)  Stable.   Encourage adequate h2o intake  Avoid nsaids  Followed by Nakul Franco MD .       8. Essential hypertension  Has appt with pcp next wek  Controlled on current medications.  Followed by Nakul Franco MD .       9. Pulmonary nodules  Stable.   Continue to monitor   Followed by Nakul Franco MD .       10. DDD (degenerative disc disease), lumbar  Stable.     Followed by La.       11. Hyperlipidemia LDL goal <130  Continue to monitor lipids  Followed by Nakul Franco MD .       12. Spondylolisthesis, unspecified spinal region  Stable.     Followed by La.       13. Gastroesophageal reflux disease, esophagitis presence not specified  Stable.   Controlled on current medications.  Followed by Nakul Franco MD .       14. Insomnia, unspecified type  Stable.   Controlled on current medications.  Followed by Nakul Franco MD .         Provided Jing with a 5-10 year written screening schedule and personal prevention plan. Recommendations were developed using the USPSTF age appropriate recommendations. Education, counseling, and referrals were provided as needed. After Visit Summary printed and given to patient which includes a list of additional screenings\tests needed.    Return in about 1 year (around 11/6/2018).    Saira Sawant NP

## 2017-11-06 NOTE — PATIENT INSTRUCTIONS
Counseling and Referral of Other Preventative  (Italic type indicates deductible and co-insurance are waived)    Patient Name: Jing Tenorio  Today's Date: 11/6/2017      SERVICE LIMITATIONS RECOMMENDATION    Vaccines    · Pneumococcal (once after 65)    · Influenza (annually)    · Hepatitis B (if medium/high risk)    · Prevnar 13      Hepatitis B medium/high risk factors:       - End-stage renal disease       - Hemophiliacs who received Factor VII or         IX concentrates       - Clients of institutions for the mentally             retarded       - Persons who live in the same house as          a HepB carrier       - Homosexual men       - Illicit injectable drug abusers     Pneumococcal: Done, no repeat necessary     Influenza: Scheduled - see appointments     Hepatitis B: N/A     Prevnar 13: Done, no repeat necessary    Mammogram (biennial age 50-74)  Annually (age 40 or over)  Scheduled, see appointments    Pap (up to age 70 and after 70 if unknown history or abnormal study last 10 years)    N/A     The USPSTF recommends against screening for cervical cancer in women older than age 65 years who have had adequate prior screening and are not otherwise at high risk for cervical cancer.      Colorectal cancer screening (to age 75)    · Fecal occult blood test (annual)  · Flexible sigmoidoscopy (5y)  · Screening colonoscopy (10y)  · Barium enema   N/A    Diabetes self-management training (no USPSTF recommendations)  Requires referral by treating physician for patient with diabetes or renal disease. 10 hours of initial DSMT sessions of no less than 30 minutes each in a continuous 12-month period. 2 hours of follow-up DSMT in subsequent years.  N/A    Bone mass measurements (age 65 & older, biennial)  Requires diagnosis related to osteoporosis or estrogen deficiency. Biennial benefit unless patient has history of long-term glucocorticoid  Last done 2017, recommend to repeat every 1-2   years    Glaucoma screening  (no USPSTF recommendation)  Diabetes mellitus, family history   , age 50 or over    American, age 65 or over  Recommend follow up with eye care professional regularly    Medical nutrition therapy for diabetes or renal disease (no recommended schedule)  Requires referral by treating physician for patient with diabetes or renal disease or kidney transplant within the past 3 years.  Can be provided in same year as diabetes self-management training (DSMT), and CMS recommends medical nutrition therapy take place after DSMT. Up to 3 hours for initial year and 2 hours in subsequent years.  N/A    Cardiovascular screening blood tests (every 5 years)  · Fasting lipid panel  Order as a panel if possible  Done this year, repeat every year    Diabetes screening tests (at least every 3 years, Medicare covers annually or at 6-month intervals for prediabetic patients)  · Fasting blood sugar (FBS) or glucose tolerance test (GTT)  Patient must be diagnosed with one of the following:       - Hypertension       - Dyslipidemia       - Obesity (BMI 30kg/m2)       - Previous elevated impaired FBS or GTT       ... or any two of the following:       - Overweight (BMI 25 but <30)       - Family history of diabetes       - Age 65 or older       - History of gestational diabetes or birth of baby weighing more than 9 pounds  Done this year, repeat every year    HIV screening (annually for increased risk patients)  · HIV-1 and HIV-2 by EIA, or JOSE, rapid antibody test or oral mucosa transudate  Patients must be at increased risk for HIV infection per USPSTF guidelines or pregnant. Tests covered annually for patient at increased risk or as requested by the patient. Pregnant patients may receive up to 3 tests during pregnancy.  Risks discussed, screening is not recommended    Smoking cessation counseling (up to 8 sessions per year)  Patients must be asymptomatic of tobacco-related conditions to receive as a preventative  service.  Non-smoker    Subsequent annual wellness visit  At least 12 months since last AWV  Return in one year     The following information is provided to all patients.  This information is to help you find resources for any of the problems found today that may be affecting your health:                Living healthy guide: www.[facility].[place].[facility]      Understanding Diabetes: www.diabetes.org      Eating healthy: www.cdc.gov/healthyweight      CDC home safety checklist: www.cdc.gov/steadi/patient.html      Agency on Aging: www.goea.[place].[facility]      Alcoholics anonymous (AA): www.aa.org      Physical Activity: www.ashwini.nih.gov/he3iphm      Tobacco use: www.quitwithusla.org

## 2017-11-09 ENCOUNTER — OFFICE VISIT (OUTPATIENT)
Dept: FAMILY MEDICINE | Facility: CLINIC | Age: 77
End: 2017-11-09
Payer: MEDICARE

## 2017-11-09 VITALS
DIASTOLIC BLOOD PRESSURE: 70 MMHG | RESPIRATION RATE: 18 BRPM | HEIGHT: 65 IN | OXYGEN SATURATION: 99 % | SYSTOLIC BLOOD PRESSURE: 132 MMHG | HEART RATE: 80 BPM | BODY MASS INDEX: 29.9 KG/M2 | WEIGHT: 179.44 LBS

## 2017-11-09 DIAGNOSIS — G47.00 INSOMNIA, UNSPECIFIED TYPE: ICD-10-CM

## 2017-11-09 DIAGNOSIS — E78.5 DYSLIPIDEMIA: ICD-10-CM

## 2017-11-09 DIAGNOSIS — I10 ESSENTIAL HYPERTENSION: Primary | ICD-10-CM

## 2017-11-09 PROCEDURE — 99214 OFFICE O/P EST MOD 30 MIN: CPT | Mod: S$GLB,,, | Performed by: INTERNAL MEDICINE

## 2017-11-09 PROCEDURE — 99499 UNLISTED E&M SERVICE: CPT | Mod: S$GLB,,, | Performed by: INTERNAL MEDICINE

## 2017-11-09 PROCEDURE — 99999 PR PBB SHADOW E&M-EST. PATIENT-LVL III: CPT | Mod: PBBFAC,,, | Performed by: INTERNAL MEDICINE

## 2017-11-09 RX ORDER — SIMVASTATIN 20 MG/1
20 TABLET, FILM COATED ORAL NIGHTLY
Qty: 90 TABLET | Refills: 3 | Status: SHIPPED | OUTPATIENT
Start: 2017-11-09 | End: 2018-10-12 | Stop reason: SDUPTHER

## 2017-11-09 RX ORDER — TRAZODONE HYDROCHLORIDE 100 MG/1
100 TABLET ORAL NIGHTLY
Qty: 90 TABLET | Refills: 3 | Status: SHIPPED | OUTPATIENT
Start: 2017-11-09 | End: 2018-05-10 | Stop reason: SDUPTHER

## 2017-11-09 RX ORDER — NAPROXEN SODIUM 220 MG
220 TABLET ORAL DAILY
COMMUNITY
End: 2018-01-24

## 2017-11-09 RX ORDER — LISINOPRIL 10 MG/1
20 TABLET ORAL 2 TIMES DAILY
Qty: 180 TABLET | Refills: 3 | Status: SHIPPED | OUTPATIENT
Start: 2017-11-09 | End: 2018-05-10 | Stop reason: SDUPTHER

## 2017-11-09 NOTE — PROGRESS NOTES
Subjective:       Patient ID: Jing Tenorio is a 77 y.o. female.    Chief Complaint: Hypertension (f/u)    Low back pain.  MRI showed tumor on nerve, but benign.  Will only need surgery if develops pain down leg.  Dr Tovar.    HTN -  controlled.       HLD - controlled    Insomnia - controlled    Breast caner - now on anastrozole for 1 mo and feels bad or run down on this w some mild nausea.  Takes in am.   Breast cancer 1.9 cm, ER/MT+, HER2 +; lymph nodes negative; s/p removal, rad tx and completed 6 chemo treatments.    s/p Ratx, chemo - now on hormone suppression therapy.       Review of Systems   Constitutional: Negative for appetite change and fever.   HENT: Negative for nosebleeds and trouble swallowing.    Eyes: Negative for discharge and visual disturbance.   Respiratory: Negative for choking and shortness of breath.    Cardiovascular: Negative for chest pain and palpitations.   Gastrointestinal: Negative for abdominal pain, nausea and vomiting.   Musculoskeletal: Negative for arthralgias and joint swelling.   Skin: Negative for rash and wound.   Neurological: Negative for dizziness and syncope.   Psychiatric/Behavioral: Negative for confusion and dysphoric mood.       Objective:      Vitals:    11/09/17 1115   BP: 132/70   Pulse: 80   Resp: 18     Physical Exam   Constitutional: She appears well-nourished.   Eyes: Conjunctivae and EOM are normal.   Neck: Normal range of motion.   Cardiovascular: Normal rate and regular rhythm.    Pulmonary/Chest: Effort normal and breath sounds normal.   Musculoskeletal:   Normal ROM bilateral    Neurological: No cranial nerve deficit (grossly intact).   Skin: Skin is warm and dry.   Psychiatric: She has a normal mood and affect.   Alert and orientated   Vitals reviewed.        Assessment:       1. Essential hypertension    2. Dyslipidemia    3. Insomnia, unspecified type        Plan:       Essential hypertension  -     lisinopril 10 MG tablet; Take 2 tablets (20 mg  "total) by mouth 2 (two) times daily.  Dispense: 180 tablet; Refill: 3    Dyslipidemia  -     Lipid panel; Future; Expected date: 05/08/2018  -     simvastatin (ZOCOR) 20 MG tablet; Take 1 tablet (20 mg total) by mouth every evening.  Dispense: 90 tablet; Refill: 3    Insomnia, unspecified type  -     traZODone (DESYREL) 100 MG tablet; Take 1 tablet (100 mg total) by mouth every evening.  Dispense: 90 tablet; Refill: 3            Counseled on regular exercise, maintenance of a healthy weight, balanced diet rich in fruits/vegetables and lean protein, and avoidance of unhealthy habits like smoking and excessive alcohol intake.   Also, counseled on importance of being compliant with medication, health appointments, diet and exercise.     Return in about 6 months (around 5/9/2018). annual     "This note will not be shared with the patient."  "

## 2017-11-14 ENCOUNTER — HOSPITAL ENCOUNTER (OUTPATIENT)
Dept: RADIOLOGY | Facility: HOSPITAL | Age: 77
Discharge: HOME | End: 2017-11-14
Attending: INTERNAL MEDICINE
Payer: MEDICARE

## 2017-11-14 DIAGNOSIS — C50.011 MALIGNANT NEOPLASM OF NIPPLE OF RIGHT BREAST IN FEMALE, UNSPECIFIED ESTROGEN RECEPTOR STATUS: ICD-10-CM

## 2017-11-14 DIAGNOSIS — D36.10 SCHWANNOMA: ICD-10-CM

## 2017-11-14 PROCEDURE — 71260 CT THORAX DX C+: CPT | Mod: TC,PO

## 2017-11-14 PROCEDURE — 71260 CT THORAX DX C+: CPT | Mod: 26,,, | Performed by: RADIOLOGY

## 2017-11-14 PROCEDURE — 74177 CT ABD & PELVIS W/CONTRAST: CPT | Mod: TC,PO

## 2017-11-14 PROCEDURE — 25500020 PHARM REV CODE 255: Mod: PO | Performed by: INTERNAL MEDICINE

## 2017-11-14 PROCEDURE — 74177 CT ABD & PELVIS W/CONTRAST: CPT | Mod: 26,,, | Performed by: RADIOLOGY

## 2017-11-14 RX ADMIN — IOHEXOL 75 ML: 350 INJECTION, SOLUTION INTRAVENOUS at 10:11

## 2017-11-14 RX ADMIN — IOHEXOL 30 ML: 350 INJECTION, SOLUTION INTRAVENOUS at 10:11

## 2017-11-17 ENCOUNTER — OFFICE VISIT (OUTPATIENT)
Dept: HEMATOLOGY/ONCOLOGY | Facility: CLINIC | Age: 77
End: 2017-11-17
Payer: MEDICARE

## 2017-11-17 VITALS
TEMPERATURE: 99 F | SYSTOLIC BLOOD PRESSURE: 179 MMHG | HEIGHT: 65 IN | DIASTOLIC BLOOD PRESSURE: 81 MMHG | BODY MASS INDEX: 29.87 KG/M2 | RESPIRATION RATE: 15 BRPM | HEART RATE: 83 BPM | WEIGHT: 179.25 LBS

## 2017-11-17 DIAGNOSIS — I10 ESSENTIAL HYPERTENSION: ICD-10-CM

## 2017-11-17 DIAGNOSIS — E78.5 HYPERLIPIDEMIA LDL GOAL <130: ICD-10-CM

## 2017-11-17 DIAGNOSIS — M81.0 OSTEOPOROSIS, SENILE: ICD-10-CM

## 2017-11-17 DIAGNOSIS — C50.011 MALIGNANT NEOPLASM OF NIPPLE OF RIGHT BREAST IN FEMALE, UNSPECIFIED ESTROGEN RECEPTOR STATUS: Primary | ICD-10-CM

## 2017-11-17 PROCEDURE — 99214 OFFICE O/P EST MOD 30 MIN: CPT | Mod: S$GLB,,, | Performed by: INTERNAL MEDICINE

## 2017-11-17 PROCEDURE — 99499 UNLISTED E&M SERVICE: CPT | Mod: S$GLB,,, | Performed by: INTERNAL MEDICINE

## 2017-11-17 PROCEDURE — 99999 PR PBB SHADOW E&M-EST. PATIENT-LVL III: CPT | Mod: PBBFAC,,, | Performed by: INTERNAL MEDICINE

## 2017-11-17 NOTE — PROGRESS NOTES
An 76-year-old  woman well known to me.  The patient was diagnosed n   May 2016 with triple positive breast cancer.  The patient had a 1.9 cm   malignancy of the left breast, sentinel lymph node negative in association with   DCIS.  She underwent lumpectomy, underwent adjuvant TCH chemotherapy for six   cycles and now continues with Herceptin alone, has done well, but she has   multiple other issues in association.  The patient had a recent echocardiogram   and follows with Dr. Gray.  Echocardiogram at this visit shows a drop by 10%   from an ejection fraction of 65% to 55%.  Dr. Gray has cleared her to continue   with Herceptin and with the short-term echocardiogram follow up as the patient   has remained asymptomatic.  The patient also had some pulmonary nodules that are   being followed by a CT scan.  They are deemed to be benign, but need ongoing   followup.  She had been taking Boniva for postmenopausal osteopenia/osteoporosis   along with calcium and the patient has also had increased density on mammogram.    Recently had a repeat mammogram, which Radiology recommends short-term   followup within three months on the right side.  She is due for a followup   mammogram on the left side three months following that, which will be towards   the end of year.  Pt has been off the arimidex but has seen Dr. dunbar and got a shot harris been referd to DR. barrios for spine evaluation , she doesn't believe the arimidex is the problem. The pt is making an unscheduled visit today after seeing his PA about the MRI that was done, pt was told she needs another MRI with contrast pt has concerns about the contrast  PHYSICAL EXAMINATION:  GENERAL:  Elderly woman.  Alert, awake, oriented.  VITAL SIGNS:    Wt Readings from Last 3 Encounters:   11/09/17 81.4 kg (179 lb 7.3 oz)   11/06/17 80.3 kg (177 lb 0.5 oz)   10/19/17 80.5 kg (177 lb 7.5 oz)     Temp Readings from Last 3 Encounters:   10/19/17 98.1 °F (36.7 °C)   09/07/17  97.7 °F (36.5 °C)   08/17/17 98.9 °F (37.2 °C)     BP Readings from Last 3 Encounters:   11/09/17 132/70   11/06/17 (!) 158/82   10/19/17 (!) 145/66     Pulse Readings from Last 3 Encounters:   11/09/17 80   11/06/17 93   10/19/17 83   HEENT:  Hair has started coming back.  Showed no congestion.  NECK:  Supple, without JVD.  Trachea is central.  LUNGS:  Clear to auscultation.  No rales, rhonchi or crepitations.  HEART:  S1 is normally heard.  No murmurs or gallops.  ABDOMEN:  Soft, without rebound, guarding or rigidity.  EXTREMITIES:  Showed no edema.  NEUROLOGIC:  She demonstrates no neurological deficits.    LABORATORY EVALUATION:    Lab Results   Component Value Date    WBC 5.26 11/14/2017    HGB 12.6 11/14/2017    HCT 37.3 11/14/2017    MCV 96 11/14/2017     11/14/2017     CMP  Sodium   Date Value Ref Range Status   11/14/2017 138 136 - 145 mmol/L Final     Potassium   Date Value Ref Range Status   11/14/2017 5.2 (H) 3.5 - 5.1 mmol/L Final     Chloride   Date Value Ref Range Status   11/14/2017 100 95 - 110 mmol/L Final     CO2   Date Value Ref Range Status   11/14/2017 25 23 - 29 mmol/L Final     Glucose   Date Value Ref Range Status   11/14/2017 96 70 - 110 mg/dL Final     BUN, Bld   Date Value Ref Range Status   11/14/2017 13 8 - 23 mg/dL Final     Creatinine   Date Value Ref Range Status   11/14/2017 1.0 0.5 - 1.4 mg/dL Final   11/14/2017 1.0 0.5 - 1.4 mg/dL Final   01/18/2013 0.9 0.5 - 1.4 mg/dL Final     Calcium   Date Value Ref Range Status   11/14/2017 9.8 8.7 - 10.5 mg/dL Final   01/18/2013 8.9 8.7 - 10.5 mg/dL Final     Total Protein   Date Value Ref Range Status   11/14/2017 7.2 6.0 - 8.4 g/dL Final     Albumin   Date Value Ref Range Status   11/14/2017 4.0 3.5 - 5.2 g/dL Final     Total Bilirubin   Date Value Ref Range Status   11/14/2017 0.4 0.1 - 1.0 mg/dL Final     Comment:     For infants and newborns, interpretation of results should be based  on gestational age, weight and in agreement  with clinical  observations.  Premature Infant recommended reference ranges:  Up to 24 hours.............<8.0 mg/dL  Up to 48 hours............<12.0 mg/dL  3-5 days..................<15.0 mg/dL  6-29 days.................<15.0 mg/dL       Alkaline Phosphatase   Date Value Ref Range Status   11/14/2017 72 55 - 135 U/L Final     AST   Date Value Ref Range Status   11/14/2017 37 10 - 40 U/L Final     ALT   Date Value Ref Range Status   11/14/2017 22 10 - 44 U/L Final     Anion Gap   Date Value Ref Range Status   11/14/2017 13 8 - 16 mmol/L Final   01/18/2013 9 5 - 15 meq/L Final     eGFR if    Date Value Ref Range Status   11/14/2017 >60 >60 mL/min/1.73 m^2 Final   11/14/2017 >60 >60 mL/min/1.73 m^2 Final     eGFR if non    Date Value Ref Range Status   11/14/2017 54 (A) >60 mL/min/1.73 m^2 Final     Comment:     Calculation used to obtain the estimated glomerular filtration  rate (eGFR) is the CKD-EPI equation.      11/14/2017 54 (A) >60 mL/min/1.73 m^2 Final     Comment:     Calculation used to obtain the estimated glomerular filtration  rate (eGFR) is the CKD-EPI equation.        Echocardiogram, ejection fraction has dropped to 55%, but   still in the normal range.  The most recent CT of the chest, abdomen and pelvis   done on 11/2017, stable 4 mm pulmonary nodule along the right major fissure with   a new region of consolidation on the left upper lobe subpleurally, six-month    She has post-surgical changes in   the breast.  The patient's most recent mammogram done in May 2017 on the right   side, shows the fibroglandular densities. Repeated in 8/2017 and is good  IMPRESSION:  1.  Triple positive breast cancer, T1, N0, M0, underwent [facility] chemotherapy,   Completed 1 year of herceptin, now on arimidex . Next prolia due in 1/2018    EF dropped somewhat followed by Cardiology.    2. The patient has significant osteoporosis on bone density.  .boniva changed this to Prolia.  Calcium    supplementation minimum 1200 mg along with dental precautions.  Orders placed   for Prolia.going for dental cleaning soon  3.  on arimidex and stay on it.   6.  The patient has dyslipidemia.  Continue with Zocor and primary care follow   up with Dr. Franco.  7.  Degenerative joint pains.  Continue with Ultram as needed, take trazodone   for sleep and anxiety along with Xanax.  8.  Mild CKD.  Continue to monitor.  No intervention necessary at this time.  9.  Gastroesophageal reflux disease.  Continue with omeprazole.  No changes or   worsening of symptoms at this point.   Ct of chest and see me in 11/2017  10.psoas muscle  Tumor possibly a schwannoma confirmed with DR. Tovar

## 2017-11-30 ENCOUNTER — TELEPHONE (OUTPATIENT)
Dept: NEUROSURGERY | Facility: CLINIC | Age: 77
End: 2017-11-30

## 2017-11-30 ENCOUNTER — INFUSION (OUTPATIENT)
Dept: INFUSION THERAPY | Facility: HOSPITAL | Age: 77
End: 2017-11-30
Attending: INTERNAL MEDICINE
Payer: MEDICARE

## 2017-11-30 DIAGNOSIS — C50.012 MALIGNANT NEOPLASM OF NIPPLE OF LEFT BREAST IN FEMALE, ESTROGEN RECEPTOR NEGATIVE: Primary | ICD-10-CM

## 2017-11-30 DIAGNOSIS — Z17.1 MALIGNANT NEOPLASM OF NIPPLE OF LEFT BREAST IN FEMALE, ESTROGEN RECEPTOR NEGATIVE: Primary | ICD-10-CM

## 2017-11-30 PROCEDURE — 63600175 PHARM REV CODE 636 W HCPCS: Mod: PN | Performed by: INTERNAL MEDICINE

## 2017-11-30 PROCEDURE — A4216 STERILE WATER/SALINE, 10 ML: HCPCS | Mod: PN | Performed by: INTERNAL MEDICINE

## 2017-11-30 PROCEDURE — 96523 IRRIG DRUG DELIVERY DEVICE: CPT | Mod: PN

## 2017-11-30 PROCEDURE — 25000003 PHARM REV CODE 250: Mod: PN | Performed by: INTERNAL MEDICINE

## 2017-11-30 RX ORDER — SODIUM CHLORIDE 0.9 % (FLUSH) 0.9 %
10 SYRINGE (ML) INJECTION
Status: COMPLETED | OUTPATIENT
Start: 2017-11-30 | End: 2017-11-30

## 2017-11-30 RX ORDER — HEPARIN 100 UNIT/ML
500 SYRINGE INTRAVENOUS
Status: COMPLETED | OUTPATIENT
Start: 2017-11-30 | End: 2017-11-30

## 2017-11-30 RX ORDER — HEPARIN 100 UNIT/ML
500 SYRINGE INTRAVENOUS
Status: CANCELLED | OUTPATIENT
Start: 2017-11-30

## 2017-11-30 RX ORDER — SODIUM CHLORIDE 0.9 % (FLUSH) 0.9 %
10 SYRINGE (ML) INJECTION
Status: CANCELLED | OUTPATIENT
Start: 2017-11-30

## 2017-11-30 RX ADMIN — SODIUM CHLORIDE, PRESERVATIVE FREE 10 ML: 5 INJECTION INTRAVENOUS at 01:11

## 2017-11-30 RX ADMIN — HEPARIN 500 UNITS: 100 SYRINGE at 01:11

## 2017-11-30 NOTE — PATIENT INSTRUCTIONS
"  Preventing Falls: Are You At Risk of Falling?     Ask for help to reduce risk of falling in your home.     As you get older, you're not as steady on your feet as you once were. And you may have health problems you didn't have when you were younger. So, it's not surprising that older people are more likely to trip and fall. Falling can be very serious. It can change your overall health and quality of life. That's why it's important to be aware of your own risk of falling.  The dangers of falling  Falls are one of the main causes of injury in people over age 65. An older person who falls may take longer to get better than a younger person. And, after a fall, an older person is more likely to have problems that don't go away. So, preventing falls can help you avoid serious health problems.  Are you at risk of falling?  Answer these questions to rate your level of risk.  · Are you a woman?  · Have you fallen or stumbled in the last year?  · Are you over age 65?  · Are you ever dizzy or lightheaded with standing?  · Do you have a hard time getting in and out of the bathtub or on and off the toilet?  · Do you lean on objects to help you get around? Or do you use a cane or walker?  · Do you have vision or hearing problems? For example, do you need new glasses or hearing aids?  · Do you have 2 or more long-lasting (chronic) medical conditions?  · Do you take 3 or more medicines?  · Have you felt depressed recently?  · Have you had more trouble with your memory in recent months?  · Are there hazards in your home that might cause you to fall, such as loose rugs or poor lighting?  · Do you have a pet that jumps on you or might trip you?  · Have you stopped getting regular exercise?  · Do you have diabetes?   · Do you have a neurologic disease, such as Parkinson or Alzheimer disease?   · Do you drink alcohol?  · Do you wear athletic shoes or slippers, or go barefoot at home?  You can help prevent falls  If you answered "yes" " to any of the above questions, you should take steps to reduce your risk of a fall. Monitoring health conditions and keeping walkways in your home free of clutter are just 2 ways. Changing is sometimes easier said than done. But keep in mind that even small changes can make you less likely to fall.  The fear of falling  It's normal to be scared of falling, especially if you've fallen before. But being afraid can actually make you more likely to fall. This is because:  · Fear might cause you to become less active. Being less active can lead to a loss of strength and balance.  · Fear can lead to isolation from others, depression, or the use of more medicines or alcohol. And all these things make falling even more likely.  To break the cycle, learn more about ways to avoid falling. As you take control, you may find yourself feeling less afraid.   Date Last Reviewed: 6/12/2015  © 1388-5452 "[employer]. [address] [place] [address]. All rights reserved. This information is not intended as a substitute for professional medical care. Always follow your healthcare professional's instructions.        Oncology: Preventing Infections  Chemotherapy can make your body less able to fight off infection. This happens because treatment reduces the number of white blood cells. White blood cells fight infection in your body. To help prevent infections, follow the tips below.     Scrub your hands with soap and warm water for at least 15 seconds.   Know your jessica  The jessica is the time during your chemotherapy cycle when you have the fewest white blood cells. The length of your jessica and when it occurs depend on the medicines you are taking. Each medicine has its own jessica. Talk with your doctor or nurse about your jessica period. Then take extra precautions to prevent infection at that time.  Protect yourself  · Keep your hands clean. To reduce your risk of infection, bathe every day and wash your hands often  throughout the day. For best results, lather them with soap for at least 15 seconds. Wash your hands before eating, after spending time in public places, and after using the bathroom.  · Stay away from some foods. Limit your risk. Dont eat uncooked or undercooked meat or fish. You may also be told not to eat raw vegetables or thin-skinned fruits during your jessica.  · Reduce your risk for illness. During this time your body is less able to fight off colds, measles, and other illnesses. Stay away from anyone who has a fever or an infection. Also stay away from large crowds during your jessica.  · Wear gloves. Make it harder for infection to enter your body. Wear gloves when you work around germs and dirt. Have someone else clean a pets tank, cage, or litter box.  · Try not to accidentally cut yourself. Protect your feet from injury and germs by not walking barefoot.  How medicines can help  · Prevent and treat infection. Antibiotics work in this way by attacking and killing the germs that cause infection.  · Trigger new cell growth. These medicines cause your body to make new white blood cells. Neupogen is an example of such a medicine.  Talk with your doctor about the best medicines for you. In some cases, your doctor may tell you to not take acetaminophen or NSAIDs for pain relief because these medicines can mask a fever if you have neutropenia. Talk with your doctor about medicines for pain control if you need it during your jessica period.  When to seek medical advice  Contact your doctor right away if you have any of the following:  · Fever of 100.4ºF (38ºC) or higher, or as directed by your healthcare provider  · Burning when you urinate  · Severe coughing or sore throat  · Shortness of breath, sweating, or chills  · Pain, especially near an open wound or catheter site   Date Last Reviewed: 1/3/2016  © 5821-5067 [employer]. [address] [place] [address], [place] [address]. All rights reserved. This  information is not intended as a substitute for professional medical care. Always follow your healthcare professional's instructions.

## 2017-11-30 NOTE — TELEPHONE ENCOUNTER
----- Message from Rena Harman sent at 11/30/2017  3:34 PM CST -----  Contact: Jenaro Florez with [facility] in [facility] at  [phone] and fax [phone]  Jenaro Florez with [facility] in [facility] at  [phone] and fax [phone], calling for most recent  Radiology reports - MRI. Not sure where they were done. Please advise. Thanks.

## 2017-12-19 ENCOUNTER — OFFICE VISIT (OUTPATIENT)
Dept: NEUROSURGERY | Facility: CLINIC | Age: 77
End: 2017-12-19
Payer: MEDICARE

## 2017-12-19 VITALS
HEART RATE: 88 BPM | DIASTOLIC BLOOD PRESSURE: 89 MMHG | BODY MASS INDEX: 29.61 KG/M2 | SYSTOLIC BLOOD PRESSURE: 179 MMHG | HEIGHT: 65 IN | WEIGHT: 177.69 LBS

## 2017-12-19 DIAGNOSIS — M51.36 DDD (DEGENERATIVE DISC DISEASE), LUMBAR: Primary | ICD-10-CM

## 2017-12-19 DIAGNOSIS — M51.36 DDD (DEGENERATIVE DISC DISEASE), LUMBAR: ICD-10-CM

## 2017-12-19 DIAGNOSIS — R60.9 EDEMA, UNSPECIFIED TYPE: Primary | ICD-10-CM

## 2017-12-19 DIAGNOSIS — I82.409 DEEP VEIN THROMBOSIS (DVT) OF LOWER EXTREMITY, UNSPECIFIED CHRONICITY, UNSPECIFIED LATERALITY, UNSPECIFIED VEIN: ICD-10-CM

## 2017-12-19 DIAGNOSIS — M79.662 PAIN OF LEFT CALF: ICD-10-CM

## 2017-12-19 PROCEDURE — 99999 PR PBB SHADOW E&M-EST. PATIENT-LVL III: CPT | Mod: PBBFAC,,, | Performed by: PHYSICIAN ASSISTANT

## 2017-12-19 PROCEDURE — 3079F DIAST BP 80-89 MM HG: CPT | Mod: CPTII,S$GLB,, | Performed by: PHYSICIAN ASSISTANT

## 2017-12-19 PROCEDURE — 99214 OFFICE O/P EST MOD 30 MIN: CPT | Mod: S$GLB,,, | Performed by: PHYSICIAN ASSISTANT

## 2017-12-19 PROCEDURE — 3077F SYST BP >= 140 MM HG: CPT | Mod: CPTII,S$GLB,, | Performed by: PHYSICIAN ASSISTANT

## 2017-12-19 RX ORDER — METHYLPREDNISOLONE 4 MG/1
TABLET ORAL
Qty: 1 PACKAGE | Refills: 0 | Status: SHIPPED | OUTPATIENT
Start: 2017-12-19 | End: 2018-01-04

## 2017-12-20 ENCOUNTER — HOSPITAL ENCOUNTER (OUTPATIENT)
Dept: RADIOLOGY | Facility: HOSPITAL | Age: 77
Discharge: HOME | End: 2017-12-20
Attending: INTERNAL MEDICINE
Payer: MEDICARE

## 2017-12-20 DIAGNOSIS — D36.10 SCHWANNOMA: ICD-10-CM

## 2017-12-20 DIAGNOSIS — C50.911 MALIGNANT NEOPLASM OF RIGHT BREAST: ICD-10-CM

## 2017-12-20 DIAGNOSIS — C50.011 MALIGNANT NEOPLASM OF NIPPLE OF RIGHT BREAST IN FEMALE, UNSPECIFIED ESTROGEN RECEPTOR STATUS: ICD-10-CM

## 2017-12-20 PROCEDURE — 77065 DX MAMMO INCL CAD UNI: CPT | Mod: 26,LT,, | Performed by: RADIOLOGY

## 2017-12-20 PROCEDURE — 77061 BREAST TOMOSYNTHESIS UNI: CPT | Mod: TC,PO,LT

## 2017-12-20 PROCEDURE — 77061 BREAST TOMOSYNTHESIS UNI: CPT | Mod: 26,LT,, | Performed by: RADIOLOGY

## 2017-12-22 ENCOUNTER — HOSPITAL ENCOUNTER (OUTPATIENT)
Dept: RADIOLOGY | Facility: HOSPITAL | Age: 77
Discharge: HOME | End: 2017-12-22
Attending: PHYSICIAN ASSISTANT
Payer: MEDICARE

## 2017-12-22 DIAGNOSIS — R60.9 EDEMA, UNSPECIFIED TYPE: ICD-10-CM

## 2017-12-22 PROCEDURE — 93970 EXTREMITY STUDY: CPT | Mod: TC

## 2017-12-22 PROCEDURE — 93970 EXTREMITY STUDY: CPT | Mod: 26,,, | Performed by: RADIOLOGY

## 2017-12-29 ENCOUNTER — OFFICE VISIT (OUTPATIENT)
Dept: PHYSICAL MEDICINE AND REHAB | Facility: CLINIC | Age: 77
End: 2017-12-29
Payer: MEDICARE

## 2017-12-29 DIAGNOSIS — M51.36 DDD (DEGENERATIVE DISC DISEASE), LUMBAR: ICD-10-CM

## 2017-12-29 PROCEDURE — 99499 UNLISTED E&M SERVICE: CPT | Mod: S$GLB,,, | Performed by: PHYSICAL MEDICINE & REHABILITATION

## 2017-12-29 PROCEDURE — 95910 NRV CNDJ TEST 7-8 STUDIES: CPT | Mod: S$GLB,,, | Performed by: PHYSICAL MEDICINE & REHABILITATION

## 2017-12-29 PROCEDURE — 95886 MUSC TEST DONE W/N TEST COMP: CPT | Mod: S$GLB,,, | Performed by: PHYSICAL MEDICINE & REHABILITATION

## 2017-12-29 NOTE — PROGRESS NOTES
[facility]  Gayle Hutchison D.O.  Physical Medicine and Rehab  Phone: [phone]  Fax: [phone]              Patient: Jing Tenorio   Patient ID: [id number]   Sex: Female   YOB: 1940   Age: 77 Years 2 Months   Notes:     Last visit date: 12/29/2017             Visit date and time: 12/29/2017 11:58   Patient Age on Visit Date: 77 Years 2 Months   Referring Physician:     Diagnoses:        Leg pain       Sensory NCS      Nerve / Sites Rec. Site Onset Lat Peak Lat Ref. NP Amp Ref. PP Amp Ref. Segments Distance Velocity     ms ms ms µV µV µV µV  cm m/s   R Sural - Ankle (Calf)      Calf Ankle NR NR ?4.20 NR ?5.0 NR ?5.0 Calf - Ankle 14 NR   L Sural - Ankle (Calf)      Calf Ankle NR NR ?4.20 NR ?5.0 NR ?5.0 Calf - Ankle 14 NR   R Superficial peroneal - Ankle      Lat leg Ankle NR NR ?4.40 NR ?5.0 NR ?5.0 Lat leg - Ankle 14 NR   L Superficial peroneal - Ankle      Lat leg Ankle NR NR ?4.40 NR ?5.0 NR ?5.0 Lat leg - Ankle 14 NR           Motor NCS      Nerve / Sites Muscle Latency Ref. Amplitude Ref. Amp % Duration Segments Distance Lat Diff Velocity Ref.     ms ms mV mV % ms  cm ms m/s m/s   R Peroneal - EDB      Ankle EDB 2.71 ?6.20 1.2 ?2.0 100 5.26 Ankle - EDB 8         Fib head EDB 10.83  0.9  77 4.53 Fib head - Ankle 30.5 8.13 38 ?39      Pop fossa EDB 12.60  1.1  90.1 3.80 Pop fossa - Fib head 10 1.77 56 ?39   L Peroneal - EDB      Ankle EDB 2.97 ?6.20 1.2 ?2.0 100 6.04 Ankle - EDB 8         Fib head EDB 9.90  0.8  63.3 8.13 Fib head - Ankle 32.5 6.93 47 ?39      Pop fossa EDB 11.77  0.7  57.1 6.88 Pop fossa - Fib head 10 1.88 53 ?39   R Tibial - AH      Ankle AH 4.22 ?6.00 5.4 ?3.0 100 5.21 Ankle - AH 8         Pop fossa AH 11.41  0.5  9.13 8.28 Pop fossa - Ankle 38 7.19 53 ?39   L Tibial - AH      Ankle AH 3.75 ?6.00 4.7 ?3.0 100 5.99 Ankle - AH 8         Pop fossa AH 11.72  1.0  21.1 7.86 Pop fossa - Ankle 34 7.97 43 ?39           F  Wave      Nerve Fmin Ref.    ms ms   R Tibial  - AH 52.81 ?58.00   L Tibial - AH 55.00 ?58.00           EMG          EMG Summary Table     Spontaneous MUAP Recruitment   Muscle IA Fib PSW Fasc H.F. Amp Dur. PPP Pattern   L. Rectus femoris Increased   None None None None    +CRD's 1+ 1+ 1+ Reduced   R. Rectus femoris N None None None None 1+ 1+ 1+ Reduced   L. Vastus lateralis N None None None None 1+ 1+ 1+ Reduced   R. Vastus lateralis N None None None None 1+ 1+ 1+ Reduced   L. Gastrocnemius (Medial head) N None None None None N N N N   R. Gastrocnemius (Medial head) N None None None None N N N N   L. Tibialis anterior N None None None None 1+ 1+ 1+ Reduced   R. Tibialis anterior N None None None None 1+ 1+ 1+ Reduced   L. Gluteus medius N None None None None 1+ 1+ 1+ Reduced   R. Gluteus medius N None None None None N N N N   L. Biceps femoris (short head) 1+ None None None None 1+ 1+  Reduced   R. Biceps femoris (short head) N None None None None 1+ 1+ 1+ Reduced           Summary    The motor conduction test was performed on 4 nerve(s). The results were normal in 2 nerve(s): R Tibial - AH, L Tibial - AH. Results outside the specified normal range were found in 2 nerve(s), as follows:   In the R Peroneal - EDB study  o the peak amplitude result was reduced for Ankle stimulation  o the take off velocity result was reduced for Fib head - Ankle segment   In the L Peroneal - EDB study  o the peak amplitude result was reduced for Ankle stimulation    The sensory conduction test had results outside of the specified normal range in all 4 of the tested nerves:   In the R Sural - Ankle (Calf) study  o the response was considered absent for Calf stimulation   In the L Sural - Ankle (Calf) study  o the response was considered absent for Calf stimulation   In the R Superficial peroneal - Ankle study  o the response was considered absent for Lat leg stimulation   In the L Superficial peroneal - Ankle study  o the response was considered absent for Lat leg  stimulation    The F wave study was normal in all 2 of the tested nerves: R Tibial - AH, L Tibial - AH.    The needle EMG examination was performed in 12 muscles. It was normal in 3 muscle(s): L. Gastrocnemius (Medial head), R. Gastrocnemius (Medial head), R. Gluteus medius. The study was abnormal in 9 muscle(s), with the following distribution:   Abnormal spontaneous/insertional activity was found in L. Biceps femoris (short head).   The MUP waveform abnormality was found in L. Rectus femoris, R. Rectus femoris, L. Vastus lateralis, R. Vastus lateralis, L. Tibialis anterior, R. Tibialis anterior, L. Gluteus medius, L. Biceps femoris (short head), R. Biceps femoris (short head).   Abnormal interference pattern was found in L. Rectus femoris, R. Rectus femoris, L. Vastus lateralis, R. Vastus lateralis, L. Tibialis anterior, R. Tibialis anterior, L. Gluteus medius, L. Biceps femoris (short head), R. Biceps femoris (short head).    NOTE: NEEDLE EMG STUDY WAS NOT PERFORMED OF LUMBAR PARASPINALS AS THEY ARE EQUIVOCAL AFTER SPINE SURGERY           Conclusion:   Chronic bilateral L4/L5 radiculopathies with active denervation note in the left biceps femoris short head  Superimposed mild sensorimotor axonal neuropathy            ____________________________  Gayle Hutchison D.O.

## 2017-12-29 NOTE — PROGRESS NOTES
Neurosurgery History & Physical    Patient ID: Jing Tenorio is a 77 y.o. female.    Chief Complaint   Patient presents with    Lumbar Spine Pain (L-Spine)     pt states pain starts in lower lumbar region and when she begins to walk it radiates down LLE and sometimes into RLE, pt states feet are numb since she started chemo, pt has numbness and tingling in the bottom of bilat ext, heat helps for a small time, standing seems to aggrivate it, no bowel or bladder issues       Review of Systems  Constitutional: Negative for activity change, appetite change, chills, fever and unexpected weight change.   HENT: Negative for tinnitus, trouble swallowing and voice change.    Respiratory: Negative for apnea, cough, chest tightness and shortness of breath.    Cardiovascular: Negative for chest pain and palpitations.   Gastrointestinal: Negative for constipation, diarrhea, nausea and vomiting.   Genitourinary: Negative for difficulty urinating, dysuria, frequency and urgency.   Musculoskeletal: Positive for arthralgias and back pain. Negative for gait problem, neck pain and neck stiffness.   Skin: Negative for wound.   Neurological: Negative for dizziness, tremors, seizures, facial asymmetry, speech difficulty, weakness, light-headedness, numbness and headaches.   Psychiatric/Behavioral: Negative for confusion and decreased concentration.      Past Medical History:   Diagnosis Date    Allergy     Anxiety     Arthritis     Back pain DDD    Breast cancer 2016    invasive ductal carcinoma    Cancer     LEFT BREAST 5-16    Cataract     Bilateral    DDD (degenerative disc disease)     DDD (degenerative disc disease), lumbar     Disorder of kidney and ureter     GERD (gastroesophageal reflux disease)     Hyperlipidemia     Hypertension     Insomnia     Osteoporosis     Thyroid disease      Social History     Social History    Marital status:      Spouse name: N/A    Number of children: N/A    Years of  education: N/A     Occupational History    Not on file.     Social History Main Topics    Smoking status: Never Smoker    Smokeless tobacco: Never Used    Alcohol use Yes      Comment: rarely    Drug use: No    Sexual activity: Not on file     Other Topics Concern    Not on file     Social History Narrative    No narrative on file     Family History   Problem Relation Age of Onset    Cancer Mother 69     Breast    Breast cancer Mother 69    Heart disease Father     Breast cancer Paternal Aunt 60    Diabetes Neg Hx      Review of patient's allergies indicates:   Allergen Reactions    Sulfa (sulfonamide antibiotics)      Other reaction(s): Unknown  Other reaction(s): Unknown  Other reaction(s): Unknown    Adhesive Rash       Current Outpatient Prescriptions:     acetaminophen (TYLENOL) 500 MG tablet, Take 500 mg by mouth every 6 (six) hours as needed for Pain., Disp: , Rfl:     anastrozole (ARIMIDEX) 1 mg Tab, Take 1 tablet (1 mg total) by mouth once daily., Disp: 30 tablet, Rfl: 12    ascorbic acid, vitamin C, (VITAMIN C) 500 MG tablet, Take 500 mg by mouth once daily., Disp: , Rfl:     b complex vitamins tablet, Take 1 tablet by mouth once daily., Disp: , Rfl:     calcium-vitamin D3-vitamin K (VIACTIV) 500-500-40 mg-unit-mcg Chew, Take 2 tablets by mouth once daily. , Disp: , Rfl:     coenzyme Q10 (CO Q-10) 100 mg capsule, Take 100 mg by mouth once daily. , Disp: , Rfl:     denosumab (PROLIA) 60 mg/mL Syrg, Inject 60 mg into the skin every 6 (six) months., Disp: , Rfl:     lisinopril 10 MG tablet, Take 2 tablets (20 mg total) by mouth 2 (two) times daily., Disp: 180 tablet, Rfl: 3    MULTIVITAMIN ORAL, once daily. Every day, Disp: , Rfl:     naproxen sodium (ANAPROX) 220 MG tablet, Take 220 mg by mouth once daily., Disp: , Rfl:     omeprazole (PRILOSEC) 40 MG capsule, TAKE 1 CAPSULE BY MOUTH EVERY DAY, Disp: 90 capsule, Rfl: 3    simvastatin (ZOCOR) 20 MG tablet, Take 1 tablet (20 mg  "total) by mouth every evening., Disp: 90 tablet, Rfl: 3    traZODone (DESYREL) 100 MG tablet, Take 1 tablet (100 mg total) by mouth every evening., Disp: 90 tablet, Rfl: 3    vitamin D (VITAMIN D3) 1000 units Tab, Take 2,000 Units by mouth once daily., Disp: , Rfl:     vitamin E 400 UNIT capsule, Take 400 Units by mouth once daily., Disp: , Rfl:     alprazolam (XANAX) 0.25 MG tablet, TK 1 T PO QD PRF ANXIETY, Disp: , Rfl: 2    methylPREDNISolone (MEDROL DOSEPACK) 4 mg tablet, use as directed, Disp: 1 Package, Rfl: 0  Blood pressure (!) 179/89, pulse 88, height 5' 5" (1.651 m), weight 80.6 kg (177 lb 11.1 oz).      Neurologic Exam  Mental Status   Oriented to person, place, and time.   Oriented to person.   Oriented to place.   Oriented to time.   Follows 3 step commands.   Attention: normal. Concentration: normal.   Speech: speech is normal   Level of consciousness: alert  Knowledge: consistent with education.   Able to name object. Able to read. Able to repeat. Able to write. Normal comprehension.      Cranial Nerves      CN II   Visual acuity: normal  Right visual field deficit: none  Left visual field deficit: none      CN III, IV, VI   Pupils are equal, round, and reactive to light.  Right pupil: Size: 3 mm. Shape: regular. Reactivity: brisk. Consensual response: intact.   Left pupil: Size: 3 mm. Shape: regular. Reactivity: brisk. Consensual response: intact.   CN III: no CN III palsy  CN VI: no CN VI palsy  Nystagmus: none   Diplopia: none  Ophthalmoparesis: none  Conjugate gaze: present     CN V   Right facial sensation deficit: none  Left facial sensation deficit: none     CN VII   Right facial weakness: none  Left facial weakness: none     CN VIII   Hearing: intact     CN IX, X   CN IX normal.   CN X normal.      CN XI   Right sternocleidomastoid strength: normal  Left sternocleidomastoid strength: normal  Right trapezius strength: normal  Left trapezius strength: normal     CN XII   Fasciculations: " absent  Tongue deviation: none     Motor Exam   Muscle bulk: normal  Overall muscle tone: normal  Right arm pronator drift: absent  Left arm pronator drift: absent     Strength   Right neck flexion: 5/5  Left neck flexion: 5/5  Right neck extension: 5/5  Left neck extension: 5/5  Right deltoid: 5/5  Left deltoid: 5/5  Right biceps: 5/5  Left biceps: 5/5  Right triceps: 5/5  Left triceps: 5/5  Right wrist flexion: 5/5  Left wrist flexion: 5/5  Right wrist extension: 5/5  Left wrist extension: 5/5  Right interossei: 5/5  Left interossei: 5/5  Right abdominals: 5/5  Left abdominals: 5/5  Right iliopsoas: 4/5-prior hip replacement  Left iliopsoas: 5  Right quadriceps: 55  Left quadriceps: 5/5  Right hamstrin  Left hamstrin/  Right glutei: 5/5  Left glutei: 5  Right anterior tibial: 55  Left anterior tibial: 5  Right posterior tibial:   Left posterior tibial:   Right peroneal: 55  Left peroneal: 55  Right gastroc: 5/5  Left gastroc: 5/5  Right EHL: 5/5  Left EHL: 4/5     Sensory Exam   Right arm light touch: normal  Left arm light touch: normal  Right leg light touch: normal  Left leg light touch: normal  Right arm vibration: normal  Left arm vibration: normal  Right arm pinprick: normal  Left arm pinprick: normal     Gait, Coordination, and Reflexes      Gait  Gait: normal     Coordination   Romberg: negative  Finger to nose coordination: normal  Heel to shin coordination: normal  Tandem walking coordination: normal     Tremor   Resting tremor: absent  Intention tremor: absent  Action tremor: absent     Reflexes   Right brachioradialis: 2+  Left brachioradialis: 2+  Right biceps: 2+  Left biceps: 2+  Right triceps: 2+  Left triceps: 2+  Right patellar: 2+  Left patellar: 2+  Right achilles: 2+  Left achilles: 2+  Right Kelley: absent  Left Kelley: absent  Right ankle clonus: absent  Left ankle clonus: absent    Physical Exam  Constitutional: She is oriented to person, place, and time. She  appears well-developed and well-nourished.   HENT:   Head: Normocephalic and atraumatic.   Eyes: Pupils are equal, round, and reactive to light.   Neck: Normal range of motion. Neck supple.   Cardiovascular: Normal rate.    Pulmonary/Chest: Effort normal.   Musculoskeletal: Normal range of motion. She exhibits no edema.   Neurological: She is alert and oriented to person, place, and time. She has a normal Finger-Nose-Finger Test, a normal Heel to Shin Test, a normal Romberg Test and a normal Tandem Gait Test. Gait normal.   Reflex Scores:       Tricep reflexes are 2+ on the right side and 2+ on the left side.       Bicep reflexes are 2+ on the right side and 2+ on the left side.       Brachioradialis reflexes are 2+ on the right side and 2+ on the left side.       Patellar reflexes are 2+ on the right side and 2+ on the left side.       Achilles reflexes are 2+ on the right side and 2+ on the left side.  Skin: Skin is warm, dry and intact.   Psychiatric: She has a normal mood and affect. Her speech is normal and behavior is normal. Judgment and thought content normal.   Nursing note and vitals reviewed.     Oswestry Disability Index score: 24%    Patient Health Questionnaire score: 1    Provider dictation:    76 year old female with history of lumbar surgery, breast cancer and osteoporosis presents for neurosurgical follow-up for back pain. Patient has been followed by Dian Jett PA-C. She is status post 1-16-13 bialteral L4/5 laminotomies, medial facetectomies and foraminotomies for right greater than left L5 radiculopathy due to L4/5 spondylolisthesis and stenosis by Dr. Gonzalez.  She had good results with resolution of all pain.  She was diagnosed with breast cancer 1 year ago and has undergone chemotherapy and radiation treatments.  She is taking prolia for osteoprosis.  About 5-6 weeks ago she had recurrence of focal lower back and hip pain.  Hip pain resolved after bursitis injection by Dr. Garcia, but  focal lower back pain has persisted. She presents today complaining of worsening back pain and now left calf pain. She also reports tingling in bilateral lower extremities below the knees, but this is due to the chemo. She reports her back pain is worse with standing, but will improve with ambulating. She denies radicular leg pain, numbness and tingling. MRI Lumbar spine w/o contrast was done and showed post op changes, multi level degenerative changes, L4 anterolisthesis on L5 (that was present pre-operatively), an L3 hemangioma and a lobulated area of hyperintensity on T2 imaging in the left psoas area that is possibly a schwannoma or neuroma. An MRI with contrast was then obtained 11/1/17 and showed a lobulated mass in the left psoas to be a schwannoma and not metastatic disease. Patient was having right sided back pain and this was felt to not be the cause of her pain. She was referred to PT and has completed about 3 weeks of this so far, but denies any improvement.      On exam, she is tender to palpation over the lower lumbar area midline.  She has 2+ muscle stretch reflexes and no sensory deficits. She has 4/5 weakness with left EHL/DF and right HF-prior hip replacement.     I have personally reviewed both Lumbar MRIs as above.      Ms. Tenorio continues with significant lower back pain and now complains of left calf pain. This does not seem to be radicular in nature from what the patient is describing. I would like to obtain an EMG/NCS of bilateral lower extremities to assess for lumbar radiculopathy. I am also ordering bilateral US lower extremities to rule out DVT since patient does have + homans sign on exam. In the meantime I would also like her to see pain management for possible CHAN and she will follow-up once the above is complete. She should continue with PT. She was informed to call the clinic with any further questions or concerns.     1. DDD (degenerative disc disease), lumbar     2. Pain of left  calf

## 2017-12-29 NOTE — LETTER
December 29, 2017      JOCELYNE Easton  [address] [facility] [address]  [facility] [address]           [facility]  [address] [place] [id number]  Phone: [phone]  Fax: [phone]          Patient: Jing Tenorio   MR Number: [id number]   YOB: 1940   Date of Visit: 12/29/2017       Dear Kristie Macias:    Thank you for referring Jing Tenorio to me for evaluation. Attached you will find relevant portions of my assessment and plan of care.    If you have questions, please do not hesitate to call me. I look forward to following Jing Tenorio along with you.    Sincerely,    Gayle Hutchison,     Enclosure  CC:  No Recipients    If you would like to receive this communication electronically, please contact [email or web address] or [phone] to request more information on [email or web address] Link access.    For providers and/or their staff who would like to refer a patient to [facility], please contact us through our one-stop-shop provider referral line, [facility] , at [phone].    If you feel you have received this communication in error or would no longer like to receive these types of communications, please e-mail [email or web address]

## 2018-01-02 ENCOUNTER — OFFICE VISIT (OUTPATIENT)
Dept: CARDIOLOGY | Facility: CLINIC | Age: 78
End: 2018-01-02
Payer: MEDICARE

## 2018-01-02 VITALS
SYSTOLIC BLOOD PRESSURE: 136 MMHG | DIASTOLIC BLOOD PRESSURE: 76 MMHG | HEIGHT: 65 IN | WEIGHT: 180.31 LBS | HEART RATE: 94 BPM | BODY MASS INDEX: 30.04 KG/M2

## 2018-01-02 DIAGNOSIS — C50.012 MALIGNANT NEOPLASM OF NIPPLE OF LEFT BREAST IN FEMALE, ESTROGEN RECEPTOR NEGATIVE: ICD-10-CM

## 2018-01-02 DIAGNOSIS — E78.5 HYPERLIPIDEMIA LDL GOAL <130: ICD-10-CM

## 2018-01-02 DIAGNOSIS — Z17.1 MALIGNANT NEOPLASM OF NIPPLE OF LEFT BREAST IN FEMALE, ESTROGEN RECEPTOR NEGATIVE: ICD-10-CM

## 2018-01-02 DIAGNOSIS — I10 ESSENTIAL HYPERTENSION: ICD-10-CM

## 2018-01-02 PROCEDURE — 99214 OFFICE O/P EST MOD 30 MIN: CPT | Mod: S$GLB,,, | Performed by: INTERNAL MEDICINE

## 2018-01-02 PROCEDURE — 99999 PR PBB SHADOW E&M-EST. PATIENT-LVL III: CPT | Mod: PBBFAC,,, | Performed by: INTERNAL MEDICINE

## 2018-01-02 PROCEDURE — 99499 UNLISTED E&M SERVICE: CPT | Mod: S$GLB,,, | Performed by: INTERNAL MEDICINE

## 2018-01-02 NOTE — PROGRESS NOTES
Subjective:    Patient ID:  Jing Tenorio is a 77 y.o. female who presents for follow-up of Hypertension      Pt here for f/u. She was seen last June after chemo therapy for breast CA with concerns about a drop in LVEF. She was asymptomatic and the EF estimate was 55-60% rather than previous 60+%. We dis not feel this represented a significant change but we wanted to recheck. She reports other than her back issues she feels fine.         Review of Systems   Constitution: Negative for weight gain and weight loss.   HENT: Negative.    Eyes: Negative.    Cardiovascular: Negative for chest pain, claudication, cyanosis, irregular heartbeat, leg swelling, near-syncope, orthopnea (no PND), palpitations and syncope.   Respiratory: Negative for cough, hemoptysis, shortness of breath and snoring.    Endocrine: Negative.    Skin: Negative.    Musculoskeletal: Negative for joint pain, muscle cramps, muscle weakness and myalgias.   Gastrointestinal: Negative for diarrhea, hematemesis, nausea and vomiting.   Genitourinary: Negative.    Neurological: Negative for dizziness, focal weakness, light-headedness, loss of balance, numbness, paresthesias and seizures.   Psychiatric/Behavioral: Negative.         Objective:    Physical Exam   Constitutional: She is oriented to person, place, and time. She appears well-developed and well-nourished.   Eyes: Pupils are equal, round, and reactive to light.   Neck: Normal range of motion. No thyromegaly present.   Cardiovascular: Normal rate, regular rhythm, S1 normal, S2 normal, normal heart sounds, intact distal pulses and normal pulses.   No extrasystoles are present. PMI is not displaced.  Exam reveals no friction rub.    No murmur heard.  Pulmonary/Chest: Effort normal and breath sounds normal. She has no wheezes. She has no rales. She exhibits no tenderness.   Abdominal: Soft. Bowel sounds are normal. She exhibits no distension and no mass. There is no tenderness.   Musculoskeletal:  Normal range of motion. She exhibits no edema.   Neurological: She is alert and oriented to person, place, and time.   Skin: Skin is warm and dry.   Vitals reviewed.      Test(s) Reviewed  I have reviewed the following in detail:  [] Stress test   [] Angiography   [x] Echocardiogram   [] Labs   [] Other:         Assessment:       1. Essential hypertension    2. Hyperlipidemia LDL goal <130    3. Malignant neoplasm of nipple of left breast in female, estrogen receptor negative         Plan:       Pt doing well  Will repeat Echo  If unchanged she can f/u prn

## 2018-01-03 DIAGNOSIS — C50.012 CARCINOMA OF NIPPLE AND AREOLA OF FEMALE BREAST, LEFT: Primary | ICD-10-CM

## 2018-01-04 ENCOUNTER — OFFICE VISIT (OUTPATIENT)
Dept: HEMATOLOGY/ONCOLOGY | Facility: CLINIC | Age: 78
End: 2018-01-04
Payer: MEDICARE

## 2018-01-04 ENCOUNTER — INFUSION (OUTPATIENT)
Dept: INFUSION THERAPY | Facility: HOSPITAL | Age: 78
End: 2018-01-04
Attending: INTERNAL MEDICINE
Payer: MEDICARE

## 2018-01-04 VITALS
RESPIRATION RATE: 16 BRPM | DIASTOLIC BLOOD PRESSURE: 80 MMHG | BODY MASS INDEX: 29.68 KG/M2 | HEART RATE: 80 BPM | TEMPERATURE: 98 F | SYSTOLIC BLOOD PRESSURE: 171 MMHG | HEIGHT: 65 IN | WEIGHT: 178.13 LBS

## 2018-01-04 VITALS
HEIGHT: 65 IN | TEMPERATURE: 98 F | SYSTOLIC BLOOD PRESSURE: 171 MMHG | DIASTOLIC BLOOD PRESSURE: 80 MMHG | WEIGHT: 178.13 LBS | BODY MASS INDEX: 29.68 KG/M2 | HEART RATE: 80 BPM | RESPIRATION RATE: 16 BRPM

## 2018-01-04 DIAGNOSIS — M51.36 DDD (DEGENERATIVE DISC DISEASE), LUMBAR: ICD-10-CM

## 2018-01-04 DIAGNOSIS — C50.012 MALIGNANT NEOPLASM INVOLVING BOTH NIPPLE AND AREOLA OF LEFT BREAST IN FEMALE, UNSPECIFIED ESTROGEN RECEPTOR STATUS: ICD-10-CM

## 2018-01-04 DIAGNOSIS — C50.012 MALIGNANT NEOPLASM OF NIPPLE OF LEFT BREAST IN FEMALE, ESTROGEN RECEPTOR NEGATIVE: ICD-10-CM

## 2018-01-04 DIAGNOSIS — Z17.1 MALIGNANT NEOPLASM OF NIPPLE OF LEFT BREAST IN FEMALE, ESTROGEN RECEPTOR NEGATIVE: ICD-10-CM

## 2018-01-04 DIAGNOSIS — C50.012 MALIGNANT NEOPLASM OF NIPPLE OF LEFT BREAST IN FEMALE, UNSPECIFIED ESTROGEN RECEPTOR STATUS: ICD-10-CM

## 2018-01-04 DIAGNOSIS — M17.0 PRIMARY OSTEOARTHRITIS OF BOTH KNEES: ICD-10-CM

## 2018-01-04 DIAGNOSIS — C50.011 MALIGNANT NEOPLASM OF NIPPLE OF RIGHT BREAST IN FEMALE, UNSPECIFIED ESTROGEN RECEPTOR STATUS: Primary | ICD-10-CM

## 2018-01-04 DIAGNOSIS — M81.0 OSTEOPOROSIS, SENILE: Primary | ICD-10-CM

## 2018-01-04 PROCEDURE — 96372 THER/PROPH/DIAG INJ SC/IM: CPT | Mod: PN

## 2018-01-04 PROCEDURE — 99999 PR PBB SHADOW E&M-EST. PATIENT-LVL IV: CPT | Mod: PBBFAC,,, | Performed by: INTERNAL MEDICINE

## 2018-01-04 PROCEDURE — 96523 IRRIG DRUG DELIVERY DEVICE: CPT | Mod: PN

## 2018-01-04 PROCEDURE — 25000003 PHARM REV CODE 250: Mod: PN | Performed by: INTERNAL MEDICINE

## 2018-01-04 PROCEDURE — 99214 OFFICE O/P EST MOD 30 MIN: CPT | Mod: S$GLB,,, | Performed by: INTERNAL MEDICINE

## 2018-01-04 PROCEDURE — A4216 STERILE WATER/SALINE, 10 ML: HCPCS | Mod: PN | Performed by: INTERNAL MEDICINE

## 2018-01-04 PROCEDURE — 99499 UNLISTED E&M SERVICE: CPT | Mod: S$GLB,,, | Performed by: INTERNAL MEDICINE

## 2018-01-04 PROCEDURE — 63600175 PHARM REV CODE 636 W HCPCS: Mod: TB,PN | Performed by: INTERNAL MEDICINE

## 2018-01-04 RX ORDER — SODIUM CHLORIDE 0.9 % (FLUSH) 0.9 %
10 SYRINGE (ML) INJECTION
Status: CANCELLED | OUTPATIENT
Start: 2018-01-04

## 2018-01-04 RX ORDER — HEPARIN 100 UNIT/ML
500 SYRINGE INTRAVENOUS
Status: COMPLETED | OUTPATIENT
Start: 2018-01-04 | End: 2018-01-04

## 2018-01-04 RX ORDER — HEPARIN 100 UNIT/ML
500 SYRINGE INTRAVENOUS
Status: CANCELLED | OUTPATIENT
Start: 2018-01-04

## 2018-01-04 RX ORDER — SODIUM CHLORIDE 0.9 % (FLUSH) 0.9 %
10 SYRINGE (ML) INJECTION
Status: COMPLETED | OUTPATIENT
Start: 2018-01-04 | End: 2018-01-04

## 2018-01-04 RX ADMIN — SODIUM CHLORIDE, PRESERVATIVE FREE 10 ML: 5 INJECTION INTRAVENOUS at 02:01

## 2018-01-04 RX ADMIN — HEPARIN 500 UNITS: 100 SYRINGE at 02:01

## 2018-01-04 RX ADMIN — DENOSUMAB 60 MG: 60 INJECTION SUBCUTANEOUS at 02:01

## 2018-01-04 NOTE — PLAN OF CARE
Problem: Patient Care Overview  Goal: Plan of Care Review  Outcome: Ongoing (interventions implemented as appropriate)  Tolerated Prolia well, AVS given to pt, ambulated from clinic with , no distress noted

## 2018-01-04 NOTE — PROGRESS NOTES
An 76-year-old  woman well known to me.  The patient was diagnosed n   May 2016 with triple positive breast cancer.  The patient had a 1.9 cm   malignancy of the left breast, sentinel lymph node negative in association with   DCIS.  She underwent lumpectomy, underwent adjuvant TCH chemotherapy for six   cycles and now continues with Herceptin alone, has done well, but she has   multiple other issues in association.  The patient had a recent echocardiogram   and follows with Dr. Gray.  Echocardiogram at this visit shows a drop by 10%   from an ejection fraction of 65% to 55%.  Dr. Gray has cleared her to continue   with Herceptin and with the short-term echocardiogram follow up as the patient   has remained asymptomatic.  The patient also had some pulmonary nodules that are   being followed by a CT scan.  They are deemed to be benign, but need ongoing   followup.  She had been taking Boniva for postmenopausal osteopenia/osteoporosis   along with calcium and the patient has also had increased density on mammogram.    Recently had a repeat mammogram, which Radiology recommends short-term   followup within three months on the right side.  She is due for a followup   mammogram on the left side three months following that, which will be towards   the end of year.  Pt has been off the arimidex but has seen Dr. dunbar and got a shot harris been referd to DR. barrios for spine evaluation , she doesn't believe the arimidex is the problem. The pt is making an unscheduled visit today after seeing his PA about the MRI that was done, pt was told she needs another MRI with contrast pt has concerns about the contrast  PHYSICAL EXAMINATION:  GENERAL:  Elderly woman.  Alert, awake, oriented.  VITAL SIGNS:    Wt Readings from Last 3 Encounters:   01/02/18 81.8 kg (180 lb 5.4 oz)   12/19/17 80.6 kg (177 lb 11.1 oz)   11/17/17 81.3 kg (179 lb 3.7 oz)     Temp Readings from Last 3 Encounters:   11/17/17 98.8 °F (37.1 °C)   10/19/17  98.1 °F (36.7 °C)   09/07/17 97.7 °F (36.5 °C)     BP Readings from Last 3 Encounters:   01/02/18 136/76   12/19/17 (!) 179/89   11/17/17 (!) 179/81     Pulse Readings from Last 3 Encounters:   01/02/18 94   12/19/17 88   11/17/17 83   HEENT:  Hair has started coming back.  Showed no congestion.  NECK:  Supple, without JVD.  Trachea is central.  LUNGS:  Clear to auscultation.  No rales, rhonchi or crepitations.  HEART:  S1 is normally heard.  No murmurs or gallops.  ABDOMEN:  Soft, without rebound, guarding or rigidity.  EXTREMITIES:  Showed no edema.  NEUROLOGIC:  She demonstrates no neurological deficits.    LABORATORY EVALUATION:    Lab Results   Component Value Date    WBC 4.80 01/04/2018    HGB 11.7 (L) 01/04/2018    HCT 35.3 (L) 01/04/2018    MCV 97 01/04/2018     01/04/2018     CMP  Sodium   Date Value Ref Range Status   01/04/2018 136 136 - 145 mmol/L Final     Potassium   Date Value Ref Range Status   01/04/2018 5.1 3.5 - 5.1 mmol/L Final     Chloride   Date Value Ref Range Status   01/04/2018 98 95 - 110 mmol/L Final     CO2   Date Value Ref Range Status   01/04/2018 27 22 - 31 mmol/L Final     Glucose   Date Value Ref Range Status   01/04/2018 93 70 - 110 mg/dL Final     Comment:     The ADA recommends the following guidelines for fasting glucose:  Normal:       less than 100 mg/dL  Prediabetes:  100 mg/dL to 125 mg/dL  Diabetes:     126 mg/dL or higher       BUN, Bld   Date Value Ref Range Status   01/04/2018 17 7 - 18 mg/dL Final     Creatinine   Date Value Ref Range Status   01/04/2018 1.08 0.50 - 1.40 mg/dL Final   01/18/2013 0.9 0.5 - 1.4 mg/dL Final     Calcium   Date Value Ref Range Status   01/04/2018 9.6 8.4 - 10.2 mg/dL Final   01/18/2013 8.9 8.7 - 10.5 mg/dL Final     Total Protein   Date Value Ref Range Status   01/04/2018 7.1 6.0 - 8.4 g/dL Final     Albumin   Date Value Ref Range Status   01/04/2018 4.2 3.5 - 5.2 g/dL Final     Total Bilirubin   Date Value Ref Range Status    01/04/2018 0.4 0.2 - 1.3 mg/dL Final     Alkaline Phosphatase   Date Value Ref Range Status   01/04/2018 72 38 - 145 U/L Final     AST   Date Value Ref Range Status   01/04/2018 40 (H) 14 - 36 U/L Final     ALT   Date Value Ref Range Status   01/04/2018 38 10 - 44 U/L Final     Anion Gap   Date Value Ref Range Status   01/04/2018 11 8 - 16 mmol/L Final   01/18/2013 9 5 - 15 meq/L Final     eGFR if    Date Value Ref Range Status   01/04/2018 56 (A) >60 mL/min/1.73 m^2 Final     eGFR if non    Date Value Ref Range Status   01/04/2018 49 (A) >60 mL/min/1.73 m^2 Final     Comment:     Calculation used to obtain the estimated glomerular filtration  rate (eGFR) is the CKD-EPI equation.        Echocardiogram, ejection fraction has dropped to 55%, but   still in the normal range.  The most recent CT of the chest, abdomen and pelvis   done on 11/2017, stable 4 mm pulmonary nodule along the right major fissure with   a new region of consolidation on the left upper lobe subpleurally, six-month    She has post-surgical changes in   the breast.  The patient's most recent mammogram done in May 2018 on the right   side, shows the fibroglandular densities.   IMPRESSION:  1.  Triple positive breast cancer, T1, N0, M0, underwent [facility] chemotherapy,   Completed 1 year of herceptin, now on arimidex . Next prolia due in 7/2018    EF dropped somewhat followed by Cardiology.    2. The patient has significant osteoporosis on bone density.  .boniva changed this to Prolia.  Calcium   supplementation minimum 1200 mg along with dental precautions.  Orders placed   for Prolia.going for dental cleaning soon  3.  on arimidex and stay on it.   6.  The patient has dyslipidemia.  Continue with Zocor and primary care follow   up with Dr. Franco.  7.  Degenerative joint pains.  Continue with Ultram as needed, take trazodone   for sleep and anxiety along with Xanax.  8.  Mild CKD.  Continue to monitor.  No intervention  necessary at this time.  9.  Gastroesophageal reflux disease.  Continue with omeprazole.  No changes or   worsening of symptoms at this point.   Ct of chest adbd and pelvis and see me in 5/2018  10.psoas muscle  Tumor possibly a schwannoma confirmed with DR. La valentin port

## 2018-01-05 ENCOUNTER — TELEPHONE (OUTPATIENT)
Dept: HEMATOLOGY/ONCOLOGY | Facility: CLINIC | Age: 78
End: 2018-01-05

## 2018-01-08 ENCOUNTER — CLINICAL SUPPORT (OUTPATIENT)
Dept: CARDIOLOGY | Facility: CLINIC | Age: 78
End: 2018-01-08
Attending: INTERNAL MEDICINE
Payer: MEDICARE

## 2018-01-08 DIAGNOSIS — I10 ESSENTIAL HYPERTENSION: ICD-10-CM

## 2018-01-08 DIAGNOSIS — Z17.1 MALIGNANT NEOPLASM OF NIPPLE OF LEFT BREAST IN FEMALE, ESTROGEN RECEPTOR NEGATIVE: ICD-10-CM

## 2018-01-08 DIAGNOSIS — E78.5 HYPERLIPIDEMIA LDL GOAL <130: ICD-10-CM

## 2018-01-08 DIAGNOSIS — C50.012 MALIGNANT NEOPLASM OF NIPPLE OF LEFT BREAST IN FEMALE, ESTROGEN RECEPTOR NEGATIVE: ICD-10-CM

## 2018-01-08 PROCEDURE — 93306 TTE W/DOPPLER COMPLETE: CPT | Mod: S$GLB,,, | Performed by: INTERNAL MEDICINE

## 2018-01-09 ENCOUNTER — TELEPHONE (OUTPATIENT)
Dept: CARDIOLOGY | Facility: CLINIC | Age: 78
End: 2018-01-09

## 2018-01-09 LAB
ESTIMATED PA SYSTOLIC PRESSURE: 29.21
MITRAL VALVE REGURGITATION: NORMAL
RETIRED EF AND QEF - SEE NOTES: 55 (ref 55–65)
TRICUSPID VALVE REGURGITATION: NORMAL

## 2018-01-09 NOTE — TELEPHONE ENCOUNTER
Test(s) Reviewed  I have reviewed the following in detail:  [] Stress test   [] Angiography   [x] Echocardiogram   [] Labs   [] Other:       Call Pt and tell her tests are ok

## 2018-01-15 ENCOUNTER — TELEPHONE (OUTPATIENT)
Dept: NEUROSURGERY | Facility: CLINIC | Age: 78
End: 2018-01-15

## 2018-01-15 ENCOUNTER — OFFICE VISIT (OUTPATIENT)
Dept: NEUROSURGERY | Facility: CLINIC | Age: 78
End: 2018-01-15
Payer: MEDICARE

## 2018-01-15 ENCOUNTER — OFFICE VISIT (OUTPATIENT)
Dept: FAMILY MEDICINE | Facility: CLINIC | Age: 78
End: 2018-01-15
Payer: MEDICARE

## 2018-01-15 VITALS
BODY MASS INDEX: 29.79 KG/M2 | DIASTOLIC BLOOD PRESSURE: 74 MMHG | HEIGHT: 65 IN | SYSTOLIC BLOOD PRESSURE: 118 MMHG | WEIGHT: 178.81 LBS

## 2018-01-15 DIAGNOSIS — N39.0 URINARY TRACT INFECTION WITHOUT HEMATURIA, SITE UNSPECIFIED: Primary | ICD-10-CM

## 2018-01-15 DIAGNOSIS — M48.062 SPINAL STENOSIS OF LUMBAR REGION WITH NEUROGENIC CLAUDICATION: Primary | ICD-10-CM

## 2018-01-15 LAB
BACTERIA #/AREA URNS HPF: ABNORMAL /HPF
BILIRUB UR QL STRIP: NEGATIVE
CLARITY UR: ABNORMAL
COLOR UR: YELLOW
GLUCOSE UR QL STRIP: NEGATIVE
HGB UR QL STRIP: ABNORMAL
HYALINE CASTS #/AREA URNS LPF: 0 /LPF
KETONES UR QL STRIP: NEGATIVE
LEUKOCYTE ESTERASE UR QL STRIP: ABNORMAL
MICROSCOPIC COMMENT: ABNORMAL
NITRITE UR QL STRIP: NEGATIVE
PH UR STRIP: 6 [PH] (ref 5–8)
PROT UR QL STRIP: ABNORMAL
RBC #/AREA URNS HPF: 5 /HPF (ref 0–4)
SP GR UR STRIP: 1.01 (ref 1–1.03)
URN SPEC COLLECT METH UR: ABNORMAL
WBC #/AREA URNS HPF: >100 /HPF (ref 0–5)

## 2018-01-15 PROCEDURE — 99499 UNLISTED E&M SERVICE: CPT | Mod: S$GLB,,, | Performed by: PHYSICIAN ASSISTANT

## 2018-01-15 PROCEDURE — 99999 PR PBB SHADOW E&M-EST. PATIENT-LVL III: CPT | Mod: PBBFAC,,, | Performed by: PHYSICIAN ASSISTANT

## 2018-01-15 PROCEDURE — 3078F DIAST BP <80 MM HG: CPT | Mod: CPTII,S$GLB,, | Performed by: PHYSICIAN ASSISTANT

## 2018-01-15 PROCEDURE — 3074F SYST BP LT 130 MM HG: CPT | Mod: CPTII,S$GLB,, | Performed by: PHYSICIAN ASSISTANT

## 2018-01-15 PROCEDURE — 99214 OFFICE O/P EST MOD 30 MIN: CPT | Mod: S$GLB,,, | Performed by: PHYSICIAN ASSISTANT

## 2018-01-15 PROCEDURE — 99999 PR PBB SHADOW E&M-EST. PATIENT-LVL IV: CPT | Mod: PBBFAC,,, | Performed by: NURSE PRACTITIONER

## 2018-01-15 PROCEDURE — 87086 URINE CULTURE/COLONY COUNT: CPT

## 2018-01-15 PROCEDURE — 81000 URINALYSIS NONAUTO W/SCOPE: CPT | Mod: PO

## 2018-01-15 PROCEDURE — 99213 OFFICE O/P EST LOW 20 MIN: CPT | Mod: S$GLB,,, | Performed by: NURSE PRACTITIONER

## 2018-01-15 RX ORDER — CIPROFLOXACIN 250 MG/1
250 TABLET, FILM COATED ORAL 2 TIMES DAILY
Qty: 14 TABLET | Refills: 0 | Status: SHIPPED | OUTPATIENT
Start: 2018-01-15 | End: 2018-01-25
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
## 2018-01-15 NOTE — TELEPHONE ENCOUNTER
----- Message from Breonna Manriquez sent at 1/15/2018  8:07 AM CST -----  Contact: self  Patient states she was on the schedule for today and now she calls to verify time for appt and she has no appt     Please call to advise why her appt was canceled [phone] (home)

## 2018-01-17 LAB — BACTERIA UR CULT: NORMAL

## 2018-01-22 ENCOUNTER — OFFICE VISIT (OUTPATIENT)
Dept: SURGERY | Facility: CLINIC | Age: 78
End: 2018-01-22
Payer: MEDICARE

## 2018-01-22 VITALS
TEMPERATURE: 98 F | DIASTOLIC BLOOD PRESSURE: 81 MMHG | BODY MASS INDEX: 29.64 KG/M2 | WEIGHT: 178.13 LBS | SYSTOLIC BLOOD PRESSURE: 187 MMHG | HEART RATE: 86 BPM

## 2018-01-22 DIAGNOSIS — Z85.9 HISTORY OF CANCER: Primary | ICD-10-CM

## 2018-01-22 PROCEDURE — 99999 PR PBB SHADOW E&M-EST. PATIENT-LVL III: CPT | Mod: PBBFAC,,, | Performed by: SURGERY

## 2018-01-22 PROCEDURE — 99213 OFFICE O/P EST LOW 20 MIN: CPT | Mod: S$GLB,,, | Performed by: SURGERY

## 2018-01-22 RX ORDER — SODIUM CHLORIDE 9 MG/ML
INJECTION, SOLUTION INTRAVENOUS CONTINUOUS
Status: CANCELLED | OUTPATIENT
Start: 2018-01-22

## 2018-01-24 ENCOUNTER — TELEPHONE (OUTPATIENT)
Dept: PAIN MEDICINE | Facility: CLINIC | Age: 78
End: 2018-01-24

## 2018-01-24 ENCOUNTER — OFFICE VISIT (OUTPATIENT)
Dept: PAIN MEDICINE | Facility: CLINIC | Age: 78
End: 2018-01-24
Payer: MEDICARE

## 2018-01-24 VITALS
BODY MASS INDEX: 28.84 KG/M2 | SYSTOLIC BLOOD PRESSURE: 161 MMHG | RESPIRATION RATE: 20 BRPM | DIASTOLIC BLOOD PRESSURE: 69 MMHG | WEIGHT: 179.44 LBS | HEART RATE: 86 BPM | HEIGHT: 66 IN | TEMPERATURE: 97 F

## 2018-01-24 DIAGNOSIS — M43.17 SPONDYLOLISTHESIS OF LUMBOSACRAL REGION: ICD-10-CM

## 2018-01-24 DIAGNOSIS — M47.816 LUMBAR SPONDYLOSIS: Primary | ICD-10-CM

## 2018-01-24 DIAGNOSIS — R53.81 PHYSICAL DEBILITY: ICD-10-CM

## 2018-01-24 DIAGNOSIS — M51.36 DDD (DEGENERATIVE DISC DISEASE), LUMBAR: ICD-10-CM

## 2018-01-24 DIAGNOSIS — Z85.9 HISTORY OF CANCER: Primary | ICD-10-CM

## 2018-01-24 DIAGNOSIS — M54.42 CHRONIC BILATERAL LOW BACK PAIN WITH LEFT-SIDED SCIATICA: ICD-10-CM

## 2018-01-24 DIAGNOSIS — G89.29 CHRONIC BILATERAL LOW BACK PAIN WITH LEFT-SIDED SCIATICA: ICD-10-CM

## 2018-01-24 PROCEDURE — 99204 OFFICE O/P NEW MOD 45 MIN: CPT | Mod: S$GLB,,, | Performed by: PAIN MEDICINE

## 2018-01-24 PROCEDURE — 99499 UNLISTED E&M SERVICE: CPT | Mod: S$GLB,,, | Performed by: PAIN MEDICINE

## 2018-01-24 PROCEDURE — 99999 PR PBB SHADOW E&M-EST. PATIENT-LVL III: CPT | Mod: PBBFAC,,, | Performed by: PAIN MEDICINE

## 2018-01-24 RX ORDER — MELOXICAM 7.5 MG/1
7.5 TABLET ORAL DAILY
Qty: 30 TABLET | Refills: 1 | Status: SHIPPED | OUTPATIENT
Start: 2018-01-24 | End: 2018-09-24

## 2018-01-24 RX ORDER — ALPRAZOLAM 0.5 MG/1
1 TABLET, ORALLY DISINTEGRATING ORAL ONCE AS NEEDED
Status: CANCELLED | OUTPATIENT
Start: 2018-02-06 | End: 2018-02-06

## 2018-01-24 NOTE — LETTER
January 24, 2018      JOCELYNE Easton  [address] [facility] [address] [place] [address]           [facility]  [address] [facility] [address] [place] [address]  Phone: [phone]          Patient: Jing Tenorio   MR Number: [id number]   YOB: 1940   Date of Visit: 1/24/2018       Dear Kristie Macias:    Thank you for referring Jing Tenorio to me for evaluation. Attached you will find relevant portions of my assessment and plan of care.    If you have questions, please do not hesitate to call me. I look forward to following Jing Tenorio along with you.    Sincerely,    Lyndsay Galeana Jr., MD    Enclosure  CC:  No Recipients    If you would like to receive this communication electronically, please contact [email or web address] or [phone] to request more information on [facility] access.    For providers and/or their staff who would like to refer a patient to [facility], please contact us through our one-stop-shop provider referral line, [facility] , at [phone].    If you feel you have received this communication in error or would no longer like to receive these types of communications, please e-mail [email or web address]

## 2018-01-24 NOTE — TELEPHONE ENCOUNTER
Spoke to patient and she wanted to move procedure back due to her other surgery to remove her port-a-cath. She is moved to 2/20/18.
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
## 2018-01-24 NOTE — TELEPHONE ENCOUNTER
----- Message from Laury Valverde sent at 1/24/2018  3:32 PM CST -----  Contact: self [phone]  Please call her, she needs to change her procedure date further out because it needs to be 2 weeks after her port removal which is being done on 1/29/18.  Thank you!
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
## 2018-01-29 ENCOUNTER — ANESTHESIA (OUTPATIENT)
Dept: SURGERY | Facility: HOSPITAL | Age: 78
End: 2018-01-29
Payer: MEDICARE

## 2018-01-29 ENCOUNTER — ANESTHESIA EVENT (OUTPATIENT)
Dept: SURGERY | Facility: HOSPITAL | Age: 78
End: 2018-01-29
Payer: MEDICARE

## 2018-01-29 ENCOUNTER — SURGERY (OUTPATIENT)
Age: 78
End: 2018-01-29

## 2018-01-29 ENCOUNTER — HOSPITAL ENCOUNTER (OUTPATIENT)
Facility: HOSPITAL | Age: 78
Discharge: HOME | End: 2018-01-29
Attending: SURGERY | Admitting: SURGERY
Payer: MEDICARE

## 2018-01-29 DIAGNOSIS — Z85.9 HISTORY OF CANCER: Primary | ICD-10-CM

## 2018-01-29 PROCEDURE — 36000706: Mod: PO | Performed by: SURGERY

## 2018-01-29 PROCEDURE — S0077 INJECTION, CLINDAMYCIN PHOSP: HCPCS | Mod: PO | Performed by: SURGERY

## 2018-01-29 PROCEDURE — 71000033 HC RECOVERY, INTIAL HOUR: Mod: PO | Performed by: SURGERY

## 2018-01-29 PROCEDURE — 25000003 PHARM REV CODE 250: Mod: PO | Performed by: SURGERY

## 2018-01-29 PROCEDURE — 36000707: Mod: PO | Performed by: SURGERY

## 2018-01-29 PROCEDURE — 36590 REMOVAL TUNNELED CV CATH: CPT | Mod: ,,, | Performed by: SURGERY

## 2018-01-29 PROCEDURE — D9220A PRA ANESTHESIA: Mod: ANES,,, | Performed by: ANESTHESIOLOGY

## 2018-01-29 PROCEDURE — 37000009 HC ANESTHESIA EA ADD 15 MINS: Mod: PO | Performed by: SURGERY

## 2018-01-29 PROCEDURE — D9220A PRA ANESTHESIA: Mod: CRNA,,, | Performed by: NURSE ANESTHETIST, CERTIFIED REGISTERED

## 2018-01-29 PROCEDURE — 63600175 PHARM REV CODE 636 W HCPCS: Mod: PO | Performed by: NURSE ANESTHETIST, CERTIFIED REGISTERED

## 2018-01-29 PROCEDURE — 37000008 HC ANESTHESIA 1ST 15 MINUTES: Mod: PO | Performed by: SURGERY

## 2018-01-29 RX ORDER — FENTANYL CITRATE 50 UG/ML
INJECTION, SOLUTION INTRAMUSCULAR; INTRAVENOUS
Status: DISCONTINUED | OUTPATIENT
Start: 2018-01-29 | End: 2018-01-29

## 2018-01-29 RX ORDER — LIDOCAINE HCL/PF 100 MG/5ML
SYRINGE (ML) INTRAVENOUS
Status: DISCONTINUED | OUTPATIENT
Start: 2018-01-29 | End: 2018-01-29

## 2018-01-29 RX ORDER — SODIUM CHLORIDE, SODIUM LACTATE, POTASSIUM CHLORIDE, CALCIUM CHLORIDE 600; 310; 30; 20 MG/100ML; MG/100ML; MG/100ML; MG/100ML
INJECTION, SOLUTION INTRAVENOUS CONTINUOUS
Status: DISCONTINUED | OUTPATIENT
Start: 2018-01-29 | End: 2018-01-29 | Stop reason: HOSPADM

## 2018-01-29 RX ORDER — HYDROCODONE BITARTRATE AND ACETAMINOPHEN 5; 325 MG/1; MG/1
1 TABLET ORAL EVERY 4 HOURS PRN
Status: DISCONTINUED | OUTPATIENT
Start: 2018-01-29 | End: 2018-01-29 | Stop reason: HOSPADM

## 2018-01-29 RX ORDER — FENTANYL CITRATE 50 UG/ML
25 INJECTION, SOLUTION INTRAMUSCULAR; INTRAVENOUS EVERY 5 MIN PRN
Status: DISCONTINUED | OUTPATIENT
Start: 2018-01-29 | End: 2018-01-29 | Stop reason: HOSPADM

## 2018-01-29 RX ORDER — SODIUM CHLORIDE 9 MG/ML
INJECTION, SOLUTION INTRAVENOUS CONTINUOUS
Status: DISCONTINUED | OUTPATIENT
Start: 2018-01-29 | End: 2018-01-29 | Stop reason: HOSPADM

## 2018-01-29 RX ORDER — PROPOFOL 10 MG/ML
VIAL (ML) INTRAVENOUS
Status: DISCONTINUED | OUTPATIENT
Start: 2018-01-29 | End: 2018-01-29

## 2018-01-29 RX ORDER — SODIUM CHLORIDE 0.9 % (FLUSH) 0.9 %
3 SYRINGE (ML) INJECTION
Status: DISCONTINUED | OUTPATIENT
Start: 2018-01-29 | End: 2018-01-29 | Stop reason: HOSPADM

## 2018-01-29 RX ORDER — SODIUM CHLORIDE 0.9 G/100ML
IRRIGANT IRRIGATION
Status: DISCONTINUED | OUTPATIENT
Start: 2018-01-29 | End: 2018-01-29 | Stop reason: HOSPADM

## 2018-01-29 RX ORDER — LIDOCAINE HYDROCHLORIDE 10 MG/ML
0.5 INJECTION, SOLUTION EPIDURAL; INFILTRATION; INTRACAUDAL; PERINEURAL ONCE AS NEEDED
Status: DISCONTINUED | OUTPATIENT
Start: 2018-01-29 | End: 2018-01-29 | Stop reason: HOSPADM

## 2018-01-29 RX ORDER — OXYCODONE HYDROCHLORIDE 5 MG/1
5 TABLET ORAL ONCE AS NEEDED
Status: DISCONTINUED | OUTPATIENT
Start: 2018-01-29 | End: 2018-01-29 | Stop reason: HOSPADM

## 2018-01-29 RX ADMIN — PROPOFOL 30 MG: 10 INJECTION, EMULSION INTRAVENOUS at 03:01

## 2018-01-29 RX ADMIN — SODIUM CHLORIDE 500 ML: 0.9 IRRIGANT IRRIGATION at 03:01

## 2018-01-29 RX ADMIN — LIDOCAINE HYDROCHLORIDE 10 ML: 10; .005 INJECTION, SOLUTION EPIDURAL; INFILTRATION; INTRACAUDAL; PERINEURAL at 03:01

## 2018-01-29 RX ADMIN — SODIUM CHLORIDE, SODIUM LACTATE, POTASSIUM CHLORIDE, CALCIUM CHLORIDE: 600; 310; 30; 20 INJECTION, SOLUTION INTRAVENOUS at 01:01

## 2018-01-29 RX ADMIN — DEXTROSE 900 MG: 50 INJECTION, SOLUTION INTRAVENOUS at 02:01

## 2018-01-29 RX ADMIN — FENTANYL CITRATE 50 MCG: 50 INJECTION, SOLUTION INTRAMUSCULAR; INTRAVENOUS at 03:01

## 2018-01-29 RX ADMIN — LIDOCAINE HYDROCHLORIDE 75 MG: 20 INJECTION PARENTERAL at 03:01

## 2018-01-29 NOTE — TRANSFER OF CARE
"Anesthesia Transfer of Care Note    Patient: Jing Tenorio    Procedure(s) Performed: Procedure(s) (LRB):  REMOVAL-PORT-A-CATH (N/A)    Patient location: PACU    Anesthesia Type: MAC    Transport from OR: Transported from OR on room air with adequate spontaneous ventilation    Post pain: adequate analgesia    Post assessment: no apparent anesthetic complications and tolerated procedure well    Post vital signs: stable    Level of consciousness: awake and alert    Nausea/Vomiting: no nausea/vomiting    Complications: none    Transfer of care protocol was followed      Last vitals:   Visit Vitals  BP (!) 153/85 (BP Location: Right arm, Patient Position: Lying)   Pulse 83   Temp 36.8 °C (98.2 °F) (Skin)   Resp 12   Ht 5' 5" (1.651 m)   Wt 80.7 kg (177 lb 14.6 oz)   Breastfeeding? No   BMI 29.61 kg/m²     "

## 2018-01-29 NOTE — OR NURSING
Notified patient and  of delay of approximately 20 more minutes. Offered TV distraction. Offered  coffee and blanket. All were declined. Dimmed lights in patient's room and notified that will continue updates as possible. Pt states complete understanding.

## 2018-01-29 NOTE — DISCHARGE INSTRUCTIONS
Discharge Instructions: After Your Surgery  Youve just had surgery. During surgery, you were given medicine called anesthesia to keep you relaxed and free of pain. After surgery, you may have some pain or nausea. This is common. Here are some tips for feeling better and getting well after surgery.     Stay on schedule with your medicine.   Going home  Your healthcare provider will show you how to take care of yourself when you go home. He or she will also answer your questions. Have an adult family member or friend drive you home. For the first 24 hours after your surgery:  · Do not drive or use heavy equipment.  · Do not make important decisions or sign legal papers.  · Do not drink alcohol.  · Have someone stay with you, if needed. He or she can watch for problems and help keep you safe.  Be sure to go to all follow-up visits with your healthcare provider. And rest after your surgery for as long as your healthcare provider tells you to.  Coping with pain  If you have pain after surgery, pain medicine will help you feel better. Take it as told, before pain becomes severe. Also, ask your healthcare provider or pharmacist about other ways to control pain. This might be with heat, ice, or relaxation. And follow any other instructions your surgeon or nurse gives you.  Tips for taking pain medicine  To get the best relief possible, remember these points:  · Pain medicines can upset your stomach. Taking them with a little food may help.  · Most pain relievers taken by mouth need at least 20 to 30 minutes to start to work.  · Taking medicine on a schedule can help you remember to take it. Try to time your medicine so that you can take it before starting an activity. This might be before you get dressed, go for a walk, or sit down for dinner.  · Constipation is a common side effect of pain medicines. Call your healthcare provider before taking any medicines such as laxatives or stool softeners to help ease constipation.  Also ask if you should skip any foods. Drinking lots of fluids and eating foods such as fruits and vegetables that are high in fiber can also help. Remember, do not take laxatives unless your surgeon has prescribed them.  · Drinking alcohol and taking pain medicine can cause dizziness and slow your breathing. It can even be deadly. Do not drink alcohol while taking pain medicine.  · Pain medicine can make you react more slowly to things. Do not drive or run machinery while taking pain medicine.  Your healthcare provider may tell you to take acetaminophen to help ease your pain. Ask him or her how much you are supposed to take each day. Acetaminophen or other pain relievers may interact with your prescription medicines or other over-the-counter (OTC) medicines. Some prescription medicines have acetaminophen and other ingredients. Using both prescription and OTC acetaminophen for pain can cause you to overdose. Read the labels on your OTC medicines with care. This will help you to clearly know the list of ingredients, how much to take, and any warnings. It may also help you not take too much acetaminophen. If you have questions or do not understand the information, ask your pharmacist or healthcare provider to explain it to you before you take the OTC medicine.  Managing nausea  Some people have an upset stomach after surgery. This is often because of anesthesia, pain, or pain medicine, or the stress of surgery. These tips will help you handle nausea and eat healthy foods as you get better. If you were on a special food plan before surgery, ask your healthcare provider if you should follow it while you get better. These tips may help:  · Do not push yourself to eat. Your body will tell you when to eat and how much.  · Start off with clear liquids and soup. They are easier to digest.  · Next try semi-solid foods, such as mashed potatoes, applesauce, and gelatin, as you feel ready.  · Slowly move to solid foods. Dont  eat fatty, rich, or spicy foods at first.  · Do not force yourself to have 3 large meals a day. Instead eat smaller amounts more often.  · Take pain medicines with a small amount of solid food, such as crackers or toast, to avoid nausea.     Call your surgeon if  · You still have pain an hour after taking medicine. The medicine may not be strong enough.  · You feel too sleepy, dizzy, or groggy. The medicine may be too strong.  · You have side effects like nausea, vomiting, or skin changes, such as rash, itching, or hives.       If you have obstructive sleep apnea  You were given anesthesia medicine during surgery to keep you comfortable and free of pain. After surgery, you may have more apnea spells because of this medicine and other medicines you were given. The spells may last longer than usual.   At home:  · Keep using the continuous positive airway pressure (CPAP) device when you sleep. Unless your healthcare provider tells you not to, use it when you sleep, day or night. CPAP is a common device used to treat obstructive sleep apnea.  · Talk with your provider before taking any pain medicine, muscle relaxants, or sedatives. Your provider will tell you about the possible dangers of taking these medicines.  Date Last Reviewed: 12/1/2016 © 2000-2017 [employer]. [address] [place] [address]. All rights reserved. This information is not intended as a substitute for professional medical care. Always follow your healthcare professional's instructions.            WOUND CARE    After Surgery    DOS:   May shower in 24 hours   May remove outer dressing in 48 hours. Leave steri-strips in place. If steri-strips get wet, pat dry. If they fall off, do not worry.   Minimal activity for 48 hours.   Advance diet as tolerated.   Resume home medications as prescribed    DONT:   Do not soak in the tub   No driving for 24 hours or while taking narcotic pain medication   DO NOT TAKE ADDITIONAL  TYLENOL/ACETAMINOPHEN WHILE TAKING NARCOTIC PAIN MEDICATION THAT CONTAINS TYLENOL/ACETAMINOPHEN.    CALL PHYSICIAN FOR:   Redness, swelling or bleeding.   Fever greater then 101.   Drainage from the incision site that appears infected.   Persistent pain not relieved by pain medication.    RETURN APPOINTMENT: Call to schedule appointment in 10-14 days    FOR EMERGENCIES: Contact your doctor at [phone]

## 2018-01-29 NOTE — INTERVAL H&P NOTE
The patient has been examined and the H&P has been reviewed:    I concur with the findings and no changes have occurred since H&P was written.    Anesthesia/Surgery risks, benefits and alternative options discussed and understood by patient/family.          Active Hospital Problems    Diagnosis  POA    History of cancer [Z85.9]  Not Applicable      Resolved Hospital Problems    Diagnosis Date Resolved POA   No resolved problems to display.

## 2018-01-29 NOTE — ANESTHESIA PREPROCEDURE EVALUATION
01/29/2018  Jing Tenorio is a 77 y.o., female.    Anesthesia Evaluation      I have reviewed the Medications.     Review of Systems  Anesthesia Hx:  No problems with previous Anesthesia   Social:  Non-Smoker, No Alcohol Use    Hematology/Oncology:         -- Cancer in past history: Breast left chemotherapy and surgery    Cardiovascular:   Hypertension hyperlipidemia    Pulmonary:  Pulmonary Normal    Renal/:   Chronic Renal Disease    Hepatic/GI:   GERD    Musculoskeletal:  Spine Disorders: lumbar Chronic Pain    Neurological:   Peripheral Neuropathy    Endocrine:  Endocrine Normal    Psych:   anxiety          Physical Exam  General:  Well nourished    Airway/Jaw/Neck:  Airway Findings: Mouth Opening: Normal Tongue: Normal  General Airway Assessment: Adult  Mallampati: II  Jaw/Neck Findings:  Neck ROM: Extension Decreased, Mild       Chest/Lungs:  Chest/Lungs Findings: Clear to auscultation, Normal Respiratory Rate     Heart/Vascular:  Heart Findings: Rate: Normal  Rhythm: Regular Rhythm  Sounds: Normal  Heart murmur: negative Vascular Findings: Normal (No carotid bruits.)       Mental Status:  Mental Status Findings:  Cooperative, Alert and Oriented         Anesthesia Plan  Type of Anesthesia, risks & benefits discussed:  Anesthesia Type:  MAC  Patient's Preference:   Intra-op Monitoring Plan:   Intra-op Monitoring Plan Comments:   Post Op Pain Control Plan:   Post Op Pain Control Plan Comments:   Induction:    Beta Blocker:  Patient is not currently on a Beta-Blocker (No further documentation required).       Informed Consent: Patient understands risks and agrees with Anesthesia plan.  Questions answered. Anesthesia consent signed with patient.  ASA Score: 2     Day of Surgery Review of History & Physical:            Ready For Surgery From Anesthesia Perspective.

## 2018-01-29 NOTE — ANESTHESIA POSTPROCEDURE EVALUATION
"Anesthesia Post Evaluation    Patient: Jing Tenorio    Procedure(s) Performed: Procedure(s) (LRB):  REMOVAL-PORT-A-CATH (N/A)    Final Anesthesia Type: MAC  Patient location during evaluation: PACU  Patient participation: Yes- Able to Participate  Level of consciousness: awake and alert and oriented  Post-procedure vital signs: reviewed and stable  Pain management: adequate  Airway patency: patent  PONV status at discharge: No PONV  Anesthetic complications: no      Cardiovascular status: blood pressure returned to baseline and hemodynamically stable  Respiratory status: unassisted, spontaneous ventilation and room air  Hydration status: euvolemic  Follow-up not needed.        Visit Vitals  BP (!) 153/85 (BP Location: Right arm, Patient Position: Lying)   Pulse 83   Temp 36.8 °C (98.2 °F) (Skin)   Resp 12   Ht 5' 5" (1.651 m)   Wt 80.7 kg (177 lb 14.6 oz)   Breastfeeding? No   BMI 29.61 kg/m²       Pain/Yonas Score: Pain Assessment Performed: Yes (1/29/2018  1:38 PM)  Presence of Pain: complains of pain/discomfort (1/29/2018  1:38 PM)  Yonas Score: 10 (1/29/2018  4:07 PM)      "

## 2018-01-29 NOTE — OR NURSING
Checked in with patient and offered comfort measures due to MD delay. Gave patient an extra blanket and notified patient and  that I will check on delay with OR team.

## 2018-01-29 NOTE — OP NOTE
PATIENT NAME:  Jing Tenorio     DATE OF PROCEDURE: 1/29/2018    SURGEON: Sawyer Nelson Jr., M.D.    ASSISTANT: None    PROCEDURE: Removal of Port-A-Cath.    PREOPERATIVE DIAGNOSIS: History of breast cancer    POSTOPERATIVE DIAGNOSIS: Same    PROCEDURE IN DETAIL: After appropriate consent was obtained, consent forms   signed and questions answered, the patient was taken to the Operating Room and   placed on the operating room table where monitored anesthesia care was   undertaken without difficulty. Preoperative antibiotics were administered.   SCDs were in place. A time-out procedure was performed. The area was prepped   and draped in the usual sterile fashion. Then, 1% lidocaine with epinephrine   was injected for local anesthetic and then a skin incision was made at the site   of the previous incision. Dissection was then performed down through the   subcutaneous tissues to the port itself. The port was grasped and then   retracted. Stay sutures were cut and removed. The port was then removed and   the tract closed with a figure-of-eight 3-0 Vicryl suture. Subcutaneous tissues  were reapproximated with a running 3-0 Vicryl suture and the skin was closed   with a 4-0 Monocryl subcuticular stitch. Steri-Strips and dressings were   applied. The patient was awakened and transferred to the Recovery Room in a   stable condition. She tolerated the procedure without complication. Blood loss  was minimal. Counts were correct at the end of the procedure.    EBL: 5 cc    COMPLICATIONS: none.    SPECIMEN: none.

## 2018-01-29 NOTE — DISCHARGE SUMMARY
Discharge Summary  General Surgery      Admit Date: 1/29/2018    Discharge Date :1/29/2018    Attending Physician: Sawyer Nelson     Discharge Physician: Sawyer Nelson    Discharged Condition: good    Discharge Diagnosis: History of cancer [Z85.9]    Treatments/Procedures: Procedure(s) (LRB):  REMOVAL-PORT-A-CATH (N/A)    Hospital Course: Uneventful; Discharged home from Recovery    Significant Diagnostic Studies: none    Disposition: Home or Self Care    Diet: Regular    Follow up: Office 10-14 days    Activity: No heavy lifting till seen in office.    Patient Instructions:   Current Discharge Medication List      CONTINUE these medications which have NOT CHANGED    Details   acetaminophen (TYLENOL) 500 MG tablet Take 500 mg by mouth every 6 (six) hours as needed for Pain.      anastrozole (ARIMIDEX) 1 mg Tab Take 1 tablet (1 mg total) by mouth once daily.  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      b complex vitamins tablet Take 1 tablet by mouth once daily.      calcium-vitamin D3-vitamin K (VIACTIV) 500-500-40 mg-unit-mcg Chew Take 2 tablets by mouth once daily.       coenzyme Q10 (CO Q-10) 100 mg capsule Take 100 mg by mouth once daily.       denosumab (PROLIA) 60 mg/mL Syrg Inject 60 mg into the skin every 6 (six) months.      lisinopril 10 MG tablet Take 2 tablets (20 mg total) by mouth 2 (two) times daily.  Qty: 180 tablet, Refills: 3    Associated Diagnoses: Essential hypertension      MULTIVITAMIN ORAL once daily. Every day      omeprazole (PRILOSEC) 40 MG capsule TAKE 1 CAPSULE BY MOUTH EVERY DAY  Qty: 90 capsule, Refills: 3      simvastatin (ZOCOR) 20 MG tablet Take 1 tablet (20 mg total) by mouth every evening.  Qty: 90 tablet, Refills: 3    Associated Diagnoses: Dyslipidemia      traZODone (DESYREL) 100 MG tablet Take  1 tablet (100 mg total) by mouth every evening.  Qty: 90 tablet, Refills: 3    Associated Diagnoses: Insomnia, unspecified type      vitamin D (VITAMIN D3) 1000 units Tab Take 2,000 Units by mouth once daily.      meloxicam (MOBIC) 7.5 MG tablet Take 1 tablet (7.5 mg total) by mouth once daily.  Qty: 30 tablet, Refills: 1      vitamin E 400 UNIT capsule Take 400 Units by mouth once daily.               Discharge Procedure Orders  Diet general     Call MD for:  temperature >100.4     Call MD for:  persistent nausea and vomiting     Call MD for:  severe uncontrolled pain

## 2018-01-30 VITALS
HEART RATE: 78 BPM | HEIGHT: 65 IN | SYSTOLIC BLOOD PRESSURE: 160 MMHG | DIASTOLIC BLOOD PRESSURE: 72 MMHG | BODY MASS INDEX: 29.65 KG/M2 | RESPIRATION RATE: 16 BRPM | TEMPERATURE: 98 F | OXYGEN SATURATION: 98 % | WEIGHT: 177.94 LBS

## 2018-02-06 ENCOUNTER — TELEPHONE (OUTPATIENT)
Dept: HEMATOLOGY/ONCOLOGY | Facility: CLINIC | Age: 78
End: 2018-02-06

## 2018-02-06 NOTE — TELEPHONE ENCOUNTER
----- Message from Debby Callaway sent at 2/6/2018  3:25 PM CST -----  Patient is calling to cancel anything scheduled for port flushes since she has had her port removed on 1/30/18. Call, if any questions at [phone].

## 2018-02-07 ENCOUNTER — TELEPHONE (OUTPATIENT)
Dept: INFUSION THERAPY | Facility: HOSPITAL | Age: 78
End: 2018-02-07

## 2018-02-07 NOTE — TELEPHONE ENCOUNTER
Patient is calling to cancel anything scheduled for port flushes since she has had her port removed on 1/30/18. Call, if any questions at [phone].

## 2018-02-20 ENCOUNTER — HOSPITAL ENCOUNTER (OUTPATIENT)
Dept: RADIOLOGY | Facility: HOSPITAL | Age: 78
Discharge: HOME | End: 2018-02-20
Attending: PAIN MEDICINE | Admitting: PAIN MEDICINE
Payer: MEDICARE

## 2018-02-20 ENCOUNTER — SURGERY (OUTPATIENT)
Age: 78
End: 2018-02-20

## 2018-02-20 ENCOUNTER — HOSPITAL ENCOUNTER (OUTPATIENT)
Facility: HOSPITAL | Age: 78
Discharge: HOME | End: 2018-02-20
Attending: PAIN MEDICINE | Admitting: PAIN MEDICINE
Payer: MEDICARE

## 2018-02-20 VITALS
OXYGEN SATURATION: 100 % | WEIGHT: 178 LBS | BODY MASS INDEX: 29.66 KG/M2 | HEART RATE: 80 BPM | DIASTOLIC BLOOD PRESSURE: 66 MMHG | HEIGHT: 65 IN | RESPIRATION RATE: 18 BRPM | TEMPERATURE: 98 F | SYSTOLIC BLOOD PRESSURE: 138 MMHG

## 2018-02-20 DIAGNOSIS — M51.36 DDD (DEGENERATIVE DISC DISEASE), LUMBAR: ICD-10-CM

## 2018-02-20 DIAGNOSIS — M47.816 LUMBAR SPONDYLOSIS: Primary | ICD-10-CM

## 2018-02-20 PROCEDURE — 76000 FLUOROSCOPY <1 HR PHYS/QHP: CPT | Mod: TC,PO

## 2018-02-20 PROCEDURE — 64494 INJ PARAVERT F JNT L/S 2 LEV: CPT | Mod: 50,,, | Performed by: PAIN MEDICINE

## 2018-02-20 PROCEDURE — 64493 INJ PARAVERT F JNT L/S 1 LEV: CPT | Mod: 50,,, | Performed by: PAIN MEDICINE

## 2018-02-20 PROCEDURE — 64493 INJ PARAVERT F JNT L/S 1 LEV: CPT | Mod: 50,PO | Performed by: PAIN MEDICINE

## 2018-02-20 PROCEDURE — 25000003 PHARM REV CODE 250: Mod: PO | Performed by: PAIN MEDICINE

## 2018-02-20 PROCEDURE — 25500020 PHARM REV CODE 255: Mod: PO | Performed by: PAIN MEDICINE

## 2018-02-20 PROCEDURE — 64494 INJ PARAVERT F JNT L/S 2 LEV: CPT | Mod: 50,PO | Performed by: PAIN MEDICINE

## 2018-02-20 PROCEDURE — 64495 INJ PARAVERT F JNT L/S 3 LEV: CPT | Mod: 50,PO | Performed by: PAIN MEDICINE

## 2018-02-20 RX ORDER — BUPIVACAINE HYDROCHLORIDE 2.5 MG/ML
INJECTION, SOLUTION EPIDURAL; INFILTRATION; INTRACAUDAL
Status: DISCONTINUED | OUTPATIENT
Start: 2018-02-20 | End: 2018-02-20 | Stop reason: HOSPADM

## 2018-02-20 RX ORDER — ALPRAZOLAM 0.5 MG/1
1 TABLET, ORALLY DISINTEGRATING ORAL ONCE AS NEEDED
Status: COMPLETED | OUTPATIENT
Start: 2018-02-20 | End: 2018-02-20

## 2018-02-20 RX ORDER — LIDOCAINE HYDROCHLORIDE 10 MG/ML
INJECTION, SOLUTION EPIDURAL; INFILTRATION; INTRACAUDAL; PERINEURAL
Status: DISCONTINUED | OUTPATIENT
Start: 2018-02-20 | End: 2018-02-20 | Stop reason: HOSPADM

## 2018-02-20 RX ADMIN — ALPRAZOLAM 1 MG: 0.5 TABLET, ORALLY DISINTEGRATING ORAL at 08:02

## 2018-02-20 RX ADMIN — IOHEXOL 3 ML: 300 INJECTION, SOLUTION INTRAVENOUS at 09:02

## 2018-02-20 RX ADMIN — LIDOCAINE HYDROCHLORIDE 5 ML: 10 INJECTION, SOLUTION EPIDURAL; INFILTRATION; INTRACAUDAL; PERINEURAL at 09:02

## 2018-02-20 RX ADMIN — BUPIVACAINE HYDROCHLORIDE 5 ML: 2.5 INJECTION, SOLUTION EPIDURAL; INFILTRATION; INTRACAUDAL; PERINEURAL at 09:02
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
## 2018-02-20 NOTE — DISCHARGE INSTRUCTIONS
Recovery After Procedural Sedation (Adult)  You have been given medicine by vein to make you sleep during your surgery. This may have included both a pain medicine and sleeping medicine. Most of the effects have worn off. But you may still have some drowsiness for the next 6 to 8 hours.  Home care  Follow these guidelines when you get home:  · For the next 8 hours, you should be watched by a responsible adult. This person should make sure your condition is not getting worse.  · Don't drink any alcohol for the next 24 hours.  · Don't drive, operate dangerous machinery, or make important business or personal decisions during the next 24 hours.  Note: Your healthcare provider may tell you not to take any medicine by mouth for pain or sleep in the next 4 hours. These medicines may react with the medicines you were given in the hospital. This could cause a much stronger response than usual.  Follow-up care  Follow up with your healthcare provider if you are not alert and back to your usual level of activity within 12 hours.  When to seek medical advice  Call your healthcare provider right away if any of these occur:  · Drowsiness gets worse  · Weakness or dizziness gets worse  · Repeated vomiting  · You can't be awakened   Date Last Reviewed: 10/18/2016  © 4633-7487 [employer]. [address] [place] [address], [place] [address]. All rights reserved. This information is not intended as a substitute for professional medical care. Always follow your healthcare professional's instructions.    Home care instructions  Apply ice pack to the injection site for 20 minutes periods for the first 24 hrs for soreness/discomfort at injection site DO NOT USE HEAT FOR 24 HOURS  Keep site clean and dry for 24 hours, remove bandaid when desired  Do not drive until tomorrow  Take care when walking after a lumbar injection  Resume regular activities today  Pain office will call tomorrow   Resume home medication as prescribed  today  Resume Aspirin, Plavix, or Coumadin the day after the procedure unless otherwise instructed.    SEE IMMEDIATE MEDICAL HELP FOR:  Severe increase in your usual pain or appearance of new pain  Prolonged or increasing weakness or numbness in the legs or arms  Drainage, redness, active bleeding, or increased swelling at the injection site  Temperature over 100.0 degrees F.  Headache that increases when your head is upright and decreases when you lie flat    CALL 911 OR GO DIRECTLY TO EMERGENCY DEPARTMENT FOR:  Shortness of breath, chest pain, or problems breathing

## 2018-02-20 NOTE — INTERVAL H&P NOTE
The patient has been examined and the H&P has been reviewed:    I concur with the findings and no changes have occurred since H&P was written.    Anesthesia/Surgery risks, benefits and alternative options discussed and understood by patient/family.          Active Hospital Problems    Diagnosis  POA    Lumbar spondylosis [M47.816]  Yes      Resolved Hospital Problems    Diagnosis Date Resolved POA   No resolved problems to display.

## 2018-02-20 NOTE — DISCHARGE SUMMARY
[facility]  Discharge Note  Short Stay    Admit Date: 2/20/2018    Discharge Date and Time: No discharge date for patient encounter.     Attending Physician: Lyndsay Galeana Jr., MD     Discharge Provider: Lyndsay Galeana Jr    Diagnoses:  Active Hospital Problems    Diagnosis  POA    *Lumbar spondylosis [M47.816]  Yes      Resolved Hospital Problems    Diagnosis Date Resolved POA   No resolved problems to display.       Discharged Condition: stable    Hospital Course: Patient was admitted for an outpatient procedure and tolerated the procedure well with no complications.    Final Diagnoses: Same as principal problem.    Disposition: Home or Self Care    Follow up/Patient Instructions:    Medications:  Reconciled Home Medications:   Current Discharge Medication List      CONTINUE these medications which have NOT CHANGED    Details   acetaminophen (TYLENOL) 500 MG tablet Take 500 mg by mouth every 6 (six) hours as needed for Pain.      anastrozole (ARIMIDEX) 1 mg Tab Take 1 tablet (1 mg total) by mouth once daily.  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      b complex vitamins tablet Take 1 tablet by mouth once daily.      calcium-vitamin D3-vitamin K (VIACTIV) 500-500-40 mg-unit-mcg Chew Take 2 tablets by mouth once daily.       coenzyme Q10 (CO Q-10) 100 mg capsule Take 100 mg by mouth once daily.       denosumab (PROLIA) 60 mg/mL Syrg Inject 60 mg into the skin every 6 (six) months.      lisinopril 10 MG tablet Take 2 tablets (20 mg total) by mouth 2 (two) times daily.  Qty: 180 tablet, Refills: 3    Associated Diagnoses: Essential hypertension      MULTIVITAMIN ORAL once daily. Every day      omeprazole (PRILOSEC) 40 MG capsule TAKE 1 CAPSULE BY MOUTH EVERY DAY  Qty: 90 capsule, Refills: 3      simvastatin (ZOCOR) 20  MG tablet Take 1 tablet (20 mg total) by mouth every evening.  Qty: 90 tablet, Refills: 3    Associated Diagnoses: Dyslipidemia      traZODone (DESYREL) 100 MG tablet Take 1 tablet (100 mg total) by mouth every evening.  Qty: 90 tablet, Refills: 3    Associated Diagnoses: Insomnia, unspecified type      vitamin D (VITAMIN D3) 1000 units Tab Take 2,000 Units by mouth once daily.      vitamin E 400 UNIT capsule Take 400 Units by mouth once daily.      meloxicam (MOBIC) 7.5 MG tablet Take 1 tablet (7.5 mg total) by mouth once daily.  Qty: 30 tablet, Refills: 1             Discharge Procedure Orders  Call MD for:  temperature >100.4     Call MD for:  severe uncontrolled pain     Call MD for:  redness, tenderness, or signs of infection (pain, swelling, redness, odor or green/yellow discharge around incision site)     Call MD for:  difficulty breathing or increased cough     Call MD for:  severe persistent headache     Call MD for:  worsening rash     Remove dressing in 24 hours       Follow-up Information     Lyndsay Galeana Jr, MD. Call in 1 day.    Specialty:  Pain Medicine  Why:  Post-procedural Follow Up As Scheduled  Contact information:  [address] [place] [address]  [phone]                   Discharge Procedure Orders (must include Diet, Follow-up, Activity):    Discharge Procedure Orders (must include Diet, Follow-up, Activity)  Call MD for:  temperature >100.4     Call MD for:  severe uncontrolled pain     Call MD for:  redness, tenderness, or signs of infection (pain, swelling, redness, odor or green/yellow discharge around incision site)     Call MD for:  difficulty breathing or increased cough     Call MD for:  severe persistent headache     Call MD for:  worsening rash     Remove dressing in 24 hours

## 2018-02-20 NOTE — OP NOTE
Lumbar Medial Branch Nerve Block    Pre-operative diagnosis: Lumbar spondylosis  Post-operative diagnosis: Lumbar spondylosis  Procedure Date: 02/20/2018  Procedure: BILATERAL L3,4,5 Lumber Medial Branch Block under fluoroscopic guidance    Procedure in detail:  Informed consent obtained after explaining the procedure and potential complications including local discomfort, infection, headache, temporary or permanent weakness and/or numbness of one or both legs, temporary or permanent paraplegia, heart attack and stroke. All questions were answered.      Patient was taken to the procedure room and placed in prone position.  Time out was performed.  Patient's Lumbar area was prepped and draped in a sterile manner.  AP and oblique fluoroscopy were used to identify and nadia the junctions between the superior articular processes and transverse processes at the L4, L5, and sacral ala levels on the Right side.Then, under fluoroscopic guidance, a 25 G 3.5 inch spinal needle, was advanced to the targeted points.    Then, after negative aspiration for heme, 0.2 mL of contrast was administered demonstrating appropriate spread for medial branch coverage.  Next, 0.5 mL of 0.25% bupivacaine was injected at each of the above targeted points: the locations of the L3,4,5 medial branch nerves.     The procedure was repeated on the LEFT side with similar findings.  Needle placement and contrast spread were consistent with successful medial branch nerve block.    Needle was removed with flush and bandaged placed over site of needle insertion.    Estimated Blood Loss: None  Specimens: None    Disposition: The patient tolerated the procedure without complaint and was transported to the recovery room in stable condition.    Results/summary/additional comments: Well tolerated.  Follow-up: Return for RFA if appropriate response is documented in post-procedure follow up.      Lyndsay Galeana Jr, MD  Interventional Pain Medicine /  Anesthesiology

## 2018-02-22 ENCOUNTER — TELEPHONE (OUTPATIENT)
Dept: PAIN MEDICINE | Facility: CLINIC | Age: 78
End: 2018-02-22

## 2018-02-22 NOTE — TELEPHONE ENCOUNTER
----- Message from Breanna Daily sent at 2/22/2018 10:23 AM CST -----  Contact: [phone]  Patient called asking to speak with the nurse regarding nerve block test. Thanks!

## 2018-02-22 NOTE — TELEPHONE ENCOUNTER
Spoke to patient and she reported that her relief from the block was around 40-50%. On a normal day her pain is reported at 8/10 on average and the day of the block it was about a 4/10. She stated that she was able to eat breakfast after procedure with less pain than normal. After that she went home and slept for a few hours. She states that the relief lasted around 6-8 hours total. Patient is still going to Pt. Please advise.

## 2018-02-26 ENCOUNTER — TELEPHONE (OUTPATIENT)
Dept: PAIN MEDICINE | Facility: CLINIC | Age: 78
End: 2018-02-26

## 2018-02-26 DIAGNOSIS — M47.816 LUMBAR SPONDYLOSIS: Primary | ICD-10-CM

## 2018-02-26 RX ORDER — SODIUM CHLORIDE, SODIUM LACTATE, POTASSIUM CHLORIDE, CALCIUM CHLORIDE 600; 310; 30; 20 MG/100ML; MG/100ML; MG/100ML; MG/100ML
INJECTION, SOLUTION INTRAVENOUS CONTINUOUS
Status: CANCELLED | OUTPATIENT
Start: 2018-03-06

## 2018-02-26 NOTE — TELEPHONE ENCOUNTER
Based on a result of 50% relief of low back pain, I recommend a second MBB as confirmation to adequately determine her candidacy for rhizotomy..    Lyndsay Galeana Jr, MD  Interventional Pain Medicine / Anesthesiology

## 2018-02-26 NOTE — TELEPHONE ENCOUNTER
Spoke with patient and reviewed Dr. Galeana's suggestion. Patient agreed and verbalized understanding. MBB scheduled for march 6th. Pre-op instructions reviewed with patient.

## 2018-02-26 NOTE — TELEPHONE ENCOUNTER
----- Message from Carlene Diego sent at 2/23/2018  4:32 PM CST -----  Contact: self  Patient calling back regarding what is next after the nerve block. Joshua call patient at [phone]. Thanks!

## 2018-02-27 ENCOUNTER — TELEPHONE (OUTPATIENT)
Dept: HEMATOLOGY/ONCOLOGY | Facility: CLINIC | Age: 78
End: 2018-02-27

## 2018-02-27 NOTE — TELEPHONE ENCOUNTER
Returned call to patient, discussed it is ok for a routine cleaning. If dentist needs to do any invasive work such as a extraction or root canal we prefer having it done 3 months before or 3 months after Prolia injection, please call the office for advisement. Patient voiced understanding and appreciation

## 2018-02-27 NOTE — TELEPHONE ENCOUNTER
----- Message from Eileen Guerrero sent at 2/27/2018  3:28 PM CST -----  Contact: Patient  Patient is going is to have her teeth cleaned and patient is taking Prolia, and she remembers them telling her something about the dentist, but doesn't remember what.  Patient wants to make sure that she can get her teeth cleaned.  Call Back#[phone]  Thanks

## 2018-03-03 DIAGNOSIS — M51.36 DDD (DEGENERATIVE DISC DISEASE), LUMBAR: Primary | ICD-10-CM

## 2018-03-06 ENCOUNTER — HOSPITAL ENCOUNTER (OUTPATIENT)
Facility: HOSPITAL | Age: 78
Discharge: HOME | End: 2018-03-06
Attending: PAIN MEDICINE | Admitting: PAIN MEDICINE
Payer: MEDICARE

## 2018-03-06 ENCOUNTER — SURGERY (OUTPATIENT)
Age: 78
End: 2018-03-06

## 2018-03-06 ENCOUNTER — HOSPITAL ENCOUNTER (OUTPATIENT)
Dept: RADIOLOGY | Facility: HOSPITAL | Age: 78
Discharge: HOME | End: 2018-03-06
Attending: PAIN MEDICINE | Admitting: PAIN MEDICINE
Payer: MEDICARE

## 2018-03-06 VITALS
TEMPERATURE: 98 F | OXYGEN SATURATION: 100 % | DIASTOLIC BLOOD PRESSURE: 65 MMHG | SYSTOLIC BLOOD PRESSURE: 146 MMHG | BODY MASS INDEX: 29.66 KG/M2 | HEIGHT: 65 IN | WEIGHT: 178 LBS | RESPIRATION RATE: 16 BRPM | HEART RATE: 85 BPM

## 2018-03-06 DIAGNOSIS — M51.36 DDD (DEGENERATIVE DISC DISEASE), LUMBAR: ICD-10-CM

## 2018-03-06 DIAGNOSIS — M47.816 LUMBAR SPONDYLOSIS: Primary | ICD-10-CM

## 2018-03-06 PROCEDURE — 64493 INJ PARAVERT F JNT L/S 1 LEV: CPT | Mod: 50,,, | Performed by: PAIN MEDICINE

## 2018-03-06 PROCEDURE — 76000 FLUOROSCOPY <1 HR PHYS/QHP: CPT | Mod: TC,PO

## 2018-03-06 PROCEDURE — 64494 INJ PARAVERT F JNT L/S 2 LEV: CPT | Mod: 50,PO | Performed by: PAIN MEDICINE

## 2018-03-06 PROCEDURE — 64494 INJ PARAVERT F JNT L/S 2 LEV: CPT | Mod: 50,,, | Performed by: PAIN MEDICINE

## 2018-03-06 PROCEDURE — 25500020 PHARM REV CODE 255: Mod: PO | Performed by: PAIN MEDICINE

## 2018-03-06 PROCEDURE — 64493 INJ PARAVERT F JNT L/S 1 LEV: CPT | Mod: 50,PO | Performed by: PAIN MEDICINE

## 2018-03-06 PROCEDURE — 25000003 PHARM REV CODE 250: Mod: PO | Performed by: PAIN MEDICINE

## 2018-03-06 PROCEDURE — 64495 INJ PARAVERT F JNT L/S 3 LEV: CPT | Mod: 50,PO | Performed by: PAIN MEDICINE

## 2018-03-06 RX ORDER — LIDOCAINE HYDROCHLORIDE 10 MG/ML
INJECTION, SOLUTION EPIDURAL; INFILTRATION; INTRACAUDAL; PERINEURAL
Status: DISCONTINUED | OUTPATIENT
Start: 2018-03-06 | End: 2018-03-06 | Stop reason: HOSPADM

## 2018-03-06 RX ORDER — ALPRAZOLAM 0.5 MG/1
0.5 TABLET, ORALLY DISINTEGRATING ORAL
Status: DISCONTINUED | OUTPATIENT
Start: 2018-03-06 | End: 2018-03-06 | Stop reason: HOSPADM

## 2018-03-06 RX ORDER — BUPIVACAINE HYDROCHLORIDE 2.5 MG/ML
INJECTION, SOLUTION EPIDURAL; INFILTRATION; INTRACAUDAL
Status: DISCONTINUED | OUTPATIENT
Start: 2018-03-06 | End: 2018-03-06 | Stop reason: HOSPADM

## 2018-03-06 RX ORDER — SODIUM CHLORIDE, SODIUM LACTATE, POTASSIUM CHLORIDE, CALCIUM CHLORIDE 600; 310; 30; 20 MG/100ML; MG/100ML; MG/100ML; MG/100ML
INJECTION, SOLUTION INTRAVENOUS CONTINUOUS
Status: DISCONTINUED | OUTPATIENT
Start: 2018-03-06 | End: 2018-03-06 | Stop reason: HOSPADM

## 2018-03-06 RX ADMIN — BUPIVACAINE HYDROCHLORIDE 5 ML: 2.5 INJECTION, SOLUTION EPIDURAL; INFILTRATION; INTRACAUDAL; PERINEURAL at 09:03

## 2018-03-06 RX ADMIN — ALPRAZOLAM 0.5 MG: 0.5 TABLET, ORALLY DISINTEGRATING ORAL at 08:03

## 2018-03-06 RX ADMIN — IOHEXOL 3 ML: 300 INJECTION, SOLUTION INTRAVENOUS at 09:03

## 2018-03-06 RX ADMIN — LIDOCAINE HYDROCHLORIDE 5 ML: 10 INJECTION, SOLUTION EPIDURAL; INFILTRATION; INTRACAUDAL; PERINEURAL at 09:03

## 2018-03-06 NOTE — DISCHARGE SUMMARY
[facility]  Discharge Note  Short Stay    Admit Date: 3/6/2018    Discharge Date and Time: No discharge date for patient encounter.     Attending Physician: Lyndsay Galeana Jr., MD     Discharge Provider: Lyndsay Galeana Jr    Diagnoses:  Active Hospital Problems    Diagnosis  POA    *Lumbar spondylosis [M47.816]  Yes      Resolved Hospital Problems    Diagnosis Date Resolved POA   No resolved problems to display.       Discharged Condition: stable    Hospital Course: Patient was admitted for an outpatient procedure and tolerated the procedure well with no complications.    Final Diagnoses: Same as principal problem.    Disposition: Home or Self Care    Follow up/Patient Instructions:    Medications:  Reconciled Home Medications:   Current Discharge Medication List      CONTINUE these medications which have NOT CHANGED    Details   acetaminophen (TYLENOL) 500 MG tablet Take 500 mg by mouth every 6 (six) hours as needed for Pain.      anastrozole (ARIMIDEX) 1 mg Tab Take 1 tablet (1 mg total) by mouth once daily.  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      b complex vitamins tablet Take 1 tablet by mouth once daily.      calcium-vitamin D3-vitamin K (VIACTIV) 500-500-40 mg-unit-mcg Chew Take 2 tablets by mouth once daily.       coenzyme Q10 (CO Q-10) 100 mg capsule Take 100 mg by mouth once daily.       denosumab (PROLIA) 60 mg/mL Syrg Inject 60 mg into the skin every 6 (six) months.      lisinopril 10 MG tablet Take 2 tablets (20 mg total) by mouth 2 (two) times daily.  Qty: 180 tablet, Refills: 3    Associated Diagnoses: Essential hypertension      MULTIVITAMIN ORAL once daily. Every day      omeprazole (PRILOSEC) 40 MG capsule TAKE 1 CAPSULE BY MOUTH EVERY DAY  Qty: 90 capsule, Refills: 3      simvastatin (ZOCOR) 20 MG  tablet Take 1 tablet (20 mg total) by mouth every evening.  Qty: 90 tablet, Refills: 3    Associated Diagnoses: Dyslipidemia      traZODone (DESYREL) 100 MG tablet Take 1 tablet (100 mg total) by mouth every evening.  Qty: 90 tablet, Refills: 3    Associated Diagnoses: Insomnia, unspecified type      vitamin D (VITAMIN D3) 1000 units Tab Take 2,000 Units by mouth once daily.      vitamin E 400 UNIT capsule Take 400 Units by mouth once daily.      meloxicam (MOBIC) 7.5 MG tablet Take 1 tablet (7.5 mg total) by mouth once daily.  Qty: 30 tablet, Refills: 1             Discharge Procedure Orders  Call MD for:  temperature >100.4     Call MD for:  severe uncontrolled pain     Call MD for:  redness, tenderness, or signs of infection (pain, swelling, redness, odor or green/yellow discharge around incision site)     Call MD for:  difficulty breathing or increased cough     Call MD for:  severe persistent headache     Call MD for:  worsening rash     Remove dressing in 24 hours       Follow-up Information     Call Lyndsay Galeana Jr, MD.    Specialty:  Pain Medicine  Why:  Post-procedural Follow Up As Scheduled  Contact information:  [address] [place] [address]  [phone]                   Discharge Procedure Orders (must include Diet, Follow-up, Activity):    Discharge Procedure Orders (must include Diet, Follow-up, Activity)  Call MD for:  temperature >100.4     Call MD for:  severe uncontrolled pain     Call MD for:  redness, tenderness, or signs of infection (pain, swelling, redness, odor or green/yellow discharge around incision site)     Call MD for:  difficulty breathing or increased cough     Call MD for:  severe persistent headache     Call MD for:  worsening rash     Remove dressing in 24 hours

## 2018-03-06 NOTE — PLAN OF CARE
Pt AAOx3. Resp even, unlabored. No complaints of pain at this time. Injection site without redness, swelling, drainage. Bandaids x6 in place, CDI. NAD noted. Pt tolerating PO well. Discharge and follow-up instructions discussed. Pt and family verbalize understanding. Pt ready for discharge.
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
## 2018-03-06 NOTE — OP NOTE
Lumbar Medial Branch Nerve Block    Pre-operative diagnosis: Lumbar spondylosis  Post-operative diagnosis: Lumbar spondylosis  Procedure Date: 03/06/2018  Procedure: BILATERAL L3,4,5 Lumber Medial Branch Block under fluoroscopic guidance    Procedure in detail:  Informed consent obtained after explaining the procedure and potential complications including local discomfort, infection, headache, temporary or permanent weakness and/or numbness of one or both legs, temporary or permanent paraplegia, heart attack and stroke. All questions were answered.      Patient was taken to the procedure room and placed in prone position.  Time out was performed.  Patient's Lumbar area was prepped and draped in a sterile manner.  AP and oblique fluoroscopy were used to identify and nadia the junctions between the superior articular processes and transverse processes at the L4, L5, and sacral ala levels on the Right side.Then, under fluoroscopic guidance, a 25 G 3.5 inch spinal needle, was advanced to the targeted points.    Then, after negative aspiration for heme, 0.2 mL of contrast was administered demonstrating appropriate spread for medial branch coverage.  Next, 0.5 mL of 0.25% bupivacaine was injected at each of the above targeted points: the locations of the L3,4,5 medial branch nerves.     The procedure was repeated on the LEFT side with similar findings.  Needle placement and contrast spread were consistent with successful medial branch nerve block.    Needle was removed with flush and bandaged placed over site of needle insertion.    Estimated Blood Loss: None  Specimens: None    Disposition: The patient tolerated the procedure without complaint and was transported to the recovery room in stable condition.    Results/summary/additional comments: Well tolerated.  Follow-up: Return for RFA if appropriate response is documented in post-procedure follow up.      Lyndsay Galeana Jr, MD  Interventional Pain Medicine /  Anesthesiology

## 2018-03-08 ENCOUNTER — TELEPHONE (OUTPATIENT)
Dept: PAIN MEDICINE | Facility: CLINIC | Age: 78
End: 2018-03-08

## 2018-03-09 ENCOUNTER — TELEPHONE (OUTPATIENT)
Dept: PAIN MEDICINE | Facility: CLINIC | Age: 78
End: 2018-03-09

## 2018-03-09 DIAGNOSIS — M47.816 LUMBAR SPONDYLOSIS: Primary | ICD-10-CM

## 2018-03-09 RX ORDER — SODIUM CHLORIDE, SODIUM LACTATE, POTASSIUM CHLORIDE, CALCIUM CHLORIDE 600; 310; 30; 20 MG/100ML; MG/100ML; MG/100ML; MG/100ML
INJECTION, SOLUTION INTRAVENOUS CONTINUOUS
Status: CANCELLED | OUTPATIENT
Start: 2018-03-13

## 2018-03-09 NOTE — TELEPHONE ENCOUNTER
Given the results, we will proceed with RFA.    Lyndsay Galeana Jr, MD  Interventional Pain Medicine / Anesthesiology

## 2018-03-09 NOTE — TELEPHONE ENCOUNTER
Spoke with patient regarding procedure. She stated it lasted around 6 hours with 60-70% relief. Patient stated she didn't do many activities but when she washed dishes she did not have as much pain. Please advise thanks.

## 2018-03-09 NOTE — TELEPHONE ENCOUNTER
----- Message from Preeti Stone sent at 3/9/2018  9:05 AM CST -----  Contact: self   Placed call to pod, patient missed a call from your office. Please call back at [phone] (dntk)

## 2018-03-13 ENCOUNTER — SURGERY (OUTPATIENT)
Age: 78
End: 2018-03-13

## 2018-03-13 ENCOUNTER — HOSPITAL ENCOUNTER (OUTPATIENT)
Facility: HOSPITAL | Age: 78
Discharge: HOME | End: 2018-03-13
Attending: PAIN MEDICINE | Admitting: PAIN MEDICINE
Payer: MEDICARE

## 2018-03-13 ENCOUNTER — HOSPITAL ENCOUNTER (OUTPATIENT)
Dept: RADIOLOGY | Facility: HOSPITAL | Age: 78
Discharge: HOME | End: 2018-03-13
Attending: PAIN MEDICINE | Admitting: PAIN MEDICINE
Payer: MEDICARE

## 2018-03-13 VITALS
HEART RATE: 80 BPM | DIASTOLIC BLOOD PRESSURE: 71 MMHG | TEMPERATURE: 97 F | SYSTOLIC BLOOD PRESSURE: 164 MMHG | RESPIRATION RATE: 18 BRPM | OXYGEN SATURATION: 98 %

## 2018-03-13 DIAGNOSIS — M51.36 DDD (DEGENERATIVE DISC DISEASE), LUMBAR: ICD-10-CM

## 2018-03-13 DIAGNOSIS — M47.816 LUMBAR SPONDYLOSIS: Primary | ICD-10-CM

## 2018-03-13 PROCEDURE — 64636 DESTROY L/S FACET JNT ADDL: CPT | Mod: 50,PO | Performed by: PAIN MEDICINE

## 2018-03-13 PROCEDURE — 63600175 PHARM REV CODE 636 W HCPCS: Mod: PO | Performed by: PAIN MEDICINE

## 2018-03-13 PROCEDURE — 64635 DESTROY LUMB/SAC FACET JNT: CPT | Mod: 50,PO | Performed by: PAIN MEDICINE

## 2018-03-13 PROCEDURE — 25000003 PHARM REV CODE 250: Mod: PO | Performed by: PAIN MEDICINE

## 2018-03-13 PROCEDURE — 64636 DESTROY L/S FACET JNT ADDL: CPT | Mod: 50,,, | Performed by: PAIN MEDICINE

## 2018-03-13 PROCEDURE — 76000 FLUOROSCOPY <1 HR PHYS/QHP: CPT | Mod: TC,PO

## 2018-03-13 PROCEDURE — 64635 DESTROY LUMB/SAC FACET JNT: CPT | Mod: 50,,, | Performed by: PAIN MEDICINE

## 2018-03-13 RX ORDER — BUPIVACAINE HYDROCHLORIDE 2.5 MG/ML
INJECTION, SOLUTION EPIDURAL; INFILTRATION; INTRACAUDAL
Status: DISCONTINUED | OUTPATIENT
Start: 2018-03-13 | End: 2018-03-13 | Stop reason: HOSPADM

## 2018-03-13 RX ORDER — MIDAZOLAM HYDROCHLORIDE 2 MG/2ML
2 INJECTION, SOLUTION INTRAMUSCULAR; INTRAVENOUS ONCE
Status: DISCONTINUED | OUTPATIENT
Start: 2018-03-13 | End: 2018-03-13 | Stop reason: HOSPADM

## 2018-03-13 RX ORDER — METHYLPREDNISOLONE ACETATE 40 MG/ML
INJECTION, SUSPENSION INTRA-ARTICULAR; INTRALESIONAL; INTRAMUSCULAR; SOFT TISSUE
Status: DISCONTINUED | OUTPATIENT
Start: 2018-03-13 | End: 2018-03-13 | Stop reason: HOSPADM

## 2018-03-13 RX ORDER — LIDOCAINE HYDROCHLORIDE 10 MG/ML
INJECTION, SOLUTION EPIDURAL; INFILTRATION; INTRACAUDAL; PERINEURAL
Status: DISCONTINUED | OUTPATIENT
Start: 2018-03-13 | End: 2018-03-13 | Stop reason: HOSPADM

## 2018-03-13 RX ORDER — SODIUM CHLORIDE, SODIUM LACTATE, POTASSIUM CHLORIDE, CALCIUM CHLORIDE 600; 310; 30; 20 MG/100ML; MG/100ML; MG/100ML; MG/100ML
INJECTION, SOLUTION INTRAVENOUS CONTINUOUS
Status: DISCONTINUED | OUTPATIENT
Start: 2018-03-13 | End: 2018-03-13 | Stop reason: HOSPADM

## 2018-03-13 RX ORDER — LIDOCAINE HYDROCHLORIDE 20 MG/ML
INJECTION, SOLUTION EPIDURAL; INFILTRATION; INTRACAUDAL; PERINEURAL
Status: DISCONTINUED | OUTPATIENT
Start: 2018-03-13 | End: 2018-03-13 | Stop reason: HOSPADM

## 2018-03-13 RX ORDER — FENTANYL CITRATE 50 UG/ML
25 INJECTION, SOLUTION INTRAMUSCULAR; INTRAVENOUS ONCE
Status: DISCONTINUED | OUTPATIENT
Start: 2018-03-13 | End: 2018-03-13 | Stop reason: HOSPADM

## 2018-03-13 RX ADMIN — METHYLPREDNISOLONE ACETATE 40 MG: 40 INJECTION, SUSPENSION INTRA-ARTICULAR; INTRALESIONAL; INTRAMUSCULAR; SOFT TISSUE at 11:03

## 2018-03-13 RX ADMIN — LIDOCAINE HYDROCHLORIDE 10 ML: 10 INJECTION, SOLUTION EPIDURAL; INFILTRATION; INTRACAUDAL; PERINEURAL at 11:03

## 2018-03-13 RX ADMIN — LIDOCAINE HYDROCHLORIDE 5 ML: 20 INJECTION, SOLUTION EPIDURAL; INFILTRATION; INTRACAUDAL; PERINEURAL at 11:03

## 2018-03-13 RX ADMIN — SODIUM CHLORIDE, SODIUM LACTATE, POTASSIUM CHLORIDE, CALCIUM CHLORIDE: 600; 310; 30; 20 INJECTION, SOLUTION INTRAVENOUS at 11:03

## 2018-03-13 RX ADMIN — BUPIVACAINE HYDROCHLORIDE 2 ML: 2.5 INJECTION, SOLUTION EPIDURAL; INFILTRATION; INTRACAUDAL; PERINEURAL at 11:03

## 2018-03-13 NOTE — DISCHARGE SUMMARY
[facility]  Discharge Note  Short Stay    Admit Date: 3/13/2018    Discharge Date and Time: No discharge date for patient encounter.     Attending Physician: Lyndsay Galeana Jr., MD     Discharge Provider: Lyndsay Galeana Jr    Diagnoses:  Active Hospital Problems    Diagnosis  POA    *Lumbar spondylosis [M47.816]  Yes      Resolved Hospital Problems    Diagnosis Date Resolved POA   No resolved problems to display.       Discharged Condition: stable    Hospital Course: Patient was admitted for an outpatient procedure and tolerated the procedure well with no complications.    Final Diagnoses: Same as principal problem.    Disposition: Home or Self Care    Follow up/Patient Instructions:    Medications:  Reconciled Home Medications:   Current Discharge Medication List      CONTINUE these medications which have NOT CHANGED    Details   acetaminophen (TYLENOL) 500 MG tablet Take 500 mg by mouth every 6 (six) hours as needed for Pain.      anastrozole (ARIMIDEX) 1 mg Tab Take 1 tablet (1 mg total) by mouth once daily.  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      b complex vitamins tablet Take 1 tablet by mouth once daily.      calcium-vitamin D3-vitamin K (VIACTIV) 500-500-40 mg-unit-mcg Chew Take 2 tablets by mouth once daily.       coenzyme Q10 (CO Q-10) 100 mg capsule Take 100 mg by mouth once daily.       denosumab (PROLIA) 60 mg/mL Syrg Inject 60 mg into the skin every 6 (six) months.      lisinopril 10 MG tablet Take 2 tablets (20 mg total) by mouth 2 (two) times daily.  Qty: 180 tablet, Refills: 3    Associated Diagnoses: Essential hypertension      meloxicam (MOBIC) 7.5 MG tablet Take 1 tablet (7.5 mg total) by mouth once daily.  Qty: 30 tablet, Refills: 1      MULTIVITAMIN ORAL once daily. Every day      omeprazole  (PRILOSEC) 40 MG capsule TAKE 1 CAPSULE BY MOUTH EVERY DAY  Qty: 90 capsule, Refills: 3      simvastatin (ZOCOR) 20 MG tablet Take 1 tablet (20 mg total) by mouth every evening.  Qty: 90 tablet, Refills: 3    Associated Diagnoses: Dyslipidemia      traZODone (DESYREL) 100 MG tablet Take 1 tablet (100 mg total) by mouth every evening.  Qty: 90 tablet, Refills: 3    Associated Diagnoses: Insomnia, unspecified type      vitamin D (VITAMIN D3) 1000 units Tab Take 2,000 Units by mouth once daily.      vitamin E 400 UNIT capsule Take 400 Units by mouth once daily.             Discharge Procedure Orders  Call MD for:  temperature >100.4     Call MD for:  severe uncontrolled pain     Call MD for:  redness, tenderness, or signs of infection (pain, swelling, redness, odor or green/yellow discharge around incision site)     Call MD for:  difficulty breathing or increased cough     Call MD for:  severe persistent headache     Call MD for:  worsening rash     Remove dressing in 24 hours       Follow-up Information     Go to Lyndsay Galeana Jr, MD.    Specialty:  Pain Medicine  Why:  Post-procedural Follow Up As Scheduled, Call to make an appointment if you do not have one  Contact information:  [address] [place] [address]  [phone]                   Discharge Procedure Orders (must include Diet, Follow-up, Activity):    Discharge Procedure Orders (must include Diet, Follow-up, Activity)  Call MD for:  temperature >100.4     Call MD for:  severe uncontrolled pain     Call MD for:  redness, tenderness, or signs of infection (pain, swelling, redness, odor or green/yellow discharge around incision site)     Call MD for:  difficulty breathing or increased cough     Call MD for:  severe persistent headache     Call MD for:  worsening rash     Remove dressing in 24 hours

## 2018-03-13 NOTE — OP NOTE
Lumbar Medial Branch Nerve Radiofrequency Ablation    Preoperative Diagnosis: Lumbar spondylosis  Postoperative Diagnosis: Lumbar spondylosis  Procedure Date: 03/13/2018  Procedure Title: (1) BILATERAL L3,4,5 Lumbar Medial Branch Radiofrequency Ablation and (2) Intraoperative Fluoroscopy    Anesthesia: Local    Indications: Jing Tenorio is a 77 y.o. year-old female with a diagnosis of back pain due to lumbar or lumbosacral spondylosis.  The patient had significant relief of the pain with the previous diagnostic nerve blocks.     Findings: Final needle placement consistent with technically sucsessful procedure.     Procedure in Detail: The patients history and physical exam were reviewed.  Informed consent obtained after explaining the procedure and potential complications including local discomfort, infection, headache, temporary or permanent weakness and/or numbness of one or both legs, temporary or permanent paraplegia, heart attack and stroke. The patient agreed to proceed, and written informed consent was obtained.    Patient was brought back to procedure room and placed in a prone position and head resting comfortably in head ring. Prior to the initiation of the procedure, the patient's identity, the site, and the nature of the procedure were verified.  AP and oblique fluoroscopy were used to identify and nadia the junctions between the superior articular processes and transverse processes at the L4, L5 levels on the Right side.  The Right sacral ala was also identified and marked.  The skin and subcutaneous tissues in these identified areas were anesthetized with 1% lidocaine. A 18 gauge 100 mm probe radiofrequency probe was advanced towards each of these points under fluoroscopic guidance. Once bone was contacted, negative aspiration was confirmed. Sensory stimulation was performed at 50 Hz & 0.4V, generating a pressure sensation. Motor stimulation at 2 Hz & 2 V was negative for muscle contractions in  the above mentioned nerve distributions. 1 mL of 2% lidocaine was injected at each level prior to lesioning, which was performed for 90 seconds at 80 degrees Centigrade. Once the lesion was complete, 3 mL of a solution consisting of 2.5 mL 0.25% bupivacaine and 20 mg depomedrol was injected through each probe. The probes were removed with a 1% lidocaine flush.      The procedure was repeated on the LEFT side with similar findings and results.    After the procedure was completed, the patients back was cleaned and bandages were placed at the needle insertion sites.    Estimated blood loss: None  Complications: None     Disposition:  The patient tolerated the procedure well and there were no apparent complications. Vital signs remained stable throughout the procedure. The patient was taken to the recovery area where written discharge instructions for the procedure were given.     Follow-up: RTC as scheduled    Lyndsay Galeana Jr, MD  Interventional Pain Medicine / Anesthesiology
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
## 2018-03-27 ENCOUNTER — OFFICE VISIT (OUTPATIENT)
Dept: PAIN MEDICINE | Facility: CLINIC | Age: 78
End: 2018-03-27
Payer: MEDICARE

## 2018-03-27 VITALS
DIASTOLIC BLOOD PRESSURE: 70 MMHG | HEIGHT: 65 IN | BODY MASS INDEX: 29.66 KG/M2 | WEIGHT: 178 LBS | SYSTOLIC BLOOD PRESSURE: 164 MMHG | HEART RATE: 90 BPM | RESPIRATION RATE: 20 BRPM

## 2018-03-27 DIAGNOSIS — M47.816 LUMBAR SPONDYLOSIS: ICD-10-CM

## 2018-03-27 DIAGNOSIS — M54.16 LUMBAR RADICULOPATHY: ICD-10-CM

## 2018-03-27 DIAGNOSIS — G89.29 CHRONIC BILATERAL LOW BACK PAIN WITH LEFT-SIDED SCIATICA: ICD-10-CM

## 2018-03-27 DIAGNOSIS — M54.42 CHRONIC BILATERAL LOW BACK PAIN WITH LEFT-SIDED SCIATICA: ICD-10-CM

## 2018-03-27 DIAGNOSIS — R53.81 PHYSICAL DEBILITY: Primary | ICD-10-CM

## 2018-03-27 DIAGNOSIS — M51.36 DDD (DEGENERATIVE DISC DISEASE), LUMBAR: ICD-10-CM

## 2018-03-27 PROCEDURE — 99499 UNLISTED E&M SERVICE: CPT | Mod: S$GLB,,, | Performed by: PAIN MEDICINE

## 2018-03-27 PROCEDURE — 99999 PR PBB SHADOW E&M-EST. PATIENT-LVL III: CPT | Mod: PBBFAC,,, | Performed by: PAIN MEDICINE

## 2018-03-27 PROCEDURE — 3078F DIAST BP <80 MM HG: CPT | Mod: CPTII,S$GLB,, | Performed by: PAIN MEDICINE

## 2018-03-27 PROCEDURE — 3077F SYST BP >= 140 MM HG: CPT | Mod: CPTII,S$GLB,, | Performed by: PAIN MEDICINE

## 2018-03-27 PROCEDURE — 99214 OFFICE O/P EST MOD 30 MIN: CPT | Mod: S$GLB,,, | Performed by: PAIN MEDICINE

## 2018-03-27 RX ORDER — GABAPENTIN 100 MG/1
200 CAPSULE ORAL 3 TIMES DAILY
Qty: 180 CAPSULE | Refills: 0 | Status: SHIPPED | OUTPATIENT
Start: 2018-03-27 | End: 2018-05-10
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
## 2018-03-28 RX ORDER — LIDOCAINE HYDROCHLORIDE 10 MG/ML
1 INJECTION, SOLUTION EPIDURAL; INFILTRATION; INTRACAUDAL; PERINEURAL ONCE
Status: DISCONTINUED | OUTPATIENT
Start: 2018-03-28 | End: 2018-10-12

## 2018-03-28 RX ORDER — ALPRAZOLAM 0.5 MG/1
0.5 TABLET, ORALLY DISINTEGRATING ORAL ONCE AS NEEDED
Status: CANCELLED | OUTPATIENT
Start: 2018-04-17 | End: 2018-04-17

## 2018-04-10 ENCOUNTER — TELEPHONE (OUTPATIENT)
Dept: PAIN MEDICINE | Facility: CLINIC | Age: 78
End: 2018-04-10

## 2018-04-10 NOTE — TELEPHONE ENCOUNTER
----- Message from Liz Munguia sent at 4/10/2018  1:01 PM CDT -----  Patient needs to speak with nurse concerning taking medication: Gabapentin and experiencing discomfort/needs advice/please call back at [phone] to advise.

## 2018-04-10 NOTE — TELEPHONE ENCOUNTER
Patient called to let us know that the gabapentin has not given her any relief yet. Today is the first day of three times a day and she states that she cannot see a difference in the pain she feels in her leg. She is going to continue this at least until her injection next week. Please advise.

## 2018-04-11 NOTE — TELEPHONE ENCOUNTER
----- Message from Nelson Crouch sent at 4/11/2018 10:22 AM CDT -----  Contact: Patient  Patient called with questions regarding upcoming procedure. Call back to advise at [phone]

## 2018-04-13 ENCOUNTER — TELEPHONE (OUTPATIENT)
Dept: PAIN MEDICINE | Facility: CLINIC | Age: 78
End: 2018-04-13

## 2018-04-13 NOTE — TELEPHONE ENCOUNTER
----- Message from Marjorie Ochoa sent at 4/13/2018 11:09 AM CDT -----  Contact: pt  Pt states she needs to know what time her procedure is for 4/17,please...[phone]

## 2018-04-15 ENCOUNTER — NURSE TRIAGE (OUTPATIENT)
Dept: ADMINISTRATIVE | Facility: CLINIC | Age: 78
End: 2018-04-15

## 2018-04-15 NOTE — TELEPHONE ENCOUNTER
Reason for Disposition   Caller has NON-URGENT medication question about med that PCP prescribed and triager unable to answer question    Protocols used: ST MEDICATION QUESTION CALL-A-AH

## 2018-04-16 DIAGNOSIS — M51.36 DDD (DEGENERATIVE DISC DISEASE), LUMBAR: Primary | ICD-10-CM

## 2018-04-17 ENCOUNTER — SURGERY (OUTPATIENT)
Age: 78
End: 2018-04-17

## 2018-04-17 ENCOUNTER — TELEPHONE (OUTPATIENT)
Dept: PAIN MEDICINE | Facility: CLINIC | Age: 78
End: 2018-04-17

## 2018-04-17 ENCOUNTER — HOSPITAL ENCOUNTER (OUTPATIENT)
Facility: HOSPITAL | Age: 78
Discharge: HOME | End: 2018-04-17
Attending: PAIN MEDICINE | Admitting: PAIN MEDICINE
Payer: MEDICARE

## 2018-04-17 ENCOUNTER — HOSPITAL ENCOUNTER (OUTPATIENT)
Dept: RADIOLOGY | Facility: HOSPITAL | Age: 78
Discharge: HOME | End: 2018-04-17
Attending: PAIN MEDICINE | Admitting: PAIN MEDICINE
Payer: MEDICARE

## 2018-04-17 DIAGNOSIS — M54.16 LUMBAR RADICULOPATHY: Primary | ICD-10-CM

## 2018-04-17 DIAGNOSIS — M51.36 DDD (DEGENERATIVE DISC DISEASE), LUMBAR: ICD-10-CM

## 2018-04-17 PROCEDURE — 25000003 PHARM REV CODE 250: Mod: PO | Performed by: PAIN MEDICINE

## 2018-04-17 PROCEDURE — 76000 FLUOROSCOPY <1 HR PHYS/QHP: CPT | Mod: TC,PO

## 2018-04-17 PROCEDURE — 62323 NJX INTERLAMINAR LMBR/SAC: CPT | Mod: PO | Performed by: PAIN MEDICINE

## 2018-04-17 PROCEDURE — 62323 NJX INTERLAMINAR LMBR/SAC: CPT | Mod: ,,, | Performed by: PAIN MEDICINE

## 2018-04-17 PROCEDURE — 25500020 PHARM REV CODE 255: Mod: PO | Performed by: PAIN MEDICINE

## 2018-04-17 PROCEDURE — 63600175 PHARM REV CODE 636 W HCPCS: Mod: PO | Performed by: PAIN MEDICINE

## 2018-04-17 RX ORDER — DEXAMETHASONE SODIUM PHOSPHATE 4 MG/ML
INJECTION, SOLUTION INTRA-ARTICULAR; INTRALESIONAL; INTRAMUSCULAR; INTRAVENOUS; SOFT TISSUE
Status: DISCONTINUED | OUTPATIENT
Start: 2018-04-17 | End: 2018-04-17 | Stop reason: HOSPADM

## 2018-04-17 RX ORDER — LIDOCAINE HYDROCHLORIDE 10 MG/ML
INJECTION, SOLUTION EPIDURAL; INFILTRATION; INTRACAUDAL; PERINEURAL
Status: DISCONTINUED | OUTPATIENT
Start: 2018-04-17 | End: 2018-04-17 | Stop reason: HOSPADM

## 2018-04-17 RX ORDER — ALPRAZOLAM 0.5 MG/1
0.5 TABLET, ORALLY DISINTEGRATING ORAL ONCE AS NEEDED
Status: COMPLETED | OUTPATIENT
Start: 2018-04-17 | End: 2018-04-17

## 2018-04-17 RX ADMIN — ALPRAZOLAM 0.5 MG: 0.5 TABLET, ORALLY DISINTEGRATING ORAL at 11:04

## 2018-04-17 RX ADMIN — LIDOCAINE HYDROCHLORIDE 5 ML: 10 INJECTION, SOLUTION EPIDURAL; INFILTRATION; INTRACAUDAL; PERINEURAL at 12:04

## 2018-04-17 RX ADMIN — IOHEXOL 3 ML: 300 INJECTION, SOLUTION INTRAVENOUS at 12:04

## 2018-04-17 RX ADMIN — DEXAMETHASONE SODIUM PHOSPHATE 15 MG: 4 INJECTION, SOLUTION INTRAMUSCULAR; INTRAVENOUS at 12:04

## 2018-04-17 NOTE — TELEPHONE ENCOUNTER
----- Message from Vickie Billings sent at 4/17/2018  4:10 PM CDT -----  Type:  Patient Returning Call    Who Called:  pt  Who Left Message for Patient: nurse  Does the patient know what this is regarding?:  na  Best Call Back Number:[phone]  Additional Information:  Pt  Is returning  Call

## 2018-04-17 NOTE — TELEPHONE ENCOUNTER
----- Message from Jocelynn Thrasher sent at 4/17/2018  2:58 PM CDT -----  Contact: Patient  Patient is requesting a call back from Winnie to check on a medicine she is to stop.  Call back at [phone] (home).  Thank you!

## 2018-04-17 NOTE — DISCHARGE SUMMARY
[facility]  Discharge Note  Short Stay    Admit Date: 4/17/2018    Discharge Date and Time: No discharge date for patient encounter.     Attending Physician: Lyndsay Galeana Jr., MD     Discharge Provider: Lyndsay Galeana Jr    Diagnoses:  Active Hospital Problems    Diagnosis  POA    *Lumbar radiculopathy [M54.16]  Yes      Resolved Hospital Problems    Diagnosis Date Resolved POA   No resolved problems to display.       Discharged Condition: fair    Hospital Course: Patient was admitted for an outpatient procedure and tolerated the procedure well with no complications.    Final Diagnoses: Same as principal problem.    Disposition: Home or Self Care    Follow up/Patient Instructions:    Medications:  Reconciled Home Medications:      Medication List      CHANGE how you take these medications    lisinopril 10 MG tablet  Take 2 tablets (20 mg total) by mouth 2 (two) times daily.  What changed:  how much to take        CONTINUE taking these medications    acetaminophen 500 MG tablet  Commonly known as:  TYLENOL  Take 500 mg by mouth every 6 (six) hours as needed for Pain.     anastrozole 1 mg Tab  Commonly known as:  ARIMIDEX  Take 1 tablet (1 mg total) by mouth once daily.     b complex vitamins tablet  Take 1 tablet by mouth once daily.     CO Q-10 100 mg capsule  Generic drug:  coenzyme Q10  Take 100 mg by mouth once daily.     denosumab 60 mg/mL Syrg  Commonly known as:  PROLIA  Inject 60 mg into the skin every 6 (six) months.     gabapentin 100 MG capsule  Commonly known as:  NEURONTIN  Take 2 capsules (200 mg total) by mouth 3 (three) times daily.     meloxicam 7.5 MG tablet  Commonly known as:  MOBIC  Take 1 tablet (7.5 mg total) by mouth once daily.     MULTIVITAMIN ORAL  once daily. Every day     omeprazole 40 MG capsule  Commonly known as:  PRILOSEC  TAKE 1 CAPSULE BY MOUTH EVERY DAY     simvastatin 20 MG tablet  Commonly known as:  ZOCOR  Take 1 tablet (20 mg total) by mouth every evening.      traZODone 100 MG tablet  Commonly known as:  DESYREL  Take 1 tablet (100 mg total) by mouth every evening.     VIACTIV 500-500-40 mg-unit-mcg Chew  Generic drug:  calcium-vitamin D3-vitamin K  Take 2 tablets by mouth once daily.     VITAMIN C 500 MG tablet  Generic drug:  ascorbic acid (vitamin C)  Take 500 mg by mouth once daily.     VITAMIN D3 1000 units Tab  Generic drug:  vitamin D  Take 2,000 Units by mouth once daily.     vitamin E 400 UNIT capsule  Take 400 Units by mouth once daily.            Discharge Procedure Orders  Call MD for:  temperature >100.4     Call MD for:  severe uncontrolled pain     Call MD for:  redness, tenderness, or signs of infection (pain, swelling, redness, odor or green/yellow discharge around incision site)     Call MD for:  difficulty breathing or increased cough     Call MD for:  severe persistent headache     Call MD for:  worsening rash     Remove dressing in 24 hours       Follow-up Information     Go to Lyndsay Galeana Jr, MD.    Specialty:  Pain Medicine  Why:  Post-procedural Follow Up As Scheduled, Call to make an appointment if you do not have one  Contact information:  [address] [place] [address]  [phone]                   Discharge Procedure Orders (must include Diet, Follow-up, Activity):    Discharge Procedure Orders (must include Diet, Follow-up, Activity)  Call MD for:  temperature >100.4     Call MD for:  severe uncontrolled pain     Call MD for:  redness, tenderness, or signs of infection (pain, swelling, redness, odor or green/yellow discharge around incision site)     Call MD for:  difficulty breathing or increased cough     Call MD for:  severe persistent headache     Call MD for:  worsening rash     Remove dressing in 24 hours

## 2018-04-17 NOTE — OP NOTE
"Procedure Note    Pre-operative Diagnosis: Lumbar radiculopathy  Post-operative Diagnosis: Lumbar radiculopathy  Procedure Date: 04/17/2018  Procedure: (1) Lumbar Epidural Steroid Injection and (2) Intraoperative fluoroscopy    NOTE: This procedure was significantly more difficult than usual due to anatomy.        Anesthesia: Local    Indications: To alleviate pain and suffering, and reduce functional impairment.    Procedure in Detail:   The patients history and physical exam were reviewed. The risks, benefits and alternatives to the procedure were discussed, and all questions were answered to the patients satisfaction. The patient agreed to proceed, and written informed consent was verified.    The patient was brought into the procedure room and placed in the prone position on the fluoroscopy table. The area of the lumbar spine was prepped with Chloraprep and draped in a sterile manner. The L3-4 interspace was identified and marked under AP fluoroscopy with significant caudal tilt. The skin and subcutaneous tissues overlying the targeted interspace were anesthetized with 3-5 mL of 1% lidocaine using a 25G, 1.5" needle. A 17G, 3.5" Tuohy epidural needle was directed toward the L4 lamina under fluoroscopic guidance the walked cephalad until the ligamentum flavum was engaged. From this point, a loss of resistance technique with a glass syringe and saline was used to identify entrance of the needle into the epidural space. Once loss of resistance was observed 1 mL of contrast solution was injected live. An appropriate epidurogram was noted.    A 5 mL mixture consisting of saline, 2 mL 1% Lidocaine and 15 mg of Dexamethasone was injected slowly and without resistance.  The needle was removed and a bandage applied to puncture site.    Blood Loss: nil    Disposition: The patient tolerated the procedure well, and there were no apparent complications. Vital signs remained stable throughout the procedure. The patient was " taken to the recovery area where written discharge instructions for the procedure were given.     Follow-up: RTC as scheduled      Lyndsay Galeana Jr, MD  Interventional Pain Medicine / Anesthesiology

## 2018-04-17 NOTE — TELEPHONE ENCOUNTER
Left VM for patient to call back with questions. Per Dr Galeana patient can stop taking gabapentin due to the effects patient reports having.

## 2018-04-17 NOTE — DISCHARGE INSTRUCTIONS
Recovery After Procedural Sedation (Adult)  You have been given medicine by vein to make you sleep during your surgery. This may have included both a pain medicine and sleeping medicine. Most of the effects have worn off. But you may still have some drowsiness for the next 6 to 8 hours.  Home care  Follow these guidelines when you get home:  · For the next 8 hours, you should be watched by a responsible adult. This person should make sure your condition is not getting worse.  · Don't drink any alcohol for the next 24 hours.  · Don't drive, operate dangerous machinery, or make important business or personal decisions during the next 24 hours.  Note: Your healthcare provider may tell you not to take any medicine by mouth for pain or sleep in the next 4 hours. These medicines may react with the medicines you were given in the hospital. This could cause a much stronger response than usual.  Follow-up care  Follow up with your healthcare provider if you are not alert and back to your usual level of activity within 12 hours.  When to seek medical advice  Call your healthcare provider right away if any of these occur:  · Drowsiness gets worse  · Weakness or dizziness gets worse  · Repeated vomiting  · You can't be awakened   Date Last Reviewed: 10/18/2016  © 9246-2217 [employer]. [address] [place] [address], [place] [address]. All rights reserved. This information is not intended as a substitute for professional medical care. Always follow your healthcare professional's instructions.      Home care instructions  Apply ice pack to the injection site for 20 minutes periods for the first 24 hrs for soreness/discomfort at injection site DO NOT USE HEAT FOR 24 HOURS  Keep site clean and dry for 24 hours, remove bandaid when desired  Do not drive until tomorrow  Take care when walking after a lumbar injection  Avoid strenuous activities for 2 days  Make take 2 weeks to feel the full effects   Resume home medication  as prescribed today  Resume Aspirin, Plavix, or Coumadin the day after the procedure unless otherwise instructed.    SEE IMMEDIATE MEDICAL HELP FOR:  Severe increase in your usual pain or appearance of new pain  Prolonged or increasing weakness or numbness in the legs or arms  Drainage, redness, active bleeding, or increased swelling at the injection site  Temperature over 100.0 degrees F.  Headache that increases when your head is upright and decreases when you lie flat    CALL 911 OR GO DIRECTLY TO EMERGENCY DEPARTMENT FOR:  Shortness of breath, chest pain, or problems breathing

## 2018-04-17 NOTE — PLAN OF CARE
Discharge instructions given to patient and spouse, verbalized understanding regarding diet, activity and restrictions.  Patient with no complaints, tolerated po well.

## 2018-04-18 ENCOUNTER — TELEPHONE (OUTPATIENT)
Dept: PAIN MEDICINE | Facility: CLINIC | Age: 78
End: 2018-04-18

## 2018-04-18 VITALS
SYSTOLIC BLOOD PRESSURE: 141 MMHG | HEART RATE: 81 BPM | HEIGHT: 65 IN | TEMPERATURE: 97 F | WEIGHT: 178 LBS | RESPIRATION RATE: 18 BRPM | OXYGEN SATURATION: 100 % | BODY MASS INDEX: 29.66 KG/M2 | DIASTOLIC BLOOD PRESSURE: 69 MMHG

## 2018-04-18 NOTE — TELEPHONE ENCOUNTER
I cannot attribute a chest vibration to gabapentin or epidural steroid injection.  As we discussed on the day of her injection, I recommend stopping gabapentin.    Lyndsay Galeana Jr, MD  Interventional Pain Medicine / Anesthesiology

## 2018-04-18 NOTE — TELEPHONE ENCOUNTER
----- Message from Eileen Guerrero sent at 4/18/2018  8:45 AM CDT -----  Contact: Patient  Patient is having a vibration in her chest this morning and she is nervous and she is wondering if it is from not being on the gabapentin or if it is from the shot she received yesterday.  Call Back#[phone]  Thank

## 2018-04-18 NOTE — TELEPHONE ENCOUNTER
Spoke to patient and she states that this has passed now. She states that she will take tylenol for some intermittent pain.

## 2018-05-02 ENCOUNTER — TELEPHONE (OUTPATIENT)
Dept: PAIN MEDICINE | Facility: CLINIC | Age: 78
End: 2018-05-02

## 2018-05-02 ENCOUNTER — OFFICE VISIT (OUTPATIENT)
Dept: PAIN MEDICINE | Facility: CLINIC | Age: 78
End: 2018-05-02
Payer: MEDICARE

## 2018-05-02 VITALS
RESPIRATION RATE: 20 BRPM | SYSTOLIC BLOOD PRESSURE: 134 MMHG | WEIGHT: 174.69 LBS | HEIGHT: 65 IN | HEART RATE: 94 BPM | BODY MASS INDEX: 29.11 KG/M2 | DIASTOLIC BLOOD PRESSURE: 74 MMHG

## 2018-05-02 DIAGNOSIS — G89.4 CHRONIC PAIN SYNDROME: ICD-10-CM

## 2018-05-02 DIAGNOSIS — G89.29 CHRONIC BILATERAL LOW BACK PAIN WITH LEFT-SIDED SCIATICA: ICD-10-CM

## 2018-05-02 DIAGNOSIS — M54.16 LUMBAR RADICULOPATHY: ICD-10-CM

## 2018-05-02 DIAGNOSIS — M51.36 DDD (DEGENERATIVE DISC DISEASE), LUMBAR: ICD-10-CM

## 2018-05-02 DIAGNOSIS — M96.1 FAILED BACK SURGICAL SYNDROME: Primary | ICD-10-CM

## 2018-05-02 DIAGNOSIS — M54.42 CHRONIC BILATERAL LOW BACK PAIN WITH LEFT-SIDED SCIATICA: ICD-10-CM

## 2018-05-02 DIAGNOSIS — M47.816 LUMBAR SPONDYLOSIS: ICD-10-CM

## 2018-05-02 DIAGNOSIS — N18.30 CHRONIC KIDNEY DISEASE, STAGE III (MODERATE): ICD-10-CM

## 2018-05-02 PROCEDURE — 3078F DIAST BP <80 MM HG: CPT | Mod: CPTII,S$GLB,, | Performed by: PAIN MEDICINE

## 2018-05-02 PROCEDURE — 99999 PR PBB SHADOW E&M-EST. PATIENT-LVL III: CPT | Mod: PBBFAC,,, | Performed by: PAIN MEDICINE

## 2018-05-02 PROCEDURE — 99499 UNLISTED E&M SERVICE: CPT | Mod: S$GLB,,, | Performed by: PAIN MEDICINE

## 2018-05-02 PROCEDURE — 3075F SYST BP GE 130 - 139MM HG: CPT | Mod: CPTII,S$GLB,, | Performed by: PAIN MEDICINE

## 2018-05-02 PROCEDURE — 99214 OFFICE O/P EST MOD 30 MIN: CPT | Mod: S$GLB,,, | Performed by: PAIN MEDICINE

## 2018-05-02 NOTE — TELEPHONE ENCOUNTER
----- Message from Nani Tadeo sent at 5/2/2018 10:46 AM CDT -----  Contact: PT  PT Jing Tenorio is calling to speak with Winnie about the referral, please call back and advise.    Call back# [phone]  Thanks
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
## 2018-05-03 ENCOUNTER — LAB VISIT (OUTPATIENT)
Dept: LAB | Facility: HOSPITAL | Age: 78
End: 2018-05-03
Attending: INTERNAL MEDICINE
Payer: MEDICARE

## 2018-05-03 ENCOUNTER — TELEPHONE (OUTPATIENT)
Dept: FAMILY MEDICINE | Facility: CLINIC | Age: 78
End: 2018-05-03

## 2018-05-03 ENCOUNTER — TELEPHONE (OUTPATIENT)
Dept: PAIN MEDICINE | Facility: CLINIC | Age: 78
End: 2018-05-03

## 2018-05-03 DIAGNOSIS — I10 ESSENTIAL HYPERTENSION: Primary | ICD-10-CM

## 2018-05-03 DIAGNOSIS — E78.5 DYSLIPIDEMIA: ICD-10-CM

## 2018-05-03 LAB
ALBUMIN SERPL BCP-MCNC: 4.1 G/DL
ALP SERPL-CCNC: 63 U/L
ALT SERPL W/O P-5'-P-CCNC: 17 U/L
ANION GAP SERPL CALC-SCNC: 13 MMOL/L
AST SERPL-CCNC: 33 U/L
BILIRUB SERPL-MCNC: 0.5 MG/DL
BUN SERPL-MCNC: 18 MG/DL
CALCIUM SERPL-MCNC: 10.7 MG/DL
CHLORIDE SERPL-SCNC: 101 MMOL/L
CHOLEST SERPL-MCNC: 181 MG/DL
CHOLEST/HDLC SERPL: 2.4 {RATIO}
CO2 SERPL-SCNC: 24 MMOL/L
CREAT SERPL-MCNC: 1.2 MG/DL
EST. GFR  (AFRICAN AMERICAN): 49.7 ML/MIN/1.73 M^2
EST. GFR  (NON AFRICAN AMERICAN): 43.1 ML/MIN/1.73 M^2
GLUCOSE SERPL-MCNC: 93 MG/DL
HDLC SERPL-MCNC: 76 MG/DL
HDLC SERPL: 42 %
LDLC SERPL CALC-MCNC: 84 MG/DL
NONHDLC SERPL-MCNC: 105 MG/DL
POTASSIUM SERPL-SCNC: 5.3 MMOL/L
PROT SERPL-MCNC: 7.3 G/DL
SODIUM SERPL-SCNC: 138 MMOL/L
TRIGL SERPL-MCNC: 105 MG/DL

## 2018-05-03 PROCEDURE — 80053 COMPREHEN METABOLIC PANEL: CPT

## 2018-05-03 PROCEDURE — 36415 COLL VENOUS BLD VENIPUNCTURE: CPT | Mod: PO

## 2018-05-03 PROCEDURE — 80061 LIPID PANEL: CPT

## 2018-05-03 NOTE — TELEPHONE ENCOUNTER
Pt asking if you can add on a lab to check her liver.  She has been taking 2000mg of Tylenol per day for her sciatica and would just like to monitor her liver.  Please advise.  Pt had labs this morning and can be added on if you find it appropriate.  Can only be added within 24 hours

## 2018-05-03 NOTE — TELEPHONE ENCOUNTER
----- Message from Liz Munguia sent at 5/2/2018  1:05 PM CDT -----  Contact: [phone]  Type:  Patient Returning Call    Who Called: Patient  Who Left Message for Patient:  Mary  Does the patient know what this is regarding?:  Scheduling appt  Best Call Back Number:  [phone]  Additional Information:

## 2018-05-03 NOTE — TELEPHONE ENCOUNTER
----- Message from Rosa William sent at 5/3/2018  8:03 AM CDT -----  Contact: JOSLYN Jewell had labs done today and wanted an order added for her liver to be checked. Pt would like a phone call letting her know either way if this can or can not be done. Thanks!

## 2018-05-04 DIAGNOSIS — N18.9 CHRONIC KIDNEY DISEASE, UNSPECIFIED CKD STAGE: ICD-10-CM

## 2018-05-10 ENCOUNTER — OFFICE VISIT (OUTPATIENT)
Dept: FAMILY MEDICINE | Facility: CLINIC | Age: 78
End: 2018-05-10
Payer: MEDICARE

## 2018-05-10 VITALS
HEIGHT: 65 IN | RESPIRATION RATE: 18 BRPM | BODY MASS INDEX: 29.53 KG/M2 | HEART RATE: 62 BPM | WEIGHT: 177.25 LBS | SYSTOLIC BLOOD PRESSURE: 138 MMHG | OXYGEN SATURATION: 97 % | DIASTOLIC BLOOD PRESSURE: 78 MMHG

## 2018-05-10 DIAGNOSIS — E83.52 HIGH CALCIUM LEVELS: ICD-10-CM

## 2018-05-10 DIAGNOSIS — E78.5 DYSLIPIDEMIA: ICD-10-CM

## 2018-05-10 DIAGNOSIS — G47.00 INSOMNIA, UNSPECIFIED TYPE: ICD-10-CM

## 2018-05-10 DIAGNOSIS — I10 ESSENTIAL HYPERTENSION: ICD-10-CM

## 2018-05-10 DIAGNOSIS — Z00.00 ROUTINE PHYSICAL EXAMINATION: Primary | ICD-10-CM

## 2018-05-10 PROCEDURE — 99999 PR PBB SHADOW E&M-EST. PATIENT-LVL III: CPT | Mod: PBBFAC,,, | Performed by: INTERNAL MEDICINE

## 2018-05-10 PROCEDURE — 3075F SYST BP GE 130 - 139MM HG: CPT | Mod: CPTII,S$GLB,, | Performed by: INTERNAL MEDICINE

## 2018-05-10 PROCEDURE — 99499 UNLISTED E&M SERVICE: CPT | Mod: S$PBB,,, | Performed by: INTERNAL MEDICINE

## 2018-05-10 PROCEDURE — 99397 PER PM REEVAL EST PAT 65+ YR: CPT | Mod: S$GLB,,, | Performed by: INTERNAL MEDICINE

## 2018-05-10 PROCEDURE — 3078F DIAST BP <80 MM HG: CPT | Mod: CPTII,S$GLB,, | Performed by: INTERNAL MEDICINE

## 2018-05-10 RX ORDER — LISINOPRIL 10 MG/1
10 TABLET ORAL 2 TIMES DAILY
Qty: 180 TABLET | Refills: 3 | Status: SHIPPED | OUTPATIENT
Start: 2018-05-10 | End: 2019-04-16

## 2018-05-10 RX ORDER — TRAZODONE HYDROCHLORIDE 100 MG/1
100 TABLET ORAL NIGHTLY
Qty: 90 TABLET | Refills: 3 | Status: SHIPPED | OUTPATIENT
Start: 2018-05-10 | End: 2018-10-12 | Stop reason: SDUPTHER

## 2018-05-10 NOTE — PROGRESS NOTES
Subjective:       Patient ID: Jing Tenorio is a 77 y.o. female.    Chief Complaint: Annual Exam    Here for routine health maintenance.      Low back pain.  MRI showed tumor on nerve, but benign.  Has leg pain.  Failed epidural and ablation.  Will get nerve stimulator trial.  Nw, will need surgery.      High calcium - on Prolia for osteoporosis and takes vit D + vit D/ca+ supplement    HTN -  controlled.       HLD - controlled    Insomnia - controlled    Breast caner - now on anastrozole for 1 mo and feels bad or run down on this w some mild nausea.  Takes in am.   Breast cancer 1.9 cm, ER/IN+, HER2 +; lymph nodes negative; s/p removal, rad tx and completed 6 chemo treatments.    s/p Ratx, chemo - now on hormone suppression therapy.   Recheck this week      Review of Systems   Constitutional: Negative for appetite change and fever.   HENT: Negative for nosebleeds and trouble swallowing.    Eyes: Negative for discharge and visual disturbance.   Respiratory: Negative for choking and shortness of breath.    Cardiovascular: Negative for chest pain and palpitations.   Gastrointestinal: Negative for abdominal pain, nausea and vomiting.   Musculoskeletal: Negative for arthralgias and joint swelling.   Skin: Negative for rash and wound.   Neurological: Negative for dizziness and syncope.   Psychiatric/Behavioral: Negative for confusion and dysphoric mood.       Objective:      Vitals:    05/10/18 1058   BP: 138/78   Pulse: 62   Resp: 18     Physical Exam   Constitutional: She appears well-nourished.   Eyes: Conjunctivae and EOM are normal.   Neck: Trachea normal and normal range of motion. No thyromegaly present.   Cardiovascular: Normal heart sounds.    Edema negative   Pulmonary/Chest: Effort normal and breath sounds normal.   Abdominal: Soft. There is no hepatomegaly.   Neurological: No cranial nerve deficit.   DTR decreased bilateral   Skin: Skin is warm, dry and intact.   Psychiatric: She has a normal mood and  affect.   Alert and Oriented    Vitals reviewed.        Assessment:       1. Routine physical examination    2. Essential hypertension    3. Dyslipidemia    4. High calcium levels    5. Insomnia, unspecified type        Plan:       Routine physical examination    Essential hypertension  -     Comprehensive metabolic panel; Future; Expected date: 09/07/2018  -     lisinopril 10 MG tablet; Take 1 tablet (10 mg total) by mouth 2 (two) times daily.  Dispense: 180 tablet; Refill: 3    Dyslipidemia  -     Comprehensive metabolic panel; Future; Expected date: 09/07/2018    High calcium levels  -     Comprehensive metabolic panel; Future; Expected date: 09/07/2018    Insomnia, unspecified type  -     traZODone (DESYREL) 100 MG tablet; Take 1 tablet (100 mg total) by mouth every evening.  Dispense: 90 tablet; Refill: 3            Medication List with Changes/Refills   Current Medications    ACETAMINOPHEN (TYLENOL) 500 MG TABLET    Take 500 mg by mouth every 6 (six) hours as needed for Pain.    ANASTROZOLE (ARIMIDEX) 1 MG TAB    Take 1 tablet (1 mg total) by mouth once daily.    ASCORBIC ACID, VITAMIN C, (VITAMIN C) 500 MG TABLET    Take 500 mg by mouth once daily.    B COMPLEX VITAMINS TABLET    Take 1 tablet by mouth once daily.    CALCIUM-VITAMIN D3-VITAMIN K (VIACTIV) 500-500-40 MG-UNIT-MCG CHEW    Take 2 tablets by mouth once daily.     COENZYME Q10 (CO Q-10) 100 MG CAPSULE    Take 100 mg by mouth once daily.     DENOSUMAB (PROLIA) 60 MG/ML SYRG    Inject 60 mg into the skin every 6 (six) months.    MELOXICAM (MOBIC) 7.5 MG TABLET    Take 1 tablet (7.5 mg total) by mouth once daily.    MULTIVITAMIN ORAL    once daily. Every day    OMEPRAZOLE (PRILOSEC) 40 MG CAPSULE    TAKE 1 CAPSULE BY MOUTH EVERY DAY    SIMVASTATIN (ZOCOR) 20 MG TABLET    Take 1 tablet (20 mg total) by mouth every evening.    VITAMIN D (VITAMIN D3) 1000 UNITS TAB    Take 2,000 Units by mouth once daily.    VITAMIN E 400 UNIT CAPSULE    Take 400 Units  "by mouth once daily.   Changed and/or Refilled Medications    Modified Medication Previous Medication    LISINOPRIL 10 MG TABLET lisinopril 10 MG tablet       Take 1 tablet (10 mg total) by mouth 2 (two) times daily.    Take 2 tablets (20 mg total) by mouth 2 (two) times daily.    TRAZODONE (DESYREL) 100 MG TABLET traZODone (DESYREL) 100 MG tablet       Take 1 tablet (100 mg total) by mouth every evening.    Take 1 tablet (100 mg total) by mouth every evening.   Discontinued Medications    GABAPENTIN (NEURONTIN) 100 MG CAPSULE    Take 2 capsules (200 mg total) by mouth 3 (three) times daily.     Wellness reviewed  Hold otc ca and vit D  Monitor kidney function with Mobic  Continue current management    Counseled on regular exercise, maintenance of a healthy weight, balanced diet rich in fruits/vegetables and lean protein, and avoidance of unhealthy habits like smoking and excessive alcohol intake.   Also, counseled on importance of being compliant with medication, health appointments, diet and exercise.     Follow-up in about 6 months (around 11/10/2018).    "This note will not be shared with the patient."  "

## 2018-05-11 ENCOUNTER — TELEPHONE (OUTPATIENT)
Dept: FAMILY MEDICINE | Facility: CLINIC | Age: 78
End: 2018-05-11

## 2018-05-11 NOTE — TELEPHONE ENCOUNTER
----- Message from Liz Munguia sent at 5/11/2018  7:55 AM CDT -----  Type: Needs Medical Advice    Who Called:  Patient  Best Call Back Number: [phone]  Additional Information: Patient would like to speak with nurse concerning stopping calcium medication/has question/please call back to advise.

## 2018-05-11 NOTE — TELEPHONE ENCOUNTER
Pt asking how long to stay off the calcium.  Advised to remain off meds until labs rechecked.  Verbalized understanding.

## 2018-05-14 ENCOUNTER — HOSPITAL ENCOUNTER (OUTPATIENT)
Dept: RADIOLOGY | Facility: HOSPITAL | Age: 78
Discharge: HOME | End: 2018-05-14
Attending: INTERNAL MEDICINE
Payer: MEDICARE

## 2018-05-14 DIAGNOSIS — C50.011 MALIGNANT NEOPLASM OF NIPPLE OF RIGHT BREAST IN FEMALE, UNSPECIFIED ESTROGEN RECEPTOR STATUS: ICD-10-CM

## 2018-05-14 PROCEDURE — 74177 CT ABD & PELVIS W/CONTRAST: CPT | Mod: 26,,, | Performed by: RADIOLOGY

## 2018-05-14 PROCEDURE — 74177 CT ABD & PELVIS W/CONTRAST: CPT | Mod: TC,PO

## 2018-05-14 PROCEDURE — 25500020 PHARM REV CODE 255: Mod: PO | Performed by: INTERNAL MEDICINE

## 2018-05-14 PROCEDURE — 71260 CT THORAX DX C+: CPT | Mod: 26,,, | Performed by: RADIOLOGY

## 2018-05-14 RX ADMIN — IOHEXOL 75 ML: 350 INJECTION, SOLUTION INTRAVENOUS at 10:05

## 2018-05-14 RX ADMIN — IOHEXOL 30 ML: 350 INJECTION, SOLUTION INTRAVENOUS at 10:05

## 2018-05-18 ENCOUNTER — TELEPHONE (OUTPATIENT)
Dept: HEMATOLOGY/ONCOLOGY | Facility: CLINIC | Age: 78
End: 2018-05-18

## 2018-05-18 ENCOUNTER — OFFICE VISIT (OUTPATIENT)
Dept: HEMATOLOGY/ONCOLOGY | Facility: CLINIC | Age: 78
End: 2018-05-18
Payer: MEDICARE

## 2018-05-18 VITALS
BODY MASS INDEX: 29.09 KG/M2 | RESPIRATION RATE: 20 BRPM | TEMPERATURE: 98 F | DIASTOLIC BLOOD PRESSURE: 80 MMHG | WEIGHT: 174.63 LBS | SYSTOLIC BLOOD PRESSURE: 172 MMHG | HEIGHT: 65 IN | HEART RATE: 77 BPM

## 2018-05-18 DIAGNOSIS — I10 ESSENTIAL HYPERTENSION: ICD-10-CM

## 2018-05-18 DIAGNOSIS — C50.011 MALIGNANT NEOPLASM INVOLVING BOTH NIPPLE AND AREOLA OF RIGHT BREAST IN FEMALE, UNSPECIFIED ESTROGEN RECEPTOR STATUS: ICD-10-CM

## 2018-05-18 DIAGNOSIS — E78.5 HYPERLIPIDEMIA LDL GOAL <130: ICD-10-CM

## 2018-05-18 DIAGNOSIS — M51.36 DDD (DEGENERATIVE DISC DISEASE), LUMBAR: ICD-10-CM

## 2018-05-18 DIAGNOSIS — C50.012 MALIGNANT NEOPLASM INVOLVING BOTH NIPPLE AND AREOLA OF LEFT BREAST IN FEMALE, UNSPECIFIED ESTROGEN RECEPTOR STATUS: Primary | ICD-10-CM

## 2018-05-18 PROCEDURE — 99499 UNLISTED E&M SERVICE: CPT | Mod: S$PBB,,, | Performed by: INTERNAL MEDICINE

## 2018-05-18 PROCEDURE — 99999 PR PBB SHADOW E&M-EST. PATIENT-LVL III: CPT | Mod: PBBFAC,,, | Performed by: INTERNAL MEDICINE

## 2018-05-18 PROCEDURE — 3077F SYST BP >= 140 MM HG: CPT | Mod: CPTII,S$GLB,, | Performed by: INTERNAL MEDICINE

## 2018-05-18 PROCEDURE — 3079F DIAST BP 80-89 MM HG: CPT | Mod: CPTII,S$GLB,, | Performed by: INTERNAL MEDICINE

## 2018-05-18 PROCEDURE — 99214 OFFICE O/P EST MOD 30 MIN: CPT | Mod: S$GLB,,, | Performed by: INTERNAL MEDICINE

## 2018-05-18 RX ORDER — ANASTROZOLE 1 MG/1
1 TABLET ORAL DAILY
Qty: 90 TABLET | Refills: 12 | Status: SHIPPED | OUTPATIENT
Start: 2018-05-18 | End: 2019-05-18

## 2018-05-18 NOTE — PROGRESS NOTES
An 76-year-old  woman well known to me.  The patient was diagnosed n   May 2016 with triple positive breast cancer.  The patient had a 1.9 cm   malignancy of the left breast, sentinel lymph node negative in association with   DCIS.  She underwent lumpectomy, underwent adjuvant TCH chemotherapy for six   cycles and now continues with Herceptin alone, has done well, but she has   multiple other issues in association.  The patient had a recent echocardiogram   and follows with Dr. Gray.  Echocardiogram at this visit shows a drop by 10%   from an ejection fraction of 65% to 55%.  Dr. Gray has cleared her to continue   with Herceptin and with the short-term echocardiogram follow up as the patient   has remained asymptomatic.  The patient also had some pulmonary nodules that are   being followed by a CT scan.  They are deemed to be benign, but need ongoing   followup.  She had been taking Boniva for postmenopausal osteopenia/osteoporosis   along with calcium and the patient has also had increased density on mammogram.    Recently had a repeat mammogram, which Radiology recommends short-term   followup within three months on the right side.  She is due for a followup   mammogram on the left side three months following that, which will be towards   the end of year.  Pt has been off the arimidex but has seen Dr. dunbar and got a shot harris been referd to DR. barrios for spine evaluation , she doesn't believe the arimidex is the problem. The pt is making an unscheduled visit today after seeing his PA about the MRI that was done, pt was told she needs another MRI with contrast pt has concerns about the contrast  PHYSICAL EXAMINATION:  GENERAL:  Elderly woman.  Alert, awake, oriented.  VITAL SIGNS:    Wt Readings from Last 3 Encounters:   05/18/18 79.2 kg (174 lb 9.7 oz)   05/10/18 80.4 kg (177 lb 4 oz)   05/02/18 79.2 kg (174 lb 11.4 oz)     Temp Readings from Last 3 Encounters:   05/18/18 98 °F (36.7 °C)   04/17/18  97.3 °F (36.3 °C) (Skin)   03/13/18 97.2 °F (36.2 °C) (Skin)     BP Readings from Last 3 Encounters:   05/18/18 (!) 172/80   05/10/18 138/78   05/02/18 134/74     Pulse Readings from Last 3 Encounters:   05/18/18 77   05/10/18 62   05/02/18 94   HEENT:  Hair has started coming back.  Showed no congestion.  NECK:  Supple, without JVD.  Trachea is central.  LUNGS:  Clear to auscultation.  No rales, rhonchi or crepitations.  HEART:  S1 is normally heard.  No murmurs or gallops.  ABDOMEN:  Soft, without rebound, guarding or rigidity.  EXTREMITIES:  Showed no edema.  NEUROLOGIC:  She demonstrates no neurological deficits.    LABORATORY EVALUATION:    Lab Results   Component Value Date    WBC 5.43 05/14/2018    HGB 12.7 05/14/2018    HCT 37.8 05/14/2018    MCV 92 05/14/2018     05/14/2018     CMP  Sodium   Date Value Ref Range Status   05/14/2018 140 136 - 145 mmol/L Final     Potassium   Date Value Ref Range Status   05/14/2018 5.3 (H) 3.5 - 5.1 mmol/L Final     Chloride   Date Value Ref Range Status   05/14/2018 106 95 - 110 mmol/L Final     CO2   Date Value Ref Range Status   05/14/2018 25 23 - 29 mmol/L Final     Glucose   Date Value Ref Range Status   05/14/2018 94 70 - 110 mg/dL Final     BUN, Bld   Date Value Ref Range Status   05/14/2018 16 8 - 23 mg/dL Final     Creatinine   Date Value Ref Range Status   05/14/2018 1.1 0.5 - 1.4 mg/dL Final   01/18/2013 0.9 0.5 - 1.4 mg/dL Final     Calcium   Date Value Ref Range Status   05/14/2018 9.3 8.7 - 10.5 mg/dL Final   01/18/2013 8.9 8.7 - 10.5 mg/dL Final     Total Protein   Date Value Ref Range Status   05/14/2018 7.0 6.0 - 8.4 g/dL Final     Albumin   Date Value Ref Range Status   05/14/2018 4.2 3.5 - 5.2 g/dL Final     Total Bilirubin   Date Value Ref Range Status   05/14/2018 0.5 0.1 - 1.0 mg/dL Final     Comment:     For infants and newborns, interpretation of results should be based  on gestational age, weight and in agreement with  clinical  observations.  Premature Infant recommended reference ranges:  Up to 24 hours.............<8.0 mg/dL  Up to 48 hours............<12.0 mg/dL  3-5 days..................<15.0 mg/dL  6-29 days.................<15.0 mg/dL       Alkaline Phosphatase   Date Value Ref Range Status   05/14/2018 60 55 - 135 U/L Final     AST   Date Value Ref Range Status   05/14/2018 31 10 - 40 U/L Final     ALT   Date Value Ref Range Status   05/14/2018 18 10 - 44 U/L Final     Anion Gap   Date Value Ref Range Status   05/14/2018 9 8 - 16 mmol/L Final   01/18/2013 9 5 - 15 meq/L Final     eGFR if    Date Value Ref Range Status   05/14/2018 55 (A) >60 mL/min/1.73 m^2 Final     eGFR if non    Date Value Ref Range Status   05/14/2018 48 (A) >60 mL/min/1.73 m^2 Final     Comment:     Calculation used to obtain the estimated glomerular filtration  rate (eGFR) is the CKD-EPI equation.        Echocardiogram, ejection fraction has dropped to 55%, but   still in the normal range.  The most recent CT of the chest, abdomen and pelvis   done on 5/2018  .   Impression       1.  Innumerable small pulmonary nodules without obvious detrimental change since 11/14/2017.  Longer term evaluation with follow-up in 1 year to 18 months may be of use.    2.  Evidence of prior left breast surgery with a stable postsurgical scar/seroma present.    3.  Renal hypodensities suggestive of cysts that appears stable since the prior too small to accurately categorize    4.Gas within the esophagus as can be seen with air swallowing or reflux with a small hiatal hernia           IMPRESSION:  1.  Triple positive breast cancer, T1, N0, M0, underwent [facility] chemotherapy,   Completed 1 year of herceptin, now on arimidex . Next prolia due in 7/2018but she will be having dental work done so will delay prolia to sept    EF dropped somewhat followed by Cardiology.  Cleared now by cards  2. The patient has significant osteoporosis on bone  density.  .boniva changed this to Prolia.  Calcium   supplementation minimum 1200 mg along with dental precautions.  Orders placed   for Prolia.going for dental cleaning soon  3.  on arimidex and stay on it.   6.  The patient has dyslipidemia.  Continue with Zocor and primary care follow   up with Dr. Franco.  7.  Degenerative joint pains.  Continue with Ultram as needed, take trazodone   for sleep and anxiety along with Xanax.may go for pain stimulant  8.  Mild CKD.  Continue to monitor.  No intervention necessary at this time.  9.  Gastroesophageal reflux disease.  Continue with omeprazole.  No changes or   worsening of symptoms at this point.   Ct of chest adbd and pelvis 12/ 2018  10.psoas muscle  Tumor possibly a schwannoma confirmed with DR. La valentin port   rtc with bilat mammo in sept for prolia

## 2018-05-28 ENCOUNTER — TELEPHONE (OUTPATIENT)
Dept: PAIN MEDICINE | Facility: CLINIC | Age: 78
End: 2018-05-28

## 2018-05-28 NOTE — TELEPHONE ENCOUNTER
Neuropsychological evaluation received stating that patient's prognosis is GOOD from a neuropsychological stand point for spinal cord stimulator trial.    We will reach out to her and offer a follow up clinic visit vs scheduling directly for the procedure depending on her preference.    Lyndsay Galeana Jr, MD  Interventional Pain Medicine / Anesthesiology

## 2018-06-20 ENCOUNTER — PES CALL (OUTPATIENT)
Dept: ADMINISTRATIVE | Facility: CLINIC | Age: 78
End: 2018-06-20

## 2018-06-20 ENCOUNTER — OFFICE VISIT (OUTPATIENT)
Dept: PAIN MEDICINE | Facility: CLINIC | Age: 78
End: 2018-06-20
Payer: MEDICARE

## 2018-06-20 VITALS
WEIGHT: 177.25 LBS | DIASTOLIC BLOOD PRESSURE: 82 MMHG | HEIGHT: 65 IN | HEART RATE: 86 BPM | BODY MASS INDEX: 29.53 KG/M2 | RESPIRATION RATE: 20 BRPM | SYSTOLIC BLOOD PRESSURE: 182 MMHG

## 2018-06-20 DIAGNOSIS — M96.1 POSTLAMINECTOMY SYNDROME: ICD-10-CM

## 2018-06-20 DIAGNOSIS — G89.4 CHRONIC PAIN SYNDROME: Primary | ICD-10-CM

## 2018-06-20 DIAGNOSIS — M96.1 FAILED BACK SURGICAL SYNDROME: ICD-10-CM

## 2018-06-20 PROCEDURE — 3079F DIAST BP 80-89 MM HG: CPT | Mod: CPTII,S$GLB,, | Performed by: PAIN MEDICINE

## 2018-06-20 PROCEDURE — 99999 PR PBB SHADOW E&M-EST. PATIENT-LVL III: CPT | Mod: PBBFAC,,, | Performed by: PAIN MEDICINE

## 2018-06-20 PROCEDURE — 3077F SYST BP >= 140 MM HG: CPT | Mod: CPTII,S$GLB,, | Performed by: PAIN MEDICINE

## 2018-06-20 PROCEDURE — 99499 UNLISTED E&M SERVICE: CPT | Mod: S$PBB,,, | Performed by: PAIN MEDICINE

## 2018-06-20 PROCEDURE — 99214 OFFICE O/P EST MOD 30 MIN: CPT | Mod: S$GLB,,, | Performed by: PAIN MEDICINE

## 2018-06-21 RX ORDER — SODIUM CHLORIDE, SODIUM LACTATE, POTASSIUM CHLORIDE, CALCIUM CHLORIDE 600; 310; 30; 20 MG/100ML; MG/100ML; MG/100ML; MG/100ML
INJECTION, SOLUTION INTRAVENOUS CONTINUOUS
Status: CANCELLED | OUTPATIENT
Start: 2018-07-17

## 2018-06-22 ENCOUNTER — TELEPHONE (OUTPATIENT)
Dept: PAIN MEDICINE | Facility: CLINIC | Age: 78
End: 2018-06-22

## 2018-06-22 NOTE — TELEPHONE ENCOUNTER
----- Message from Eileen Guerrero sent at 6/22/2018  8:08 AM CDT -----  Contact: Patient  Patient has some more questions regarding her upcoming procedure.  Call Back#[phone]  Thanks

## 2018-06-22 NOTE — TELEPHONE ENCOUNTER
Spoke to this patient and she was inquiring about whether she would have an incision for the trial. I explained that it would be just a needle stick like Dr Galeana told her in the clinic appointment.

## 2018-06-27 ENCOUNTER — TELEPHONE (OUTPATIENT)
Dept: PAIN MEDICINE | Facility: CLINIC | Age: 78
End: 2018-06-27

## 2018-06-27 NOTE — TELEPHONE ENCOUNTER
----- Message from Anh Dewitt sent at 6/27/2018  1:25 PM CDT -----  Contact: patient  [phone]  Patient called to cancel her procedure she states she is not ready to have it done.

## 2018-06-27 NOTE — TELEPHONE ENCOUNTER
Patient states she wants to postpone her procedure for now. She is unsure/not ready to have this done at this time. She will call our office if and when she decides to have this procedure done.

## 2018-06-29 ENCOUNTER — TELEPHONE (OUTPATIENT)
Dept: PAIN MEDICINE | Facility: CLINIC | Age: 78
End: 2018-06-29

## 2018-06-29 NOTE — TELEPHONE ENCOUNTER
----- Message from Brittani Garcia MA sent at 6/29/2018 10:52 AM CDT -----  Contact: Self      ----- Message -----  From: Vickie Jimenez  Sent: 6/29/2018  10:36 AM  To: [facility]erv[facility]man[facility] Staff    The pt is cancelling her procedure for 7/17/18. She is not ready to have this procedure and will let you know when she is ready.

## 2018-07-11 DIAGNOSIS — C50.011 MALIGNANT NEOPLASM INVOLVING BOTH NIPPLE AND AREOLA OF RIGHT BREAST IN FEMALE: ICD-10-CM

## 2018-07-11 RX ORDER — ANASTROZOLE 1 MG/1
TABLET ORAL
Qty: 30 TABLET | Refills: 0 | Status: SHIPPED | OUTPATIENT
Start: 2018-07-11 | End: 2018-09-24 | Stop reason: SDUPTHER

## 2018-07-30 ENCOUNTER — PES CALL (OUTPATIENT)
Dept: ADMINISTRATIVE | Facility: CLINIC | Age: 78
End: 2018-07-30

## 2018-08-22 ENCOUNTER — PES CALL (OUTPATIENT)
Dept: ADMINISTRATIVE | Facility: CLINIC | Age: 78
End: 2018-08-22

## 2018-09-06 ENCOUNTER — DOCUMENTATION ONLY (OUTPATIENT)
Dept: FAMILY MEDICINE | Facility: CLINIC | Age: 78
End: 2018-09-06

## 2018-09-06 NOTE — PROGRESS NOTES
[facility]. Placed in appt accordion folder.

## 2018-09-12 ENCOUNTER — TELEPHONE (OUTPATIENT)
Dept: HEMATOLOGY/ONCOLOGY | Facility: CLINIC | Age: 78
End: 2018-09-12

## 2018-09-12 NOTE — TELEPHONE ENCOUNTER
Pt inquiring about teeth cleaning prior to Prolia.  Ok to have cleaning.  Pt verbalized understanding.    ----- Message from Vickie Billings sent at 9/12/2018  9:26 AM CDT -----  Patient is calling to  Speak to the  Nurse about  A  Dental carline  Pt is  Going to  For teeth cleaning  Call [phone]

## 2018-09-13 RX ORDER — OMEPRAZOLE 40 MG/1
CAPSULE, DELAYED RELEASE ORAL
Qty: 90 CAPSULE | Refills: 3 | Status: SHIPPED | OUTPATIENT
Start: 2018-09-13 | End: 2019-10-16 | Stop reason: SDUPTHER

## 2018-09-24 ENCOUNTER — OFFICE VISIT (OUTPATIENT)
Dept: FAMILY MEDICINE | Facility: CLINIC | Age: 78
End: 2018-09-24
Payer: MEDICARE

## 2018-09-24 ENCOUNTER — HOSPITAL ENCOUNTER (OUTPATIENT)
Dept: RADIOLOGY | Facility: HOSPITAL | Age: 78
Discharge: HOME | End: 2018-09-24
Attending: INTERNAL MEDICINE
Payer: MEDICARE

## 2018-09-24 VITALS
HEIGHT: 65 IN | SYSTOLIC BLOOD PRESSURE: 144 MMHG | WEIGHT: 175.69 LBS | DIASTOLIC BLOOD PRESSURE: 79 MMHG | OXYGEN SATURATION: 98 % | BODY MASS INDEX: 29.27 KG/M2 | HEART RATE: 82 BPM

## 2018-09-24 DIAGNOSIS — N18.30 CHRONIC KIDNEY DISEASE, STAGE III (MODERATE): ICD-10-CM

## 2018-09-24 DIAGNOSIS — R91.8 PULMONARY NODULES: ICD-10-CM

## 2018-09-24 DIAGNOSIS — M81.0 OSTEOPOROSIS, SENILE: ICD-10-CM

## 2018-09-24 DIAGNOSIS — M54.16 LUMBAR RADICULOPATHY: ICD-10-CM

## 2018-09-24 DIAGNOSIS — C50.011 MALIGNANT NEOPLASM OF NIPPLE OF RIGHT BREAST IN FEMALE, UNSPECIFIED ESTROGEN RECEPTOR STATUS: ICD-10-CM

## 2018-09-24 DIAGNOSIS — I70.0 AORTIC ATHEROSCLEROSIS: ICD-10-CM

## 2018-09-24 DIAGNOSIS — E78.5 HYPERLIPIDEMIA LDL GOAL <130: ICD-10-CM

## 2018-09-24 DIAGNOSIS — Z17.1 MALIGNANT NEOPLASM OF NIPPLE OF LEFT BREAST IN FEMALE, ESTROGEN RECEPTOR NEGATIVE: ICD-10-CM

## 2018-09-24 DIAGNOSIS — C50.012 MALIGNANT NEOPLASM OF NIPPLE OF LEFT BREAST IN FEMALE, ESTROGEN RECEPTOR NEGATIVE: ICD-10-CM

## 2018-09-24 DIAGNOSIS — Z00.00 ENCOUNTER FOR PREVENTIVE HEALTH EXAMINATION: Primary | ICD-10-CM

## 2018-09-24 DIAGNOSIS — K21.9 GASTROESOPHAGEAL REFLUX DISEASE, ESOPHAGITIS PRESENCE NOT SPECIFIED: ICD-10-CM

## 2018-09-24 DIAGNOSIS — I10 ESSENTIAL HYPERTENSION: ICD-10-CM

## 2018-09-24 DIAGNOSIS — C50.012 MALIGNANT NEOPLASM INVOLVING BOTH NIPPLE AND AREOLA OF LEFT BREAST IN FEMALE, UNSPECIFIED ESTROGEN RECEPTOR STATUS: ICD-10-CM

## 2018-09-24 DIAGNOSIS — M51.36 DDD (DEGENERATIVE DISC DISEASE), LUMBAR: ICD-10-CM

## 2018-09-24 PROBLEM — M25.559 HIP PAIN: Status: ACTIVE | Noted: 2018-09-24

## 2018-09-24 PROBLEM — M79.606 PAIN IN LOWER LIMB: Status: ACTIVE | Noted: 2018-09-24

## 2018-09-24 PROBLEM — H49.00: Status: ACTIVE | Noted: 2018-09-24

## 2018-09-24 PROCEDURE — 3077F SYST BP >= 140 MM HG: CPT | Mod: CPTII,S$GLB,, | Performed by: NURSE PRACTITIONER

## 2018-09-24 PROCEDURE — 77066 DX MAMMO INCL CAD BI: CPT | Mod: 26,,, | Performed by: RADIOLOGY

## 2018-09-24 PROCEDURE — 99999 PR PBB SHADOW E&M-EST. PATIENT-LVL V: CPT | Mod: PBBFAC,,, | Performed by: NURSE PRACTITIONER

## 2018-09-24 PROCEDURE — 99499 UNLISTED E&M SERVICE: CPT | Mod: S$GLB,,, | Performed by: NURSE PRACTITIONER

## 2018-09-24 PROCEDURE — 3078F DIAST BP <80 MM HG: CPT | Mod: CPTII,S$GLB,, | Performed by: NURSE PRACTITIONER

## 2018-09-24 PROCEDURE — G0439 PPPS, SUBSEQ VISIT: HCPCS | Mod: S$GLB,,, | Performed by: NURSE PRACTITIONER

## 2018-09-24 PROCEDURE — 77066 DX MAMMO INCL CAD BI: CPT | Mod: TC,PO

## 2018-09-24 PROCEDURE — 77062 BREAST TOMOSYNTHESIS BI: CPT | Mod: 26,,, | Performed by: RADIOLOGY

## 2018-09-24 PROCEDURE — 99215 OFFICE O/P EST HI 40 MIN: CPT | Mod: PBBFAC,PO | Performed by: NURSE PRACTITIONER

## 2018-09-24 RX ORDER — DULOXETIN HYDROCHLORIDE 20 MG/1
CAPSULE, DELAYED RELEASE ORAL
Refills: 2 | COMMUNITY
Start: 2018-08-07 | End: 2018-09-24

## 2018-09-24 NOTE — PROGRESS NOTES
"Jing Tenorio presented for a  Medicare AWV and comprehensive Health Risk Assessment today. The following components were reviewed and updated:    · Medical history  · Family History  · Social history  · Allergies and Current Medications  · Health Risk Assessment  · Health Maintenance  · Care Team     ** See Completed Assessments for Annual Wellness Visit within the encounter summary.**     The following assessments were completed:  · Living Situation  · CAGE  · Depression Screening  · Timed Get Up and Go  · Whisper Test  · Cognitive Function Screening      · Nutrition Screening  · ADL Screening  · PAQ Screening    Vitals:    09/24/18 1045   BP: (!) 144/79   BP Location: Left arm   Patient Position: Sitting   BP Method: Medium (Automatic)   Pulse: 82   SpO2: 98%   Weight: 79.7 kg (175 lb 11.3 oz)   Height: 5' 5" (1.651 m)     Body mass index is 29.24 kg/m².  Physical Exam   Constitutional: She is oriented to person, place, and time. She appears well-nourished.   Cardiovascular: Normal rate, regular rhythm, normal heart sounds and intact distal pulses.   Pulmonary/Chest: Effort normal and breath sounds normal.   Neurological: She is alert and oriented to person, place, and time.   Skin: Skin is warm and dry.   Vitals reviewed.        Diagnoses and health risks identified today and associated recommendations/orders:    1. Encounter for preventive health examination  Reviewed and discussed health maintenance.    Written rx given to patient to update influenza vaccine today    2. Hyperlipidemia LDL goal <130  Stable- continue current treatment and follow up routinely with PCP     3. Aortic atherosclerosis  Stable- continue current treatment and follow up routinely with PCP     4. Essential hypertension  Elevated today, but acceptable for patient. Follow up with PCP in weeks as scheduled  RTC sooner if needed    5. Chronic kidney disease, stage III (moderate)  Stable- continue current treatment and follow up routinely " with PCP   Labs reviewed  Avoid NSAIDS and increase fluids    6. Malignant neoplasm of nipple of left breast in female, estrogen receptor negative  Stable- continue current treatment and follow up routinely with PCP and hem/onc ()    7. Malignant neoplasm involving both nipple and areola of left breast in female, unspecified estrogen receptor status  Stable- continue current treatment and follow up routinely with PCP and hem/onc ()    8. Gastroesophageal reflux disease, esophagitis presence not specified  Stable- continue current treatment and follow up routinely with PCP     9. DDD (degenerative disc disease), lumbar  Stable- continue current treatment and follow up routinely with PCP and pain management (Dr. Rice/Dr. Bean)    10. Lumbar radiculopathy  Stable- continue current treatment and follow up routinely with PCP and pain management (Dr. Rice/Dr. Bean)    11. Pulmonary nodules  Stable- continue current treatment and follow up routinely with PCP and hem/onc ()    12. Osteoporosis, senile  Stable- continue current treatment and follow up routinely with PCP and hem/onc ()    Provided Jing with a 5-10 year written screening schedule and personal prevention plan. Recommendations were developed using the USPSTF age appropriate recommendations. Education, counseling, and referrals were provided as needed. After Visit Summary printed and given to patient which includes a list of additional screenings\tests needed.    Chrissy Tijerina NP

## 2018-09-24 NOTE — PATIENT INSTRUCTIONS
Counseling and Referral of Other Preventative  (Italic type indicates deductible and co-insurance are waived)    Patient Name: Jing Tenorio  Today's Date: 9/24/2018    Health Maintenance       Date Due Completion Date    Zoster Vaccine 10/10/1998 ---    Influenza Vaccine 08/01/2018 11/6/2017    Override on 10/14/2014: Done (at flu fair today)    DEXA SCAN 06/02/2020 6/2/2017    Colonoscopy 09/23/2021 9/23/2013    Lipid Panel 05/03/2023 5/3/2018    TETANUS VACCINE 07/18/2027 7/18/2017    Override on 7/18/2017: Done        No orders of the defined types were placed in this encounter.    The following information is provided to all patients.  This information is to help you find resources for any of the problems found today that may be affecting your health:                Living healthy guide: www.[facility].[place].gov      Understanding Diabetes: www.diabetes.org      Eating healthy: www.cdc.gov/healthyweight      CDC home safety checklist: www.cdc.gov/steadi/patient.html      Agency on Aging: www.goea.[place].[facility]      Alcoholics anonymous (AA): www.aa.org      Physical Activity: www.ashwini.nih.gov/wh8frko      Tobacco use: www.quitwithusla.org

## 2018-09-27 ENCOUNTER — OFFICE VISIT (OUTPATIENT)
Dept: HEMATOLOGY/ONCOLOGY | Facility: CLINIC | Age: 78
End: 2018-09-27
Payer: MEDICARE

## 2018-09-27 VITALS
BODY MASS INDEX: 29.31 KG/M2 | SYSTOLIC BLOOD PRESSURE: 144 MMHG | HEART RATE: 88 BPM | RESPIRATION RATE: 18 BRPM | DIASTOLIC BLOOD PRESSURE: 79 MMHG | HEIGHT: 65 IN | WEIGHT: 175.94 LBS | TEMPERATURE: 98 F

## 2018-09-27 DIAGNOSIS — C50.012 MALIGNANT NEOPLASM INVOLVING BOTH NIPPLE AND AREOLA OF LEFT BREAST IN FEMALE, UNSPECIFIED ESTROGEN RECEPTOR STATUS: Primary | ICD-10-CM

## 2018-09-27 DIAGNOSIS — I10 ESSENTIAL HYPERTENSION: ICD-10-CM

## 2018-09-27 DIAGNOSIS — D49.89 NEOPLASM OF FACE: ICD-10-CM

## 2018-09-27 DIAGNOSIS — N18.30 CHRONIC KIDNEY DISEASE, STAGE III (MODERATE): ICD-10-CM

## 2018-09-27 PROCEDURE — 99214 OFFICE O/P EST MOD 30 MIN: CPT | Mod: S$PBB,,, | Performed by: INTERNAL MEDICINE

## 2018-09-27 PROCEDURE — 99999 PR PBB SHADOW E&M-EST. PATIENT-LVL III: CPT | Mod: PBBFAC,,, | Performed by: INTERNAL MEDICINE

## 2018-09-27 PROCEDURE — 3077F SYST BP >= 140 MM HG: CPT | Mod: CPTII,,, | Performed by: INTERNAL MEDICINE

## 2018-09-27 PROCEDURE — 99213 OFFICE O/P EST LOW 20 MIN: CPT | Mod: PBBFAC,PN | Performed by: INTERNAL MEDICINE

## 2018-09-27 PROCEDURE — 3078F DIAST BP <80 MM HG: CPT | Mod: CPTII,,, | Performed by: INTERNAL MEDICINE

## 2018-09-27 PROCEDURE — 1101F PT FALLS ASSESS-DOCD LE1/YR: CPT | Mod: CPTII,,, | Performed by: INTERNAL MEDICINE

## 2018-09-27 NOTE — PROGRESS NOTES
An 76-year-old  woman well known to me.  The patient was diagnosed n   May 2016 with triple positive breast cancer.  The patient had a 1.9 cm   malignancy of the left breast, sentinel lymph node negative in association with   DCIS.  She underwent lumpectomy, underwent adjuvant TCH chemotherapy for six   cycles and now continues with Herceptin alone, has done well, but she has   multiple other issues in association.  The patient had a recent echocardiogram   and follows with Dr. Gray.  Echocardiogram at this visit shows a drop by 10%   from an ejection fraction of 65% to 55%.  Dr. Gray has cleared her to continue   with Herceptin and with the short-term echocardiogram follow up as the patient   has remained asymptomatic.  The patient also had some pulmonary nodules that are   being followed by a CT scan.  They are deemed to be benign, but need ongoing   followup.  She had been taking Boniva for postmenopausal osteopenia/osteoporosis   along with calcium and the patient has also had increased density on mammogram.    Recently had a repeat mammogram, which Radiology recommends short-term   followup within three months on the right side. Was changed to prolia but she still has dental work scheduled.   She is due for a followup   mammogram on the left side three months following that, which will be towards   the end of year.  .   PHYSICAL EXAMINATION:  GENERAL:  Elderly woman.  Alert, awake, oriented.  VITAL SIGNS:    Wt Readings from Last 3 Encounters:   09/27/18 79.8 kg (175 lb 14.8 oz)   09/24/18 79.7 kg (175 lb 11.3 oz)   06/20/18 80.4 kg (177 lb 4 oz)     Temp Readings from Last 3 Encounters:   09/27/18 98.1 °F (36.7 °C)   05/18/18 98 °F (36.7 °C)   04/17/18 97.3 °F (36.3 °C) (Skin)     BP Readings from Last 3 Encounters:   09/27/18 (!) 144/79   09/24/18 (!) 144/79   06/20/18 (!) 182/82     Pulse Readings from Last 3 Encounters:   09/27/18 88   09/24/18 82   06/20/18 86   HEENT:  Hair has started coming  back.  Showed no congestion.  NECK:  Supple, without JVD.  Trachea is central.  LUNGS:  Clear to auscultation.  No rales, rhonchi or crepitations.  HEART:  S1 is normally heard.  No murmurs or gallops.  ABDOMEN:  Soft, without rebound, guarding or rigidity.  EXTREMITIES:  Showed no edema.  NEUROLOGIC:  She demonstrates no neurological deficits.    LABORATORY EVALUATION:    Lab Results   Component Value Date    WBC 7.14 09/24/2018    HGB 12.9 09/24/2018    HCT 38.2 09/24/2018    MCV 91 09/24/2018     09/24/2018     CMP  Sodium   Date Value Ref Range Status   09/24/2018 134 (L) 136 - 145 mmol/L Final     Potassium   Date Value Ref Range Status   09/24/2018 5.2 (H) 3.5 - 5.1 mmol/L Final     Chloride   Date Value Ref Range Status   09/24/2018 99 95 - 110 mmol/L Final     CO2   Date Value Ref Range Status   09/24/2018 25 23 - 29 mmol/L Final     Glucose   Date Value Ref Range Status   09/24/2018 94 70 - 110 mg/dL Final     BUN, Bld   Date Value Ref Range Status   09/24/2018 16 8 - 23 mg/dL Final     Creatinine   Date Value Ref Range Status   09/24/2018 1.1 0.5 - 1.4 mg/dL Final   01/18/2013 0.9 0.5 - 1.4 mg/dL Final     Calcium   Date Value Ref Range Status   09/24/2018 10.0 8.7 - 10.5 mg/dL Final   01/18/2013 8.9 8.7 - 10.5 mg/dL Final     Total Protein   Date Value Ref Range Status   09/24/2018 7.2 6.0 - 8.4 g/dL Final     Albumin   Date Value Ref Range Status   09/24/2018 4.2 3.5 - 5.2 g/dL Final     Total Bilirubin   Date Value Ref Range Status   09/24/2018 0.4 0.1 - 1.0 mg/dL Final     Comment:     For infants and newborns, interpretation of results should be based  on gestational age, weight and in agreement with clinical  observations.  Premature Infant recommended reference ranges:  Up to 24 hours.............<8.0 mg/dL  Up to 48 hours............<12.0 mg/dL  3-5 days..................<15.0 mg/dL  6-29 days.................<15.0 mg/dL       Alkaline Phosphatase   Date Value Ref Range Status   09/24/2018  71 55 - 135 U/L Final     AST   Date Value Ref Range Status   09/24/2018 33 10 - 40 U/L Final     ALT   Date Value Ref Range Status   09/24/2018 18 10 - 44 U/L Final     Anion Gap   Date Value Ref Range Status   09/24/2018 10 8 - 16 mmol/L Final   01/18/2013 9 5 - 15 meq/L Final     eGFR if    Date Value Ref Range Status   09/24/2018 55 (A) >60 mL/min/1.73 m^2 Final     eGFR if non    Date Value Ref Range Status   09/24/2018 48 (A) >60 mL/min/1.73 m^2 Final     Comment:     Calculation used to obtain the estimated glomerular filtration  rate (eGFR) is the CKD-EPI equation.        Echocardiogram, ejection fraction has dropped to 55%, but   still in the normal range.  The most recent CT of the chest, abdomen and pelvis   done on 5/2018  .   Impression       1.  Innumerable small pulmonary nodules without obvious detrimental change since 11/14/2017.  Longer term evaluation with follow-up in 1 year to 18 months may be of use.    2.  Evidence of prior left breast surgery with a stable postsurgical scar/seroma present.    3.  Renal hypodensities suggestive of cysts that appears stable since the prior too small to accurately categorize    4.Gas within the esophagus as can be seen with air swallowing or reflux with a small hiatal hernia       mammo 9/2018 wnl    IMPRESSION:  1.  Triple positive breast cancer, T1, N0, M0, underwent H chemotherapy,   Completed 1 year of herceptin, now on arimidex . Next prolia due now but she will be having dental work done so will delay prolia to 1/2019    EF dropped somewhat followed by Cardiology.  Cleared now by cards  2. The patient has significant osteoporosis on bone density.  .boniva changed this to Prolia.  Calcium   supplementation minimum 1200 mg along with dental precautions.  Orders placed   for Prolia.going for dental cleaning soon  3.  on arimidex and stay on it.   6.  The patient has dyslipidemia.  Continue with Zocor and primary care follow    up with Dr. Franco.  7.  Degenerative joint pains.  Continue with Ultram as needed, take trazodone   for sleep and anxiety along with Xanax.may go for pain stimulant  8.  Mild CKD.  Continue to monitor.  No intervention necessary at this time.  9.  Gastroesophageal reflux disease.  Continue with omeprazole.  No changes or   worsening of symptoms at this point.   Ct of chest adbd and pelvis 12/ 2018  10.psoas muscle  Tumor possibly a schwannoma confirmed with DR. Tovar   dc port  No prolia now as dental work still pending   will reschedule around 1/2019

## 2018-10-11 ENCOUNTER — LAB VISIT (OUTPATIENT)
Dept: LAB | Facility: HOSPITAL | Age: 78
End: 2018-10-11
Attending: INTERNAL MEDICINE
Payer: MEDICARE

## 2018-10-11 DIAGNOSIS — E83.52 HIGH CALCIUM LEVELS: ICD-10-CM

## 2018-10-11 DIAGNOSIS — E78.5 DYSLIPIDEMIA: ICD-10-CM

## 2018-10-11 DIAGNOSIS — I10 ESSENTIAL HYPERTENSION: ICD-10-CM

## 2018-10-11 LAB
ALBUMIN SERPL BCP-MCNC: 3.9 G/DL
ALP SERPL-CCNC: 80 U/L
ALT SERPL W/O P-5'-P-CCNC: 16 U/L
ANION GAP SERPL CALC-SCNC: 9 MMOL/L
AST SERPL-CCNC: 35 U/L
BILIRUB SERPL-MCNC: 0.4 MG/DL
BUN SERPL-MCNC: 15 MG/DL
CALCIUM SERPL-MCNC: 9.9 MG/DL
CHLORIDE SERPL-SCNC: 100 MMOL/L
CO2 SERPL-SCNC: 27 MMOL/L
CREAT SERPL-MCNC: 1.1 MG/DL
EST. GFR  (AFRICAN AMERICAN): 54.8 ML/MIN/1.73 M^2
EST. GFR  (NON AFRICAN AMERICAN): 47.5 ML/MIN/1.73 M^2
GLUCOSE SERPL-MCNC: 90 MG/DL
POTASSIUM SERPL-SCNC: 4.8 MMOL/L
PROT SERPL-MCNC: 7 G/DL
SODIUM SERPL-SCNC: 136 MMOL/L

## 2018-10-11 PROCEDURE — 80053 COMPREHEN METABOLIC PANEL: CPT

## 2018-10-11 PROCEDURE — 36415 COLL VENOUS BLD VENIPUNCTURE: CPT | Mod: PO

## 2018-10-12 ENCOUNTER — OFFICE VISIT (OUTPATIENT)
Dept: FAMILY MEDICINE | Facility: CLINIC | Age: 78
End: 2018-10-12
Payer: MEDICARE

## 2018-10-12 VITALS
HEIGHT: 65 IN | BODY MASS INDEX: 29.49 KG/M2 | HEART RATE: 73 BPM | WEIGHT: 177 LBS | SYSTOLIC BLOOD PRESSURE: 139 MMHG | DIASTOLIC BLOOD PRESSURE: 80 MMHG | OXYGEN SATURATION: 100 % | TEMPERATURE: 98 F

## 2018-10-12 DIAGNOSIS — G89.29 OTHER CHRONIC PAIN: ICD-10-CM

## 2018-10-12 DIAGNOSIS — G47.00 INSOMNIA, UNSPECIFIED TYPE: ICD-10-CM

## 2018-10-12 DIAGNOSIS — E78.5 DYSLIPIDEMIA: Primary | ICD-10-CM

## 2018-10-12 PROCEDURE — 1101F PT FALLS ASSESS-DOCD LE1/YR: CPT | Mod: CPTII,,, | Performed by: INTERNAL MEDICINE

## 2018-10-12 PROCEDURE — 99999 PR PBB SHADOW E&M-EST. PATIENT-LVL III: CPT | Mod: PBBFAC,,, | Performed by: INTERNAL MEDICINE

## 2018-10-12 PROCEDURE — 3075F SYST BP GE 130 - 139MM HG: CPT | Mod: CPTII,,, | Performed by: INTERNAL MEDICINE

## 2018-10-12 PROCEDURE — 99214 OFFICE O/P EST MOD 30 MIN: CPT | Mod: S$PBB,,, | Performed by: INTERNAL MEDICINE

## 2018-10-12 PROCEDURE — 99213 OFFICE O/P EST LOW 20 MIN: CPT | Mod: PBBFAC,PO | Performed by: INTERNAL MEDICINE

## 2018-10-12 PROCEDURE — 3079F DIAST BP 80-89 MM HG: CPT | Mod: CPTII,,, | Performed by: INTERNAL MEDICINE

## 2018-10-12 RX ORDER — TRAMADOL HYDROCHLORIDE 50 MG/1
TABLET ORAL
Qty: 60 TABLET | Refills: 5 | Status: SHIPPED | OUTPATIENT
Start: 2018-10-12 | End: 2019-10-16 | Stop reason: SDUPTHER

## 2018-10-12 RX ORDER — SIMVASTATIN 20 MG/1
20 TABLET, FILM COATED ORAL NIGHTLY
Qty: 90 TABLET | Refills: 3 | Status: SHIPPED | OUTPATIENT
Start: 2018-10-12 | End: 2019-10-12 | Stop reason: SDUPTHER

## 2018-10-12 RX ORDER — TRAZODONE HYDROCHLORIDE 100 MG/1
100 TABLET ORAL NIGHTLY
Qty: 90 TABLET | Refills: 3 | Status: SHIPPED | OUTPATIENT
Start: 2018-10-12 | End: 2019-04-16 | Stop reason: SDUPTHER

## 2018-10-12 NOTE — PROGRESS NOTES
Subjective:       Patient ID: Jing Tenorio is a 78 y.o. female.    Chief Complaint: Follow-up and Labs Only    Chronic Low back pain.  MRI showed tumor on nerve, but benign.  Has leg pain.  Failed epidural and ablation.  Saw neurosurgeon in [place] who did not want to do surgery as pt higher risk and thought may end up with chronic leg pain.  Recommended nerve stimulator.  Dr Rice had given meloxicam, which helps but pt has decreased kidney function so this was held.  Her new pain Dr, Dr Bean, offered Cymbalta? But pt scared of side affects. She also wants to avoid anything with potential addiction.  He also recommended a nerve stimulator but she is scared of this.  She will take an otc Advil about once a week.        High calcium - on Prolia for osteoporosis and takes vit D + vit D/ca+ supplement    HTN -  controlled.       HLD - controlled    Insomnia - controlled    Breast caner - now on anastrozole for 1 mo and feels bad or run down on this w some mild nausea.  Takes in am.   Breast cancer 1.9 cm, ER/RI+, HER2 +; lymph nodes negative; s/p removal, rad tx and completed 6 chemo treatments.    s/p Ratx, chemo - now on hormone suppression therapy.   Recheck this week      Review of Systems   Constitutional: Negative for appetite change and fever.   HENT: Negative for nosebleeds and trouble swallowing.    Eyes: Negative for discharge and visual disturbance.   Respiratory: Negative for choking and shortness of breath.    Cardiovascular: Negative for chest pain and palpitations.   Gastrointestinal: Negative for abdominal pain, nausea and vomiting.   Musculoskeletal: Positive for back pain. Negative for arthralgias and joint swelling.   Skin: Negative for rash and wound.   Neurological: Negative for dizziness and syncope.   Psychiatric/Behavioral: Negative for confusion and dysphoric mood.       Objective:      Vitals:    10/12/18 1131   BP: (!) 142/84   Pulse: 73   Temp: 97.6 °F (36.4 °C)     Physical Exam    Constitutional: She appears well-nourished.   Eyes: Conjunctivae and EOM are normal.   Neck: Normal range of motion.   Cardiovascular: Normal rate and regular rhythm.   Pulmonary/Chest: Effort normal and breath sounds normal.   Musculoskeletal:   Normal ROM bilateral    Neurological: No cranial nerve deficit (grossly intact).   Skin: Skin is warm and dry.   Psychiatric: She has a normal mood and affect.   Alert and orientated   Vitals reviewed.        Assessment:       1. Dyslipidemia    2. Insomnia, unspecified type    3. Other chronic pain        Plan:       Dyslipidemia  -     simvastatin (ZOCOR) 20 MG tablet; Take 1 tablet (20 mg total) by mouth every evening.  Dispense: 90 tablet; Refill: 3  -     Lipid panel; Future; Expected date: 04/10/2019  -     Comprehensive metabolic panel; Future; Expected date: 04/10/2019    Insomnia, unspecified type  -     traZODone (DESYREL) 100 MG tablet; Take 1 tablet (100 mg total) by mouth every evening.  Dispense: 90 tablet; Refill: 3    Other chronic pain  -     traMADol (ULTRAM) 50 mg tablet; 1/2 - 1 po q 6 hour prn pain  Dispense: 60 tablet; Refill: 5               Medication List           Accurate as of 10/12/18 12:15 PM. If you have any questions, ask your nurse or doctor.               CONTINUE taking these medications    acetaminophen 500 MG tablet  Commonly known as:  TYLENOL     anastrozole 1 mg Tab  Commonly known as:  ARIMIDEX  Take 1 tablet (1 mg total) by mouth once daily.     b complex vitamins tablet     CO Q-10 100 mg capsule  Generic drug:  coenzyme Q10     denosumab 60 mg/mL Syrg  Commonly known as:  PROLIA     FLUZONE HIGH-DOSE 2018-19 (PF) 180 mcg/0.5 mL vaccine  Generic drug:  influenza  Inject 0.5 mLs into the muscle.     lisinopril 10 MG tablet  Take 1 tablet (10 mg total) by mouth 2 (two) times daily.     MULTIVITAMIN ORAL     omeprazole 40 MG capsule  Commonly known as:  PRILOSEC  TAKE 1 CAPSULE BY MOUTH EVERY DAY     simvastatin 20 MG  "tablet  Commonly known as:  ZOCOR  Take 1 tablet (20 mg total) by mouth every evening.     traZODone 100 MG tablet  Commonly known as:  DESYREL  Take 1 tablet (100 mg total) by mouth every evening.     VITAMIN C 500 MG tablet  Generic drug:  ascorbic acid (vitamin C)     VITAMIN D3 1000 units Tab  Generic drug:  vitamin D     vitamin E 400 UNIT capsule            Continue current management    Counseled on regular exercise, maintenance of a healthy weight, balanced diet rich in fruits/vegetables and lean protein, and avoidance of unhealthy habits like smoking and excessive alcohol intake.   Also, counseled on importance of being compliant with medication, health appointments, diet and exercise.     Follow-up in about 6 months (around 4/12/2019).    "This note will not be shared with the patient."  "

## 2019-01-08 ENCOUNTER — TELEPHONE (OUTPATIENT)
Dept: HEMATOLOGY/ONCOLOGY | Facility: CLINIC | Age: 79
End: 2019-01-08

## 2019-01-08 NOTE — TELEPHONE ENCOUNTER
----- Message from Joshua Hart sent at 1/8/2019 10:49 AM CST -----  Type: Needs Medical Advice    Who Called:  Patient  Best Call Back Number: [phone] (home)   Additional Information: Patient would like to speak to office regarding a few things

## 2019-01-08 NOTE — TELEPHONE ENCOUNTER
Returned call to patient. Per Dr. Olvera's Sept 2018 note Prolia was held pending dental work with approximate reschedule in January 2019. Patient unsure of dental work and date. She will call dentist and get dates and what dental work was done and call back. Will reschedule lab, fu and Prolia pending dental work information.

## 2019-01-21 ENCOUNTER — HOSPITAL ENCOUNTER (OUTPATIENT)
Dept: RADIOLOGY | Facility: HOSPITAL | Age: 79
Discharge: HOME | End: 2019-01-21
Attending: INTERNAL MEDICINE
Payer: MEDICARE

## 2019-01-21 ENCOUNTER — DOCUMENTATION ONLY (OUTPATIENT)
Dept: FAMILY MEDICINE | Facility: CLINIC | Age: 79
End: 2019-01-21

## 2019-01-21 DIAGNOSIS — D49.89 NEOPLASM OF FACE: ICD-10-CM

## 2019-01-21 PROCEDURE — 74177 CT CHEST ABDOMEN PELVIS WITH CONTRAST (XPD): ICD-10-PCS | Mod: 26,,, | Performed by: RADIOLOGY

## 2019-01-21 PROCEDURE — 71260 CT THORAX DX C+: CPT | Mod: 26,,, | Performed by: RADIOLOGY

## 2019-01-21 PROCEDURE — 25500020 PHARM REV CODE 255: Mod: PO | Performed by: INTERNAL MEDICINE

## 2019-01-21 PROCEDURE — 74177 CT ABD & PELVIS W/CONTRAST: CPT | Mod: TC,PO

## 2019-01-21 PROCEDURE — 71260 CT CHEST ABDOMEN PELVIS WITH CONTRAST (XPD): ICD-10-PCS | Mod: 26,,, | Performed by: RADIOLOGY

## 2019-01-21 PROCEDURE — 74177 CT ABD & PELVIS W/CONTRAST: CPT | Mod: 26,,, | Performed by: RADIOLOGY

## 2019-01-21 RX ADMIN — IOHEXOL 30 ML: 350 INJECTION, SOLUTION INTRAVENOUS at 10:01

## 2019-01-21 RX ADMIN — IOHEXOL 75 ML: 350 INJECTION, SOLUTION INTRAVENOUS at 10:01

## 2019-01-25 ENCOUNTER — OFFICE VISIT (OUTPATIENT)
Dept: HEMATOLOGY/ONCOLOGY | Facility: CLINIC | Age: 79
End: 2019-01-25
Payer: MEDICARE

## 2019-01-25 ENCOUNTER — INFUSION (OUTPATIENT)
Dept: INFUSION THERAPY | Facility: HOSPITAL | Age: 79
End: 2019-01-25
Attending: INTERNAL MEDICINE
Payer: MEDICARE

## 2019-01-25 VITALS
BODY MASS INDEX: 29.75 KG/M2 | WEIGHT: 178.56 LBS | HEART RATE: 81 BPM | RESPIRATION RATE: 18 BRPM | DIASTOLIC BLOOD PRESSURE: 81 MMHG | TEMPERATURE: 98 F | SYSTOLIC BLOOD PRESSURE: 156 MMHG | HEIGHT: 65 IN

## 2019-01-25 VITALS
WEIGHT: 178.56 LBS | SYSTOLIC BLOOD PRESSURE: 180 MMHG | BODY MASS INDEX: 29.75 KG/M2 | RESPIRATION RATE: 18 BRPM | TEMPERATURE: 98 F | HEART RATE: 98 BPM | DIASTOLIC BLOOD PRESSURE: 79 MMHG | HEIGHT: 65 IN

## 2019-01-25 DIAGNOSIS — M81.0 OSTEOPOROSIS, SENILE: Primary | ICD-10-CM

## 2019-01-25 DIAGNOSIS — M54.42 CHRONIC BILATERAL LOW BACK PAIN WITH LEFT-SIDED SCIATICA: ICD-10-CM

## 2019-01-25 DIAGNOSIS — C50.012 MALIGNANT NEOPLASM INVOLVING BOTH NIPPLE AND AREOLA OF LEFT BREAST IN FEMALE, UNSPECIFIED ESTROGEN RECEPTOR STATUS: ICD-10-CM

## 2019-01-25 DIAGNOSIS — G89.29 CHRONIC BILATERAL LOW BACK PAIN WITH LEFT-SIDED SCIATICA: ICD-10-CM

## 2019-01-25 DIAGNOSIS — M81.0 OSTEOPOROSIS, SENILE: ICD-10-CM

## 2019-01-25 DIAGNOSIS — M17.0 PRIMARY OSTEOARTHRITIS OF BOTH KNEES: ICD-10-CM

## 2019-01-25 DIAGNOSIS — C50.012 MALIGNANT NEOPLASM OF NIPPLE OF LEFT BREAST IN FEMALE, UNSPECIFIED ESTROGEN RECEPTOR STATUS: ICD-10-CM

## 2019-01-25 DIAGNOSIS — C50.011 MALIGNANT NEOPLASM OF NIPPLE OF RIGHT BREAST IN FEMALE, UNSPECIFIED ESTROGEN RECEPTOR STATUS: Primary | ICD-10-CM

## 2019-01-25 DIAGNOSIS — E78.5 HYPERLIPIDEMIA LDL GOAL <130: ICD-10-CM

## 2019-01-25 DIAGNOSIS — I10 ESSENTIAL HYPERTENSION: ICD-10-CM

## 2019-01-25 PROCEDURE — 96372 THER/PROPH/DIAG INJ SC/IM: CPT | Mod: HCNC,PN

## 2019-01-25 PROCEDURE — 99499 RISK ADDL DX/OHS AUDIT: ICD-10-PCS | Mod: HCNC,S$GLB,, | Performed by: INTERNAL MEDICINE

## 2019-01-25 PROCEDURE — 63600175 PHARM REV CODE 636 W HCPCS: Mod: HCNC,JG,PN | Performed by: INTERNAL MEDICINE

## 2019-01-25 PROCEDURE — 1101F PT FALLS ASSESS-DOCD LE1/YR: CPT | Mod: HCNC,CPTII,S$GLB, | Performed by: INTERNAL MEDICINE

## 2019-01-25 PROCEDURE — 3077F SYST BP >= 140 MM HG: CPT | Mod: HCNC,CPTII,S$GLB, | Performed by: INTERNAL MEDICINE

## 2019-01-25 PROCEDURE — 1101F PR PT FALLS ASSESS DOC 0-1 FALLS W/OUT INJ PAST YR: ICD-10-PCS | Mod: HCNC,CPTII,S$GLB, | Performed by: INTERNAL MEDICINE

## 2019-01-25 PROCEDURE — 3077F PR MOST RECENT SYSTOLIC BLOOD PRESSURE >= 140 MM HG: ICD-10-PCS | Mod: HCNC,CPTII,S$GLB, | Performed by: INTERNAL MEDICINE

## 2019-01-25 PROCEDURE — 99214 PR OFFICE/OUTPT VISIT, EST, LEVL IV, 30-39 MIN: ICD-10-PCS | Mod: HCNC,S$GLB,, | Performed by: INTERNAL MEDICINE

## 2019-01-25 PROCEDURE — 99214 OFFICE O/P EST MOD 30 MIN: CPT | Mod: HCNC,S$GLB,, | Performed by: INTERNAL MEDICINE

## 2019-01-25 PROCEDURE — 99499 UNLISTED E&M SERVICE: CPT | Mod: HCNC,S$GLB,, | Performed by: INTERNAL MEDICINE

## 2019-01-25 PROCEDURE — 99999 PR PBB SHADOW E&M-EST. PATIENT-LVL IV: ICD-10-PCS | Mod: PBBFAC,HCNC,, | Performed by: INTERNAL MEDICINE

## 2019-01-25 PROCEDURE — 99999 PR PBB SHADOW E&M-EST. PATIENT-LVL IV: CPT | Mod: PBBFAC,HCNC,, | Performed by: INTERNAL MEDICINE

## 2019-01-25 PROCEDURE — 3078F PR MOST RECENT DIASTOLIC BLOOD PRESSURE < 80 MM HG: ICD-10-PCS | Mod: HCNC,CPTII,S$GLB, | Performed by: INTERNAL MEDICINE

## 2019-01-25 PROCEDURE — 3078F DIAST BP <80 MM HG: CPT | Mod: HCNC,CPTII,S$GLB, | Performed by: INTERNAL MEDICINE

## 2019-01-25 RX ADMIN — DENOSUMAB 60 MG: 60 INJECTION SUBCUTANEOUS at 03:01

## 2019-01-25 NOTE — PLAN OF CARE
Problem: Fall Injury Risk  Goal: Absence of Fall and Fall-Related Injury    Intervention: Identify and Manage Contributors to Fall Injury Risk  Pt receiving prolia on shift. Pt educated to continue vit D and calcium supplement. Pt also educated to refrain from invasive dental work. Pt verbalized understanding

## 2019-01-25 NOTE — PROGRESS NOTES
An 78 year-old  woman well known to me.  The patient was diagnosed n   May 2016 with triple positive breast cancer.  The patient had a 1.9 cm   malignancy of the left breast, sentinel lymph node negative in association with   DCIS.  She underwent lumpectomy, underwent adjuvant TCH chemotherapy for six   cycles and now continues with Herceptin alone, has done well, but she has   multiple other issues in association.  The patient had a recent echocardiogram   and follows with Dr. Gray.  Echocardiogram at this visit shows a drop by 10%   from an ejection fraction of 65% to 55%.  Dr. Gray has cleared her to continue   with Herceptin and with the short-term echocardiogram follow up as the patient   has remained asymptomatic.  The patient also had some pulmonary nodules that are   being followed by a CT scan.  They are deemed to be benign, but need ongoing   followup.  She had been taking Boniva for postmenopausal osteopenia/osteoporosis   along with calcium and the patient has also had increased density on mammogram.    Recently had a repeat mammogram, which Radiology recommends short-term   followup within three months on the right side. Was changed to prolia but she still has dental work scheduled.   She is due for a followup   mammogram on the left side three months following that, which will be towards   the end of year.  .   PHYSICAL EXAMINATION:  GENERAL:  Elderly woman.  Alert, awake, oriented.  VITAL SIGNS:    Wt Readings from Last 3 Encounters:   01/25/19 81 kg (178 lb 9.2 oz)   10/12/18 80.3 kg (177 lb 0.5 oz)   09/27/18 79.8 kg (175 lb 14.8 oz)     Temp Readings from Last 3 Encounters:   01/25/19 98 °F (36.7 °C)   10/12/18 97.6 °F (36.4 °C) (Oral)   09/27/18 98.1 °F (36.7 °C)     BP Readings from Last 3 Encounters:   01/25/19 (!) 180/79   10/12/18 139/80   09/27/18 (!) 144/79     Pulse Readings from Last 3 Encounters:   01/25/19 98   10/12/18 73   09/27/18 88   HEENT:  Hair has started coming back.   Showed no congestion.  NECK:  Supple, without JVD.  Trachea is central.  LUNGS:  Clear to auscultation.  No rales, rhonchi or crepitations.  HEART:  S1 is normally heard.  No murmurs or gallops.  ABDOMEN:  Soft, without rebound, guarding or rigidity.  EXTREMITIES:  Showed no edema.  NEUROLOGIC:  She demonstrates no neurological deficits.    LABORATORY EVALUATION:    Lab Results   Component Value Date    WBC 7.81 01/21/2019    HGB 13.3 01/21/2019    HCT 39.5 01/21/2019    MCV 91 01/21/2019     01/21/2019     CMP  Sodium   Date Value Ref Range Status   01/21/2019 138 136 - 145 mmol/L Final     Potassium   Date Value Ref Range Status   01/21/2019 4.6 3.5 - 5.1 mmol/L Final     Chloride   Date Value Ref Range Status   01/21/2019 100 95 - 110 mmol/L Final     CO2   Date Value Ref Range Status   01/21/2019 28 23 - 29 mmol/L Final     Glucose   Date Value Ref Range Status   01/21/2019 94 70 - 110 mg/dL Final     BUN, Bld   Date Value Ref Range Status   01/21/2019 14 8 - 23 mg/dL Final     Creatinine   Date Value Ref Range Status   01/21/2019 1.1 0.5 - 1.4 mg/dL Final   01/18/2013 0.9 0.5 - 1.4 mg/dL Final     Calcium   Date Value Ref Range Status   01/21/2019 10.7 (H) 8.7 - 10.5 mg/dL Final   01/18/2013 8.9 8.7 - 10.5 mg/dL Final     Total Protein   Date Value Ref Range Status   01/21/2019 7.1 6.0 - 8.4 g/dL Final     Albumin   Date Value Ref Range Status   01/21/2019 4.3 3.5 - 5.2 g/dL Final     Total Bilirubin   Date Value Ref Range Status   01/21/2019 0.6 0.1 - 1.0 mg/dL Final     Comment:     For infants and newborns, interpretation of results should be based  on gestational age, weight and in agreement with clinical  observations.  Premature Infant recommended reference ranges:  Up to 24 hours.............<8.0 mg/dL  Up to 48 hours............<12.0 mg/dL  3-5 days..................<15.0 mg/dL  6-29 days.................<15.0 mg/dL       Alkaline Phosphatase   Date Value Ref Range Status   01/21/2019 87 26 - 390  U/L Final     AST   Date Value Ref Range Status   01/21/2019 33 10 - 40 U/L Final     ALT   Date Value Ref Range Status   01/21/2019 19 10 - 44 U/L Final     Anion Gap   Date Value Ref Range Status   01/21/2019 10 8 - 16 mmol/L Final   01/18/2013 9 5 - 15 meq/L Final     eGFR if    Date Value Ref Range Status   01/21/2019 55 (A) >60 mL/min/1.73 m^2 Final     eGFR if non    Date Value Ref Range Status   01/21/2019 48 (A) >60 mL/min/1.73 m^2 Final     Comment:     Calculation used to obtain the estimated glomerular filtration  rate (eGFR) is the CKD-EPI equation.        Echocardiogram, ejection fraction has dropped to 55%, but   still in the normal range.  The most recent CT of the chest, abdomen and pelvis   done on 5/2018  .   Impression 1/2019       Stable appearance to the chest, abdomen and pelvis in this patient status post left breast surgery reportedly for malignancy.    There is a small hiatal hernia there is fatty liver infiltration but I do not see evidence of sofya or parenchymal metastatic disease.    There are stable innumerable sub 5 mm pulmonary nodules for which no specific follow up is advised.           mammo 9/2018 wnl    IMPRESSION:  1.  Triple positive breast cancer, T1, N0, M0, underwent TCH chemotherapy,   Completed 1 year of herceptin, now on arimidex . Next prolia due now but she will be having dental work done so will delay prolia to 1/2019    EF dropped somewhat followed by Cardiology.  Cleared now by cards  2. The patient has significant osteoporosis on bone density.  .boniva changed this to Prolia.  Proceed with prolia  3 months out of dental work  Calcium   supplementation minimum 1200 mg along with dental precautions.  3.  on arimidex and stay on it.   6.  The patient has dyslipidemia.  Continue with Zocor and primary care follow   up with Dr. Franco.  7.  Degenerative joint pains.  Continue with Ultram as needed, take trazodone   for sleep and anxiety  along with Xanax.may go for pain stimulant  8.  Mild CKD.  Continue to monitor.  No intervention necessary at this time.  9.  Gastroesophageal reflux disease.  Continue with omeprazole.  No changes or   worsening of symptoms at this point  Will get pet in 6 months due to ckd , and breast ca would like to avoid dye  10.psoas muscle  Tumor possibly a schwannoma confirmed with DR. La valentin port  No prolia now as dental work still pending   will reschedule around 1/2019   aortic athrosclerosis stable follow with  pcp

## 2019-03-13 ENCOUNTER — TELEPHONE (OUTPATIENT)
Dept: HEMATOLOGY/ONCOLOGY | Facility: CLINIC | Age: 79
End: 2019-03-13

## 2019-03-13 NOTE — TELEPHONE ENCOUNTER
Spoke with pt about having dental work done between Prolia injections. I told her that dental work such as cleaning is ok as  Long as it does not cause excessive bleeding

## 2019-03-13 NOTE — TELEPHONE ENCOUNTER
----- Message from Paula Yarbrough sent at 3/13/2019  9:28 AM CDT -----  Type: Needs Medical Advice    Who Called:  self  Symptoms (please be specific):  Asking to discuss getting her teeth cleaned (has to be 3 months) since her injections  How long has patient had these symptoms:  Questions regarding upcoming appt    Pharmacy name and phone #:     Best Call Back Number: [phone]   Additional Information: leave a message

## 2019-04-02 ENCOUNTER — PATIENT OUTREACH (OUTPATIENT)
Dept: ADMINISTRATIVE | Facility: HOSPITAL | Age: 79
End: 2019-04-02

## 2019-04-03 ENCOUNTER — TELEPHONE (OUTPATIENT)
Dept: FAMILY MEDICINE | Facility: CLINIC | Age: 79
End: 2019-04-03

## 2019-04-03 NOTE — TELEPHONE ENCOUNTER
----- Message from Nelson Crouch sent at 4/3/2019  2:43 PM CDT -----  Contact: patient  Type: Needs Medical Advice    Who Called:  Patient  Symptoms (please be specific):    How long has patient had these symptoms:    Pharmacy name and phone #:    Best Call Back Number: [phone]  Additional Information: patient has questions regarding blood work order

## 2019-04-09 ENCOUNTER — LAB VISIT (OUTPATIENT)
Dept: LAB | Facility: HOSPITAL | Age: 79
End: 2019-04-09
Attending: INTERNAL MEDICINE
Payer: MEDICARE

## 2019-04-09 DIAGNOSIS — E78.5 DYSLIPIDEMIA: ICD-10-CM

## 2019-04-09 LAB
ALBUMIN SERPL BCP-MCNC: 4.2 G/DL (ref 3.5–5.2)
ALP SERPL-CCNC: 71 U/L (ref 55–135)
ALT SERPL W/O P-5'-P-CCNC: 17 U/L (ref 10–44)
ANION GAP SERPL CALC-SCNC: 7 MMOL/L (ref 8–16)
AST SERPL-CCNC: 31 U/L (ref 10–40)
BILIRUB SERPL-MCNC: 0.6 MG/DL (ref 0.1–1)
BUN SERPL-MCNC: 12 MG/DL (ref 8–23)
CALCIUM SERPL-MCNC: 9.9 MG/DL (ref 8.7–10.5)
CHLORIDE SERPL-SCNC: 102 MMOL/L (ref 95–110)
CHOLEST SERPL-MCNC: 174 MG/DL (ref 120–199)
CHOLEST/HDLC SERPL: 2.4 {RATIO} (ref 2–5)
CO2 SERPL-SCNC: 28 MMOL/L (ref 23–29)
CREAT SERPL-MCNC: 1.1 MG/DL (ref 0.5–1.4)
EST. GFR  (AFRICAN AMERICAN): 54.8 ML/MIN/1.73 M^2
EST. GFR  (NON AFRICAN AMERICAN): 47.5 ML/MIN/1.73 M^2
GLUCOSE SERPL-MCNC: 96 MG/DL (ref 70–110)
HDLC SERPL-MCNC: 72 MG/DL (ref 40–75)
HDLC SERPL: 41.4 % (ref 20–50)
LDLC SERPL CALC-MCNC: 85 MG/DL (ref 63–159)
NONHDLC SERPL-MCNC: 102 MG/DL
POTASSIUM SERPL-SCNC: 5 MMOL/L (ref 3.5–5.1)
PROT SERPL-MCNC: 7.1 G/DL (ref 6–8.4)
SODIUM SERPL-SCNC: 137 MMOL/L (ref 136–145)
TRIGL SERPL-MCNC: 85 MG/DL (ref 30–150)

## 2019-04-09 PROCEDURE — 80061 LIPID PANEL: CPT | Mod: HCNC

## 2019-04-09 PROCEDURE — 36415 COLL VENOUS BLD VENIPUNCTURE: CPT | Mod: HCNC,PO

## 2019-04-09 PROCEDURE — 80053 COMPREHEN METABOLIC PANEL: CPT | Mod: HCNC

## 2019-04-16 ENCOUNTER — TELEPHONE (OUTPATIENT)
Dept: HEMATOLOGY/ONCOLOGY | Facility: CLINIC | Age: 79
End: 2019-04-16

## 2019-04-16 ENCOUNTER — OFFICE VISIT (OUTPATIENT)
Dept: FAMILY MEDICINE | Facility: CLINIC | Age: 79
End: 2019-04-16
Payer: MEDICARE

## 2019-04-16 VITALS
HEART RATE: 84 BPM | WEIGHT: 175.25 LBS | HEIGHT: 65 IN | SYSTOLIC BLOOD PRESSURE: 138 MMHG | DIASTOLIC BLOOD PRESSURE: 80 MMHG | BODY MASS INDEX: 29.2 KG/M2 | OXYGEN SATURATION: 99 %

## 2019-04-16 DIAGNOSIS — G47.00 INSOMNIA, UNSPECIFIED TYPE: ICD-10-CM

## 2019-04-16 DIAGNOSIS — K21.9 GASTROESOPHAGEAL REFLUX DISEASE, ESOPHAGITIS PRESENCE NOT SPECIFIED: ICD-10-CM

## 2019-04-16 DIAGNOSIS — M81.0 OSTEOPOROSIS, UNSPECIFIED OSTEOPOROSIS TYPE, UNSPECIFIED PATHOLOGICAL FRACTURE PRESENCE: ICD-10-CM

## 2019-04-16 DIAGNOSIS — I70.0 AORTIC ATHEROSCLEROSIS: ICD-10-CM

## 2019-04-16 DIAGNOSIS — D69.6 THROMBOCYTOPENIA: ICD-10-CM

## 2019-04-16 DIAGNOSIS — I10 ESSENTIAL HYPERTENSION: Primary | ICD-10-CM

## 2019-04-16 DIAGNOSIS — N18.30 CKD (CHRONIC KIDNEY DISEASE), STAGE III: ICD-10-CM

## 2019-04-16 DIAGNOSIS — M46.99 INFLAMMATORY SPONDYLOPATHY OF MULTIPLE SITES IN SPINE: ICD-10-CM

## 2019-04-16 PROCEDURE — 99214 PR OFFICE/OUTPT VISIT, EST, LEVL IV, 30-39 MIN: ICD-10-PCS | Mod: HCNC,S$GLB,, | Performed by: INTERNAL MEDICINE

## 2019-04-16 PROCEDURE — 3079F DIAST BP 80-89 MM HG: CPT | Mod: HCNC,CPTII,S$GLB, | Performed by: INTERNAL MEDICINE

## 2019-04-16 PROCEDURE — 99214 OFFICE O/P EST MOD 30 MIN: CPT | Mod: HCNC,S$GLB,, | Performed by: INTERNAL MEDICINE

## 2019-04-16 PROCEDURE — 3075F PR MOST RECENT SYSTOLIC BLOOD PRESS GE 130-139MM HG: ICD-10-PCS | Mod: HCNC,CPTII,S$GLB, | Performed by: INTERNAL MEDICINE

## 2019-04-16 PROCEDURE — 99499 RISK ADDL DX/OHS AUDIT: ICD-10-PCS | Mod: HCNC,S$GLB,, | Performed by: INTERNAL MEDICINE

## 2019-04-16 PROCEDURE — 99999 PR PBB SHADOW E&M-EST. PATIENT-LVL III: ICD-10-PCS | Mod: PBBFAC,HCNC,, | Performed by: INTERNAL MEDICINE

## 2019-04-16 PROCEDURE — 1101F PR PT FALLS ASSESS DOC 0-1 FALLS W/OUT INJ PAST YR: ICD-10-PCS | Mod: HCNC,CPTII,S$GLB, | Performed by: INTERNAL MEDICINE

## 2019-04-16 PROCEDURE — 3079F PR MOST RECENT DIASTOLIC BLOOD PRESSURE 80-89 MM HG: ICD-10-PCS | Mod: HCNC,CPTII,S$GLB, | Performed by: INTERNAL MEDICINE

## 2019-04-16 PROCEDURE — 1101F PT FALLS ASSESS-DOCD LE1/YR: CPT | Mod: HCNC,CPTII,S$GLB, | Performed by: INTERNAL MEDICINE

## 2019-04-16 PROCEDURE — 99999 PR PBB SHADOW E&M-EST. PATIENT-LVL III: CPT | Mod: PBBFAC,HCNC,, | Performed by: INTERNAL MEDICINE

## 2019-04-16 PROCEDURE — 3075F SYST BP GE 130 - 139MM HG: CPT | Mod: HCNC,CPTII,S$GLB, | Performed by: INTERNAL MEDICINE

## 2019-04-16 PROCEDURE — 99499 UNLISTED E&M SERVICE: CPT | Mod: HCNC,S$GLB,, | Performed by: INTERNAL MEDICINE

## 2019-04-16 RX ORDER — LISINOPRIL 20 MG/1
TABLET ORAL
Qty: 135 TABLET | Refills: 3 | Status: SHIPPED | OUTPATIENT
Start: 2019-04-16 | End: 2019-06-10 | Stop reason: SDUPTHER

## 2019-04-16 RX ORDER — TRAZODONE HYDROCHLORIDE 100 MG/1
100 TABLET ORAL NIGHTLY
Qty: 90 TABLET | Refills: 3 | Status: SHIPPED | OUTPATIENT
Start: 2019-04-16 | End: 2020-05-21 | Stop reason: SDUPTHER

## 2019-04-16 NOTE — TELEPHONE ENCOUNTER
----- Message from Maribell Albarado sent at 4/16/2019  1:22 PM CDT -----  Contact: Patient  Type: Needs Medical Advice    Who Called:  Patient  Best Call Back Number:   Additional Information:Mrs. Franco has some questions about a shot she is on.Please call back and advise.

## 2019-04-16 NOTE — TELEPHONE ENCOUNTER
Returned call to patient, discussed Prolia injection and dental work, with routine cleaning no opposed in the 3 month window. Patient voiced understanding and appreciation

## 2019-04-16 NOTE — PROGRESS NOTES
Subjective:       Patient ID: Jing Tenorio is a 78 y.o. female.    Chief Complaint: Hypertension    Chronic Low back pain/ inflammatory arthropathy. MRI showed tumor on nerve, but benign.  Has leg pain.  Failed epidural and ablation.  Saw neurosurgeon in [place] who did not want to do surgery as pt higher risk and thought may end up with chronic leg pain.  Recommended nerve stimulator.  Dr Rice had given meloxicam, which helps but pt has decreased kidney function so this was held.  Her new pain Dr, Dr Bean, offered Cymbalta? But pt scared of side affects. She also wants to avoid anything with potential addiction.  He also recommended a nerve stimulator but she is scared of this.  She will take an otc Advil about once a week.        High calcium - on Prolia for osteoporosis and takes vit D + vit D/ca+ supplement    HTN -  Borderline controlled.       HLD - controlled    Insomnia - controlled    Breast caner - now on anastrozole for 1 mo and feels bad or run down on this w some mild nausea.  Takes in am.   Breast cancer 1.9 cm, ER/MS+, HER2 +; lymph nodes negative; s/p removal, rad tx and completed 6 chemo treatments.    s/p Ratx, chemo - now on hormone suppression therapy.   Recheck this week    Thrombocytopenia - mild  Atherosclerosis - no syncope    Review of Systems   Constitutional: Negative for appetite change and fever.   HENT: Negative for nosebleeds and trouble swallowing.    Eyes: Negative for discharge and visual disturbance.   Respiratory: Negative for choking and shortness of breath.    Cardiovascular: Negative for chest pain and palpitations.   Gastrointestinal: Negative for abdominal pain, nausea and vomiting.   Musculoskeletal: Positive for back pain. Negative for arthralgias and joint swelling.   Skin: Negative for rash and wound.   Neurological: Negative for dizziness and syncope.   Psychiatric/Behavioral: Negative for confusion and dysphoric mood.       Objective:      Vitals:    04/16/19  1100   BP: 138/80   Pulse: 84     Physical Exam   Constitutional: She appears well-nourished.   Eyes: Conjunctivae and EOM are normal.   Neck: Normal range of motion.   Cardiovascular: Normal rate and regular rhythm.   Pulmonary/Chest: Effort normal and breath sounds normal.   Musculoskeletal:   Normal ROM bilateral    Neurological: No cranial nerve deficit (grossly intact).   Skin: Skin is warm and dry.   Psychiatric: She has a normal mood and affect.   Alert and orientated   Vitals reviewed.        Assessment:       1. Essential hypertension    2. Insomnia, unspecified type    3. Gastroesophageal reflux disease, esophagitis presence not specified    4. Osteoporosis, unspecified osteoporosis type, unspecified pathological fracture presence    5. Inflammatory spondylopathy of multiple sites in spine    6. Thrombocytopenia    7. Aortic atherosclerosis    8. CKD (chronic kidney disease), stage III        Plan:       Essential hypertension  -     lisinopril (PRINIVIL,ZESTRIL) 20 MG tablet; 1 po qam and 1/2 tab qpm  Dispense: 135 tablet; Refill: 3  -     Comprehensive metabolic panel; Future; Expected date: 10/13/2019    Insomnia, unspecified type  -     traZODone (DESYREL) 100 MG tablet; Take 1 tablet (100 mg total) by mouth every evening.  Dispense: 90 tablet; Refill: 3    Gastroesophageal reflux disease, esophagitis presence not specified    Osteoporosis, unspecified osteoporosis type, unspecified pathological fracture presence  -     DXA Bone Density Spine And Hip; Future; Expected date: 06/03/2019    Inflammatory spondylopathy of multiple sites in spine    Thrombocytopenia    Aortic atherosclerosis    CKD (chronic kidney disease), stage III  -     Comprehensive metabolic panel; Future; Expected date: 10/13/2019  -     CBC auto differential; Future; Expected date: 10/13/2019            Medication List with Changes/Refills   Current Medications    ACETAMINOPHEN (TYLENOL) 500 MG TABLET    Take 500 mg by mouth every 6  (six) hours as needed for Pain.    ANASTROZOLE (ARIMIDEX) 1 MG TAB    Take 1 tablet (1 mg total) by mouth once daily.    ASCORBIC ACID, VITAMIN C, (VITAMIN C) 500 MG TABLET    Take 500 mg by mouth once daily.    B COMPLEX VITAMINS TABLET    Take 1 tablet by mouth once daily.    COENZYME Q10 (CO Q-10) 100 MG CAPSULE    Take 100 mg by mouth once daily.     DENOSUMAB (PROLIA) 60 MG/ML SYRG    Inject 60 mg into the skin every 6 (six) months.    INFLUENZA (FLUZONE HIGH-DOSE 2018-19, PF,) 180 MCG/0.5 ML VACCINE    Inject 0.5 mLs into the muscle.    MULTIVITAMIN ORAL    once daily. Every day    OMEPRAZOLE (PRILOSEC) 40 MG CAPSULE    TAKE 1 CAPSULE BY MOUTH EVERY DAY    SIMVASTATIN (ZOCOR) 20 MG TABLET    Take 1 tablet (20 mg total) by mouth every evening.    TRAMADOL (ULTRAM) 50 MG TABLET    1/2 - 1 po q 6 hour prn pain    VITAMIN D (VITAMIN D3) 1000 UNITS TAB    Take 2,000 Units by mouth once daily.    VITAMIN E 400 UNIT CAPSULE    Take 400 Units by mouth once daily.   Changed and/or Refilled Medications    Modified Medication Previous Medication    LISINOPRIL (PRINIVIL,ZESTRIL) 20 MG TABLET lisinopril 10 MG tablet       1 po qam and 1/2 tab qpm    Take 1 tablet (10 mg total) by mouth 2 (two) times daily.    TRAZODONE (DESYREL) 100 MG TABLET traZODone (DESYREL) 100 MG tablet       Take 1 tablet (100 mg total) by mouth every evening.    Take 1 tablet (100 mg total) by mouth every evening.     Avoid nsaids  Continue current management and monitor.    Counseled on regular exercise, maintenance of a healthy weight, balanced diet rich in fruits/vegetables and lean protein, and avoidance of unhealthy habits like smoking and excessive alcohol intake.   Also, counseled on importance of being compliant with medication, health appointments, diet and exercise.     Follow up in about 6 months (around 10/16/2019). annual

## 2019-04-23 ENCOUNTER — PES CALL (OUTPATIENT)
Dept: ADMINISTRATIVE | Facility: CLINIC | Age: 79
End: 2019-04-23

## 2019-05-03 ENCOUNTER — TELEPHONE (OUTPATIENT)
Dept: FAMILY MEDICINE | Facility: CLINIC | Age: 79
End: 2019-05-03

## 2019-05-16 ENCOUNTER — OFFICE VISIT (OUTPATIENT)
Dept: ORTHOPEDICS | Facility: CLINIC | Age: 79
End: 2019-05-16
Payer: MEDICARE

## 2019-05-16 VITALS — BODY MASS INDEX: 29.16 KG/M2 | WEIGHT: 175 LBS | HEIGHT: 65 IN

## 2019-05-16 DIAGNOSIS — M65.312 TRIGGER FINGER OF LEFT THUMB: ICD-10-CM

## 2019-05-16 DIAGNOSIS — M79.642 HAND PAIN, LEFT: ICD-10-CM

## 2019-05-16 DIAGNOSIS — M65.342 TRIGGER FINGER, LEFT RING FINGER: Primary | ICD-10-CM

## 2019-05-16 PROCEDURE — 1101F PT FALLS ASSESS-DOCD LE1/YR: CPT | Mod: HCNC,CPTII,S$GLB, | Performed by: ORTHOPAEDIC SURGERY

## 2019-05-16 PROCEDURE — 20550 TENDON SHEATH: L THUMB MCP: ICD-10-PCS | Mod: HCNC,51,FA,S$GLB | Performed by: ORTHOPAEDIC SURGERY

## 2019-05-16 PROCEDURE — 99999 PR PBB SHADOW E&M-EST. PATIENT-LVL II: CPT | Mod: PBBFAC,HCNC,, | Performed by: ORTHOPAEDIC SURGERY

## 2019-05-16 PROCEDURE — 99214 PR OFFICE/OUTPT VISIT, EST, LEVL IV, 30-39 MIN: ICD-10-PCS | Mod: 25,HCNC,S$GLB, | Performed by: ORTHOPAEDIC SURGERY

## 2019-05-16 PROCEDURE — 1101F PR PT FALLS ASSESS DOC 0-1 FALLS W/OUT INJ PAST YR: ICD-10-PCS | Mod: HCNC,CPTII,S$GLB, | Performed by: ORTHOPAEDIC SURGERY

## 2019-05-16 PROCEDURE — 20550 NJX 1 TENDON SHEATH/LIGAMENT: CPT | Mod: HCNC,F3,S$GLB, | Performed by: ORTHOPAEDIC SURGERY

## 2019-05-16 PROCEDURE — 99214 OFFICE O/P EST MOD 30 MIN: CPT | Mod: 25,HCNC,S$GLB, | Performed by: ORTHOPAEDIC SURGERY

## 2019-05-16 PROCEDURE — 99999 PR PBB SHADOW E&M-EST. PATIENT-LVL II: ICD-10-PCS | Mod: PBBFAC,HCNC,, | Performed by: ORTHOPAEDIC SURGERY

## 2019-05-16 PROCEDURE — 20550 NJX 1 TENDON SHEATH/LIGAMENT: CPT | Mod: HCNC,51,FA,S$GLB | Performed by: ORTHOPAEDIC SURGERY

## 2019-05-16 RX ORDER — TRIAMCINOLONE ACETONIDE 40 MG/ML
40 INJECTION, SUSPENSION INTRA-ARTICULAR; INTRAMUSCULAR
Status: DISCONTINUED | OUTPATIENT
Start: 2019-05-16 | End: 2019-05-16 | Stop reason: HOSPADM

## 2019-05-16 RX ADMIN — TRIAMCINOLONE ACETONIDE 40 MG: 40 INJECTION, SUSPENSION INTRA-ARTICULAR; INTRAMUSCULAR at 04:05

## 2019-05-16 NOTE — PROCEDURES
Tendon Sheath: L ring MCP  Date/Time: 5/16/2019 4:45 PM  Performed by: Vargas Garcia MD  Authorized by: Vargas Garcia MD     Location:  Ring finger  Site:  L ring MCP  Prep: patient was prepped and draped in usual sterile fashion    Medications:  40 mg triamcinolone acetonide 40 mg/mL

## 2019-05-16 NOTE — PROCEDURES
Tendon Sheath: L thumb MCP  Date/Time: 5/16/2019 4:46 PM  Performed by: Vargas Garcia MD  Authorized by: Vargas Garcia MD     Location:  Thumb  Site:  L thumb MCP  Prep: patient was prepped and draped in usual sterile fashion    Needle size:  25 G  Medications:  40 mg triamcinolone acetonide 40 mg/mL  Patient tolerance:  Patient tolerated the procedure well with no immediate complications

## 2019-05-16 NOTE — PROGRESS NOTES
An 78 years old, locking, catching, and triggering of the left thumb and left   ring finger, has been going on for a few months' time.  We have injection two   years ago.  The left fourth trigger finger, responded well, requesting an   injection today.    Exam shows she does indeed have an elicitable trigger of the left thumb, left   ring finger without signs of infection.    ASSESSMENT:  Left ring finger, left thumb trigger finger.    PLAN:  Kenalog injection to left thumb and left ring finger.  Followup as   needed.      PBB/HN  dd: 05/16/2019 15:02:15 (CDT)  td: 05/17/2019 01:43:21 (CDT)  Doc ID   #[id number]  Job ID #[id number]    CC:     Further History  Aching pain  Worse with activity  Relieved with rest  No other associated symptoms  No other radiation    Further Exam  Alert and oriented  Pleasant  Contralateral limb has appropriate range of motion for age and condition  Contralateral limb has appropriate strength for age and condition  Contralateral limb has appropriate stability  for age and condition  No adenopathy  Pulses are appropriate for current condition  Skin is intact        Chief Complaint    Chief Complaint   Patient presents with    Left Hand - Pain       HPI  Jing Tenorio is a 78 y.o.  female who presents with       Past Medical History  Past Medical History:   Diagnosis Date    Allergy     Anxiety     Arthritis     Back pain DDD    Breast cancer 2016    invasive ductal carcinoma    Cancer     LEFT BREAST 5-16    Cataract     Bilateral    DDD (degenerative disc disease)     DDD (degenerative disc disease), lumbar     GERD (gastroesophageal reflux disease)     Hyperlipidemia     Hypertension     Insomnia     Osteoporosis     Thyroid disease     Pt denies       Past Surgical History  Past Surgical History:   Procedure Laterality Date    BAND HEMORRHOIDECTOMY  3/8/2004  Beck    Internal hemorrhoids with banding times 2.    BIOPSY-AXILLARY NODE Left 5/31/2016    Performed by  Sawyer Nelson MD at [facility] OR    BLOCK-NERVE-MEDIAL BRANCH-LUMBAR L3, L4, L5 Bilateral 3/6/2018    Performed by Lyndsay Galeana Jr., [facility] at [facility] OR    BLOCK-NERVE-MEDIAL BRANCH-LUMBAR L3, L4, L5 Bilateral 2/20/2018    Performed by Lyndsay G[facility]ana Jr., MD at [facility] OR    BREAST LUMPECTOMY Left 2016    CARPAL TUNNEL RELEASE      Bilateral    CATARACT EXTRACTION W/  INTRAOCULAR LENS IMPLANT      Bilateral    COLONOSCOPY  3/8/2004  Mo    Internal hemorrhoids were found.   The colon is normal.    COLONOSCOPY N/A 9/23/2013    Performed by Bj Beavers Jr., MD at [facility] ENDO    EYE SURGERY  BILAT WITH LENS    FRACTURE SURGERY      Left  Leg    HIP FRACTURE SURGERY Right     6/14    HYSTERECTOMY      INJECTION-STEROID-EPIDURAL-LUMBAR L4-5 N/A 4/17/2018    Performed by Lyndsay G[facility]ana Jr., MD at [facility] OR    INJECTION-STEROID-EPIDURAL-TRANSFORAMINAL Bilateral 4/16/2012    Performed by Raffaele Davis MD at [facility] OR    RJEUDCVBO-DYOP-K-CATH Right 8/1/2016    Performed by Sawyer [facility] at [facility] OR    JOINT REPLACEMENT  8/15/12    Right tkr; right hip    KNEE ARTHROSCOPY W/ MENISCECTOMY      Right    LAMINECTOMY, SPINE, LUMBAR, WITH DISCECTOMY Bilateral 1/16/2013    Performed by José Manuel Gonzalez Jr., MD at [facility] OR    LEG SURGERY      dione and plate - left    LUMPECTOMY-BREAST Left 6/21/2016    Performed by Saw[facility] at [facility] OR    MASTECTOMY-PARTIAL Left 5/31/2016    Performed by [facility]yer [facility] MD at [facility] OR    OTHER SURGICAL HISTORY      dione and plate in [facility] leg [facility]     RADIOFREQUENCY THERMOCOAGULATION L3,4,5 Bilateral 3/13/2018    Performed by Lynds[facility] G[facility]ana Jr[facility] at [facility] OR    REMOVAL-PORT-A-CATH N/A 1/29/2018    Performed by [facility] at [facility] OR    SALPINGOOPHORECTOMY      Right    SPINE SURGERY  1/16/13    Lisa    TONSILLECTOMY      TRIGGER FINGER RELEASE      Biateral Ring Fingers       Medications  Current Outpatient Medications    Medication Sig    acetaminophen (TYLENOL) 500 MG tablet Take 500 mg by mouth every 6 (six) hours as needed for Pain.    anastrozole (ARIMIDEX) 1 mg Tab Take 1 tablet (1 mg total) by mouth once daily.    ascorbic acid, vitamin C, (VITAMIN C) 500 MG tablet Take 500 mg by mouth once daily.    b complex vitamins tablet Take 1 tablet by mouth once daily.    coenzyme Q10 (CO Q-10) 100 mg capsule Take 100 mg by mouth once daily.     denosumab (PROLIA) 60 mg/mL Syrg Inject 60 mg into the skin every 6 (six) months.    influenza (FLUZONE HIGH-DOSE 2018-19, PF,) 180 mcg/0.5 mL vaccine Inject 0.5 mLs into the muscle.    lisinopril (PRINIVIL,ZESTRIL) 20 MG tablet 1 po qam and 1/2 tab qpm    MULTIVITAMIN ORAL once daily. Every day    omeprazole (PRILOSEC) 40 MG capsule TAKE 1 CAPSULE BY MOUTH EVERY DAY    simvastatin (ZOCOR) 20 MG tablet Take 1 tablet (20 mg total) by mouth every evening.    traMADol (ULTRAM) 50 mg tablet 1/2 - 1 po q 6 hour prn pain    traZODone (DESYREL) 100 MG tablet Take 1 tablet (100 mg total) by mouth every evening.    vitamin D (VITAMIN D3) 1000 units Tab Take 2,000 Units by mouth once daily.    vitamin E 400 UNIT capsule Take 400 Units by mouth once daily.     No current facility-administered medications for this visit.        Allergies  Review of patient's allergies indicates:   Allergen Reactions    Sulfa (sulfonamide antibiotics)      Unsure of reaction;       Adhesive Rash       Family History  Family History   Problem Relation Age of Onset    Cancer Mother 69        Breast    Breast cancer Mother 69    Heart disease Father     Breast cancer Paternal Aunt 60    Diabetes Neg Hx        Social History  Social History     Socioeconomic History    Marital status:      Spouse name: Not on file    Number of children: Not on file    Years of education: Not on file    Highest education level: Not on file   Occupational History    Not on file   Social Needs    Financial  resource strain: Not on file    Food insecurity:     Worry: Not on file     Inability: Not on file    Transportation needs:     Medical: Not on file     Non-medical: Not on file   Tobacco Use    Smoking status: Never Smoker    Smokeless tobacco: Never Used   Substance and Sexual Activity    Alcohol use: Yes     Comment: None in past 2 years    Drug use: No    Sexual activity: Not on file   Lifestyle    Physical activity:     Days per week: Not on file     Minutes per session: Not on file    Stress: Not on file   Relationships    Social connections:     Talks on phone: Not on file     Gets together: Not on file     Attends Zoroastrianism service: Not on file     Active member of club or organization: Not on file     Attends meetings of clubs or organizations: Not on file     Relationship status: Not on file   Other Topics Concern    Not on file   Social History Narrative    Not on file               Review of Systems     Constitutional: Negative    HENT: Negative  Eyes: Negative  Respiratory: Negative  Cardiovascular: Negative  Musculoskeletal: HPI  Skin: Negative  Neurological: Negative  Hematological: Negative  Endocrine: Negative                 Physical Exam    There were no vitals filed for this visit.  Body mass index is 29.12 kg/m².  Physical Examination:     General appearance -  well appearing, and in no distress  Mental status - awake  Neck - supple  Chest -  symmetric air entry  Heart - normal rate   Abdomen - soft      Assessment     1. Trigger finger, left ring finger    2. Trigger finger of left thumb    3. Hand pain, left          Plan

## 2019-05-20 ENCOUNTER — NURSE TRIAGE (OUTPATIENT)
Dept: ADMINISTRATIVE | Facility: CLINIC | Age: 79
End: 2019-05-20

## 2019-05-20 ENCOUNTER — LAB VISIT (OUTPATIENT)
Dept: LAB | Facility: HOSPITAL | Age: 79
End: 2019-05-20
Attending: NURSE PRACTITIONER
Payer: MEDICARE

## 2019-05-20 ENCOUNTER — OFFICE VISIT (OUTPATIENT)
Dept: FAMILY MEDICINE | Facility: CLINIC | Age: 79
End: 2019-05-20
Payer: MEDICARE

## 2019-05-20 VITALS
WEIGHT: 174.19 LBS | HEART RATE: 85 BPM | DIASTOLIC BLOOD PRESSURE: 88 MMHG | SYSTOLIC BLOOD PRESSURE: 132 MMHG | HEIGHT: 65 IN | BODY MASS INDEX: 29.02 KG/M2 | OXYGEN SATURATION: 98 %

## 2019-05-20 DIAGNOSIS — R00.2 PALPITATION: ICD-10-CM

## 2019-05-20 DIAGNOSIS — R42 DIZZINESS: Primary | ICD-10-CM

## 2019-05-20 DIAGNOSIS — R53.83 FATIGUE, UNSPECIFIED TYPE: ICD-10-CM

## 2019-05-20 DIAGNOSIS — R82.90 ABNORMAL URINALYSIS: ICD-10-CM

## 2019-05-20 DIAGNOSIS — R42 DIZZINESS: ICD-10-CM

## 2019-05-20 LAB
ALBUMIN SERPL BCP-MCNC: 4.4 G/DL (ref 3.5–5.2)
ALP SERPL-CCNC: 70 U/L (ref 55–135)
ALT SERPL W/O P-5'-P-CCNC: 18 U/L (ref 10–44)
ANION GAP SERPL CALC-SCNC: 9 MMOL/L (ref 8–16)
AST SERPL-CCNC: 33 U/L (ref 10–40)
BACTERIA #/AREA URNS HPF: ABNORMAL /HPF
BASOPHILS # BLD AUTO: 0.09 K/UL (ref 0–0.2)
BASOPHILS NFR BLD: 1.2 % (ref 0–1.9)
BILIRUB SERPL-MCNC: 0.4 MG/DL (ref 0.1–1)
BILIRUB UR QL STRIP: NEGATIVE
BUN SERPL-MCNC: 15 MG/DL (ref 8–23)
CALCIUM SERPL-MCNC: 10.2 MG/DL (ref 8.7–10.5)
CHLORIDE SERPL-SCNC: 97 MMOL/L (ref 95–110)
CLARITY UR: CLEAR
CO2 SERPL-SCNC: 27 MMOL/L (ref 23–29)
COLOR UR: YELLOW
CREAT SERPL-MCNC: 1.1 MG/DL (ref 0.5–1.4)
DIFFERENTIAL METHOD: ABNORMAL
EOSINOPHIL # BLD AUTO: 0.1 K/UL (ref 0–0.5)
EOSINOPHIL NFR BLD: 1.3 % (ref 0–8)
ERYTHROCYTE [DISTWIDTH] IN BLOOD BY AUTOMATED COUNT: 14.7 % (ref 11.5–14.5)
EST. GFR  (AFRICAN AMERICAN): 54.8 ML/MIN/1.73 M^2
EST. GFR  (NON AFRICAN AMERICAN): 47.5 ML/MIN/1.73 M^2
GLUCOSE SERPL-MCNC: 95 MG/DL (ref 70–110)
GLUCOSE UR QL STRIP: NEGATIVE
HCT VFR BLD AUTO: 40.6 % (ref 37–48.5)
HGB BLD-MCNC: 13.4 G/DL (ref 12–16)
HGB UR QL STRIP: NEGATIVE
IMM GRANULOCYTES # BLD AUTO: 0.02 K/UL (ref 0–0.04)
IMM GRANULOCYTES NFR BLD AUTO: 0.3 % (ref 0–0.5)
KETONES UR QL STRIP: NEGATIVE
LEUKOCYTE ESTERASE UR QL STRIP: ABNORMAL
LYMPHOCYTES # BLD AUTO: 0.8 K/UL (ref 1–4.8)
LYMPHOCYTES NFR BLD: 10.7 % (ref 18–48)
MCH RBC QN AUTO: 30.7 PG (ref 27–31)
MCHC RBC AUTO-ENTMCNC: 33 G/DL (ref 32–36)
MCV RBC AUTO: 93 FL (ref 82–98)
MICROSCOPIC COMMENT: ABNORMAL
MONOCYTES # BLD AUTO: 0.5 K/UL (ref 0.3–1)
MONOCYTES NFR BLD: 6.3 % (ref 4–15)
NEUTROPHILS # BLD AUTO: 6.2 K/UL (ref 1.8–7.7)
NEUTROPHILS NFR BLD: 80.2 % (ref 38–73)
NITRITE UR QL STRIP: NEGATIVE
NRBC BLD-RTO: 0 /100 WBC
PH UR STRIP: 6 [PH] (ref 5–8)
PLATELET # BLD AUTO: 379 K/UL (ref 150–350)
PMV BLD AUTO: 10.1 FL (ref 9.2–12.9)
POTASSIUM SERPL-SCNC: 5 MMOL/L (ref 3.5–5.1)
PROT SERPL-MCNC: 7.3 G/DL (ref 6–8.4)
PROT UR QL STRIP: NEGATIVE
RBC # BLD AUTO: 4.37 M/UL (ref 4–5.4)
RBC #/AREA URNS HPF: 1 /HPF (ref 0–4)
SODIUM SERPL-SCNC: 133 MMOL/L (ref 136–145)
SP GR UR STRIP: 1.01 (ref 1–1.03)
SQUAMOUS #/AREA URNS HPF: 5 /HPF
TSH SERPL DL<=0.005 MIU/L-ACNC: 0.72 UIU/ML (ref 0.4–4)
URN SPEC COLLECT METH UR: ABNORMAL
WBC # BLD AUTO: 7.75 K/UL (ref 3.9–12.7)
WBC #/AREA URNS HPF: 8 /HPF (ref 0–5)

## 2019-05-20 PROCEDURE — 3075F SYST BP GE 130 - 139MM HG: CPT | Mod: HCNC,CPTII,S$GLB, | Performed by: NURSE PRACTITIONER

## 2019-05-20 PROCEDURE — 87086 URINE CULTURE/COLONY COUNT: CPT

## 2019-05-20 PROCEDURE — 1101F PR PT FALLS ASSESS DOC 0-1 FALLS W/OUT INJ PAST YR: ICD-10-PCS | Mod: HCNC,CPTII,S$GLB, | Performed by: NURSE PRACTITIONER

## 2019-05-20 PROCEDURE — 36415 COLL VENOUS BLD VENIPUNCTURE: CPT | Mod: PO

## 2019-05-20 PROCEDURE — 81000 URINALYSIS NONAUTO W/SCOPE: CPT | Mod: HCNC,PO

## 2019-05-20 PROCEDURE — 87186 SC STD MICRODIL/AGAR DIL: CPT | Mod: HCNC

## 2019-05-20 PROCEDURE — 80053 COMPREHEN METABOLIC PANEL: CPT

## 2019-05-20 PROCEDURE — 84443 ASSAY THYROID STIM HORMONE: CPT

## 2019-05-20 PROCEDURE — 99214 PR OFFICE/OUTPT VISIT, EST, LEVL IV, 30-39 MIN: ICD-10-PCS | Mod: HCNC,S$GLB,, | Performed by: NURSE PRACTITIONER

## 2019-05-20 PROCEDURE — 3075F PR MOST RECENT SYSTOLIC BLOOD PRESS GE 130-139MM HG: ICD-10-PCS | Mod: HCNC,CPTII,S$GLB, | Performed by: NURSE PRACTITIONER

## 2019-05-20 PROCEDURE — 99999 PR PBB SHADOW E&M-EST. PATIENT-LVL IV: ICD-10-PCS | Mod: PBBFAC,HCNC,, | Performed by: NURSE PRACTITIONER

## 2019-05-20 PROCEDURE — 85025 COMPLETE CBC W/AUTO DIFF WBC: CPT

## 2019-05-20 PROCEDURE — 93010 ELECTROCARDIOGRAM REPORT: CPT | Mod: S$GLB,,, | Performed by: INTERNAL MEDICINE

## 2019-05-20 PROCEDURE — 99214 OFFICE O/P EST MOD 30 MIN: CPT | Mod: HCNC,S$GLB,, | Performed by: NURSE PRACTITIONER

## 2019-05-20 PROCEDURE — 93005 EKG 12-LEAD: ICD-10-PCS | Mod: S$GLB,,, | Performed by: NURSE PRACTITIONER

## 2019-05-20 PROCEDURE — 93010 EKG 12-LEAD: ICD-10-PCS | Mod: S$GLB,,, | Performed by: INTERNAL MEDICINE

## 2019-05-20 PROCEDURE — 87088 URINE BACTERIA CULTURE: CPT | Mod: HCNC

## 2019-05-20 PROCEDURE — 93005 ELECTROCARDIOGRAM TRACING: CPT | Mod: S$GLB,,, | Performed by: NURSE PRACTITIONER

## 2019-05-20 PROCEDURE — 3079F DIAST BP 80-89 MM HG: CPT | Mod: HCNC,CPTII,S$GLB, | Performed by: NURSE PRACTITIONER

## 2019-05-20 PROCEDURE — 1101F PT FALLS ASSESS-DOCD LE1/YR: CPT | Mod: HCNC,CPTII,S$GLB, | Performed by: NURSE PRACTITIONER

## 2019-05-20 PROCEDURE — 99999 PR PBB SHADOW E&M-EST. PATIENT-LVL IV: CPT | Mod: PBBFAC,HCNC,, | Performed by: NURSE PRACTITIONER

## 2019-05-20 PROCEDURE — 87077 CULTURE AEROBIC IDENTIFY: CPT | Mod: HCNC

## 2019-05-20 PROCEDURE — 3079F PR MOST RECENT DIASTOLIC BLOOD PRESSURE 80-89 MM HG: ICD-10-PCS | Mod: HCNC,CPTII,S$GLB, | Performed by: NURSE PRACTITIONER

## 2019-05-20 NOTE — PROGRESS NOTES
Subjective:       Patient ID: Jing Tenorio is a 78 y.o. female.    Chief Complaint: Dizziness    Patient has had 3 episodes of lightheadedness in the last week. Otherwise feel foggy and just not right. Room does not spin, feels weak and lightheaded. Twice while sitting and once while walking. She says she has had some episodes of feeling palpitations/rapid heart rate once yesterday and once today. She says it lasted less than an hour each time.   No medication changes. No det changes. No unusual stress.     There are no preventive care reminders to display for this patient.    Past Medical History:  Past Medical History:  No date: Allergy  No date: Anxiety  No date: Arthritis  DDD: Back pain  2016: Breast cancer      Comment:  invasive ductal carcinoma  No date: Cancer      Comment:  LEFT BREAST 5-16  No date: Cataract      Comment:  Bilateral  No date: DDD (degenerative disc disease)  No date: DDD (degenerative disc disease), lumbar  No date: GERD (gastroesophageal reflux disease)  No date: Hyperlipidemia  No date: Hypertension  No date: Insomnia  No date: Osteoporosis  No date: Thyroid disease      Comment:  Pt denies  Past Surgical History:  3/8/2004  Mo: BAND HEMORRHOIDECTOMY      Comment:  Internal hemorrhoids with banding times 2.  5/31/2016: BIOPSY-AXILLARY NODE; Left      Comment:  Performed by Sawyer Nelson MD at [facility] OR  3/6/2018: BLOCK-NERVE-MEDIAL BRANCH-LUMBAR L3, L4, L5; Bilateral      Comment:  Performed by Lyndsay Galeana Jr[facility] at [facility]  2/20/2018: BLOCK-NERVE-MEDIAL BRANCH-LUMBAR L3, L4, L5; Bilateral      Comment:  Performed by Lyndsay Galeana Jr., [facility] at [facility]  2016: BREAST LUMPECTOMY; Left  No date: CARPAL TUNNEL RELEASE      Comment:  Bilateral  No date: CATARACT EXTRACTION W/  INTRAOCULAR LENS IMPLANT      Comment:  Bilateral  3/8/2004  Mo: COLONOSCOPY      Comment:  Internal hemorrhoids were found.   The colon is normal.  9/23/2013: COLONOSCOPY; N/A      Comment:   Performed by Bj Beavers Jr., MD at [facility] ENDO  BILAT WITH LENS: EYE SURGERY  No date: FRACTURE SURGERY      Comment:  Left  Leg  No date: HIP FRACTURE SURGERY; Right      Comment:  6/14  No date: HYSTERECTOMY  4/17/2018: INJECTION-STEROID-EPIDURAL-LUMBAR L4-5; N/A      Comment:  Performed by Lyndsay Galeana Jr., MD at [facility] OR  4/16/2012: INJECTION-STEROID-EPIDURAL-TRANSFORAMINAL; Bilateral      Comment:  Performed by Raffaele Davis MD at [facility] OR  8/1/2016: [id number]-RZNX-X-CATH; Right      Comment:  Performed by Sawyer Nelson MD at [facility] OR  8/15/12: JOINT REPLACEMENT      Comment:  Right tkr; right hip  No date: KNEE ARTHROSCOPY W/ MENISCECTOMY      Comment:  Right  1/16/2013: LAMINECTOMY, SPINE, LUMBAR, WITH DISCECTOMY; Bilateral      Comment:  Performed by José Manuel Gonzalez Jr., MD at [facility] OR  No date: LEG SURGERY      Comment:  dione and plate - left  6/21/2016: LUMPECTOMY-BREAST; Left      Comment:  Performed by Sawyer Nelson MD at [facility] OR  5/31/2016: MASTECTOMY-PARTIAL; Left      Comment:  Performed by Sawyer Nelson MD at [facility] OR  No date: OTHER SURGICAL HISTORY      Comment:  dione and plate in left leg [facility]   3/13/2018: RADIOFREQUENCY THERMOCOAGULATION L3,4,5; Bilateral      Comment:  Performed by Lyndsay Galeana Jr., MD at [facility] OR  1/29/2018: REMOVAL-PORT-A-CATH; N/A      Comment:  Performed by Sawyer Nelson MD at [facility] OR  No date: SALPINGOOPHORECTOMY      Comment:  Right  1/16/13: SPINE SURGERY      Comment:  Lisa  No date: TONSILLECTOMY  No date: TRIGGER FINGER RELEASE      Comment:  Biateral Ring Fingers  Review of patient's allergies indicates:   -- Sulfa (sulfonamide antibiotics)     --  Unsure of reaction;   -- Adhesive -- Rash  Current Outpatient Medications on File Prior to Visit:  acetaminophen (TYLENOL) 500 MG tablet, Take 500 mg by mouth every 6 (six) hours as needed for Pain., Disp: , Rfl:   ascorbic acid, vitamin C, (VITAMIN C) 500 MG tablet, Take 500 mg by  mouth once daily., Disp: , Rfl:   b complex vitamins tablet, Take 1 tablet by mouth once daily., Disp: , Rfl:    coenzyme Q10 (CO Q-10) 100 mg capsule, Take 100 mg by mouth once daily. , Disp: , Rfl:   denosumab (PROLIA) 60 mg/mL Syrg, Inject 60 mg into the skin every 6 (six) months., Disp: , Rfl:   lisinopril (PRINIVIL,ZESTRIL) 20 MG tablet, 1 po qam and 1/2 tab qpm, Disp: 135 tablet, Rfl: 3  MULTIVITAMIN ORAL, once daily. Every day, Disp: , Rfl:   omeprazole (PRILOSEC) 40 MG capsule, TAKE 1 CAPSULE BY MOUTH EVERY DAY, Disp: 90 capsule, Rfl: 3  simvastatin (ZOCOR) 20 MG tablet, Take 1 tablet (20 mg total) by mouth every evening., Disp: 90 tablet, Rfl: 3  traMADol (ULTRAM) 50 mg tablet, 1/2 - 1 po q 6 hour prn pain, Disp: 60 tablet, Rfl: 5  traZODone (DESYREL) 100 MG tablet, Take 1 tablet (100 mg total) by mouth every evening., Disp: 90 tablet, Rfl: 3  vitamin D (VITAMIN D3) 1000 units Tab, Take 2,000 Units by mouth once daily., Disp: , Rfl:   vitamin E 400 UNIT capsule, Take 400 Units by mouth once daily., Disp: , Rfl:   (DISCONTINUED) influenza (FLUZONE HIGH-DOSE 2018-19, PF,) 180 mcg/0.5 mL vaccine, Inject 0.5 mLs into the muscle., Disp: 0.5 mL, Rfl: 0    No current facility-administered medications on file prior to visit.     Social History    Socioeconomic History      Marital status:       Spouse name: Not on file      Number of children: Not on file      Years of education: Not on file      Highest education level: Not on file    Occupational History      Not on file    Social Needs      Financial resource strain: Not on file      Food insecurity:        Worry: Not on file        Inability: Not on file      Transportation needs:        Medical: Not on file        Non-medical: Not on file    Tobacco Use      Smoking status: Never Smoker      Smokeless tobacco: Never Used    Substance and Sexual Activity      Alcohol use: Yes        Comment: None in past 2 years      Drug use: No      Sexual activity:  Not on file    Lifestyle      Physical activity:        Days per week: Not on file        Minutes per session: Not on file      Stress: Not on file    Relationships      Social connections:        Talks on phone: Not on file        Gets together: Not on file        Attends Roman Catholic service: Not on file        Active member of club or organization: Not on file        Attends meetings of clubs or organizations: Not on file        Relationship status: Not on file    Other Topics      Concerns:        Not on file    Social History Narrative      Not on file    Review of patient's family history indicates:  Problem: Cancer      Relation: Mother          Age of Onset: 69          Comment: Breast  Problem: Breast cancer      Relation: Mother          Age of Onset: 69  Problem: Heart disease      Relation: Father          Age of Onset: (Not Specified)  Problem: Breast cancer      Relation: Paternal Aunt          Age of Onset: 60  Problem: Diabetes      Relation: Neg Hx          Age of Onset: (Not Specified)            Review of Systems   Constitutional: Positive for fatigue.   HENT: Positive for ear pain (she says she has noticed some strange sensations in her ears). Negative for postnasal drip, rhinorrhea, sinus pressure, sinus pain and sore throat.    Respiratory: Negative.  Negative for cough, shortness of breath and wheezing.    Cardiovascular: Positive for palpitations. Negative for chest pain and leg swelling.   Gastrointestinal: Negative.  Negative for abdominal pain, blood in stool, constipation, diarrhea, nausea and vomiting.   Genitourinary: Negative for dysuria, frequency and urgency.   Musculoskeletal: Positive for arthralgias and back pain (chronic). Negative for gait problem, joint swelling, myalgias, neck pain and neck stiffness.   Skin: Negative.  Negative for color change, rash and wound.   Neurological: Positive for dizziness and light-headedness. Negative for tremors, seizures, syncope, facial asymmetry,  speech difficulty, weakness, numbness and headaches.   Psychiatric/Behavioral: Negative for confusion and decreased concentration. The patient is nervous/anxious.        Objective:      Physical Exam   Constitutional: She is oriented to person, place, and time. No distress.   HENT:   Head: Normocephalic and atraumatic.   Eyes: Pupils are equal, round, and reactive to light.   Cardiovascular: Normal rate and regular rhythm. Exam reveals no friction rub.   No murmur heard.  Pulmonary/Chest: Effort normal and breath sounds normal. No stridor. No respiratory distress.   Abdominal: Soft. Bowel sounds are normal. She exhibits no distension. There is no tenderness.   Neurological: She is alert and oriented to person, place, and time. She displays normal reflexes. No cranial nerve deficit or sensory deficit. She exhibits normal muscle tone. Coordination normal.   Skin: No rash noted. She is not diaphoretic. No erythema.   Psychiatric: She has a normal mood and affect. Her behavior is normal.   Vitals reviewed.      Assessment:       1. Dizziness    2. Palpitation    3. Fatigue, unspecified type        Plan:     Go to Ed for any new or worsening symptoms. Labs, ua, ecg, and holter scheduled. ENT follow up scheduled.   1. Dizziness    - IN OFFICE EKG 12-LEAD (to Muse)  - CBC auto differential; Future  - Comprehensive metabolic panel; Future  - TSH; Future  - URINALYSIS  - Holter monitor - 48 hour; Future  - Ambulatory referral to ENT    2. Palpitation    - IN OFFICE EKG 12-LEAD (to Muse)  - CBC auto differential; Future  - Holter monitor - 48 hour; Future    3. Fatigue, unspecified type    - CBC auto differential; Future  - TSH; Future

## 2019-05-20 NOTE — TELEPHONE ENCOUNTER
Reason for Disposition   [1] MODERATE dizziness (e.g., interferes with normal activities) AND [2] has NOT been evaluated by physician for this  (Exception: dizziness caused by heat exposure, sudden standing, or poor fluid intake)    Protocols used: DIZZINESS - [id number]

## 2019-05-23 LAB — BACTERIA UR CULT: NORMAL

## 2019-05-23 RX ORDER — DOXYCYCLINE HYCLATE 100 MG
100 TABLET ORAL 2 TIMES DAILY
Qty: 20 TABLET | Refills: 0 | Status: SHIPPED | OUTPATIENT
Start: 2019-05-23 | End: 2019-06-10

## 2019-05-31 ENCOUNTER — CLINICAL SUPPORT (OUTPATIENT)
Dept: AUDIOLOGY | Facility: CLINIC | Age: 79
End: 2019-05-31
Payer: MEDICARE

## 2019-05-31 ENCOUNTER — OFFICE VISIT (OUTPATIENT)
Dept: OTOLARYNGOLOGY | Facility: CLINIC | Age: 79
End: 2019-05-31
Payer: MEDICARE

## 2019-05-31 ENCOUNTER — CLINICAL SUPPORT (OUTPATIENT)
Dept: CARDIOLOGY | Facility: CLINIC | Age: 79
End: 2019-05-31
Attending: NURSE PRACTITIONER
Payer: MEDICARE

## 2019-05-31 VITALS
HEIGHT: 65 IN | DIASTOLIC BLOOD PRESSURE: 84 MMHG | BODY MASS INDEX: 29.02 KG/M2 | WEIGHT: 174.19 LBS | SYSTOLIC BLOOD PRESSURE: 160 MMHG

## 2019-05-31 DIAGNOSIS — R42 DIZZINESS: ICD-10-CM

## 2019-05-31 DIAGNOSIS — R53.1 SPELL OF GENERALIZED WEAKNESS: ICD-10-CM

## 2019-05-31 DIAGNOSIS — R00.2 PALPITATION: ICD-10-CM

## 2019-05-31 DIAGNOSIS — R42 LIGHTHEADEDNESS: Primary | ICD-10-CM

## 2019-05-31 PROCEDURE — 3077F PR MOST RECENT SYSTOLIC BLOOD PRESSURE >= 140 MM HG: ICD-10-PCS | Mod: HCNC,CPTII,S$GLB, | Performed by: NURSE PRACTITIONER

## 2019-05-31 PROCEDURE — 92541 PR SPONTANEOUS NYSTAGMUS TEST: ICD-10-PCS | Mod: HCNC,S$GLB,, | Performed by: AUDIOLOGIST

## 2019-05-31 PROCEDURE — 99999 PR PBB SHADOW E&M-EST. PATIENT-LVL III: ICD-10-PCS | Mod: PBBFAC,HCNC,, | Performed by: NURSE PRACTITIONER

## 2019-05-31 PROCEDURE — 93224 HOLTER MONITOR - 48 HOUR (CUPID ONLY): ICD-10-PCS | Mod: HCNC,S$GLB,, | Performed by: INTERNAL MEDICINE

## 2019-05-31 PROCEDURE — 99203 PR OFFICE/OUTPT VISIT, NEW, LEVL III, 30-44 MIN: ICD-10-PCS | Mod: HCNC,S$GLB,, | Performed by: NURSE PRACTITIONER

## 2019-05-31 PROCEDURE — 3079F PR MOST RECENT DIASTOLIC BLOOD PRESSURE 80-89 MM HG: ICD-10-PCS | Mod: HCNC,CPTII,S$GLB, | Performed by: NURSE PRACTITIONER

## 2019-05-31 PROCEDURE — 99203 OFFICE O/P NEW LOW 30 MIN: CPT | Mod: HCNC,S$GLB,, | Performed by: NURSE PRACTITIONER

## 2019-05-31 PROCEDURE — 93224 XTRNL ECG REC UP TO 48 HRS: CPT | Mod: HCNC,S$GLB,, | Performed by: INTERNAL MEDICINE

## 2019-05-31 PROCEDURE — 92542 PR POSITIONAL NYSTAGMUS TEST: ICD-10-PCS | Mod: HCNC,59,S$GLB, | Performed by: AUDIOLOGIST

## 2019-05-31 PROCEDURE — 3077F SYST BP >= 140 MM HG: CPT | Mod: HCNC,CPTII,S$GLB, | Performed by: NURSE PRACTITIONER

## 2019-05-31 PROCEDURE — 3079F DIAST BP 80-89 MM HG: CPT | Mod: HCNC,CPTII,S$GLB, | Performed by: NURSE PRACTITIONER

## 2019-05-31 PROCEDURE — 1101F PT FALLS ASSESS-DOCD LE1/YR: CPT | Mod: HCNC,CPTII,S$GLB, | Performed by: NURSE PRACTITIONER

## 2019-05-31 PROCEDURE — 1101F PR PT FALLS ASSESS DOC 0-1 FALLS W/OUT INJ PAST YR: ICD-10-PCS | Mod: HCNC,CPTII,S$GLB, | Performed by: NURSE PRACTITIONER

## 2019-05-31 PROCEDURE — 99999 PR PBB SHADOW E&M-EST. PATIENT-LVL III: CPT | Mod: PBBFAC,HCNC,, | Performed by: NURSE PRACTITIONER

## 2019-05-31 PROCEDURE — 92541 SPONTANEOUS NYSTAGMUS TEST: CPT | Mod: HCNC,S$GLB,, | Performed by: AUDIOLOGIST

## 2019-05-31 PROCEDURE — 92542 POSITIONAL NYSTAGMUS TEST: CPT | Mod: HCNC,59,S$GLB, | Performed by: AUDIOLOGIST

## 2019-05-31 NOTE — LETTER
May 31, 2019      Cecelia Coats NP  [address] [facility] [address] [place] [address]           [facility]  [address] [facility] [address] [place] [address]  Phone: [phone]  Fax: [phone]          Patient: Jing Tenorio   MR Number: [id number]   YOB: 1940   Date of Visit: 5/31/2019       Dear Cecelia Coats:    Thank you for referring Jing Tenorio to me for evaluation. Attached you will find relevant portions of my assessment and plan of care.    If you have questions, please do not hesitate to call me. I look forward to following Jing Tenorio along with you.    Sincerely,    Josefina Guzman, AARON    Enclosure  CC:  No Recipients    If you would like to receive this communication electronically, please contact [email or web address] or [phone] to request more information on [employer] Link access.    For providers and/or their staff who would like to refer a patient to [facility], please contact us through our one-stop-shop provider referral line, [facility], at [phone].    If you feel you have received this communication in error or would no longer like to receive these types of communications, please e-mail [email or web address]

## 2019-05-31 NOTE — PROGRESS NOTES
"Subjective:       Patient ID: Jing Tenorio is a 78 y.o. female.    Chief Complaint: Other (light headed and weakness )    HPI   Patient was last seen by me 6 years ago for cerumen impactions.  She returns today upon the referral of Cecelia Coats for consultation for dizziness.  Patient states she is not dizzy.  She is having weak spells.  She feels lightheaded and "whole body goes weak" like she is going to fall out. Her  has to catch her.  Episodes have occurred while standing, while walking, and while seated still.  She adamantly denies any vertigo, no spinning or swirling. No head trauma. Onset of spells started two weeks ago.     Review of Systems   Constitutional: Negative.    HENT: Negative.    Eyes: Negative.    Respiratory: Negative.    Cardiovascular: Negative.    Gastrointestinal: Negative.    Musculoskeletal: Negative.    Skin: Negative.    Neurological: Positive for weakness and light-headedness.   Hematological: Negative.    Psychiatric/Behavioral: Negative.        Objective:      Physical Exam   Constitutional: She is oriented to person, place, and time. Vital signs are normal. She appears well-developed and well-nourished. She is cooperative. She does not appear ill. No distress.   HENT:   Head: Normocephalic and atraumatic.   Right Ear: Hearing, tympanic membrane, external ear and ear canal normal. Tympanic membrane is not erythematous. No middle ear effusion.   Left Ear: Hearing, tympanic membrane, external ear and ear canal normal. Tympanic membrane is not erythematous.  No middle ear effusion.   Nose: Nose normal.   Mouth/Throat: Uvula is midline, oropharynx is clear and moist and mucous membranes are normal.   Eyes: EOM and lids are normal. Right eye exhibits no discharge. Left eye exhibits no discharge. No scleral icterus.   Neck: Trachea normal and normal range of motion. Neck supple. No tracheal deviation present.   Cardiovascular: Normal rate.   Pulmonary/Chest: Effort normal. No " "stridor. No respiratory distress. She has no wheezes.   Musculoskeletal: Normal range of motion.   Neurological: She is alert and oriented to person, place, and time. She has normal strength. Coordination and gait normal.   Skin: Skin is warm, dry and intact. She is not diaphoretic. No cyanosis. No pallor.   Psychiatric: She has a normal mood and affect. Her speech is normal and behavior is normal. Judgment and thought content normal. Cognition and memory are normal.   Nursing note and vitals reviewed.   Negative for BPPV AU   Assessment:     No evidence of BPPV AU    No vertigo  "Weak spells" -- not likely vestibular in nature  Plan:     Scheduled for VNG & Audiogram on 7/19/19      "

## 2019-05-31 NOTE — PROGRESS NOTES
Jing Tenorio was seen 5/31/19 for a BPPV evaluation.    Patient reported that her dizziness feels like she is weak, lightheaded and about to fall due to the weakness when it happens.  There is no spinning or vertigo.  The episodes last about one minute or so each time it happens.        VAT was negative right and negative left   Gaze nystagmus was absent  Spontaneous nystagmus was absent  Head Right Positional was negative  Head Left Positional was negative  Side-lying Hallpike Right was negative  Side-lying Hallpike Left was negative    Impression: Negative for BPPV bilaterally    Scheduled pt for a VNG and audiogram on 6/19/19.

## 2019-06-04 ENCOUNTER — HOSPITAL ENCOUNTER (OUTPATIENT)
Dept: RADIOLOGY | Facility: HOSPITAL | Age: 79
Discharge: HOME | End: 2019-06-04
Attending: INTERNAL MEDICINE
Payer: MEDICARE

## 2019-06-04 DIAGNOSIS — M81.0 OSTEOPOROSIS, UNSPECIFIED OSTEOPOROSIS TYPE, UNSPECIFIED PATHOLOGICAL FRACTURE PRESENCE: ICD-10-CM

## 2019-06-04 PROCEDURE — 77080 DXA BONE DENSITY AXIAL: CPT | Mod: 26,HCNC,, | Performed by: RADIOLOGY

## 2019-06-04 PROCEDURE — 77080 DXA BONE DENSITY AXIAL: CPT | Mod: TC,HCNC,PO

## 2019-06-04 PROCEDURE — 77080 DEXA BONE DENSITY SPINE HIP: ICD-10-PCS | Mod: 26,HCNC,, | Performed by: RADIOLOGY

## 2019-06-07 ENCOUNTER — TELEPHONE (OUTPATIENT)
Dept: FAMILY MEDICINE | Facility: CLINIC | Age: 79
End: 2019-06-07

## 2019-06-07 NOTE — TELEPHONE ENCOUNTER
Patient would like to know if Holter Monitor results are in. Let patient know that they are in process. Verbalized understanding

## 2019-06-10 ENCOUNTER — TELEPHONE (OUTPATIENT)
Dept: FAMILY MEDICINE | Facility: CLINIC | Age: 79
End: 2019-06-10

## 2019-06-10 ENCOUNTER — OFFICE VISIT (OUTPATIENT)
Dept: FAMILY MEDICINE | Facility: CLINIC | Age: 79
End: 2019-06-10
Payer: MEDICARE

## 2019-06-10 DIAGNOSIS — R53.1 WEAKNESS: ICD-10-CM

## 2019-06-10 DIAGNOSIS — I10 ESSENTIAL HYPERTENSION: ICD-10-CM

## 2019-06-10 DIAGNOSIS — I95.1 ORTHOSTATIC HYPOTENSION: ICD-10-CM

## 2019-06-10 DIAGNOSIS — N18.30 CHRONIC KIDNEY DISEASE, STAGE III (MODERATE): ICD-10-CM

## 2019-06-10 DIAGNOSIS — R42 DIZZINESS: Primary | ICD-10-CM

## 2019-06-10 PROCEDURE — 3079F PR MOST RECENT DIASTOLIC BLOOD PRESSURE 80-89 MM HG: ICD-10-PCS | Mod: HCNC,CPTII,S$GLB, | Performed by: PHYSICIAN ASSISTANT

## 2019-06-10 PROCEDURE — 1101F PT FALLS ASSESS-DOCD LE1/YR: CPT | Mod: HCNC,CPTII,S$GLB, | Performed by: PHYSICIAN ASSISTANT

## 2019-06-10 PROCEDURE — 99214 PR OFFICE/OUTPT VISIT, EST, LEVL IV, 30-39 MIN: ICD-10-PCS | Mod: HCNC,S$GLB,, | Performed by: PHYSICIAN ASSISTANT

## 2019-06-10 PROCEDURE — 99214 OFFICE O/P EST MOD 30 MIN: CPT | Mod: HCNC,S$GLB,, | Performed by: PHYSICIAN ASSISTANT

## 2019-06-10 PROCEDURE — 1101F PR PT FALLS ASSESS DOC 0-1 FALLS W/OUT INJ PAST YR: ICD-10-PCS | Mod: HCNC,CPTII,S$GLB, | Performed by: PHYSICIAN ASSISTANT

## 2019-06-10 PROCEDURE — 99999 PR PBB SHADOW E&M-EST. PATIENT-LVL V: ICD-10-PCS | Mod: PBBFAC,HCNC,, | Performed by: PHYSICIAN ASSISTANT

## 2019-06-10 PROCEDURE — 3077F PR MOST RECENT SYSTOLIC BLOOD PRESSURE >= 140 MM HG: ICD-10-PCS | Mod: HCNC,CPTII,S$GLB, | Performed by: PHYSICIAN ASSISTANT

## 2019-06-10 PROCEDURE — 3077F SYST BP >= 140 MM HG: CPT | Mod: HCNC,CPTII,S$GLB, | Performed by: PHYSICIAN ASSISTANT

## 2019-06-10 PROCEDURE — 99999 PR PBB SHADOW E&M-EST. PATIENT-LVL V: CPT | Mod: PBBFAC,HCNC,, | Performed by: PHYSICIAN ASSISTANT

## 2019-06-10 PROCEDURE — 3079F DIAST BP 80-89 MM HG: CPT | Mod: HCNC,CPTII,S$GLB, | Performed by: PHYSICIAN ASSISTANT

## 2019-06-10 RX ORDER — LISINOPRIL 10 MG/1
TABLET ORAL
Qty: 90 TABLET | Refills: 10 | Status: SHIPPED | OUTPATIENT
Start: 2019-06-10 | End: 2020-05-21 | Stop reason: SDUPTHER

## 2019-06-10 RX ORDER — MECLIZINE HCL 12.5 MG 12.5 MG/1
12.5 TABLET ORAL 3 TIMES DAILY PRN
Qty: 30 TABLET | Refills: 0 | Status: SHIPPED | OUTPATIENT
Start: 2019-06-10 | End: 2019-06-25

## 2019-06-10 NOTE — TELEPHONE ENCOUNTER
"Patient reports she had her 'crystals" tested due to bouts of dizziness and was told that they were "fine". Also had a recent halter monitor, but doesn't know the results of this test.     Patient states that this morning she woke up and the room was spinning for a while before she was able to get out of bed.   Wants to know what to do next.     "

## 2019-06-10 NOTE — PATIENT INSTRUCTIONS
Try Meclizine up to three times a day and see if it helps with episodes.    Cardiology should be calling you soon.    Thanks for seeing me,  Jenn Gallagher PA-C

## 2019-06-10 NOTE — TELEPHONE ENCOUNTER
Spoke to patient, scheduled with Jenn Gallagher at Dr Franco request. Patient advised that the providers will collaborate while in visit.   Voiced understanding.

## 2019-06-10 NOTE — PROGRESS NOTES
"Subjective:      Patient ID: Jing Tenorio is a 78 y.o. female.    Chief Complaint: Dizziness and Fatigue    HPI   Patient saw Ms. Coats previously and was put on doxycycline monohydrate 100mg twice a day for UTI.      Patient started with one minute episodes of lightheadedness, palpitations, hot flash, shakiness, and weakness on 5/18/2019.  This morning she had spinning of the room while she was walking.  She is not driving currently as per cardiologist.  No syncope.  She is afraid to leave the house because she thinks something may happen.  She is stressed out "with this condition."  She has been getting winded when she is walking around.    She has been only taking 10mg Lisinopril BID.  Dr. Franco prescribed 20mg in the morning and 10mg Lisinopril nightly.  She has been consistently slightly hyponatremic.  She drinks a glass of water every hour.      ENT appt scheduled.    Review of Systems   Constitutional: Positive for diaphoresis. Negative for appetite change, chills and fever.   HENT: Positive for ear pain (right).    Respiratory: Negative for shortness of breath.    Cardiovascular: Positive for palpitations. Negative for chest pain.   Gastrointestinal: Negative for abdominal pain, constipation, diarrhea, nausea and vomiting.   Endocrine: Negative for polydipsia.   Genitourinary: Negative for dysuria.   Neurological: Positive for dizziness, weakness (episodic) and light-headedness (episodic). Negative for syncope.       Objective:   BP (!) 146/84 (Patient Position: Standing) Comment: 2 min sitting  Pulse 92   Temp 98.8 °F (37.1 °C)   Resp 14   Ht 5' 5" (1.651 m)   Wt 77.6 kg (171 lb 1.2 oz)   SpO2 97%   BMI 28.47 kg/m²      Physical Exam   Constitutional: She is oriented to person, place, and time. She appears well-developed and well-nourished. She is active and cooperative. No distress.   HENT:   Head: Normocephalic and atraumatic.   Right Ear: Hearing, tympanic membrane, external ear and ear canal " normal.   Left Ear: Hearing, tympanic membrane, external ear and ear canal normal.   Nose: Nose normal.   Mouth/Throat: Uvula is midline, oropharynx is clear and moist and mucous membranes are normal. No oropharyngeal exudate. No tonsillar exudate.   Eyes: Conjunctivae and lids are normal.   Neck: Normal range of motion and phonation normal.   Cardiovascular: Normal rate, regular rhythm and normal heart sounds. Exam reveals no gallop and no friction rub.   No murmur heard.  Pulmonary/Chest: Effort normal and breath sounds normal. No stridor. No respiratory distress. She has no decreased breath sounds. She has no wheezes. She has no rhonchi. She has no rales.   Musculoskeletal: Normal range of motion.   Neurological: She is alert and oriented to person, place, and time.   Skin: Skin is warm, dry and intact. No rash noted.   Psychiatric: She has a normal mood and affect. Her speech is normal and behavior is normal. Judgment and thought content normal. Cognition and memory are normal.   Vitals reviewed.   Performed orthostatics with patient-, sitting 162/94, standing 2 minutes 146/84  Assessment:      1. Dizziness    2. Weakness    3. Orthostatic hypotension    4. Essential hypertension    5. Chronic kidney disease, stage III (moderate)       Plan:   1. Dizziness  Try Meclizine up to three times a day and see if it helps with episodes.  - Ambulatory referral to Cardiology  - meclizine (ANTIVERT) 12.5 mg tablet; Take 1 tablet (12.5 mg total) by mouth 3 (three) times daily as needed for Dizziness.  Dispense: 30 tablet; Refill: 0    2. Weakness  - Ambulatory referral to Cardiology    3. Orthostatic hypotension  Seen today.  Would appreciate evaluation by cardiology.  Holter Monitor results are still in process.    4. Essential hypertension  Continue to monitor and change current medication to dose below.  - lisinopril 10 MG tablet; 2 pills in the morning and 1 pill at night  Dispense: 90 tablet; Refill: 10    5. Chronic  kidney disease, stage III (moderate)  Stable, but continued. eGFR 47.5    Follow up in 2 months with Dr. Franco.  Patient agreed with plan and expressed understanding.

## 2019-06-10 NOTE — TELEPHONE ENCOUNTER
"Per my conversation with nurse Leila, pt reports "room spinning" which never occurred previously.  Previously was being evaluated more for near syncope type symptoms.  This seems new vertigo.  Needs eval in 40 min today.  Have someone drive her.  Confirm with [facility].  Meclizine, if nausea, phenergan, zofran   Can discuss with me prior/during if needed.   "

## 2019-06-11 ENCOUNTER — TELEPHONE (OUTPATIENT)
Dept: FAMILY MEDICINE | Facility: CLINIC | Age: 79
End: 2019-06-11

## 2019-06-11 NOTE — TELEPHONE ENCOUNTER
Offered AWV appt ~ pt declines to schedule at this time because she is depending on someone to bring her to her appts. Ms. Tenorio requests a call back in approx 3 months.

## 2019-06-12 LAB
OHS CV EVENT MONITOR DAY: 0
OHS CV HOLTER LENGTH DECIMAL HOURS: 48
OHS CV HOLTER LENGTH HOURS: 48
OHS CV HOLTER LENGTH MINUTES: 0

## 2019-06-17 ENCOUNTER — TELEPHONE (OUTPATIENT)
Dept: HEMATOLOGY/ONCOLOGY | Facility: CLINIC | Age: 79
End: 2019-06-17

## 2019-06-17 NOTE — TELEPHONE ENCOUNTER
Returned call to patient, rescheduled labs and PET to 7/23/19 and follow up and Prolia to 7/30. Patient voiced understanding and appreciation

## 2019-06-18 ENCOUNTER — TELEPHONE (OUTPATIENT)
Dept: FAMILY MEDICINE | Facility: CLINIC | Age: 79
End: 2019-06-18

## 2019-06-18 VITALS
SYSTOLIC BLOOD PRESSURE: 146 MMHG | TEMPERATURE: 99 F | HEIGHT: 65 IN | HEART RATE: 92 BPM | BODY MASS INDEX: 28.5 KG/M2 | OXYGEN SATURATION: 97 % | DIASTOLIC BLOOD PRESSURE: 84 MMHG | WEIGHT: 171.06 LBS | RESPIRATION RATE: 14 BRPM

## 2019-06-18 NOTE — TELEPHONE ENCOUNTER
Spoke with pt she said her dizziness is getting much worse. Would like to get on wait list or a sooner appointment for her audiology appt.

## 2019-06-18 NOTE — TELEPHONE ENCOUNTER
Spoke with patient about her new symptoms.  R/s pt to a sooner appt due to a cancellation so new test date is 6/28/19.  Confirmed date, time and location of appts. Understanding voiced.

## 2019-06-18 NOTE — PROGRESS NOTES
Per Elliott I performed orthostatic on pt. Having her stand for 2 min and sit for 2 min and taking bp in between.

## 2019-06-18 NOTE — TELEPHONE ENCOUNTER
----- Message from Tracey Tadeo sent at 6/18/2019  1:47 PM CDT -----  Contact: patient  Type: Needs Medical Advice    Who Called:  Patient  Best Call Back Number: [phone]  Additional Information: would like for the nurse to give her a call back to discuss a matter. She did not want to give any details.

## 2019-06-25 ENCOUNTER — OFFICE VISIT (OUTPATIENT)
Dept: CARDIOLOGY | Facility: CLINIC | Age: 79
End: 2019-06-25
Payer: MEDICARE

## 2019-06-25 VITALS
WEIGHT: 172.38 LBS | BODY MASS INDEX: 29.43 KG/M2 | HEART RATE: 94 BPM | HEIGHT: 64 IN | DIASTOLIC BLOOD PRESSURE: 70 MMHG | SYSTOLIC BLOOD PRESSURE: 134 MMHG

## 2019-06-25 DIAGNOSIS — R07.9 CHEST PAIN, UNSPECIFIED TYPE: ICD-10-CM

## 2019-06-25 DIAGNOSIS — R42 DIZZINESS: ICD-10-CM

## 2019-06-25 DIAGNOSIS — I10 ESSENTIAL HYPERTENSION: ICD-10-CM

## 2019-06-25 DIAGNOSIS — R53.1 WEAKNESS: ICD-10-CM

## 2019-06-25 DIAGNOSIS — R09.89 BRUIT: ICD-10-CM

## 2019-06-25 DIAGNOSIS — R00.2 PALPITATIONS: ICD-10-CM

## 2019-06-25 DIAGNOSIS — R42 VERTIGO: ICD-10-CM

## 2019-06-25 PROCEDURE — 1100F PR PT FALLS ASSESS DOC 2+ FALLS/FALL W/INJURY/YR: ICD-10-PCS | Mod: HCNC,CPTII,S$GLB, | Performed by: INTERNAL MEDICINE

## 2019-06-25 PROCEDURE — 99499 UNLISTED E&M SERVICE: CPT | Mod: HCNC,S$GLB,, | Performed by: INTERNAL MEDICINE

## 2019-06-25 PROCEDURE — 99999 PR PBB SHADOW E&M-EST. PATIENT-LVL III: CPT | Mod: PBBFAC,HCNC,, | Performed by: INTERNAL MEDICINE

## 2019-06-25 PROCEDURE — 3075F SYST BP GE 130 - 139MM HG: CPT | Mod: HCNC,CPTII,S$GLB, | Performed by: INTERNAL MEDICINE

## 2019-06-25 PROCEDURE — 3078F DIAST BP <80 MM HG: CPT | Mod: HCNC,CPTII,S$GLB, | Performed by: INTERNAL MEDICINE

## 2019-06-25 PROCEDURE — 99214 PR OFFICE/OUTPT VISIT, EST, LEVL IV, 30-39 MIN: ICD-10-PCS | Mod: HCNC,S$GLB,, | Performed by: INTERNAL MEDICINE

## 2019-06-25 PROCEDURE — 3288F FALL RISK ASSESSMENT DOCD: CPT | Mod: HCNC,CPTII,S$GLB, | Performed by: INTERNAL MEDICINE

## 2019-06-25 PROCEDURE — 1100F PTFALLS ASSESS-DOCD GE2>/YR: CPT | Mod: HCNC,CPTII,S$GLB, | Performed by: INTERNAL MEDICINE

## 2019-06-25 PROCEDURE — 99499 RISK ADDL DX/OHS AUDIT: ICD-10-PCS | Mod: HCNC,S$GLB,, | Performed by: INTERNAL MEDICINE

## 2019-06-25 PROCEDURE — 3075F PR MOST RECENT SYSTOLIC BLOOD PRESS GE 130-139MM HG: ICD-10-PCS | Mod: HCNC,CPTII,S$GLB, | Performed by: INTERNAL MEDICINE

## 2019-06-25 PROCEDURE — 99214 OFFICE O/P EST MOD 30 MIN: CPT | Mod: HCNC,S$GLB,, | Performed by: INTERNAL MEDICINE

## 2019-06-25 PROCEDURE — 3078F PR MOST RECENT DIASTOLIC BLOOD PRESSURE < 80 MM HG: ICD-10-PCS | Mod: HCNC,CPTII,S$GLB, | Performed by: INTERNAL MEDICINE

## 2019-06-25 PROCEDURE — 99999 PR PBB SHADOW E&M-EST. PATIENT-LVL III: ICD-10-PCS | Mod: PBBFAC,HCNC,, | Performed by: INTERNAL MEDICINE

## 2019-06-25 PROCEDURE — 3288F PR FALLS RISK ASSESSMENT DOCUMENTED: ICD-10-PCS | Mod: HCNC,CPTII,S$GLB, | Performed by: INTERNAL MEDICINE

## 2019-06-25 NOTE — PROGRESS NOTES
Subjective:    Patient ID:  Jing Tenorio is a 78 y.o. female who presents for evaluation of Dissociation (holter results); Hypertension; Fatigue; and Hyperlipidemia      Pt has been having weak spells for the past 6 weeks. She describes a strange feeling in her ears followed by a profound weakness and lightheadedness and sometimes feeling like her heart pounds. She does not feel pre-syncopal and has not fallen. She has had at least 2 events of the room spinning. The whole event may last a few seconds and is over. The spells will occur multiple times in a day. She wore a Holter last month which showed frequent PVC's but no other significant arrhythmias or pauses.      Review of Systems   Constitution: Negative for weight gain and weight loss.   HENT: Negative.    Eyes: Negative.    Cardiovascular: Positive for palpitations. Negative for claudication, cyanosis, dyspnea on exertion, irregular heartbeat, leg swelling, near-syncope, orthopnea (no PND) and syncope.   Respiratory: Negative for cough, hemoptysis, shortness of breath and snoring.    Endocrine: Negative.    Skin: Negative.    Musculoskeletal: Negative for joint pain, muscle cramps, muscle weakness and myalgias.   Gastrointestinal: Negative for diarrhea, hematemesis, nausea and vomiting.   Genitourinary: Negative.    Neurological: Positive for dizziness, light-headedness, vertigo and weakness. Negative for focal weakness, loss of balance, numbness, paresthesias and seizures.   Psychiatric/Behavioral: Negative.         Objective:    Physical Exam   Constitutional: She is oriented to person, place, and time. She appears well-developed and well-nourished.   Eyes: Pupils are equal, round, and reactive to light.   Neck: Normal range of motion. No thyromegaly present.   Cardiovascular: Normal rate, regular rhythm, S1 normal, S2 normal, normal heart sounds, intact distal pulses and normal pulses.  No extrasystoles are present. PMI is not displaced. Exam reveals  no friction rub.   No murmur heard.  Pulmonary/Chest: Effort normal and breath sounds normal. She has no wheezes. She has no rales. She exhibits no tenderness.   Abdominal: Soft. Bowel sounds are normal. She exhibits no distension and no mass. There is no tenderness.   Musculoskeletal: Normal range of motion. She exhibits no edema.   Neurological: She is alert and oriented to person, place, and time.   Skin: Skin is warm and dry.   Vitals reviewed.      Test(s) Reviewed  I have reviewed the following in detail:  [] Stress test   [] Angiography   [x] Echocardiogram   [x] Labs   [x] Other:  ECG; Holter       Assessment:       1. Weak spells    2. Dizziness    3. Palpitations    4. Vertigo    5. Essential hypertension    6. Bruit    7. Chest pain/weakness         Plan:       We discussed her symptoms and that they may not be cardiac related.  She and her  raised various cardiac and vascular concerns and will try to reassure them that we are not overlooking anything  Repeat Holter - she is not sure she had any spells when she last wore the monitor  Carotids  Echo  SPECT stress

## 2019-06-25 NOTE — LETTER
June 25, 2019      Jenn Gallagher PA-C  [address] [facility] [address] [place] [address]           [facility]  [address] [facility] [address] [place] [address]  Phone: [phone]          Patient: Jing Tenoroi   MR Number: [id number]   YOB: 1940   Date of Visit: 6/25/2019       Dear Jenn Gallagher:    Thank you for referring Jing Tenorio to me for evaluation. Attached you will find relevant portions of my assessment and plan of care.    If you have questions, please do not hesitate to call me. I look forward to following Jing Tenorio along with you.    Sincerely,    Henry Gray MD    Enclosure  CC:  No Recipients    If you would like to receive this communication electronically, please contact [email or web address] or [phone] to request more information on [facility] access.    For providers and/or their staff who would like to refer a patient to [facility], please contact us through our one-stop-shop provider referral line, [facility] , at [phone].    If you feel you have received this communication in error or would no longer like to receive these types of communications, please e-mail [email or web address]

## 2019-06-28 ENCOUNTER — CLINICAL SUPPORT (OUTPATIENT)
Dept: AUDIOLOGY | Facility: CLINIC | Age: 79
End: 2019-06-28
Payer: MEDICARE

## 2019-06-28 ENCOUNTER — CLINICAL SUPPORT (OUTPATIENT)
Dept: CARDIOLOGY | Facility: CLINIC | Age: 79
End: 2019-06-28
Attending: INTERNAL MEDICINE
Payer: MEDICARE

## 2019-06-28 VITALS
BODY MASS INDEX: 29.37 KG/M2 | WEIGHT: 172 LBS | HEART RATE: 80 BPM | HEIGHT: 64 IN | DIASTOLIC BLOOD PRESSURE: 70 MMHG | SYSTOLIC BLOOD PRESSURE: 140 MMHG

## 2019-06-28 DIAGNOSIS — R42 VERTIGO: ICD-10-CM

## 2019-06-28 DIAGNOSIS — R53.1 WEAKNESS: ICD-10-CM

## 2019-06-28 DIAGNOSIS — H90.3 BILATERAL SENSORINEURAL HEARING LOSS: Primary | ICD-10-CM

## 2019-06-28 DIAGNOSIS — R00.2 PALPITATIONS: ICD-10-CM

## 2019-06-28 DIAGNOSIS — I10 ESSENTIAL HYPERTENSION: ICD-10-CM

## 2019-06-28 DIAGNOSIS — R09.89 BRUIT: ICD-10-CM

## 2019-06-28 DIAGNOSIS — R42 DIZZINESS: ICD-10-CM

## 2019-06-28 DIAGNOSIS — H81.8X1 UNILATERAL VESTIBULAR WEAKNESS, RIGHT: ICD-10-CM

## 2019-06-28 DIAGNOSIS — R07.9 CHEST PAIN, UNSPECIFIED TYPE: ICD-10-CM

## 2019-06-28 DIAGNOSIS — H81.4 CENTRAL VESTIBULAR VERTIGO: Primary | ICD-10-CM

## 2019-06-28 LAB
ASCENDING AORTA: 3.45 CM
AV INDEX (PROSTH): 0.61
AV MEAN GRADIENT: 5 MMHG
AV PEAK GRADIENT: 9 MMHG
AV VALVE AREA: 2.21 CM2
AV VELOCITY RATIO: 0.66
BSA FOR ECHO PROCEDURE: 1.88 M2
CV ECHO LV RWT: 0.47 CM
DOP CALC AO PEAK VEL: 1.49 M/S
DOP CALC AO VTI: 32.02 CM
DOP CALC LVOT AREA: 3.6 CM2
DOP CALC LVOT DIAMETER: 2.15 CM
DOP CALC LVOT PEAK VEL: 0.98 M/S
DOP CALC LVOT STROKE VOLUME: 70.87 CM3
DOP CALCLVOT PEAK VEL VTI: 19.53 CM
E WAVE DECELERATION TIME: 141.09 MSEC
E/A RATIO: 0.54
E/E' RATIO: 12 M/S
ECHO LV POSTERIOR WALL: 1.07 CM (ref 0.6–1.1)
FRACTIONAL SHORTENING: 35 % (ref 28–44)
INTERVENTRICULAR SEPTUM: 1.06 CM (ref 0.6–1.1)
IVRT: 0.16 MSEC
LA MAJOR: 4.27 CM
LA MINOR: 4.24 CM
LA WIDTH: 2.24 CM
LEFT ATRIUM SIZE: 3.19 CM
LEFT ATRIUM VOLUME INDEX: 14.1 ML/M2
LEFT ATRIUM VOLUME: 25.84 CM3
LEFT INTERNAL DIMENSION IN SYSTOLE: 2.93 CM (ref 2.1–4)
LEFT VENTRICLE DIASTOLIC VOLUME INDEX: 50.65 ML/M2
LEFT VENTRICLE DIASTOLIC VOLUME: 92.92 ML
LEFT VENTRICLE MASS INDEX: 91 G/M2
LEFT VENTRICLE SYSTOLIC VOLUME INDEX: 18 ML/M2
LEFT VENTRICLE SYSTOLIC VOLUME: 33.01 ML
LEFT VENTRICULAR INTERNAL DIMENSION IN DIASTOLE: 4.51 CM (ref 3.5–6)
LEFT VENTRICULAR MASS: 167.85 G
LV LATERAL E/E' RATIO: 12 M/S
LV SEPTAL E/E' RATIO: 12 M/S
MV PEAK A VEL: 1.11 M/S
MV PEAK E VEL: 0.6 M/S
PISA TR MAX VEL: 2.5 M/S
PULM VEIN S/D RATIO: 1.54
PV PEAK D VEL: 0.28 M/S
PV PEAK S VEL: 0.43 M/S
RA MAJOR: 4.19 CM
RA PRESSURE: 3 MMHG
RA WIDTH: 2.84 CM
RIGHT VENTRICULAR END-DIASTOLIC DIMENSION: 3.43 CM
SINUS: 2.69 CM
STJ: 2.67 CM
TDI LATERAL: 0.05 M/S
TDI SEPTAL: 0.05 M/S
TDI: 0.05 M/S
TR MAX PG: 25 MMHG
TRICUSPID ANNULAR PLANE SYSTOLIC EXCURSION: 2.22 CM
TV REST PULMONARY ARTERY PRESSURE: 28 MMHG

## 2019-06-28 PROCEDURE — 93880 EXTRACRANIAL BILAT STUDY: CPT | Mod: HCNC,S$GLB,, | Performed by: INTERNAL MEDICINE

## 2019-06-28 PROCEDURE — 92537 PR CALORIC VSTBLR TEST W/REC BITHERMAL: ICD-10-PCS | Mod: HCNC,S$GLB,, | Performed by: AUDIOLOGIST

## 2019-06-28 PROCEDURE — 93880 CV US DOPPLER CAROTID (CUPID ONLY): ICD-10-PCS | Mod: HCNC,S$GLB,, | Performed by: INTERNAL MEDICINE

## 2019-06-28 PROCEDURE — 99999 PR PBB SHADOW E&M-EST. PATIENT-LVL II: ICD-10-PCS | Mod: PBBFAC,HCNC,,

## 2019-06-28 PROCEDURE — 92557 PR COMPREHENSIVE HEARING TEST: ICD-10-PCS | Mod: HCNC,S$GLB,, | Performed by: AUDIOLOGIST-HEARING AID FITTER

## 2019-06-28 PROCEDURE — 92537 CALORIC VSTBLR TEST W/REC: CPT | Mod: HCNC,S$GLB,, | Performed by: AUDIOLOGIST

## 2019-06-28 PROCEDURE — 93306 TTE W/DOPPLER COMPLETE: CPT | Mod: HCNC,S$GLB,, | Performed by: INTERNAL MEDICINE

## 2019-06-28 PROCEDURE — 92567 PR TYMPA2METRY: ICD-10-PCS | Mod: HCNC,S$GLB,, | Performed by: AUDIOLOGIST-HEARING AID FITTER

## 2019-06-28 PROCEDURE — 93306 TRANSTHORACIC ECHO (TTE) COMPLETE (CUPID ONLY): ICD-10-PCS | Mod: HCNC,S$GLB,, | Performed by: INTERNAL MEDICINE

## 2019-06-28 PROCEDURE — 99999 PR PBB SHADOW E&M-EST. PATIENT-LVL II: CPT | Mod: PBBFAC,HCNC,,

## 2019-06-28 PROCEDURE — 92557 COMPREHENSIVE HEARING TEST: CPT | Mod: HCNC,S$GLB,, | Performed by: AUDIOLOGIST-HEARING AID FITTER

## 2019-06-28 PROCEDURE — 92540 BASIC VESTIBULAR EVALUATION: CPT | Mod: HCNC,S$GLB,, | Performed by: AUDIOLOGIST

## 2019-06-28 PROCEDURE — 92540 PR VESTIBULAR EVAL NYSTAG FOVL&PERPH STIM OSCIL TRACKING: ICD-10-PCS | Mod: HCNC,S$GLB,, | Performed by: AUDIOLOGIST

## 2019-06-28 PROCEDURE — 92567 TYMPANOMETRY: CPT | Mod: HCNC,S$GLB,, | Performed by: AUDIOLOGIST-HEARING AID FITTER

## 2019-06-28 NOTE — PROGRESS NOTES
Jing Tenorio was seen 06/28/2019 for an audiological evaluation. Patient complains of hearing loss and dizziness. Pt reports trouble hearing.    Results reveal a mild-to-severe sensorineural hearing loss for the right ear, and  mild-to-profound sensorineural hearing loss for the left ear.    Speech Reception Thresholds were  30 dBHL for the right ear and 25 dBHL for the left ear.    Word recognition scores were good for the right ear and good for the left ear.   Tympanograms were Type A for the right ear and Type A for the left ear.    Audiogram results were reviewed in detail with patient and all questions were answered. Results will be reviewed by ENT at the completion of this note. Recommend binaural amplification pending medical clearance, hearing test in one year and hearing protection in loud noise. She says she wants to get rid of the dizziness before she thinks about hearing aids.

## 2019-06-28 NOTE — Clinical Note
VNG indicative of central involvement.  No evidence of peripheral asymmetry or weakness. Negative for BPPV bilaterally.  Recommend Neurology consult and to continue with cardiology work-up. Pt has requested a call back to discuss the medication that was prescribed for her dizziness.  She is not sure if she should take it as a preventative measure or as needed when she gets spinning.  She two cardiology appointments this afternoon following her VNG so she has requested a call back on her cell phone with a voicemail to be left with instructions regarding when to take this medication.  Thank you.

## 2019-06-28 NOTE — PROGRESS NOTES
Referring provider:  Josefina Guzman NP    Jing Tenorio was seen 2019 for Videonystagmography (VNG) testing.      Pt reported that her weakness and lightheadedness have gotten worse since her initial evaluation. She has also started experiencing brief episodes of positional and non-positional vertigo that last seconds per episode.  The vertigo can happen when she is bending down to put on her shoes or when she is sitting still and not moving.  Pt stated that she never gets dizzy when she is laying down or rolling to either side in the bed.  (Pt has multiple back issues so a side-lying hallpike testing method will be used.)      Pt stated that she did take her Trazodone last night to help her sleep but that she did not take any other contraindicated medications within the past 24 hours.    Pt has multiple cardiology appts this afternoon to investigate other non-vestibular causes for her symptoms.         VAT was negative bilaterally    VNG Results (abnormal results italicized):   Oculomotor function tests:   Sinusoidal tracking - abnormal with catch-up saccades noted at all frequencies except 0.1Hz; symmetrial   Saccades - abnormal velocity; normal latency and accuracy (same with repeat test)   OPKs - normal and symmetric  Spontaneous nystagmus was absent.  Gaze nystagmus was absent.  Head-shake test was absent for after head shake nystagmus.  Blossom-Hallpike Left was negative for BPPV.  Aby-Hallpike Right was negative for BPPV.  Static Positional nystagmus was absent.  Bi-thermal caloric irrigations revealed a 24% caloric weakness in the right ear, which is within normal limits, and 33% directional preponderance to the left, which is outside of normal limits.  Fixation suppression following caloric irrigations were normal.  RC: 16 d/s    RW: 8 d/s for repeat caloric (7 d/s with initial test)   d/s    LW: 27 d/s     Impressions: Abnormal VNG indicative of central involvement.  Borderline evidence of a  possible peripheral asymmetry or weakness but this is likely influenced by the narrowness of the patient's right ear canal and the difficulty with caloric stimulation.  Negative for BPPV bilaterally.      Abnormal VNG results include:  1. Abnormal performance on sinusoidal tracking and saccades oculomotor subtests  2. Possible borderline right peripheral unilateral weakness (this is likely influenced by the narrowness of the patient's right ear canal that is affecting proper caloric stimulation)    Recommendations:   1. ENT review of results  2. Referral to Neurology   3. Continued work-up with Cardiology (pt is having multiple tests done today)  4. Referral to PT for balance rehab to aid with stability and reduce fall risk    Tracings will be scanned into patient's chart.

## 2019-06-30 LAB
LEFT ARM DIASTOLIC BLOOD PRESSURE: 70 MMHG
LEFT ARM SYSTOLIC BLOOD PRESSURE: 140 MMHG
LEFT CBA DIAS: 12 CM/S
LEFT CBA SYS: 53 CM/S
LEFT CCA DIST DIAS: 22 CM/S
LEFT CCA DIST SYS: 70 CM/S
LEFT CCA MID DIAS: 18 CM/S
LEFT CCA MID SYS: 63 CM/S
LEFT CCA PROX DIAS: 14 CM/S
LEFT CCA PROX SYS: 73 CM/S
LEFT ECA DIAS: 0 CM/S
LEFT ECA SYS: 57 CM/S
LEFT ICA DIST DIAS: 18 CM/S
LEFT ICA DIST SYS: 62 CM/S
LEFT ICA MID DIAS: 25 CM/S
LEFT ICA MID SYS: 86 CM/S
LEFT ICA PROX DIAS: 20 CM/S
LEFT ICA PROX SYS: 61 CM/S
LEFT VERTEBRAL DIAS: 14 CM/S
LEFT VERTEBRAL SYS: 59 CM/S
OHS CV CAROTID RIGHT ICA EDV HIGHEST: 25
OHS CV CAROTID ULTRASOUND LEFT ICA/CCA RATIO: 1.18
OHS CV CAROTID ULTRASOUND RIGHT ICA/CCA RATIO: 1.2
OHS CV PV CAROTID LEFT HIGHEST CCA: 73
OHS CV PV CAROTID LEFT HIGHEST ICA: 86
OHS CV PV CAROTID RIGHT HIGHEST CCA: 69
OHS CV PV CAROTID RIGHT HIGHEST ICA: 83
OHS CV US CAROTID LEFT HIGHEST EDV: 25
RIGHT ARM DIASTOLIC BLOOD PRESSURE: 70 MMHG
RIGHT ARM SYSTOLIC BLOOD PRESSURE: 136 MMHG
RIGHT CBA DIAS: 13 CM/S
RIGHT CBA SYS: 65 CM/S
RIGHT CCA DIST DIAS: 15 CM/S
RIGHT CCA DIST SYS: 68 CM/S
RIGHT CCA MID DIAS: 10 CM/S
RIGHT CCA MID SYS: 65 CM/S
RIGHT CCA PROX DIAS: 12 CM/S
RIGHT CCA PROX SYS: 69 CM/S
RIGHT ECA DIAS: 0 CM/S
RIGHT ECA SYS: 75 CM/S
RIGHT ICA DIST DIAS: 25 CM/S
RIGHT ICA DIST SYS: 83 CM/S
RIGHT ICA MID DIAS: 21 CM/S
RIGHT ICA MID SYS: 70 CM/S
RIGHT ICA PROX DIAS: 12 CM/S
RIGHT ICA PROX SYS: 56 CM/S
RIGHT VERTEBRAL DIAS: 11 CM/S
RIGHT VERTEBRAL SYS: 55 CM/S

## 2019-07-01 ENCOUNTER — TELEPHONE (OUTPATIENT)
Dept: OTOLARYNGOLOGY | Facility: CLINIC | Age: 79
End: 2019-07-01

## 2019-07-01 NOTE — TELEPHONE ENCOUNTER
Pt has requested a call back to discuss the medication that was prescribed for her dizziness.  She is not sure if she should take it as a preventative measure or as needed when she gets spinning.  She two cardiology appointments this afternoon following her VNG so she has requested a call back on her cell phone with a voicemail to be left with instructions regarding when to take this medication.  Thank you.

## 2019-07-01 NOTE — TELEPHONE ENCOUNTER
----- Message from Josefina Guzman NP sent at 6/28/2019  3:43 PM CDT -----  Her VNG showed no evidence of an inner problem: no asymmetry or weakness, and no evidence of BPPV bilaterally.    Neurology consult was ordered.   I never prescribed any medication for her dizziness.  I do not see any medication on her med list for dizziness.    Thank you.       ----- Message -----  From: NELY Artis  Sent: 6/28/2019   2:37 PM  To: Josefina Guzman NP    VNG indicative of central involvement.  No evidence of peripheral asymmetry or weakness. Negative for BPPV bilaterally.  Recommend Neurology consult and to continue with cardiology work-up.     Pt has requested a call back to discuss the medication that was prescribed for her dizziness.  She is not sure if she should take it as a preventative measure or as needed when she gets spinning.  She two cardiology appointments this afternoon following her VNG so she has requested a call back on her cell phone with a voicemail to be left with instructions regarding when to take this medication.  Thank you.

## 2019-07-03 NOTE — TELEPHONE ENCOUNTER
I spoke with pt she wanted to know how to take meclizine. She understands  to take as needed up to three times a day. She said she did not want to take it because her dizziness only last for a few seconds and it is gone. She has cardiac testing to do and will let us know how this turns out.

## 2019-07-08 ENCOUNTER — TELEPHONE (OUTPATIENT)
Dept: CARDIOLOGY | Facility: CLINIC | Age: 79
End: 2019-07-08

## 2019-07-08 NOTE — TELEPHONE ENCOUNTER
----- Message from Liz Ramon sent at 7/8/2019  3:09 PM CDT -----  Type: Needs Medical Advice    Who Called:  Patient   Best Call Back Number: [phone]  Additional Information: contact to advise if patient should take her blood pressure medication prior to her stress test tomorrow    Thank you

## 2019-07-09 ENCOUNTER — HOSPITAL ENCOUNTER (OUTPATIENT)
Dept: RADIOLOGY | Facility: HOSPITAL | Age: 79
Discharge: HOME | End: 2019-07-09
Attending: INTERNAL MEDICINE
Payer: MEDICARE

## 2019-07-09 ENCOUNTER — CLINICAL SUPPORT (OUTPATIENT)
Dept: CARDIOLOGY | Facility: CLINIC | Age: 79
End: 2019-07-09
Attending: INTERNAL MEDICINE
Payer: MEDICARE

## 2019-07-09 VITALS — BODY MASS INDEX: 29.37 KG/M2 | HEIGHT: 64 IN | WEIGHT: 172 LBS

## 2019-07-09 DIAGNOSIS — I10 ESSENTIAL HYPERTENSION: ICD-10-CM

## 2019-07-09 DIAGNOSIS — R07.9 CHEST PAIN, UNSPECIFIED TYPE: ICD-10-CM

## 2019-07-09 DIAGNOSIS — R42 VERTIGO: ICD-10-CM

## 2019-07-09 DIAGNOSIS — R53.1 WEAKNESS: ICD-10-CM

## 2019-07-09 DIAGNOSIS — R09.89 BRUIT: ICD-10-CM

## 2019-07-09 DIAGNOSIS — R42 DIZZINESS: ICD-10-CM

## 2019-07-09 DIAGNOSIS — R00.2 PALPITATIONS: ICD-10-CM

## 2019-07-09 LAB
CV STRESS BASE HR: 82 BPM
DIASTOLIC BLOOD PRESSURE: 88 MMHG
NUC REST DIASTOLIC VOLUME INDEX: 53
NUC REST EJECTION FRACTION: 76
NUC REST SYSTOLIC VOLUME INDEX: 12
OHS CV CPX 1 MINUTE RECOVERY HEART RATE: 122 BPM
OHS CV CPX 85 PERCENT MAX PREDICTED HEART RATE MALE: 115
OHS CV CPX MAX PREDICTED HEART RATE: 136
OHS CV CPX PATIENT IS FEMALE: 1
OHS CV CPX PATIENT IS MALE: 0
OHS CV CPX PEAK DIASTOLIC BLOOD PRESSURE: 74 MMHG
OHS CV CPX PEAK HEAR RATE: 122 BPM
OHS CV CPX PEAK RATE PRESSURE PRODUCT: NORMAL
OHS CV CPX PEAK SYSTOLIC BLOOD PRESSURE: 159 MMHG
OHS CV CPX PERCENT MAX PREDICTED HEART RATE ACHIEVED: 90
OHS CV CPX RATE PRESSURE PRODUCT PRESENTING: NORMAL
STRESS ECHO TARGET HR: 119 BPM
SYSTOLIC BLOOD PRESSURE: 145 MMHG

## 2019-07-09 PROCEDURE — 93224 XTRNL ECG REC UP TO 48 HRS: CPT | Mod: HCNC,S$GLB,, | Performed by: INTERNAL MEDICINE

## 2019-07-09 PROCEDURE — 93015 CV STRESS TEST SUPVJ I&R: CPT | Mod: HCNC,S$GLB,, | Performed by: INTERNAL MEDICINE

## 2019-07-09 PROCEDURE — 99999 PR PBB SHADOW E&M-EST. PATIENT-LVL I: CPT | Mod: PBBFAC,HCNC,,

## 2019-07-09 PROCEDURE — 78452 STRESS TEST WITH MYOCARDIAL PERFUSION (CUPID ONLY): ICD-10-PCS | Mod: 26,HCNC,, | Performed by: INTERNAL MEDICINE

## 2019-07-09 PROCEDURE — 93224 HOLTER MONITOR - 48 HOUR (CUPID ONLY): ICD-10-PCS | Mod: HCNC,S$GLB,, | Performed by: INTERNAL MEDICINE

## 2019-07-09 PROCEDURE — 78452 HT MUSCLE IMAGE SPECT MULT: CPT | Mod: 26,HCNC,, | Performed by: INTERNAL MEDICINE

## 2019-07-09 PROCEDURE — 99999 PR PBB SHADOW E&M-EST. PATIENT-LVL I: ICD-10-PCS | Mod: PBBFAC,HCNC,,

## 2019-07-09 PROCEDURE — 93015 STRESS TEST WITH MYOCARDIAL PERFUSION (CUPID ONLY): ICD-10-PCS | Mod: HCNC,S$GLB,, | Performed by: INTERNAL MEDICINE

## 2019-07-15 ENCOUNTER — TELEPHONE (OUTPATIENT)
Dept: NEUROLOGY | Facility: CLINIC | Age: 79
End: 2019-07-15

## 2019-07-15 NOTE — TELEPHONE ENCOUNTER
called patient and informed patient we have no appointments available right now but I was adding her to wait list. Patient expressed understanding.

## 2019-07-16 ENCOUNTER — TELEPHONE (OUTPATIENT)
Dept: FAMILY MEDICINE | Facility: CLINIC | Age: 79
End: 2019-07-16

## 2019-07-16 ENCOUNTER — TELEPHONE (OUTPATIENT)
Dept: CARDIOLOGY | Facility: CLINIC | Age: 79
End: 2019-07-16

## 2019-07-16 NOTE — TELEPHONE ENCOUNTER
Test(s) Reviewed  I have reviewed the following in detail:  [x] Stress test   [] Angiography   [x] Echocardiogram   [] Labs   [x] Other:  Carotid     Call Pt and tell her tests are ok  Still waiting on Holter

## 2019-07-16 NOTE — TELEPHONE ENCOUNTER
----- Message from Preeti Stone sent at 7/16/2019  3:18 PM CDT -----  Contact: self   Pt need to speak with a nurse regarding a hearing test she had done, she want to know if office put that she has vertigo. Please call back at [phone] (home)

## 2019-07-16 NOTE — TELEPHONE ENCOUNTER
----- Message from Liz Munguia sent at 7/16/2019  9:14 AM CDT -----  Type:  Test Results    Who Called:  Patient  Name of Test (Lab/Mammo/Etc):  Echo, etc  Date of Test:  June 28th  Ordering Provider:  Dr. Gray  Where the test was performed:  [facility]  Best Call Back Number:  [phone]  Additional Information:

## 2019-07-16 NOTE — TELEPHONE ENCOUNTER
----- Message from Jackie Yarbrough sent at 7/16/2019  3:03 PM CDT -----  Contact: pt  Calling in regards to waiting on results for 2 weeks now and missed a call today and please advise . [phone] (home)

## 2019-07-16 NOTE — TELEPHONE ENCOUNTER
Spoke with pt she stated she had hearing test performed not sure if it states it was veritago 6/28/2019 test performed. Please advise

## 2019-07-17 NOTE — TELEPHONE ENCOUNTER
Called pt and gave results. She stated that she hasn't had a spell after 7/5/19. She feels good and asked if she could start driving. She is also not wanting to see kenneth as of now. She stated it was someone in ENT that told her not to drive. I will look into it and get back with her. Please advise

## 2019-07-23 ENCOUNTER — HOSPITAL ENCOUNTER (OUTPATIENT)
Dept: RADIOLOGY | Facility: HOSPITAL | Age: 79
Discharge: HOME | End: 2019-07-23
Attending: INTERNAL MEDICINE
Payer: MEDICARE

## 2019-07-23 DIAGNOSIS — C50.011 MALIGNANT NEOPLASM OF NIPPLE OF RIGHT BREAST IN FEMALE, UNSPECIFIED ESTROGEN RECEPTOR STATUS: ICD-10-CM

## 2019-07-23 PROCEDURE — 78815 PET IMAGE W/CT SKULL-THIGH: CPT | Mod: 26,PI,HCNC, | Performed by: RADIOLOGY

## 2019-07-23 PROCEDURE — 78815 PET IMAGE W/CT SKULL-THIGH: CPT | Mod: TC,HCNC,PO

## 2019-07-23 PROCEDURE — A9552 F18 FDG: HCPCS | Mod: HCNC,PO

## 2019-07-23 PROCEDURE — 78815 NM PET CT ROUTINE: ICD-10-PCS | Mod: 26,PI,HCNC, | Performed by: RADIOLOGY

## 2019-07-29 ENCOUNTER — TELEPHONE (OUTPATIENT)
Dept: CARDIOLOGY | Facility: CLINIC | Age: 79
End: 2019-07-29

## 2019-07-29 NOTE — TELEPHONE ENCOUNTER
Test(s) Reviewed  I have reviewed the following in detail:  [] Stress test   [] Angiography   [] Echocardiogram   [] Labs   [x] Other:  Holter     Call Pt and tell her tests are ok  Occasional premature beats  No abnormal rhythms  Other test results called to patient 07/16

## 2019-07-30 ENCOUNTER — OFFICE VISIT (OUTPATIENT)
Dept: HEMATOLOGY/ONCOLOGY | Facility: CLINIC | Age: 79
End: 2019-07-30
Payer: MEDICARE

## 2019-07-30 ENCOUNTER — INFUSION (OUTPATIENT)
Dept: INFUSION THERAPY | Facility: HOSPITAL | Age: 79
End: 2019-07-30
Attending: INTERNAL MEDICINE
Payer: MEDICARE

## 2019-07-30 VITALS
SYSTOLIC BLOOD PRESSURE: 134 MMHG | RESPIRATION RATE: 16 BRPM | OXYGEN SATURATION: 98 % | WEIGHT: 173.06 LBS | HEIGHT: 65 IN | HEART RATE: 89 BPM | TEMPERATURE: 98 F | DIASTOLIC BLOOD PRESSURE: 71 MMHG | BODY MASS INDEX: 28.83 KG/M2

## 2019-07-30 VITALS
RESPIRATION RATE: 16 BRPM | TEMPERATURE: 98 F | HEART RATE: 89 BPM | SYSTOLIC BLOOD PRESSURE: 134 MMHG | DIASTOLIC BLOOD PRESSURE: 71 MMHG

## 2019-07-30 DIAGNOSIS — E78.5 HYPERLIPIDEMIA LDL GOAL <130: ICD-10-CM

## 2019-07-30 DIAGNOSIS — C50.011 MALIGNANT NEOPLASM OF NIPPLE OF RIGHT BREAST IN FEMALE, UNSPECIFIED ESTROGEN RECEPTOR STATUS: Primary | ICD-10-CM

## 2019-07-30 DIAGNOSIS — C50.012 MALIGNANT NEOPLASM INVOLVING BOTH NIPPLE AND AREOLA OF LEFT BREAST IN FEMALE, UNSPECIFIED ESTROGEN RECEPTOR STATUS: ICD-10-CM

## 2019-07-30 DIAGNOSIS — D69.6 THROMBOCYTOPENIA: ICD-10-CM

## 2019-07-30 DIAGNOSIS — C50.012 MALIGNANT NEOPLASM OF NIPPLE OF LEFT BREAST IN FEMALE, UNSPECIFIED ESTROGEN RECEPTOR STATUS: ICD-10-CM

## 2019-07-30 DIAGNOSIS — M81.0 OSTEOPOROSIS, SENILE: Primary | ICD-10-CM

## 2019-07-30 DIAGNOSIS — M81.0 OSTEOPOROSIS, SENILE: ICD-10-CM

## 2019-07-30 DIAGNOSIS — N18.30 CHRONIC KIDNEY DISEASE, STAGE III (MODERATE): ICD-10-CM

## 2019-07-30 DIAGNOSIS — I70.0 AORTIC ATHEROSCLEROSIS: ICD-10-CM

## 2019-07-30 PROCEDURE — 1101F PT FALLS ASSESS-DOCD LE1/YR: CPT | Mod: HCNC,CPTII,S$GLB, | Performed by: INTERNAL MEDICINE

## 2019-07-30 PROCEDURE — 99214 PR OFFICE/OUTPT VISIT, EST, LEVL IV, 30-39 MIN: ICD-10-PCS | Mod: HCNC,S$GLB,, | Performed by: INTERNAL MEDICINE

## 2019-07-30 PROCEDURE — 63600175 PHARM REV CODE 636 W HCPCS: Mod: HCNC,JG,PN | Performed by: INTERNAL MEDICINE

## 2019-07-30 PROCEDURE — 99497 PR ADVNCD CARE PLAN 30 MIN: ICD-10-PCS | Mod: HCNC,S$GLB,, | Performed by: INTERNAL MEDICINE

## 2019-07-30 PROCEDURE — 1101F PR PT FALLS ASSESS DOC 0-1 FALLS W/OUT INJ PAST YR: ICD-10-PCS | Mod: HCNC,CPTII,S$GLB, | Performed by: INTERNAL MEDICINE

## 2019-07-30 PROCEDURE — 99999 PR PBB SHADOW E&M-EST. PATIENT-LVL IV: CPT | Mod: PBBFAC,HCNC,, | Performed by: INTERNAL MEDICINE

## 2019-07-30 PROCEDURE — 3078F PR MOST RECENT DIASTOLIC BLOOD PRESSURE < 80 MM HG: ICD-10-PCS | Mod: HCNC,CPTII,S$GLB, | Performed by: INTERNAL MEDICINE

## 2019-07-30 PROCEDURE — 99999 PR PBB SHADOW E&M-EST. PATIENT-LVL IV: ICD-10-PCS | Mod: PBBFAC,HCNC,, | Performed by: INTERNAL MEDICINE

## 2019-07-30 PROCEDURE — 99214 OFFICE O/P EST MOD 30 MIN: CPT | Mod: HCNC,S$GLB,, | Performed by: INTERNAL MEDICINE

## 2019-07-30 PROCEDURE — 3078F DIAST BP <80 MM HG: CPT | Mod: HCNC,CPTII,S$GLB, | Performed by: INTERNAL MEDICINE

## 2019-07-30 PROCEDURE — 3075F SYST BP GE 130 - 139MM HG: CPT | Mod: HCNC,CPTII,S$GLB, | Performed by: INTERNAL MEDICINE

## 2019-07-30 PROCEDURE — 99497 ADVNCD CARE PLAN 30 MIN: CPT | Mod: HCNC,S$GLB,, | Performed by: INTERNAL MEDICINE

## 2019-07-30 PROCEDURE — 96372 THER/PROPH/DIAG INJ SC/IM: CPT | Mod: HCNC,PN

## 2019-07-30 PROCEDURE — 3075F PR MOST RECENT SYSTOLIC BLOOD PRESS GE 130-139MM HG: ICD-10-PCS | Mod: HCNC,CPTII,S$GLB, | Performed by: INTERNAL MEDICINE

## 2019-07-30 RX ORDER — ANASTROZOLE 1 MG/1
1 TABLET ORAL DAILY
Qty: 90 TABLET | Refills: 3 | Status: SHIPPED | OUTPATIENT
Start: 2019-07-30 | End: 2020-07-30 | Stop reason: SDUPTHER

## 2019-07-30 RX ORDER — ANASTROZOLE 1 MG/1
1 TABLET ORAL DAILY
COMMUNITY
End: 2019-07-30 | Stop reason: SDUPTHER

## 2019-07-30 RX ADMIN — DENOSUMAB 60 MG: 60 INJECTION SUBCUTANEOUS at 03:07

## 2019-07-30 NOTE — PROGRESS NOTES
An 78 year-old  woman well known to me.  The patient was diagnosed n   May 2016 with triple positive breast cancer.  The patient had a 1.9 cm   malignancy of the left breast, sentinel lymph node negative in association with   DCIS.  She underwent lumpectomy, underwent adjuvant TCH chemotherapy for six   cycles and now continues with Herceptin alone, has done well, but she has   multiple other issues in association.  The patient had a recent echocardiogram   and follows with Dr. Gray.  Echocardiogram at this visit shows a drop by 10%   from an ejection fraction of 65% to 55%.  Dr. Gray has cleared her to continue   with Herceptin and with the short-term echocardiogram follow up as the patient   has remained asymptomatic.  The patient also had some pulmonary nodules that are   being followed by a CT scan.  They are deemed to be benign, but need ongoing   followup.  She had been taking Boniva for postmenopausal osteopenia/osteoporosis   along with calcium and the patient has also had increased density on mammogram.    Recently had a repeat mammogram, which Radiology recommends short-term   followup within three months on the right side. Was changed to prolia due for it now She is due for a followup   mammogram on the left side three months following that, which will be towards   the end of year.  .   PHYSICAL EXAMINATION:  GENERAL:  Elderly woman.  Alert, awake, oriented.  VITAL SIGNS:    Wt Readings from Last 3 Encounters:   07/30/19 78.5 kg (173 lb 1 oz)   07/09/19 78 kg (172 lb)   06/28/19 78 kg (172 lb)     Temp Readings from Last 3 Encounters:   07/30/19 98 °F (36.7 °C) (Oral)   06/10/19 98.8 °F (37.1 °C)   01/25/19 98 °F (36.7 °C)     BP Readings from Last 3 Encounters:   07/30/19 134/71   06/28/19 (!) 140/70   06/25/19 134/70     Pulse Readings from Last 3 Encounters:   07/30/19 89   06/28/19 80   06/25/19 94   HEENT:  Hair has started coming back.  Showed no congestion.  NECK:  Supple, without JVD.   Trachea is central.  LUNGS:  Clear to auscultation.  No rales, rhonchi or crepitations.  HEART:  S1 is normally heard.  No murmurs or gallops.  ABDOMEN:  Soft, without rebound, guarding or rigidity.  EXTREMITIES:  Showed no edema.  NEUROLOGIC:  She demonstrates no neurological deficits.    LABORATORY EVALUATION:    Lab Results   Component Value Date    WBC 6.62 07/23/2019    HGB 13.6 07/23/2019    HCT 40.1 07/23/2019    MCV 91 07/23/2019     07/23/2019     CMP  Sodium   Date Value Ref Range Status   07/23/2019 137 136 - 145 mmol/L Final     Potassium   Date Value Ref Range Status   07/23/2019 4.7 3.5 - 5.1 mmol/L Final     Chloride   Date Value Ref Range Status   07/23/2019 101 95 - 110 mmol/L Final     CO2   Date Value Ref Range Status   07/23/2019 26 23 - 29 mmol/L Final     Glucose   Date Value Ref Range Status   07/23/2019 99 70 - 110 mg/dL Final     BUN, Bld   Date Value Ref Range Status   07/23/2019 10 8 - 23 mg/dL Final     Creatinine   Date Value Ref Range Status   07/23/2019 1.1 0.5 - 1.4 mg/dL Final   01/18/2013 0.9 0.5 - 1.4 mg/dL Final     Calcium   Date Value Ref Range Status   07/23/2019 10.2 8.7 - 10.5 mg/dL Final   01/18/2013 8.9 8.7 - 10.5 mg/dL Final     Total Protein   Date Value Ref Range Status   07/23/2019 7.3 6.0 - 8.4 g/dL Final     Albumin   Date Value Ref Range Status   07/23/2019 4.4 3.5 - 5.2 g/dL Final     Total Bilirubin   Date Value Ref Range Status   07/23/2019 0.5 0.1 - 1.0 mg/dL Final     Comment:     For infants and newborns, interpretation of results should be based  on gestational age, weight and in agreement with clinical  observations.  Premature Infant recommended reference ranges:  Up to 24 hours.............<8.0 mg/dL  Up to 48 hours............<12.0 mg/dL  3-5 days..................<15.0 mg/dL  6-29 days.................<15.0 mg/dL       Alkaline Phosphatase   Date Value Ref Range Status   07/23/2019 64 55 - 135 U/L Final     AST   Date Value Ref Range Status    07/23/2019 33 10 - 40 U/L Final     ALT   Date Value Ref Range Status   07/23/2019 16 10 - 44 U/L Final     Anion Gap   Date Value Ref Range Status   07/23/2019 10 8 - 16 mmol/L Final   01/18/2013 9 5 - 15 meq/L Final     eGFR if    Date Value Ref Range Status   07/23/2019 55 (A) >60 mL/min/1.73 m^2 Final     eGFR if non    Date Value Ref Range Status   07/23/2019 48 (A) >60 mL/min/1.73 m^2 Final     Comment:     Calculation used to obtain the estimated glomerular filtration  rate (eGFR) is the CKD-EPI equation.        Echocardiogram, ejection fraction has dropped to 55%, but   still in the normal range.  The most recent CT of the chest, abdomen and pelvis   done on 5/2018  .   Impression July/2019     No metastatic disease    CA 2729  66.9 elevated throughout these surveillances  mammo 9/2018 wnl    IMPRESSION:  1.  Triple positive breast cancer, T1, N0, M0, underwent TCH chemotherapy,   Completed 1 year of herceptin, now on arimidex . Next prolia due now 7/2019    EF dropped somewhat followed by Cardiology.  Cleared now by cards had a stress test recently, carotids checked  2. The patient has significant osteoporosis on bone density.  .boniva changed this to Prolia.  Proceed with prolia    Calcium supplementation minimum 1200 mg along with dental precautions.    on arimidex and stay on it.   6.  The patient has dyslipidemia.  Continue with Zocor and primary care follow   up with Dr. Franco.  7.  Degenerative joint pains.  Continue with Ultram as needed, take trazodone   for sleep and anxiety along with Xanax.may go for pain stimulant  8.  Mild CKD.  Continue to monitor.  No intervention necessary at this time.  9.  Gastroesophageal reflux disease.  Continue with omeprazole.  No changes or   worsening of symptoms at this point  Will get pet in 6 months due to ckd , and breast ca would like to avoid dye  10.psoas muscle  Tumor possibly a schwannoma confirmed with DR. La valentin  port       aortic athrosclerosis stable follow with  pcp   Advance Care Planning     Living Will  During this visit, I engaged the patient in the advance care planning process.  The patient and I reviewed the role for advance directives and their purpose in directing future healthcare if the patient's unable to speak for herself.  At this point in time, the patient does have full decision-making capacity.  We discussed different extreme health states that she could experience, and reviewed what kind of medical care she would want in those situations.  The patient communicated that if she were comatose and had little chance of a meaningful recovery, she want machines/life-sustaining treatments used. The patient  completed a living will to reflect these preferences.  The patient already designated a healthcare power of  to make decisions on her behalf.   I spent a total of 25 minutes engaging the patient in this advance care planning discussion. Her children will. Has two sons. Pt is motivated to consider these issues as she has a long term partner but her sons will make decisions

## 2019-08-14 ENCOUNTER — PATIENT OUTREACH (OUTPATIENT)
Dept: ADMINISTRATIVE | Facility: HOSPITAL | Age: 79
End: 2019-08-14

## 2019-09-25 ENCOUNTER — HOSPITAL ENCOUNTER (OUTPATIENT)
Dept: RADIOLOGY | Facility: HOSPITAL | Age: 79
Discharge: HOME | End: 2019-09-25
Attending: INTERNAL MEDICINE
Payer: MEDICARE

## 2019-09-25 DIAGNOSIS — M81.0 OSTEOPOROSIS, SENILE: ICD-10-CM

## 2019-09-25 DIAGNOSIS — I70.0 AORTIC ATHEROSCLEROSIS: ICD-10-CM

## 2019-09-25 DIAGNOSIS — N18.30 CHRONIC KIDNEY DISEASE, STAGE III (MODERATE): ICD-10-CM

## 2019-09-25 DIAGNOSIS — C50.011 MALIGNANT NEOPLASM OF NIPPLE OF RIGHT BREAST IN FEMALE, UNSPECIFIED ESTROGEN RECEPTOR STATUS: ICD-10-CM

## 2019-09-25 DIAGNOSIS — D69.6 THROMBOCYTOPENIA: ICD-10-CM

## 2019-09-25 DIAGNOSIS — C50.012 MALIGNANT NEOPLASM INVOLVING BOTH NIPPLE AND AREOLA OF LEFT BREAST IN FEMALE, UNSPECIFIED ESTROGEN RECEPTOR STATUS: ICD-10-CM

## 2019-09-25 DIAGNOSIS — E78.5 HYPERLIPIDEMIA LDL GOAL <130: ICD-10-CM

## 2019-09-25 PROCEDURE — 77062 MAMMO DIGITAL DIAGNOSTIC BILAT WITH TOMOSYNTHESIS_CAD: ICD-10-PCS | Mod: 26,HCNC,, | Performed by: RADIOLOGY

## 2019-09-25 PROCEDURE — 77062 BREAST TOMOSYNTHESIS BI: CPT | Mod: 26,HCNC,, | Performed by: RADIOLOGY

## 2019-09-25 PROCEDURE — 77066 DX MAMMO INCL CAD BI: CPT | Mod: TC,HCNC,PO

## 2019-09-25 PROCEDURE — 77066 DX MAMMO INCL CAD BI: CPT | Mod: 26,HCNC,, | Performed by: RADIOLOGY

## 2019-09-25 PROCEDURE — 77066 MAMMO DIGITAL DIAGNOSTIC BILAT WITH TOMOSYNTHESIS_CAD: ICD-10-PCS | Mod: 26,HCNC,, | Performed by: RADIOLOGY

## 2019-10-02 ENCOUNTER — TELEPHONE (OUTPATIENT)
Dept: HEMATOLOGY/ONCOLOGY | Facility: CLINIC | Age: 79
End: 2019-10-02

## 2019-10-02 NOTE — TELEPHONE ENCOUNTER
"Returned call to patient who stated she has a "lump" on her gum, going to see Dentist, but concerned regarding Prolia. Assured patient that unless anything involves bone, all good.  "

## 2019-10-02 NOTE — TELEPHONE ENCOUNTER
----- Message from Tia Erazo LPN sent at 10/2/2019 10:37 AM CDT -----  Contact: self      ----- Message -----  From: Argelia Mayfield  Sent: 10/2/2019   9:14 AM CDT  To: [facility] Staff    Type: Needs Medical Advice    Who Called:  Patient   Symptoms (please be specific):  Lump on gums  How long has patient had these symptoms:  About a week   Pharmacy name and phone #:    Best Call Back Number: [phone] (home) or after 12 call cell at [phone] per patient     Additional Information: patient asking for Tia

## 2019-10-07 ENCOUNTER — TELEPHONE (OUTPATIENT)
Dept: FAMILY MEDICINE | Facility: CLINIC | Age: 79
End: 2019-10-07

## 2019-10-07 NOTE — TELEPHONE ENCOUNTER
----- Message from Breanna Daily sent at 10/7/2019  7:19 AM CDT -----  Type: Needs Medical Advice    Who Called:  Self   Symptoms (please be specific):    How long has patient had these symptoms: Pharmacy name and phone #:    Best Call Back Number:[phone]  Additional Information: Patient asking to speak with the nurse, pt want to discuss ordered labs to be done tomorrow.

## 2019-10-08 ENCOUNTER — LAB VISIT (OUTPATIENT)
Dept: LAB | Facility: HOSPITAL | Age: 79
End: 2019-10-08
Attending: INTERNAL MEDICINE
Payer: MEDICARE

## 2019-10-08 DIAGNOSIS — N18.30 CKD (CHRONIC KIDNEY DISEASE), STAGE III: ICD-10-CM

## 2019-10-08 DIAGNOSIS — I10 ESSENTIAL HYPERTENSION: ICD-10-CM

## 2019-10-08 LAB
ALBUMIN SERPL BCP-MCNC: 4.1 G/DL (ref 3.5–5.2)
ALP SERPL-CCNC: 66 U/L (ref 55–135)
ALT SERPL W/O P-5'-P-CCNC: 15 U/L (ref 10–44)
ANION GAP SERPL CALC-SCNC: 7 MMOL/L (ref 8–16)
AST SERPL-CCNC: 36 U/L (ref 10–40)
BASOPHILS # BLD AUTO: 0.08 K/UL (ref 0–0.2)
BASOPHILS NFR BLD: 1.5 % (ref 0–1.9)
BILIRUB SERPL-MCNC: 0.5 MG/DL (ref 0.1–1)
BUN SERPL-MCNC: 13 MG/DL (ref 8–23)
CALCIUM SERPL-MCNC: 9.6 MG/DL (ref 8.7–10.5)
CHLORIDE SERPL-SCNC: 99 MMOL/L (ref 95–110)
CO2 SERPL-SCNC: 28 MMOL/L (ref 23–29)
CREAT SERPL-MCNC: 1.1 MG/DL (ref 0.5–1.4)
DIFFERENTIAL METHOD: ABNORMAL
EOSINOPHIL # BLD AUTO: 0.2 K/UL (ref 0–0.5)
EOSINOPHIL NFR BLD: 4 % (ref 0–8)
ERYTHROCYTE [DISTWIDTH] IN BLOOD BY AUTOMATED COUNT: 14.6 % (ref 11.5–14.5)
EST. GFR  (AFRICAN AMERICAN): 54.8 ML/MIN/1.73 M^2
EST. GFR  (NON AFRICAN AMERICAN): 47.5 ML/MIN/1.73 M^2
GLUCOSE SERPL-MCNC: 90 MG/DL (ref 70–110)
HCT VFR BLD AUTO: 40.1 % (ref 37–48.5)
HGB BLD-MCNC: 13 G/DL (ref 12–16)
IMM GRANULOCYTES # BLD AUTO: 0.01 K/UL (ref 0–0.04)
IMM GRANULOCYTES NFR BLD AUTO: 0.2 % (ref 0–0.5)
LYMPHOCYTES # BLD AUTO: 0.7 K/UL (ref 1–4.8)
LYMPHOCYTES NFR BLD: 12.1 % (ref 18–48)
MCH RBC QN AUTO: 31.4 PG (ref 27–31)
MCHC RBC AUTO-ENTMCNC: 32.4 G/DL (ref 32–36)
MCV RBC AUTO: 97 FL (ref 82–98)
MONOCYTES # BLD AUTO: 0.4 K/UL (ref 0.3–1)
MONOCYTES NFR BLD: 7.7 % (ref 4–15)
NEUTROPHILS # BLD AUTO: 4.1 K/UL (ref 1.8–7.7)
NEUTROPHILS NFR BLD: 74.5 % (ref 38–73)
NRBC BLD-RTO: 0 /100 WBC
PLATELET # BLD AUTO: 319 K/UL (ref 150–350)
PMV BLD AUTO: 10.3 FL (ref 9.2–12.9)
POTASSIUM SERPL-SCNC: 5.2 MMOL/L (ref 3.5–5.1)
PROT SERPL-MCNC: 7 G/DL (ref 6–8.4)
RBC # BLD AUTO: 4.14 M/UL (ref 4–5.4)
SODIUM SERPL-SCNC: 134 MMOL/L (ref 136–145)
WBC # BLD AUTO: 5.47 K/UL (ref 3.9–12.7)

## 2019-10-08 PROCEDURE — 36415 COLL VENOUS BLD VENIPUNCTURE: CPT | Mod: HCNC,PO

## 2019-10-08 PROCEDURE — 85025 COMPLETE CBC W/AUTO DIFF WBC: CPT | Mod: HCNC

## 2019-10-08 PROCEDURE — 80053 COMPREHEN METABOLIC PANEL: CPT | Mod: HCNC

## 2019-10-12 DIAGNOSIS — E78.5 DYSLIPIDEMIA: ICD-10-CM

## 2019-10-14 RX ORDER — SIMVASTATIN 20 MG/1
TABLET, FILM COATED ORAL
Qty: 90 TABLET | Refills: 1 | Status: SHIPPED | OUTPATIENT
Start: 2019-10-14 | End: 2020-03-22

## 2019-10-14 NOTE — PROGRESS NOTES
Refill Authorization Note     is requesting a refill authorization.    Brief assessment and rationale for refill: APPROVE: prr  Name and strength of medication: SIMVASTATIN 20MG TABLETS       Medication Therapy Plan: HLD-controlled, LCO(LOV); Labs WNL; Approve 6 more months    Medication reconciliation completed: No              How patient will take medication: t1 po qpm          Comments:   Last Lipids 12 months  Lab Results   Component Value Date    CHOL 174 04/09/2019    CHOL 181 05/03/2018    CHOL 163 11/06/2017    Lab Results   Component Value Date    HDL 72 04/09/2019    HDL 76 (H) 05/03/2018    HDL 73 11/06/2017      Lab Results   Component Value Date    TRIG 85 04/09/2019    TRIG 105 05/03/2018    TRIG 60 11/06/2017    Lab Results   Component Value Date    LDLCALC 85.0 04/09/2019    LDLCALC 84.0 05/03/2018    LDLCALC 78.0 11/06/2017       Lab Results   Component Value Date    CHOLHDL 41.4 04/09/2019    CHOLHDL 42.0 05/03/2018    CHOLHDL 44.8 11/06/2017        Appointments past 12m or future 3m    Date Provider   Last Visit   4/16/2019 Nakul Franco MD   Next Visit   10/16/2019 Nakul Franco MD

## 2019-10-16 ENCOUNTER — OFFICE VISIT (OUTPATIENT)
Dept: FAMILY MEDICINE | Facility: CLINIC | Age: 79
End: 2019-10-16
Payer: MEDICARE

## 2019-10-16 VITALS
BODY MASS INDEX: 28.91 KG/M2 | DIASTOLIC BLOOD PRESSURE: 74 MMHG | SYSTOLIC BLOOD PRESSURE: 136 MMHG | HEART RATE: 83 BPM | HEIGHT: 65 IN | WEIGHT: 173.5 LBS | OXYGEN SATURATION: 98 %

## 2019-10-16 DIAGNOSIS — G89.29 OTHER CHRONIC PAIN: ICD-10-CM

## 2019-10-16 DIAGNOSIS — M51.36 DDD (DEGENERATIVE DISC DISEASE), LUMBAR: ICD-10-CM

## 2019-10-16 DIAGNOSIS — E78.5 DYSLIPIDEMIA: ICD-10-CM

## 2019-10-16 DIAGNOSIS — I10 ESSENTIAL HYPERTENSION: ICD-10-CM

## 2019-10-16 DIAGNOSIS — K21.9 GASTROESOPHAGEAL REFLUX DISEASE, ESOPHAGITIS PRESENCE NOT SPECIFIED: ICD-10-CM

## 2019-10-16 DIAGNOSIS — Z00.00 ROUTINE PHYSICAL EXAMINATION: Primary | ICD-10-CM

## 2019-10-16 PROCEDURE — G0008 FLU VACCINE - HIGH DOSE (65+) PRESERVATIVE FREE IM: ICD-10-PCS | Mod: HCNC,S$GLB,, | Performed by: INTERNAL MEDICINE

## 2019-10-16 PROCEDURE — G0008 ADMIN INFLUENZA VIRUS VAC: HCPCS | Mod: HCNC,S$GLB,, | Performed by: INTERNAL MEDICINE

## 2019-10-16 PROCEDURE — 3078F PR MOST RECENT DIASTOLIC BLOOD PRESSURE < 80 MM HG: ICD-10-PCS | Mod: HCNC,CPTII,S$GLB, | Performed by: INTERNAL MEDICINE

## 2019-10-16 PROCEDURE — 99999 PR PBB SHADOW E&M-EST. PATIENT-LVL III: CPT | Mod: PBBFAC,HCNC,, | Performed by: INTERNAL MEDICINE

## 2019-10-16 PROCEDURE — 90662 FLU VACCINE - HIGH DOSE (65+) PRESERVATIVE FREE IM: ICD-10-PCS | Mod: HCNC,S$GLB,, | Performed by: INTERNAL MEDICINE

## 2019-10-16 PROCEDURE — 3075F PR MOST RECENT SYSTOLIC BLOOD PRESS GE 130-139MM HG: ICD-10-PCS | Mod: HCNC,CPTII,S$GLB, | Performed by: INTERNAL MEDICINE

## 2019-10-16 PROCEDURE — 3075F SYST BP GE 130 - 139MM HG: CPT | Mod: HCNC,CPTII,S$GLB, | Performed by: INTERNAL MEDICINE

## 2019-10-16 PROCEDURE — 3078F DIAST BP <80 MM HG: CPT | Mod: HCNC,CPTII,S$GLB, | Performed by: INTERNAL MEDICINE

## 2019-10-16 PROCEDURE — 90662 IIV NO PRSV INCREASED AG IM: CPT | Mod: HCNC,S$GLB,, | Performed by: INTERNAL MEDICINE

## 2019-10-16 PROCEDURE — 99397 PER PM REEVAL EST PAT 65+ YR: CPT | Mod: HCNC,25,S$GLB, | Performed by: INTERNAL MEDICINE

## 2019-10-16 PROCEDURE — 99999 PR PBB SHADOW E&M-EST. PATIENT-LVL III: ICD-10-PCS | Mod: PBBFAC,HCNC,, | Performed by: INTERNAL MEDICINE

## 2019-10-16 PROCEDURE — 99397 PR PREVENTIVE VISIT,EST,65 & OVER: ICD-10-PCS | Mod: HCNC,25,S$GLB, | Performed by: INTERNAL MEDICINE

## 2019-10-16 RX ORDER — OMEPRAZOLE 40 MG/1
40 CAPSULE, DELAYED RELEASE ORAL DAILY
Qty: 90 CAPSULE | Refills: 3 | Status: SHIPPED | OUTPATIENT
Start: 2019-10-16 | End: 2019-10-21 | Stop reason: SDUPTHER

## 2019-10-16 RX ORDER — TRAMADOL HYDROCHLORIDE 50 MG/1
TABLET ORAL
Qty: 60 TABLET | Refills: 5 | Status: SHIPPED | OUTPATIENT
Start: 2019-10-16 | End: 2020-05-21 | Stop reason: SDUPTHER

## 2019-10-16 NOTE — PROGRESS NOTES
Subjective:       Patient ID: Jing Tenorio is a 79 y.o. female.    Chief Complaint: Annual Exam    Here for routine health maintenance.      Chronic Low back pain/ inflammatory arthropathy. MRI showed tumor on nerve, but benign.  Has leg pain.  Failed epidural and ablation.  Saw neurosurgeon in [place] who did not want to do surgery as pt higher risk and thought may end up with chronic leg pain.  Recommended nerve stimulator.  Dr Rice had given meloxicam, which helps but pt has decreased kidney function so this was held.  Her new pain Dr, Dr Bean, offered Cymbalta? But pt scared of side affects. She also wants to avoid anything with potential addiction.  He also recommended a nerve stimulator but she is scared of this.  She will take an otc Advil about once a week.    Can not take nsaids with ckd   May get medical marijuana from her pain Dr.    Dizzy spells.  Resolved.  Originally patient described more as weak and potential to pass out, but not states it was an off balance/spinning feeling and not like she would pass out.  This made her off balance so she would have to sit - this is where she stated she became weak.  Extensive w/u neg by ENT and cardiology.    HTN -  Borderline controlled.       HLD - controlled    Insomnia - controlled    Breast caner - now on anastrozole for 1 mo and feels bad or run down on this w some mild nausea.  Takes in am.   Breast cancer 1.9 cm, ER/FL+, HER2 +; lymph nodes negative; s/p removal, rad tx and completed 6 chemo treatments.    s/p Ratx, chemo - now on hormone suppression therapy.   Recheck this week    Thrombocytopenia - mild  Atherosclerosis - no syncope    Review of Systems   Constitutional: Negative for appetite change and fever.   HENT: Negative for nosebleeds and trouble swallowing.    Eyes: Negative for discharge and visual disturbance.   Respiratory: Negative for choking and shortness of breath.    Cardiovascular: Negative for chest pain and palpitations.    Gastrointestinal: Negative for abdominal pain, nausea and vomiting.   Musculoskeletal: Positive for back pain. Negative for arthralgias and joint swelling.   Skin: Negative for rash and wound.   Neurological: Negative for dizziness and syncope.   Psychiatric/Behavioral: Negative for confusion and dysphoric mood.       Objective:      Vitals:    10/16/19 1215   BP: 136/74   Pulse: 83     Physical Exam   Constitutional: She appears well-nourished.   Eyes: Conjunctivae and EOM are normal.   Neck: Trachea normal and normal range of motion. No thyromegaly present.   Cardiovascular: Normal heart sounds.   Edema negative   Pulmonary/Chest: Effort normal and breath sounds normal.   Abdominal: Soft. There is no hepatomegaly.   Neurological: No cranial nerve deficit.   DTR decreased bilateral   Skin: Skin is warm, dry and intact.   Psychiatric: She has a normal mood and affect.   Alert and Oriented    Vitals reviewed.        Assessment:       1. Routine physical examination    2. Other chronic pain    3. DDD (degenerative disc disease), lumbar    4. Essential hypertension    5. Dyslipidemia    6. Gastroesophageal reflux disease, esophagitis presence not specified        Plan:       Routine physical examination    Other chronic pain  -     traMADol (ULTRAM) 50 mg tablet; 1/2 - 1 po q 6 hour prn pain  Dispense: 60 tablet; Refill: 5    DDD (degenerative disc disease), lumbar    Essential hypertension  -     Comprehensive metabolic panel; Future; Expected date: 04/13/2020    Dyslipidemia  -     Lipid panel; Future; Expected date: 04/13/2020    Gastroesophageal reflux disease, esophagitis presence not specified  -     omeprazole (PRILOSEC) 40 MG capsule; Take 1 capsule (40 mg total) by mouth once daily.  Dispense: 90 capsule; Refill: 3            Medication List with Changes/Refills   Current Medications    ANASTROZOLE (ARIMIDEX) 1 MG TAB    Take 1 tablet (1 mg total) by mouth once daily.    ASCORBIC ACID, VITAMIN C, (VITAMIN C)  500 MG TABLET    Take 500 mg by mouth once daily.    B COMPLEX VITAMINS TABLET    Take 1 tablet by mouth once daily.    COENZYME Q10 (CO Q-10) 100 MG CAPSULE    Take 100 mg by mouth once daily.     DENOSUMAB (PROLIA) 60 MG/ML SYRG    Inject 60 mg into the skin every 6 (six) months.    LISINOPRIL 10 MG TABLET    2 pills in the morning and 1 pill at night    MULTIVITAMIN ORAL    once daily. Every day    SIMVASTATIN (ZOCOR) 20 MG TABLET    TAKE 1 TABLET BY MOUTH EVERY DAY IN THE EVENING    TRAZODONE (DESYREL) 100 MG TABLET    Take 1 tablet (100 mg total) by mouth every evening.    VITAMIN D (VITAMIN D3) 1000 UNITS TAB    Take 2,000 Units by mouth once daily.    VITAMIN E 400 UNIT CAPSULE    Take 400 Units by mouth once daily.   Changed and/or Refilled Medications    Modified Medication Previous Medication    OMEPRAZOLE (PRILOSEC) 40 MG CAPSULE omeprazole (PRILOSEC) 40 MG capsule       Take 1 capsule (40 mg total) by mouth once daily.    TAKE 1 CAPSULE BY MOUTH EVERY DAY    TRAMADOL (ULTRAM) 50 MG TABLET traMADol (ULTRAM) 50 mg tablet       1/2 - 1 po q 6 hour prn pain    1/2 - 1 po q 6 hour prn pain     Wellness reviewed   Continue current management and monitor.    Counseled on regular exercise, maintenance of a healthy weight, balanced diet rich in fruits/vegetables and lean protein, and avoidance of unhealthy habits like smoking and excessive alcohol intake.   Also, counseled on importance of being compliant with medication, health appointments, diet and exercise.     Follow up in about 6 months (around 4/16/2020).

## 2019-10-18 DIAGNOSIS — K21.9 GASTROESOPHAGEAL REFLUX DISEASE, ESOPHAGITIS PRESENCE NOT SPECIFIED: Primary | ICD-10-CM

## 2019-10-18 NOTE — PROGRESS NOTES
Refill Authorization Note     is requesting a refill authorization.    Brief assessment and rationale for refill: ROUTE: op   Name and strength of medication: OMEPRAZOLE 40MG CAPSULES       Medication Therapy Plan: GERD-LCO/LOV(10/19); med outside of protocol; route to you     Medication reconciliation completed: No              How patient will take medication: t1c po qd           Comments:   Appointments past 12m or future 3m    Date Provider   Last Visit   10/16/2019 Nakul Franco MD   Next Visit   4/14/2020 Nakul Franco MD     -

## 2019-10-21 RX ORDER — OMEPRAZOLE 40 MG/1
CAPSULE, DELAYED RELEASE ORAL
Qty: 90 CAPSULE | Refills: 1 | Status: SHIPPED | OUTPATIENT
Start: 2019-10-21 | End: 2020-05-21 | Stop reason: SDUPTHER

## 2019-10-21 NOTE — TELEPHONE ENCOUNTER
"I have reviewed and agree with the assessment below with changes. GERD LCO 10/19. Rx ordered on 10/16/19 set to "Print" and has not yet been filled. Will approve 6 more.   "

## 2020-01-06 ENCOUNTER — OFFICE VISIT (OUTPATIENT)
Dept: HOME HEALTH SERVICES | Facility: CLINIC | Age: 80
End: 2020-01-06
Payer: MEDICARE

## 2020-01-06 VITALS
TEMPERATURE: 98 F | RESPIRATION RATE: 16 BRPM | OXYGEN SATURATION: 98 % | HEART RATE: 78 BPM | DIASTOLIC BLOOD PRESSURE: 68 MMHG | WEIGHT: 176 LBS | SYSTOLIC BLOOD PRESSURE: 122 MMHG | BODY MASS INDEX: 29.29 KG/M2

## 2020-01-06 DIAGNOSIS — Z00.00 ENCOUNTER FOR PREVENTIVE HEALTH EXAMINATION: ICD-10-CM

## 2020-01-06 DIAGNOSIS — M54.16 LUMBAR RADICULOPATHY: ICD-10-CM

## 2020-01-06 DIAGNOSIS — M47.816 LUMBAR SPONDYLOSIS: ICD-10-CM

## 2020-01-06 DIAGNOSIS — Z79.811 LONG TERM (CURRENT) USE OF AROMATASE INHIBITORS: ICD-10-CM

## 2020-01-06 DIAGNOSIS — E78.5 HYPERLIPIDEMIA LDL GOAL <130: ICD-10-CM

## 2020-01-06 DIAGNOSIS — D69.2 SENILE PURPURA: ICD-10-CM

## 2020-01-06 DIAGNOSIS — M15.9 GENERALIZED OA: ICD-10-CM

## 2020-01-06 DIAGNOSIS — G62.0 CHEMOTHERAPY-INDUCED NEUROPATHY: ICD-10-CM

## 2020-01-06 DIAGNOSIS — M40.04 POSTURAL KYPHOSIS OF THORACIC REGION: ICD-10-CM

## 2020-01-06 DIAGNOSIS — N39.3 STRESS INCONTINENCE: ICD-10-CM

## 2020-01-06 DIAGNOSIS — N18.30 BENIGN HYPERTENSION WITH CKD (CHRONIC KIDNEY DISEASE) STAGE III: ICD-10-CM

## 2020-01-06 DIAGNOSIS — M51.36 DDD (DEGENERATIVE DISC DISEASE), LUMBAR: Primary | ICD-10-CM

## 2020-01-06 DIAGNOSIS — K21.9 GASTROESOPHAGEAL REFLUX DISEASE, ESOPHAGITIS PRESENCE NOT SPECIFIED: ICD-10-CM

## 2020-01-06 DIAGNOSIS — I70.0 AORTIC ATHEROSCLEROSIS: ICD-10-CM

## 2020-01-06 DIAGNOSIS — G47.00 INSOMNIA, UNSPECIFIED TYPE: ICD-10-CM

## 2020-01-06 DIAGNOSIS — M81.0 OSTEOPOROSIS, SENILE: ICD-10-CM

## 2020-01-06 DIAGNOSIS — N18.30 STAGE 3 CHRONIC KIDNEY DISEASE: ICD-10-CM

## 2020-01-06 DIAGNOSIS — I12.9 BENIGN HYPERTENSION WITH CKD (CHRONIC KIDNEY DISEASE) STAGE III: ICD-10-CM

## 2020-01-06 DIAGNOSIS — C50.012 MALIGNANT NEOPLASM INVOLVING BOTH NIPPLE AND AREOLA OF LEFT BREAST IN FEMALE, UNSPECIFIED ESTROGEN RECEPTOR STATUS: ICD-10-CM

## 2020-01-06 DIAGNOSIS — R91.8 PULMONARY NODULES: ICD-10-CM

## 2020-01-06 DIAGNOSIS — T45.1X5A CHEMOTHERAPY-INDUCED NEUROPATHY: ICD-10-CM

## 2020-01-06 PROBLEM — D69.6 THROMBOCYTOPENIA: Status: RESOLVED | Noted: 2019-04-16 | Resolved: 2020-01-06

## 2020-01-06 PROCEDURE — 3078F PR MOST RECENT DIASTOLIC BLOOD PRESSURE < 80 MM HG: ICD-10-PCS | Mod: CPTII,S$GLB,, | Performed by: NURSE PRACTITIONER

## 2020-01-06 PROCEDURE — G0439 PPPS, SUBSEQ VISIT: HCPCS | Mod: S$GLB,,, | Performed by: NURSE PRACTITIONER

## 2020-01-06 PROCEDURE — G0439 PR MEDICARE ANNUAL WELLNESS SUBSEQUENT VISIT: ICD-10-PCS | Mod: S$GLB,,, | Performed by: NURSE PRACTITIONER

## 2020-01-06 PROCEDURE — 3074F PR MOST RECENT SYSTOLIC BLOOD PRESSURE < 130 MM HG: ICD-10-PCS | Mod: CPTII,S$GLB,, | Performed by: NURSE PRACTITIONER

## 2020-01-06 PROCEDURE — 3078F DIAST BP <80 MM HG: CPT | Mod: CPTII,S$GLB,, | Performed by: NURSE PRACTITIONER

## 2020-01-06 PROCEDURE — 3074F SYST BP LT 130 MM HG: CPT | Mod: CPTII,S$GLB,, | Performed by: NURSE PRACTITIONER

## 2020-01-06 NOTE — PATIENT INSTRUCTIONS
Counseling and Referral of Other Preventative  (Italic type indicates deductible and co-insurance are waived)    Patient Name: Jing Tenorio  Today's Date: 1/6/2020    Health Maintenance       Date Due Completion Date    Shingles Vaccine (1 of 2) 10[birth year]10/1988 ---    Colonoscopy 09/23/2021 9/23/2013    DEXA SCAN 06/04/2022 6/4/2019    Lipid Panel 04/09/2024 4/9/2019    TETANUS VACCINE 07/18/2027 7/18/2017        No orders of the defined types were placed in this encounter.    The following information is provided to all patients.  This information is to help you find resources for any of the problems found today that may be affecting your health:                Living healthy guide: www.[facility].[place].[facility]      Understanding Diabetes: www.diabetes.org      Eating healthy: www.cdc.gov/healthyweight      [facility] home safety checklist: www.cdc.gov/steadi/patient.html      Agency on Aging: www.goea.[place].[facility]      Alcoholics anonymous (AA): www.aa.org      Physical Activity: www.ashwini.nih.gov/ed5qswn      Tobacco use: www.quitwithusla.org     Chronic Kidney Disease (CKD)     The role of the kidneys is to remove waste products and extra water from the blood.  When the kidneys do not work as they should, waste products begin to build up in the blood. This is called chronic kidney disease (CKD). CKD means that you have kidney damage or a decrease in kidney function lasting at least 3 months. CKD allows extra water, waste, and toxins to build up in the body. This can eventually become life-threatening. You might need dialysis or a kidney transplant to stay alive. This most severe form is called end stage renal disease.  Diabetes is the leading causes of chronic renal failure. Other causes include high blood pressure, hardening of the arteries (atherosclerosis), lupus, inflammation of the blood vessels (vasculitis), and past viral or bacterial infections. Certain over-the-counter pain medicines can cause renal failure when taken  often over a long period of time. These include aspirin, ibuprofen, and related anti-inflammatory medicines called NSAIDs (nonsteroidal anti-inflammatory drugs).  Home care  The following guidelines will help you care for yourself at home:  · If you have diabetes, talk with your healthcare provider about keeping your blood sugar under control. Ask if you need to make and changes to your diet, lifestyle, or medicines.  · If you have high blood pressure:  ¨ Take prescribed medicine to lower your blood pressure to the recommended goal of less than 130/80.  ¨ Start a regular exercise program that you enjoy. Check with your healthcare provider to be sure your planned exercise program is right for you.  ¨ Eat less salt (sodium). Your healthcare provider can tell you how much salt per day is safe for you.  · If you are overweight, talk with your healthcare provider about a weight loss plan.  · If you smoke, you must quit. Smoking makes kidney disease worse. Talk with your healthcare provider about ways to help you quit.  For more information, visit the following links:  ¨ www.smokefree.gov/sites/default/files/pdf/clearing-the-air-accessible.pdf  ¨ www.smokefree.gov  ¨ www.cancer.org/healthy/stayawayfromtobacco/guidetoquittingsmoking/  · Most people with CKD need to follow a special diet.  Be sure you understand yours. In general, you will need to limit protein, salt, potassium, and phosphorus. You also need to limit how much fluid you drink.   · CKD is a risk factor for heart disease. Talk with your healthcare provider about any other risk factors you might have and what you can do to lessen them.  · Talk with your healthcare provider about any medicines you are taking to find out if they need to be reduced or stopped.  · Don't use the following over-the-counter medicines, or consult your healthcare provider before using:  ¨ Aspirin and NSAIDs such as ibuprofen or naproxen. Using acetaminophen for fever or pain is  OK.  ¨ Laxatives and antacids containing magnesium or aluminum  ¨ Fleet or phospho soda enemas containing phosphorus  ¨ Certain stomach acid-blocking medicine such as cimetidine or ranitidine   ¨ Decongestants containing pseudoephedrine   ¨ Herbal supplements  Follow-up care  Follow up with your healthcare provider, or as advised. Contact one of the following for more information:  · American Association of Kidney Patients 507-744-0519 www.aakp.org  · National Kidney Foundation 110-121-9337 www.kidney.org  · American Kidney Fund 907-149-1079 www.kidneyfund.org  · National Kidney Disease Education Program 866-4KIDNEY www.nkdep.nih.gov  If an X-ray, ECG (cardiogram), or other diagnostic test was taken, you will be told of any new findings that may affect your care.  Call 911  Call 911 if you have any of the following:  · Severe weakness, dizziness, fainting, drowsiness, or confusion  · Chest pain or shortness of breath  · Heart beating fast, slow, or irregularly  When to seek medical advice  Call your healthcare provider right away if any of these occur:  · Nausea or vomiting  · Fever of 100.4°F (38°C) or higher, or as directed by your healthcare provider  · Unexpected weight gain or swelling in the legs, ankles, or around the eyes  · Decrease or absent urine output  Date Last Reviewed: 9/1/2016  © 0577-3576 [employer]. [address] [place] [address], [place] [address]. All rights reserved. This information is not intended as a substitute for professional medical care. Always follow your healthcare professional's instructions.

## 2020-01-06 NOTE — PROGRESS NOTES
Jing Tenorio presented for a follow-up Medicare AWV today. The following components were reviewed and updated:    · Medical history  · Family History  · Social history  · Allergies and Current Medications  · Health Risk Assessment  · Health Maintenance  · Care Team    **See Completed Assessments for Annual Wellness visit with in the encounter summary    The following assessments were completed:  · Depression Screening  · Cognitive function Screening  ·   ·   ·   · Timed Get Up Test  · Whisper Test    Vitals:    01/06/20 1113   BP: 122/68   Pulse: 78   Resp: 16   Temp: 98.2 °F (36.8 °C)   TempSrc: Temporal   SpO2: 98%   Weight: 79.8 kg (176 lb)     Body mass index is 29.29 kg/m².   ]    Physical Exam   Constitutional: She appears well-nourished.   Eyes: Conjunctivae and EOM are normal.   Neck: Trachea normal and normal range of motion. No thyromegaly present.   Cardiovascular: Normal heart sounds. No murmur. Peripheral pulses 1+.  Edema negative   Pulmonary/Chest: Effort normal and breath sounds normal.   Abdominal: Soft. There is no hepatomegaly.   Musculoskeletal: + heberden's nodes to bilateral hands, + thoracic kyphosis  Neurological: No cranial nerve deficit.   DTR decreased bilateral   Skin: Skin is warm, dry and intact. + scattered purpura to BUE  Psychiatric: She has a normal mood and affect.   Alert and Oriented .    Diagnoses and health risks identified today and associated recommendations/orders:  1. Encounter for preventive health examination  Assessment performed and preventive measures reviewed.    2. DDD (degenerative disc disease), lumbar  Active. Followed by Pain Management.     3. Lumbar spondylosis  Active. Followed by Pain Management.     4. Lumbar radiculopathy  Active. Followed by Pain Management.     5. Pulmonary nodules  Stable. Followed by PCP.     6. Hyperlipidemia LDL goal <130  Stable. Followed by PCP.     7. Aortic atherosclerosis  Stable. Followed by Cardiology.    8. Benign  hypertension with CKD (chronic kidney disease) stage III  Stable. Followed by PCP.     9. Stage 3 chronic kidney disease  Stable. Followed by PCP.     10. Malignant neoplasm involving both nipple and areola of left breast in female, unspecified estrogen receptor status  Stable. Followed by Heme/Onc.     11. Gastroesophageal reflux disease, esophagitis presence not specified  Stable. Followed by PCP.     12. Insomnia, unspecified type  Stable. Followed by PCP.     13. Senile purpura  Stable. Followed by PCP.     14. Generalized OA  Stable. Followed by PCP.     15. Postural kyphosis of thoracic region  Stable. Followed by PCP.     16. BMI 29.0-29.9,adult  Stable. Followed by PCP.     17. Stress incontinence  Stable. Followed by PCP.     18. Chemotherapy-induced neuropathy  Stable in both feet. Followed by Heme/Onc.     19. Osteoporosis, senile  Stable. Followed by Heme/Onc.     20. Long term (current) use of aromatase inhibitors  Stable. Followed by Heme/Onc.       Provided Jing with a 5-10 year written screening schedule and personal prevention plan. Recommendations were developed using the USPSTF age appropriate recommendations. Education, counseling, and referrals were provided as needed.  After Visit Summary printed and given to patient which includes a list of additional screenings\tests needed.    Follow up in about 1 year (around 1/6/2021).    Consent for treatment obtained from patient today on visit.     Chrissy Chand NP  I offered to discuss end of life issues, including information on how to make advance directives that the patient could use to name someone who would make medical decisions on their behalf if they became too ill to make themselves.    ___Patient declined  _X_Patient is interested, I provided paper work and offered to discuss.

## 2020-01-16 ENCOUNTER — TELEPHONE (OUTPATIENT)
Dept: HEMATOLOGY/ONCOLOGY | Facility: CLINIC | Age: 80
End: 2020-01-16

## 2020-01-16 NOTE — TELEPHONE ENCOUNTER
----- Message from Yaa Barreto sent at 1/16/2020  1:55 PM CST -----  Contact: Patient  Type: Needs Medical Advice    Who Called:  Patient   Best Call Back Number:   Additional Information: Calling to speak to Tia about her blood pressure medication. She advised that if she is not there then to leave a message.

## 2020-01-20 ENCOUNTER — TELEPHONE (OUTPATIENT)
Dept: HEMATOLOGY/ONCOLOGY | Facility: CLINIC | Age: 80
End: 2020-01-20

## 2020-01-20 NOTE — TELEPHONE ENCOUNTER
Spoke with Pt. Pt had a question about taking her b/p medication the day she does her PET scan. Pt notified that it is ok to take her b/p med with plain water. Pt verbalized understanding.

## 2020-01-20 NOTE — TELEPHONE ENCOUNTER
----- Message from Princess JUAN Franco sent at 1/20/2020  8:20 AM CST -----  Contact: patient  Type: Needs Medical Advice    Who Called: Patient  Best Call Back Number:   Additional Information: Patient needs to know if she can take her pressure pills in regards to a test she is having coming up.

## 2020-01-20 NOTE — TELEPHONE ENCOUNTER
----- Message from Marjorie Ochoa sent at 1/20/2020  2:02 PM CST -----  Contact: pt  Pt calling states that she needs to speak to nurse Tia..[phone] (home)

## 2020-01-28 ENCOUNTER — HOSPITAL ENCOUNTER (OUTPATIENT)
Dept: RADIOLOGY | Facility: HOSPITAL | Age: 80
Discharge: HOME | End: 2020-01-28
Attending: INTERNAL MEDICINE
Payer: MEDICARE

## 2020-01-28 DIAGNOSIS — I70.0 AORTIC ATHEROSCLEROSIS: ICD-10-CM

## 2020-01-28 DIAGNOSIS — D69.6 THROMBOCYTOPENIA: ICD-10-CM

## 2020-01-28 DIAGNOSIS — C50.012 MALIGNANT NEOPLASM INVOLVING BOTH NIPPLE AND AREOLA OF LEFT BREAST IN FEMALE, UNSPECIFIED ESTROGEN RECEPTOR STATUS: ICD-10-CM

## 2020-01-28 DIAGNOSIS — N18.30 CHRONIC KIDNEY DISEASE, STAGE III (MODERATE): ICD-10-CM

## 2020-01-28 DIAGNOSIS — M81.0 OSTEOPOROSIS, SENILE: ICD-10-CM

## 2020-01-28 DIAGNOSIS — E78.5 HYPERLIPIDEMIA LDL GOAL <130: ICD-10-CM

## 2020-01-28 DIAGNOSIS — C50.011 MALIGNANT NEOPLASM OF NIPPLE OF RIGHT BREAST IN FEMALE, UNSPECIFIED ESTROGEN RECEPTOR STATUS: ICD-10-CM

## 2020-01-28 PROCEDURE — 78815 PET IMAGE W/CT SKULL-THIGH: CPT | Mod: 26,HCNC,PS, | Performed by: RADIOLOGY

## 2020-01-28 PROCEDURE — 78815 NM PET CT ROUTINE: ICD-10-PCS | Mod: 26,HCNC,PS, | Performed by: RADIOLOGY

## 2020-01-28 PROCEDURE — A9552 F18 FDG: HCPCS | Mod: HCNC,PO

## 2020-01-28 PROCEDURE — 78815 PET IMAGE W/CT SKULL-THIGH: CPT | Mod: TC,HCNC,PO,PS

## 2020-01-30 ENCOUNTER — INFUSION (OUTPATIENT)
Dept: INFUSION THERAPY | Facility: HOSPITAL | Age: 80
End: 2020-01-30
Attending: INTERNAL MEDICINE
Payer: MEDICARE

## 2020-01-30 ENCOUNTER — OFFICE VISIT (OUTPATIENT)
Dept: HEMATOLOGY/ONCOLOGY | Facility: CLINIC | Age: 80
End: 2020-01-30
Payer: MEDICARE

## 2020-01-30 VITALS
HEIGHT: 65 IN | OXYGEN SATURATION: 98 % | DIASTOLIC BLOOD PRESSURE: 74 MMHG | HEART RATE: 95 BPM | BODY MASS INDEX: 28.81 KG/M2 | WEIGHT: 172.94 LBS | RESPIRATION RATE: 18 BRPM | SYSTOLIC BLOOD PRESSURE: 135 MMHG | TEMPERATURE: 98 F

## 2020-01-30 VITALS
TEMPERATURE: 98 F | DIASTOLIC BLOOD PRESSURE: 74 MMHG | HEART RATE: 95 BPM | RESPIRATION RATE: 18 BRPM | SYSTOLIC BLOOD PRESSURE: 135 MMHG

## 2020-01-30 DIAGNOSIS — E78.5 HYPERLIPIDEMIA LDL GOAL <130: ICD-10-CM

## 2020-01-30 DIAGNOSIS — I10 ESSENTIAL HYPERTENSION: ICD-10-CM

## 2020-01-30 DIAGNOSIS — M81.0 OSTEOPOROSIS, SENILE: Primary | ICD-10-CM

## 2020-01-30 DIAGNOSIS — N18.30 BENIGN HYPERTENSION WITH CKD (CHRONIC KIDNEY DISEASE) STAGE III: ICD-10-CM

## 2020-01-30 DIAGNOSIS — C50.011 MALIGNANT NEOPLASM OF NIPPLE OF RIGHT BREAST IN FEMALE, UNSPECIFIED ESTROGEN RECEPTOR STATUS: Primary | ICD-10-CM

## 2020-01-30 DIAGNOSIS — I12.9 BENIGN HYPERTENSION WITH CKD (CHRONIC KIDNEY DISEASE) STAGE III: ICD-10-CM

## 2020-01-30 DIAGNOSIS — C50.012 MALIGNANT NEOPLASM OF NIPPLE OF LEFT BREAST IN FEMALE, UNSPECIFIED ESTROGEN RECEPTOR STATUS: ICD-10-CM

## 2020-01-30 PROCEDURE — 3075F PR MOST RECENT SYSTOLIC BLOOD PRESS GE 130-139MM HG: ICD-10-PCS | Mod: HCNC,CPTII,S$GLB, | Performed by: INTERNAL MEDICINE

## 2020-01-30 PROCEDURE — 99214 PR OFFICE/OUTPT VISIT, EST, LEVL IV, 30-39 MIN: ICD-10-PCS | Mod: HCNC,S$GLB,, | Performed by: INTERNAL MEDICINE

## 2020-01-30 PROCEDURE — 96372 THER/PROPH/DIAG INJ SC/IM: CPT | Mod: HCNC,PN

## 2020-01-30 PROCEDURE — 99499 UNLISTED E&M SERVICE: CPT | Mod: HCNC,S$GLB,, | Performed by: INTERNAL MEDICINE

## 2020-01-30 PROCEDURE — 1125F PR PAIN SEVERITY QUANTIFIED, PAIN PRESENT: ICD-10-PCS | Mod: HCNC,S$GLB,, | Performed by: INTERNAL MEDICINE

## 2020-01-30 PROCEDURE — 1159F PR MEDICATION LIST DOCUMENTED IN MEDICAL RECORD: ICD-10-PCS | Mod: HCNC,S$GLB,, | Performed by: INTERNAL MEDICINE

## 2020-01-30 PROCEDURE — 1101F PR PT FALLS ASSESS DOC 0-1 FALLS W/OUT INJ PAST YR: ICD-10-PCS | Mod: HCNC,CPTII,S$GLB, | Performed by: INTERNAL MEDICINE

## 2020-01-30 PROCEDURE — 99999 PR PBB SHADOW E&M-EST. PATIENT-LVL IV: ICD-10-PCS | Mod: PBBFAC,HCNC,, | Performed by: INTERNAL MEDICINE

## 2020-01-30 PROCEDURE — 1101F PT FALLS ASSESS-DOCD LE1/YR: CPT | Mod: HCNC,CPTII,S$GLB, | Performed by: INTERNAL MEDICINE

## 2020-01-30 PROCEDURE — 1125F AMNT PAIN NOTED PAIN PRSNT: CPT | Mod: HCNC,S$GLB,, | Performed by: INTERNAL MEDICINE

## 2020-01-30 PROCEDURE — 3075F SYST BP GE 130 - 139MM HG: CPT | Mod: HCNC,CPTII,S$GLB, | Performed by: INTERNAL MEDICINE

## 2020-01-30 PROCEDURE — 99499 RISK ADDL DX/OHS AUDIT: ICD-10-PCS | Mod: HCNC,S$GLB,, | Performed by: INTERNAL MEDICINE

## 2020-01-30 PROCEDURE — 3078F DIAST BP <80 MM HG: CPT | Mod: HCNC,CPTII,S$GLB, | Performed by: INTERNAL MEDICINE

## 2020-01-30 PROCEDURE — 3078F PR MOST RECENT DIASTOLIC BLOOD PRESSURE < 80 MM HG: ICD-10-PCS | Mod: HCNC,CPTII,S$GLB, | Performed by: INTERNAL MEDICINE

## 2020-01-30 PROCEDURE — 1159F MED LIST DOCD IN RCRD: CPT | Mod: HCNC,S$GLB,, | Performed by: INTERNAL MEDICINE

## 2020-01-30 PROCEDURE — 63600175 PHARM REV CODE 636 W HCPCS: Mod: JG,HCNC,PN | Performed by: INTERNAL MEDICINE

## 2020-01-30 PROCEDURE — 99999 PR PBB SHADOW E&M-EST. PATIENT-LVL IV: CPT | Mod: PBBFAC,HCNC,, | Performed by: INTERNAL MEDICINE

## 2020-01-30 PROCEDURE — 99214 OFFICE O/P EST MOD 30 MIN: CPT | Mod: HCNC,S$GLB,, | Performed by: INTERNAL MEDICINE

## 2020-01-30 RX ADMIN — DENOSUMAB 60 MG: 60 INJECTION SUBCUTANEOUS at 02:01

## 2020-01-30 NOTE — PROGRESS NOTES
An 78 year-old  woman well known to me.  The patient was diagnosed n   May 2016 with triple positive breast cancer.  The patient had a 1.9 cm   malignancy of the left breast, sentinel lymph node negative in association with   DCIS.  She underwent lumpectomy, underwent adjuvant TCH chemotherapy for six   cycles and now continues with Herceptin alone, has done well, but she has   multiple other issues in association.  The patient had a recent echocardiogram   and follows with Dr. Gray.  Echocardiogram at this visit shows a drop by 10%   from an ejection fraction of 65% to 55%.  Dr. Gray has cleared her to continue   with Herceptin and with the short-term echocardiogram follow up as the patient   has remained asymptomatic.  The patient also had some pulmonary nodules that are   being followed by a CT scan.  They are deemed to be benign, but need ongoing   followup.  She had been taking Boniva for postmenopausal osteopenia/osteoporosis   along with calcium and the patient has also had increased density on mammogram.    had  repeat mammogram, which Radiology recommends short-term   followup which was done and now she goes for annual mammogram most recent mammogram September 2019.  She receives Prolia on Arimidex     PHYSICAL EXAMINATION:  GENERAL:  Elderly woman.  Alert, awake, oriented.  VITAL SIGNS:    Wt Readings from Last 3 Encounters:   01/30/20 78.4 kg (172 lb 15.2 oz)   01/06/20 79.8 kg (176 lb)   10/16/19 78.7 kg (173 lb 8 oz)     Temp Readings from Last 3 Encounters:   01/30/20 98.3 °F (36.8 °C)   01/30/20 98.3 °F (36.8 °C) (Oral)   01/06/20 98.2 °F (36.8 °C) (Temporal)     BP Readings from Last 3 Encounters:   01/30/20 135/74   01/30/20 135/74   01/06/20 122/68     Pulse Readings from Last 3 Encounters:   01/30/20 95   01/30/20 95   01/06/20 78   HEENT:  Hair has started coming back.  Showed no congestion.  NECK:  Supple, without JVD.  Trachea is central.  LUNGS:  Clear to auscultation.  No rales,  rhonchi or crepitations.  HEART:  S1 is normally heard.  No murmurs or gallops.  ABDOMEN:  Soft, without rebound, guarding or rigidity.  EXTREMITIES:  Showed no edema.  NEUROLOGIC:  She demonstrates no neurological deficits.    LABORATORY EVALUATION:    Lab Results   Component Value Date    WBC 7.27 01/28/2020    HGB 13.9 01/28/2020    HCT 41.1 01/28/2020    MCV 91 01/28/2020     (H) 01/28/2020     CMP  Sodium   Date Value Ref Range Status   01/28/2020 138 136 - 145 mmol/L Final     Potassium   Date Value Ref Range Status   01/28/2020 5.0 3.5 - 5.1 mmol/L Final     Chloride   Date Value Ref Range Status   01/28/2020 101 95 - 110 mmol/L Final     CO2   Date Value Ref Range Status   01/28/2020 27 23 - 29 mmol/L Final     Glucose   Date Value Ref Range Status   01/28/2020 102 70 - 110 mg/dL Final     BUN, Bld   Date Value Ref Range Status   01/28/2020 10 8 - 23 mg/dL Final     Creatinine   Date Value Ref Range Status   01/28/2020 1.0 0.5 - 1.4 mg/dL Final   01/18/2013 0.9 0.5 - 1.4 mg/dL Final     Calcium   Date Value Ref Range Status   01/28/2020 10.2 8.7 - 10.5 mg/dL Final   01/18/2013 8.9 8.7 - 10.5 mg/dL Final     Total Protein   Date Value Ref Range Status   01/28/2020 7.3 6.0 - 8.4 g/dL Final     Albumin   Date Value Ref Range Status   01/28/2020 4.3 3.5 - 5.2 g/dL Final     Total Bilirubin   Date Value Ref Range Status   01/28/2020 0.6 0.1 - 1.0 mg/dL Final     Comment:     For infants and newborns, interpretation of results should be based  on gestational age, weight and in agreement with clinical  observations.  Premature Infant recommended reference ranges:  Up to 24 hours.............<8.0 mg/dL  Up to 48 hours............<12.0 mg/dL  3-5 days..................<15.0 mg/dL  6-29 days.................<15.0 mg/dL       Alkaline Phosphatase   Date Value Ref Range Status   01/28/2020 71 55 - 135 U/L Final     AST   Date Value Ref Range Status   01/28/2020 34 10 - 40 U/L Final     ALT   Date Value Ref Range  Status   01/28/2020 19 10 - 44 U/L Final     Anion Gap   Date Value Ref Range Status   01/28/2020 10 8 - 16 mmol/L Final   01/18/2013 9 5 - 15 meq/L Final     eGFR if    Date Value Ref Range Status   01/28/2020 >60 >60 mL/min/1.73 m^2 Final     eGFR if non    Date Value Ref Range Status   01/28/2020 53 (A) >60 mL/min/1.73 m^2 Final     Comment:     Calculation used to obtain the estimated glomerular filtration  rate (eGFR) is the CKD-EPI equation.        Echocardiogram, ejection fraction has dropped to 55%, but   still in the normal range.  .   Impression 1/2020 PET scan     No metastatic disease    CA 2729  67.3. elevated throughout these surveillances without corresponding changes on scan  mammo 9/2018 wnl    IMPRESSION:  1.  Triple positive breast cancer, T1, N0, M0, underwent [facility] chemotherapy,   Completed 1 year of herceptin, now on arimidex .  prolia due now 1/20    EF dropped somewhat followed by Cardiology.  This improved cards had a stress test , carotids checked  2. The patient has significant osteoporosis on bone density.  .boniva changed this to Prolia.  Proceed with prolia  next Prolia July/ 20 return to clinic then with CBC CMP and tumor marker PET scan annually  Calcium supplementation minimum 1200 mg along with dental precautions.    on arimidex and stay on it.   6.  The patient has dyslipidemia.  Continue with Zocor and primary care follow   up with Dr. Franco.  7.  Degenerative joint pains.  Continue with Ultram as needed, take trazodone   for sleep and anxiety along with Xanax.may go for pain stimulant  8.  Mild CKD.  Continue to monitor.  No intervention necessary at this time.  9.  Gastroesophageal reflux disease.  Continue with omeprazole.  No changes or   worsening of symptoms at this point    10.psoas muscle  Tumor possibly a schwannoma confirmed with DR. La valentin port       aortic athrosclerosis stable follow with  pcp   Advance Care Planning     Living  Will  Discussed and documented previous visit

## 2020-02-06 NOTE — PLAN OF CARE
START ON PATHWAY REGIMEN - Breast    [id number]        Docetaxel (Taxotere(R))       Carboplatin (Paraplatin(R))       Trastuzumab-xxxx       Trastuzumab-xxxx       Pertuzumab (Perjeta(R))       Pertuzumab (Perjeta(R))           Additional Orders: Begin corticosteroid premedication prior to docetaxel   initiation. LVEF assessment prior to initiation of trastuzumab and pertuzumab   and as clinically indicated. Reload trastuzumab if a dose is missed by more than   one week. Reload pertuzumab if it has been greater than or equal to 6 weeks   since last dose.    **Always confirm dose/schedule in your pharmacy ordering system**    Patient Characteristics:  Postoperative without Neoadjuvant Therapy (Pathologic Staging), Invasive   Disease, Adjuvant Therapy, HER2 Positive, ER Positive, Node Negative, pT2 or   Higher  Therapeutic Status: Postoperative without Neoadjuvant Therapy (Pathologic   Staging)  AJCC Grade: G2  AJCC N Category: pN0  AJCC M Category: cM0  ER Status: Positive (+)  AJCC 8 Stage Grouping: IA  HER2 Status: Positive (+)  Oncotype Dx Recurrence Score: Not Appropriate  AJCC T Category: pT2  [place] Status: Positive (+)  Intent of Therapy:  Curative Intent, Discussed with Patient

## 2020-02-21 ENCOUNTER — CLINICAL SUPPORT (OUTPATIENT)
Dept: URGENT CARE | Facility: CLINIC | Age: 80
End: 2020-02-21
Payer: MEDICARE

## 2020-02-21 VITALS
DIASTOLIC BLOOD PRESSURE: 85 MMHG | TEMPERATURE: 98 F | RESPIRATION RATE: 18 BRPM | OXYGEN SATURATION: 97 % | BODY MASS INDEX: 29.71 KG/M2 | HEART RATE: 86 BPM | SYSTOLIC BLOOD PRESSURE: 150 MMHG | HEIGHT: 64 IN | WEIGHT: 174 LBS

## 2020-02-21 DIAGNOSIS — J02.9 PHARYNGITIS, UNSPECIFIED ETIOLOGY: Primary | ICD-10-CM

## 2020-02-21 DIAGNOSIS — R09.82 POSTNASAL DRIP: ICD-10-CM

## 2020-02-21 DIAGNOSIS — R05.9 COUGH: ICD-10-CM

## 2020-02-21 LAB
CTP QC/QA: YES
CTP QC/QA: YES
FLUAV AG NPH QL: NEGATIVE
FLUBV AG NPH QL: NEGATIVE
S PYO RRNA THROAT QL PROBE: NEGATIVE

## 2020-02-21 PROCEDURE — 87880 STREP A ASSAY W/OPTIC: CPT | Mod: QW,,, | Performed by: NURSE PRACTITIONER

## 2020-02-21 PROCEDURE — 96372 THER/PROPH/DIAG INJ SC/IM: CPT | Mod: S$GLB,,, | Performed by: EMERGENCY MEDICINE

## 2020-02-21 PROCEDURE — 99204 OFFICE O/P NEW MOD 45 MIN: CPT | Mod: 25,S$GLB,, | Performed by: NURSE PRACTITIONER

## 2020-02-21 PROCEDURE — 87880 POCT RAPID STREP A: ICD-10-PCS | Mod: QW,,, | Performed by: NURSE PRACTITIONER

## 2020-02-21 PROCEDURE — 87804 POCT INFLUENZA A/B: ICD-10-PCS | Mod: QW,,, | Performed by: NURSE PRACTITIONER

## 2020-02-21 PROCEDURE — 99204 PR OFFICE/OUTPT VISIT, NEW, LEVL IV, 45-59 MIN: ICD-10-PCS | Mod: 25,S$GLB,, | Performed by: NURSE PRACTITIONER

## 2020-02-21 PROCEDURE — 87804 INFLUENZA ASSAY W/OPTIC: CPT | Mod: QW,,, | Performed by: NURSE PRACTITIONER

## 2020-02-21 PROCEDURE — 96372 PR INJECTION,THERAP/PROPH/DIAG2ST, IM OR SUBCUT: ICD-10-PCS | Mod: S$GLB,,, | Performed by: EMERGENCY MEDICINE

## 2020-02-21 RX ORDER — DEXAMETHASONE SODIUM PHOSPHATE 4 MG/ML
4 INJECTION, SOLUTION INTRA-ARTICULAR; INTRALESIONAL; INTRAMUSCULAR; INTRAVENOUS; SOFT TISSUE
Status: COMPLETED | OUTPATIENT
Start: 2020-02-21 | End: 2020-02-21

## 2020-02-21 RX ORDER — FLUTICASONE PROPIONATE 50 MCG
1 SPRAY, SUSPENSION (ML) NASAL DAILY
Qty: 9.9 ML | Refills: 0 | Status: SHIPPED | OUTPATIENT
Start: 2020-02-21

## 2020-02-21 RX ORDER — CETIRIZINE HYDROCHLORIDE 10 MG/1
10 TABLET ORAL DAILY
Qty: 30 TABLET | Refills: 1 | Status: SHIPPED | OUTPATIENT
Start: 2020-02-21 | End: 2023-11-10

## 2020-02-21 RX ADMIN — DEXAMETHASONE SODIUM PHOSPHATE 4 MG: 4 INJECTION, SOLUTION INTRA-ARTICULAR; INTRALESIONAL; INTRAMUSCULAR; INTRAVENOUS; SOFT TISSUE at 12:02

## 2020-02-21 NOTE — PATIENT INSTRUCTIONS

## 2020-02-21 NOTE — PROGRESS NOTES
"Subjective:       Patient ID: Jing Tenorio is a 79 y.o. female.    Vitals:  height is 5' 4" (1.626 m) and weight is 78.9 kg (174 lb). Her oral temperature is 97.9 °F (36.6 °C). Her blood pressure is 150/85 (abnormal) and her pulse is 86. Her respiration is 18 and oxygen saturation is 97%.     Chief Complaint: Sinus Problem    Pt states that her male significant other had similar symptoms 5 days ago and tested negative for influenza.     Sinus Problem   This is a new problem. Episode onset: three days ago with sore throat. The problem has been gradually worsening since onset. There has been no fever. Her pain is at a severity of 3/10. The pain is mild. Associated symptoms include congestion, coughing, a hoarse voice, sneezing and a sore throat. Pertinent negatives include no chills, diaphoresis, ear pain, shortness of breath or sinus pressure. Past treatments include nothing.       Constitution: Negative for chills, sweating, fatigue and fever.   HENT: Positive for congestion, postnasal drip, sore throat and voice change. Negative for ear pain, sinus pain and sinus pressure.         Runny nose   Neck: Negative for painful lymph nodes.   Eyes: Negative for eye redness.   Respiratory: Positive for cough. Negative for chest tightness, sputum production, bloody sputum, COPD, shortness of breath, stridor, wheezing and asthma.         Sneezing   Gastrointestinal: Negative for nausea, vomiting and diarrhea.   Musculoskeletal: Negative for muscle ache.   Skin: Negative for rash.   Allergic/Immunologic: Positive for sneezing. Negative for seasonal allergies and asthma.   Hematologic/Lymphatic: Negative for swollen lymph nodes.       Objective:      Physical Exam   Constitutional: She is oriented to person, place, and time. She appears well-developed and well-nourished. She is cooperative.  Non-toxic appearance. She does not have a sickly appearance. She does not appear ill. No distress.   HENT:   Head: Normocephalic and " atraumatic.   Right Ear: Hearing, tympanic membrane, external ear and ear canal normal.   Left Ear: Hearing, tympanic membrane, external ear and ear canal normal.   Nose: Rhinorrhea present. No mucosal edema or nasal deformity. No epistaxis. Right sinus exhibits no maxillary sinus tenderness and no frontal sinus tenderness. Left sinus exhibits no maxillary sinus tenderness and no frontal sinus tenderness.   Mouth/Throat: Uvula is midline and mucous membranes are normal. No trismus in the jaw. Normal dentition. No uvula swelling. Posterior oropharyngeal erythema present. No oropharyngeal exudate or posterior oropharyngeal edema. No tonsillar exudate.   Thin, clear   Eyes: Conjunctivae and lids are normal. No scleral icterus.   Neck: Trachea normal, full passive range of motion without pain and phonation normal. Neck supple. No neck rigidity. No edema and no erythema present.   Cardiovascular: Normal rate, regular rhythm, normal heart sounds, intact distal pulses and normal pulses.   Pulmonary/Chest: Effort normal and breath sounds normal. No respiratory distress. She has no decreased breath sounds. She has no wheezes. She has no rhonchi.   Abdominal: Normal appearance.   Musculoskeletal: Normal range of motion. She exhibits no edema or deformity.   Neurological: She is alert and oriented to person, place, and time. She exhibits normal muscle tone. Coordination normal.   Skin: Skin is warm, dry, intact, not diaphoretic and not pale.   Psychiatric: She has a normal mood and affect. Her speech is normal and behavior is normal. Judgment and thought content normal. Cognition and memory are normal.   Nursing note and vitals reviewed.        Assessment:       1. Pharyngitis, unspecified etiology        Plan:     POCT influenza and rapid strep tests both negative. Encourage po fluid hydration. Advised to return if symptoms worsen. Also educated on ER precautions.     Pharyngitis, unspecified etiology  -     POCT Influenza  A/B  -     POCT rapid strep A    Other orders  -     fluticasone propionate (FLONASE) 50 mcg/actuation nasal spray; 1 spray (50 mcg total) by Each Nostril route once daily.  Dispense: 9.9 mL; Refill: 0  -     dexamethasone injection 4 mg  -     cetirizine (ZYRTEC) 10 MG tablet; Take 1 tablet (10 mg total) by mouth once daily.  Dispense: 30 tablet; Refill: 1

## 2020-03-17 DIAGNOSIS — E78.5 DYSLIPIDEMIA: ICD-10-CM

## 2020-03-19 ENCOUNTER — TELEPHONE (OUTPATIENT)
Dept: FAMILY MEDICINE | Facility: CLINIC | Age: 80
End: 2020-03-19

## 2020-03-19 DIAGNOSIS — E78.5 DYSLIPIDEMIA: ICD-10-CM

## 2020-03-19 NOTE — TELEPHONE ENCOUNTER
----- Message from Arianna Del Castillo sent at 3/19/2020  4:04 PM CDT -----  Contact: Pt  Type: Needs Medical Advice    Who Called:  Pt  Pharmacy name and phone: [phone]  Additional Information: Patient stated she received a letter from her insurance company about an injections and she needs advisement on what its for. Please call back and advise

## 2020-03-19 NOTE — TELEPHONE ENCOUNTER
(sent to the wrong ) Called pt, she stated she got a letter from insurance company stating her prolia injection was approved for July. Please advise.

## 2020-03-19 NOTE — TELEPHONE ENCOUNTER
----- Message from Luiza Guillen sent at 3/19/2020  3:03 PM CDT -----  Contact: Patient  Type:  RX Refill Request    Who Called:  Patient  Refill or New Rx:  Refill  RX Name and Strength:  simvastatin (ZOCOR) 20 MG tablet  How is the patient currently taking it? (ex. 1XDay):  1XDay  Is this a 30 day or 90 day RX:  90  Preferred Pharmacy with phone number:  [id number] [address] AT [place] [address] [phone] (Phone)   Local or Mail Order: Local  Ordering Provider:  Salvador Raya Call Back Number:  [phone]  Additional Information:

## 2020-03-22 RX ORDER — SIMVASTATIN 20 MG/1
TABLET, FILM COATED ORAL
Qty: 90 TABLET | Refills: 0 | Status: SHIPPED | OUTPATIENT
Start: 2020-03-22 | End: 2020-05-21 | Stop reason: SDUPTHER

## 2020-03-22 RX ORDER — SIMVASTATIN 20 MG/1
20 TABLET, FILM COATED ORAL NIGHTLY
Qty: 90 TABLET | Refills: 1 | OUTPATIENT
Start: 2020-03-22

## 2020-03-22 NOTE — PROGRESS NOTES
Refill Authorization Note     is requesting a refill authorization.    Brief assessment and rationale for refill: APPROVE: prr          Medication Therapy Plan: FOVS/FLOS                              Comments:   Refill Center Care Gap Closure protocols temporarily suspended.   Requested Prescriptions   Signed Prescriptions Disp Refills    simvastatin (ZOCOR) 20 MG tablet 90 tablet 0     Sig: TAKE 1 TABLET BY MOUTH EVERY DAY IN THE EVENING       Cardiovascular:  Antilipid - Statins Passed - 3/17/2020  8:14 AM        Passed - Patient is at least 18 years old        Passed - Office visit in past 12 months or future 90 days.     Recent Outpatient Visits            1 month ago Malignant neoplasm of nipple of right breast in female, unspecified estrogen receptor status    [facility] Mary Smith MD    2 months ago DDD (degenerative disc disease), lumbar    [facility] Chrissy Chand NP    5 months ago Routine physical examination    [facility] Nakul Franco MD    7 months ago Malignant neoplasm of nipple of right breast in female, unspecified estrogen receptor status    [facility] Mary Smith MD    9 months ago [facility] Henry Gray MD          Future Appointments              In 2 weeks LAB, [place] [facility] [place]    In 3 weeks Nakul Franco MD [facility]    In 4 months LAB, [facility], [facility]    In 4 months Mary Smith MD [facility], [facility]    In 4 months INJECTION SCHEDULE, [facility], [facility]                Passed - Lipid Panel completed in last 360 days     Lab Results   Component Value Date    CHOL 174 04/09/2019    HDL 72 04/09/2019    LDLCALC 85.0 04/09/2019    TRIG 85 04/09/2019              Passed - ALT is 94 or below and within 360 days     ALT   Date Value Ref Range Status   01/28/2020 19 10 - 44 U/L Final   10/08/2019 15 10 - 44 U/L Final   07/23/2019 16 10 - 44 U/L Final              Passed - AST is 54 or below and within 360 days     AST   Date Value Ref Range Status   01/28/2020 34 10 - 40 U/L Final   10/08/2019 36 10 - 40 U/L Final   07/23/2019 33 10 - 40 U/L Final               Appointments  past 12m or future 3m with PCP    Date Provider   Last Visit   10/16/2019 Nakul Franco MD   Next Visit   4/14/2020 Nakul Franco MD   .  ED visits in past 90 days: 0       Note composed:3:29 AM 03/22/2020

## 2020-03-22 NOTE — PROGRESS NOTES
Refill Authorization Note     is requesting a refill authorization.    Brief assessment and rationale for refill: QUICK DC: duplicate          Medication Therapy Plan: refill request is being processed in another encounter                              Comments:   Last Prescribed Info:     Outpatient Medication Detail      Disp Refills Start End    simvastatin (ZOCOR) 20 MG tablet [Pharmacy Med Name: SIMVASTATIN 20MG TABLETS] 90 tablet 1 3/17/2020     Sig: TAKE 1 TABLET BY MOUTH EVERY DAY IN THE EVENING    Ordering Encounter Report     Associated Reports   View Encounter          Note composed:3:32 AM 03/22/2020

## 2020-04-06 PROBLEM — Z00.00 ENCOUNTER FOR PREVENTIVE HEALTH EXAMINATION: Status: RESOLVED | Noted: 2020-01-06 | Resolved: 2020-04-06

## 2020-05-04 ENCOUNTER — LAB VISIT (OUTPATIENT)
Dept: LAB | Facility: HOSPITAL | Age: 80
End: 2020-05-04
Attending: INTERNAL MEDICINE
Payer: MEDICARE

## 2020-05-04 ENCOUNTER — TELEPHONE (OUTPATIENT)
Dept: FAMILY MEDICINE | Facility: CLINIC | Age: 80
End: 2020-05-04

## 2020-05-04 DIAGNOSIS — E78.5 DYSLIPIDEMIA: ICD-10-CM

## 2020-05-04 DIAGNOSIS — I10 ESSENTIAL HYPERTENSION: ICD-10-CM

## 2020-05-04 LAB
ALBUMIN SERPL BCP-MCNC: 4.3 G/DL (ref 3.5–5.2)
ALP SERPL-CCNC: 61 U/L (ref 55–135)
ALT SERPL W/O P-5'-P-CCNC: 17 U/L (ref 10–44)
ANION GAP SERPL CALC-SCNC: 10 MMOL/L (ref 8–16)
AST SERPL-CCNC: 33 U/L (ref 10–40)
BILIRUB SERPL-MCNC: 0.6 MG/DL (ref 0.1–1)
BUN SERPL-MCNC: 11 MG/DL (ref 8–23)
CALCIUM SERPL-MCNC: 10.2 MG/DL (ref 8.7–10.5)
CHLORIDE SERPL-SCNC: 102 MMOL/L (ref 95–110)
CHOLEST SERPL-MCNC: 165 MG/DL (ref 120–199)
CHOLEST/HDLC SERPL: 2.2 {RATIO} (ref 2–5)
CO2 SERPL-SCNC: 27 MMOL/L (ref 23–29)
CREAT SERPL-MCNC: 1.1 MG/DL (ref 0.5–1.4)
EST. GFR  (AFRICAN AMERICAN): 54.4 ML/MIN/1.73 M^2
EST. GFR  (NON AFRICAN AMERICAN): 47.2 ML/MIN/1.73 M^2
GLUCOSE SERPL-MCNC: 92 MG/DL (ref 70–110)
HDLC SERPL-MCNC: 76 MG/DL (ref 40–75)
HDLC SERPL: 46.1 % (ref 20–50)
LDLC SERPL CALC-MCNC: 76 MG/DL (ref 63–159)
NONHDLC SERPL-MCNC: 89 MG/DL
POTASSIUM SERPL-SCNC: 5.1 MMOL/L (ref 3.5–5.1)
PROT SERPL-MCNC: 7.1 G/DL (ref 6–8.4)
SODIUM SERPL-SCNC: 139 MMOL/L (ref 136–145)
TRIGL SERPL-MCNC: 65 MG/DL (ref 30–150)

## 2020-05-04 PROCEDURE — 80053 COMPREHEN METABOLIC PANEL: CPT | Mod: HCNC

## 2020-05-04 PROCEDURE — 36415 COLL VENOUS BLD VENIPUNCTURE: CPT | Mod: HCNC,PO

## 2020-05-04 PROCEDURE — 80061 LIPID PANEL: CPT | Mod: HCNC

## 2020-05-21 ENCOUNTER — OFFICE VISIT (OUTPATIENT)
Dept: FAMILY MEDICINE | Facility: CLINIC | Age: 80
End: 2020-05-21
Payer: MEDICARE

## 2020-05-21 VITALS
BODY MASS INDEX: 28.56 KG/M2 | HEART RATE: 89 BPM | DIASTOLIC BLOOD PRESSURE: 72 MMHG | WEIGHT: 167.31 LBS | HEIGHT: 64 IN | SYSTOLIC BLOOD PRESSURE: 124 MMHG | TEMPERATURE: 98 F | OXYGEN SATURATION: 100 %

## 2020-05-21 DIAGNOSIS — I10 ESSENTIAL HYPERTENSION: ICD-10-CM

## 2020-05-21 DIAGNOSIS — M81.0 OSTEOPOROSIS, SENILE: ICD-10-CM

## 2020-05-21 DIAGNOSIS — G47.00 INSOMNIA, UNSPECIFIED TYPE: ICD-10-CM

## 2020-05-21 DIAGNOSIS — C50.012 MALIGNANT NEOPLASM OF NIPPLE OF LEFT BREAST IN FEMALE, UNSPECIFIED ESTROGEN RECEPTOR STATUS: ICD-10-CM

## 2020-05-21 DIAGNOSIS — G89.29 OTHER CHRONIC PAIN: ICD-10-CM

## 2020-05-21 DIAGNOSIS — I70.0 AORTIC ATHEROSCLEROSIS: ICD-10-CM

## 2020-05-21 DIAGNOSIS — E78.5 DYSLIPIDEMIA: ICD-10-CM

## 2020-05-21 DIAGNOSIS — M51.36 DDD (DEGENERATIVE DISC DISEASE), LUMBAR: ICD-10-CM

## 2020-05-21 DIAGNOSIS — E78.5 HYPERLIPIDEMIA LDL GOAL <130: ICD-10-CM

## 2020-05-21 DIAGNOSIS — N18.30 CHRONIC KIDNEY DISEASE, STAGE III (MODERATE): Primary | ICD-10-CM

## 2020-05-21 DIAGNOSIS — K21.9 GASTROESOPHAGEAL REFLUX DISEASE, ESOPHAGITIS PRESENCE NOT SPECIFIED: ICD-10-CM

## 2020-05-21 PROCEDURE — 3078F PR MOST RECENT DIASTOLIC BLOOD PRESSURE < 80 MM HG: ICD-10-PCS | Mod: HCNC,CPTII,S$GLB, | Performed by: NURSE PRACTITIONER

## 2020-05-21 PROCEDURE — 1101F PR PT FALLS ASSESS DOC 0-1 FALLS W/OUT INJ PAST YR: ICD-10-PCS | Mod: HCNC,CPTII,S$GLB, | Performed by: NURSE PRACTITIONER

## 2020-05-21 PROCEDURE — 3074F SYST BP LT 130 MM HG: CPT | Mod: HCNC,CPTII,S$GLB, | Performed by: NURSE PRACTITIONER

## 2020-05-21 PROCEDURE — 1125F AMNT PAIN NOTED PAIN PRSNT: CPT | Mod: HCNC,S$GLB,, | Performed by: NURSE PRACTITIONER

## 2020-05-21 PROCEDURE — 3078F DIAST BP <80 MM HG: CPT | Mod: HCNC,CPTII,S$GLB, | Performed by: NURSE PRACTITIONER

## 2020-05-21 PROCEDURE — 99499 RISK ADDL DX/OHS AUDIT: ICD-10-PCS | Mod: HCNC,S$GLB,, | Performed by: NURSE PRACTITIONER

## 2020-05-21 PROCEDURE — 99214 PR OFFICE/OUTPT VISIT, EST, LEVL IV, 30-39 MIN: ICD-10-PCS | Mod: HCNC,S$GLB,, | Performed by: NURSE PRACTITIONER

## 2020-05-21 PROCEDURE — 1125F PR PAIN SEVERITY QUANTIFIED, PAIN PRESENT: ICD-10-PCS | Mod: HCNC,S$GLB,, | Performed by: NURSE PRACTITIONER

## 2020-05-21 PROCEDURE — 99499 UNLISTED E&M SERVICE: CPT | Mod: HCNC,S$GLB,, | Performed by: NURSE PRACTITIONER

## 2020-05-21 PROCEDURE — 99999 PR PBB SHADOW E&M-EST. PATIENT-LVL III: CPT | Mod: PBBFAC,HCNC,, | Performed by: NURSE PRACTITIONER

## 2020-05-21 PROCEDURE — 99999 PR PBB SHADOW E&M-EST. PATIENT-LVL III: ICD-10-PCS | Mod: PBBFAC,HCNC,, | Performed by: NURSE PRACTITIONER

## 2020-05-21 PROCEDURE — 1159F PR MEDICATION LIST DOCUMENTED IN MEDICAL RECORD: ICD-10-PCS | Mod: HCNC,S$GLB,, | Performed by: NURSE PRACTITIONER

## 2020-05-21 PROCEDURE — 1101F PT FALLS ASSESS-DOCD LE1/YR: CPT | Mod: HCNC,CPTII,S$GLB, | Performed by: NURSE PRACTITIONER

## 2020-05-21 PROCEDURE — 99214 OFFICE O/P EST MOD 30 MIN: CPT | Mod: HCNC,S$GLB,, | Performed by: NURSE PRACTITIONER

## 2020-05-21 PROCEDURE — 3074F PR MOST RECENT SYSTOLIC BLOOD PRESSURE < 130 MM HG: ICD-10-PCS | Mod: HCNC,CPTII,S$GLB, | Performed by: NURSE PRACTITIONER

## 2020-05-21 PROCEDURE — 1159F MED LIST DOCD IN RCRD: CPT | Mod: HCNC,S$GLB,, | Performed by: NURSE PRACTITIONER

## 2020-05-21 RX ORDER — TRAZODONE HYDROCHLORIDE 100 MG/1
100 TABLET ORAL NIGHTLY
Qty: 90 TABLET | Refills: 3 | Status: SHIPPED | OUTPATIENT
Start: 2020-05-21 | End: 2021-05-20 | Stop reason: SDUPTHER

## 2020-05-21 RX ORDER — SIMVASTATIN 20 MG/1
20 TABLET, FILM COATED ORAL NIGHTLY
Qty: 90 TABLET | Refills: 3 | Status: SHIPPED | OUTPATIENT
Start: 2020-05-21 | End: 2021-05-20 | Stop reason: SDUPTHER

## 2020-05-21 RX ORDER — OMEPRAZOLE 40 MG/1
40 CAPSULE, DELAYED RELEASE ORAL DAILY
Qty: 90 CAPSULE | Refills: 3 | Status: SHIPPED | OUTPATIENT
Start: 2020-05-21 | End: 2021-05-03

## 2020-05-21 RX ORDER — TRAMADOL HYDROCHLORIDE 50 MG/1
TABLET ORAL
Qty: 60 TABLET | Refills: 5 | Status: SHIPPED | OUTPATIENT
Start: 2020-05-21 | End: 2020-11-18 | Stop reason: SDUPTHER

## 2020-05-21 RX ORDER — LISINOPRIL 10 MG/1
TABLET ORAL
Qty: 270 TABLET | Refills: 1 | Status: SHIPPED | OUTPATIENT
Start: 2020-05-21 | End: 2020-11-18 | Stop reason: SDUPTHER

## 2020-05-21 NOTE — PROGRESS NOTES
Subjective:       Patient ID: Jing Tenorio is a 79 y.o. female.    Chief Complaint: Follow-up    HPI   Patient is here for 6 month follow up of chronic conditions. She is without complaints    Chronic Low back pain/ inflammatory arthropathy. MRI showed tumor on nerve, but benign.  Has leg pain.  Failed epidural and ablation.  Saw neurosurgeon in [place] who did not want to do surgery as pt higher risk and thought may end up with chronic leg pain.  Recommended nerve stimulator. Dr Rice had given meloxicam, which helps but pt has decreased kidney function so this was held.  Her new pain Dr, Dr Bean, offered Cymbalta--But pt scared of side affects. She also wants to avoid anything with potential addiction.  He also recommended a nerve stimulator but she is scared of this.  She will take an otc Advil about once a week.    Can not take nsaids with ckd   May get medical marijuana from her pain Dr.  Scheduled to restart physical therapy next week      Dizzy spells.  Resolved.  extensive workup by ENT and cardiology      HTN -  controlled  HLD - controlled  Atherosclerosis - no syncope     Insomnia - controlled     Breast cancer - Followed by . The patient was diagnosed n   May 2016 with triple positive breast cancer.  The patient had a 1.9 cm   malignancy of the left breast, sentinel lymph node negative in association with   DCIS.  She underwent lumpectomy, underwent adjuvant TCH chemotherapy for six   cycles and now continues with Herceptin alone, has done well. Pulmonary nodules stable. Last echo EF 60%  Next mammo due September 2020.  Osteopenia/osteoporosis--on Prolia        Vitals:    05/21/20 1426   BP: 124/72   Pulse: 89   Temp: 98.3 °F (36.8 °C)     Review of Systems   Constitutional: Negative for diaphoresis and fever.   HENT: Negative for facial swelling and trouble swallowing.    Eyes: Negative for discharge and redness.   Respiratory: Negative for cough and shortness of breath.     Cardiovascular: Negative for chest pain and palpitations.   Gastrointestinal: Negative for abdominal pain, constipation, diarrhea, nausea and vomiting.   Genitourinary: Negative for difficulty urinating.   Musculoskeletal: Positive for back pain.   Skin: Negative for pallor and rash.   Neurological: Negative for facial asymmetry and speech difficulty.   Psychiatric/Behavioral: Negative for confusion. The patient is not nervous/anxious.        Past Medical History:   Diagnosis Date    Allergy     Anxiety     Arthritis     Back pain DDD    Breast cancer 2016    invasive ductal carcinoma    Cancer     LEFT BREAST 5-16    Cataract     Bilateral    DDD (degenerative disc disease)     DDD (degenerative disc disease), lumbar     Disorder of kidney and ureter     GERD (gastroesophageal reflux disease)     Hyperlipidemia     Hypertension     Insomnia     Osteoporosis     Thyroid disease     Pt denies    Urinary incontinence      Objective:      Physical Exam   Constitutional: She is oriented to person, place, and time. No distress.   HENT:   Head: Normocephalic.   Right Ear: Hearing normal.   Left Ear: Hearing normal.   Nose: Nose normal.   Eyes: Conjunctivae and lids are normal.   Neck: No JVD present. No tracheal deviation present.   Cardiovascular: Normal rate and normal heart sounds.   Negative edema   Pulmonary/Chest: Effort normal and breath sounds normal.   Abdominal: She exhibits no distension.   Musculoskeletal: She exhibits no deformity.   Neurological: She is alert and oriented to person, place, and time.   Skin: She is not diaphoretic. No pallor.   Psychiatric: She has a normal mood and affect. Her speech is normal and behavior is normal. Judgment and thought content normal. Cognition and memory are normal.   Nursing note and vitals reviewed.      Assessment:       1. Chronic kidney disease, stage III (moderate)    2. Essential hypertension    3. DDD (degenerative disc disease), lumbar    4.  Aortic atherosclerosis    5. Hyperlipidemia LDL goal <130    6. Malignant neoplasm of nipple of left breast in female, unspecified estrogen receptor status    7. Osteoporosis, senile        Plan:       Chronic kidney disease, stage III (moderate)  -     Comprehensive metabolic panel; Future; Expected date: 11/21/2020    Essential hypertension  -     Comprehensive metabolic panel; Future; Expected date: 11/21/2020  -     TSH; Future; Expected date: 11/21/2020    DDD (degenerative disc disease), lumbar    Aortic atherosclerosis    Hyperlipidemia LDL goal <130    Malignant neoplasm of nipple of left breast in female, unspecified estrogen receptor status    Osteoporosis, senile            Follow up in about 6 months (around 11/21/2020).    Medication List with Changes/Refills   Current Medications    ANASTROZOLE (ARIMIDEX) 1 MG TAB    Take 1 tablet (1 mg total) by mouth once daily.    ASCORBIC ACID, VITAMIN C, (VITAMIN C) 500 MG TABLET    Take 500 mg by mouth once daily.    B COMPLEX VITAMINS TABLET    Take 1 tablet by mouth once daily.    CETIRIZINE (ZYRTEC) 10 MG TABLET    Take 1 tablet (10 mg total) by mouth once daily.    COENZYME Q10 (CO Q-10) 100 MG CAPSULE    Take 100 mg by mouth once daily.     DENOSUMAB (PROLIA) 60 MG/ML SYRG    Inject 60 mg into the skin every 6 (six) months.    FLUTICASONE PROPIONATE (FLONASE) 50 MCG/ACTUATION NASAL SPRAY    1 spray (50 mcg total) by Each Nostril route once daily.    MULTIVITAMIN ORAL    once daily. Every day    VITAMIN D (VITAMIN D3) 1000 UNITS TAB    Take 2,000 Units by mouth once daily.    VITAMIN E 400 UNIT CAPSULE    Take 400 Units by mouth once daily.   Changed and/or Refilled Medications    Modified Medication Previous Medication    LISINOPRIL 10 MG TABLET lisinopril 10 MG tablet       2 pills in the morning and 1 pill at night    2 pills in the morning and 1 pill at night    OMEPRAZOLE (PRILOSEC) 40 MG CAPSULE omeprazole (PRILOSEC) 40 MG capsule       Take 1 capsule  (40 mg total) by mouth once daily.    TAKE 1 CAPSULE BY MOUTH EVERY DAY    SIMVASTATIN (ZOCOR) 20 MG TABLET simvastatin (ZOCOR) 20 MG tablet       Take 1 tablet (20 mg total) by mouth every evening.    TAKE 1 TABLET BY MOUTH EVERY DAY IN THE EVENING    TRAMADOL (ULTRAM) 50 MG TABLET traMADol (ULTRAM) 50 mg tablet       1/2 - 1 po q 6 hour prn pain    1/2 - 1 po q 6 hour prn pain    TRAZODONE (DESYREL) 100 MG TABLET traZODone (DESYREL) 100 MG tablet       Take 1 tablet (100 mg total) by mouth every evening.    Take 1 tablet (100 mg total) by mouth every evening.

## 2020-07-09 ENCOUNTER — TELEPHONE (OUTPATIENT)
Dept: HEMATOLOGY/ONCOLOGY | Facility: CLINIC | Age: 80
End: 2020-07-09

## 2020-07-09 NOTE — TELEPHONE ENCOUNTER
----- Message from Shruti Che sent at 7/9/2020  3:07 PM CDT -----  Regarding: clearance for dental work  Contact: Jing  Type: Needs Medical Advice  Who Called:  Jing Raya Call Back Number: [phone]  Additional Information: Neal fax [facility] regarding clearance fax to [phone]

## 2020-07-09 NOTE — TELEPHONE ENCOUNTER
Left VM messagewith contact information at [facility] in [place] to see what they need in regards to dental clearance.

## 2020-07-09 NOTE — TELEPHONE ENCOUNTER
S/w spouse Jorge--discussed pt should keep lab/ apt as scheduled.  I will cancel her prolia injection and we will reschedule it when  decides it is appropriate.  Verb agreement.      Message sent to Char in infusion to shilo apt.

## 2020-07-09 NOTE — TELEPHONE ENCOUNTER
----- Message from Carlene Diego sent at 7/9/2020  9:37 AM CDT -----  Regarding: advise  Contact: patient  Type: Needs Medical Advice  Who Called:  self  Symptoms (please be specific):    How long has patient had these symptoms:    Pharmacy name and phone #:    Best Call Back Number: [phone] (home)   Additional Information: Patient needs to know if she can get dental work done and also get the Prolia injections on 07/30/20. The dentist also will need a medical clearance. Please call patient to advise. Thanks!

## 2020-07-10 ENCOUNTER — TELEPHONE (OUTPATIENT)
Dept: HEMATOLOGY/ONCOLOGY | Facility: CLINIC | Age: 80
End: 2020-07-10

## 2020-07-10 NOTE — TELEPHONE ENCOUNTER
Spoke with pt regarding her message. Pt notified to have her dental office send request for dental clearance. Pt having filling put in on 7/17/20. Pt given our fax number [phone] so the form can be faxed on Monday and  can sign it on Tuesday. Pt verbalized understanding.

## 2020-07-10 NOTE — TELEPHONE ENCOUNTER
----- Message from Shruti Che sent at 7/10/2020  2:03 PM CDT -----  Regarding: needs clearance for dental work  Contact: Jing dawkins  Type: Needs Medical Advice  Who Called:  Jing Raya Call Back Number: cell [phone]   Additional Information: Pls call pt regarding a clearance for her dental work to be done. Pls call today

## 2020-07-16 ENCOUNTER — TELEPHONE (OUTPATIENT)
Dept: HEMATOLOGY/ONCOLOGY | Facility: CLINIC | Age: 80
End: 2020-07-16

## 2020-07-16 NOTE — TELEPHONE ENCOUNTER
Spoke with pt to notify her that her medical form was faxed over to her dental office. Pt verbalized understanding and appreciation.

## 2020-07-16 NOTE — TELEPHONE ENCOUNTER
----- Message from Emeka Salazar sent at 7/16/2020 12:41 PM CDT -----  Regarding: Surgical Release  Contact: Dr. Brice Peña--Raymundo Fonseca called in and stated patient is having some dental work done tomorrow 7/17/20 & had faxed over a clearance on 7/13/20.  Raymundo just needed to know if patient was on any type of medication that she needs to come off of prior to appointment.    Raymundo's call back number is [phone]

## 2020-07-16 NOTE — TELEPHONE ENCOUNTER
----- Message from Gordon Monreal sent at 7/16/2020 12:55 PM CDT -----  Type: Needs Medical Advice    Who Called:  Patient  Best Call Back Number: [phone]  Additional Information: Patient would like to discuss receiving a surgical clearance for dental work. Please call to advise. Thanks!

## 2020-07-16 NOTE — TELEPHONE ENCOUNTER
Spoke with Raymundo from dental office to notify her that the Medical clearance was faxed back on pt. Raymundo verbalized understanding.

## 2020-07-27 ENCOUNTER — LAB VISIT (OUTPATIENT)
Dept: LAB | Facility: HOSPITAL | Age: 80
End: 2020-07-27
Attending: INTERNAL MEDICINE
Payer: MEDICARE

## 2020-07-27 DIAGNOSIS — C50.011 MALIGNANT NEOPLASM OF NIPPLE OF RIGHT BREAST IN FEMALE, UNSPECIFIED ESTROGEN RECEPTOR STATUS: ICD-10-CM

## 2020-07-27 LAB
ALBUMIN SERPL BCP-MCNC: 4.2 G/DL (ref 3.5–5.2)
ALP SERPL-CCNC: 74 U/L (ref 55–135)
ALT SERPL W/O P-5'-P-CCNC: 18 U/L (ref 10–44)
ANION GAP SERPL CALC-SCNC: 8 MMOL/L (ref 8–16)
AST SERPL-CCNC: 36 U/L (ref 10–40)
BASOPHILS # BLD AUTO: 0.06 K/UL (ref 0–0.2)
BASOPHILS NFR BLD: 1.1 % (ref 0–1.9)
BILIRUB SERPL-MCNC: 0.4 MG/DL (ref 0.1–1)
BUN SERPL-MCNC: 11 MG/DL (ref 8–23)
CALCIUM SERPL-MCNC: 9.7 MG/DL (ref 8.7–10.5)
CHLORIDE SERPL-SCNC: 99 MMOL/L (ref 95–110)
CO2 SERPL-SCNC: 27 MMOL/L (ref 23–29)
CREAT SERPL-MCNC: 1.1 MG/DL (ref 0.5–1.4)
DIFFERENTIAL METHOD: ABNORMAL
EOSINOPHIL # BLD AUTO: 0.1 K/UL (ref 0–0.5)
EOSINOPHIL NFR BLD: 1.3 % (ref 0–8)
ERYTHROCYTE [DISTWIDTH] IN BLOOD BY AUTOMATED COUNT: 14 % (ref 11.5–14.5)
EST. GFR  (AFRICAN AMERICAN): 54 ML/MIN/1.73 M^2
EST. GFR  (NON AFRICAN AMERICAN): 47 ML/MIN/1.73 M^2
GLUCOSE SERPL-MCNC: 87 MG/DL (ref 70–110)
HCT VFR BLD AUTO: 41.7 % (ref 37–48.5)
HGB BLD-MCNC: 13.4 G/DL (ref 12–16)
IMM GRANULOCYTES # BLD AUTO: 0.01 K/UL (ref 0–0.04)
IMM GRANULOCYTES NFR BLD AUTO: 0.2 % (ref 0–0.5)
LYMPHOCYTES # BLD AUTO: 0.5 K/UL (ref 1–4.8)
LYMPHOCYTES NFR BLD: 9.3 % (ref 18–48)
MCH RBC QN AUTO: 30.1 PG (ref 27–31)
MCHC RBC AUTO-ENTMCNC: 32.1 G/DL (ref 32–36)
MCV RBC AUTO: 94 FL (ref 82–98)
MONOCYTES # BLD AUTO: 0.3 K/UL (ref 0.3–1)
MONOCYTES NFR BLD: 6.1 % (ref 4–15)
NEUTROPHILS # BLD AUTO: 4.4 K/UL (ref 1.8–7.7)
NEUTROPHILS NFR BLD: 82 % (ref 38–73)
NRBC BLD-RTO: 0 /100 WBC
PLATELET # BLD AUTO: 338 K/UL (ref 150–350)
PMV BLD AUTO: 10.2 FL (ref 9.2–12.9)
POTASSIUM SERPL-SCNC: 5.3 MMOL/L (ref 3.5–5.1)
PROT SERPL-MCNC: 7.1 G/DL (ref 6–8.4)
RBC # BLD AUTO: 4.45 M/UL (ref 4–5.4)
SODIUM SERPL-SCNC: 134 MMOL/L (ref 136–145)
WBC # BLD AUTO: 5.37 K/UL (ref 3.9–12.7)

## 2020-07-27 PROCEDURE — 80053 COMPREHEN METABOLIC PANEL: CPT | Mod: HCNC

## 2020-07-27 PROCEDURE — 85025 COMPLETE CBC W/AUTO DIFF WBC: CPT | Mod: HCNC

## 2020-07-27 PROCEDURE — 86300 IMMUNOASSAY TUMOR CA 15-3: CPT | Mod: HCNC

## 2020-07-29 LAB — CANCER AG27-29 SERPL-ACNC: 55.7 U/ML

## 2020-07-30 ENCOUNTER — OFFICE VISIT (OUTPATIENT)
Dept: HEMATOLOGY/ONCOLOGY | Facility: CLINIC | Age: 80
End: 2020-07-30
Payer: MEDICARE

## 2020-07-30 VITALS
HEART RATE: 90 BPM | BODY MASS INDEX: 28.06 KG/M2 | WEIGHT: 164.38 LBS | SYSTOLIC BLOOD PRESSURE: 135 MMHG | DIASTOLIC BLOOD PRESSURE: 73 MMHG | OXYGEN SATURATION: 99 % | TEMPERATURE: 98 F | HEIGHT: 64 IN | RESPIRATION RATE: 12 BRPM

## 2020-07-30 DIAGNOSIS — I10 ESSENTIAL HYPERTENSION: ICD-10-CM

## 2020-07-30 DIAGNOSIS — N18.30 STAGE 3 CHRONIC KIDNEY DISEASE: ICD-10-CM

## 2020-07-30 DIAGNOSIS — I12.9 BENIGN HYPERTENSION WITH CKD (CHRONIC KIDNEY DISEASE) STAGE III: ICD-10-CM

## 2020-07-30 DIAGNOSIS — N18.30 CHRONIC KIDNEY DISEASE, STAGE III (MODERATE): ICD-10-CM

## 2020-07-30 DIAGNOSIS — E78.5 HYPERLIPIDEMIA LDL GOAL <130: ICD-10-CM

## 2020-07-30 DIAGNOSIS — C50.012 MALIGNANT NEOPLASM INVOLVING BOTH NIPPLE AND AREOLA OF LEFT BREAST IN FEMALE, UNSPECIFIED ESTROGEN RECEPTOR STATUS: ICD-10-CM

## 2020-07-30 DIAGNOSIS — N18.30 BENIGN HYPERTENSION WITH CKD (CHRONIC KIDNEY DISEASE) STAGE III: ICD-10-CM

## 2020-07-30 DIAGNOSIS — C50.011 MALIGNANT NEOPLASM OF NIPPLE OF RIGHT BREAST IN FEMALE, UNSPECIFIED ESTROGEN RECEPTOR STATUS: Primary | ICD-10-CM

## 2020-07-30 PROCEDURE — 1159F PR MEDICATION LIST DOCUMENTED IN MEDICAL RECORD: ICD-10-PCS | Mod: HCNC,S$GLB,, | Performed by: INTERNAL MEDICINE

## 2020-07-30 PROCEDURE — 3078F PR MOST RECENT DIASTOLIC BLOOD PRESSURE < 80 MM HG: ICD-10-PCS | Mod: HCNC,CPTII,S$GLB, | Performed by: INTERNAL MEDICINE

## 2020-07-30 PROCEDURE — 99214 PR OFFICE/OUTPT VISIT, EST, LEVL IV, 30-39 MIN: ICD-10-PCS | Mod: HCNC,S$GLB,, | Performed by: INTERNAL MEDICINE

## 2020-07-30 PROCEDURE — 99999 PR PBB SHADOW E&M-EST. PATIENT-LVL IV: ICD-10-PCS | Mod: PBBFAC,HCNC,, | Performed by: INTERNAL MEDICINE

## 2020-07-30 PROCEDURE — 1126F PR PAIN SEVERITY QUANTIFIED, NO PAIN PRESENT: ICD-10-PCS | Mod: HCNC,S$GLB,, | Performed by: INTERNAL MEDICINE

## 2020-07-30 PROCEDURE — 99499 UNLISTED E&M SERVICE: CPT | Mod: HCNC,S$GLB,, | Performed by: INTERNAL MEDICINE

## 2020-07-30 PROCEDURE — 3075F PR MOST RECENT SYSTOLIC BLOOD PRESS GE 130-139MM HG: ICD-10-PCS | Mod: HCNC,CPTII,S$GLB, | Performed by: INTERNAL MEDICINE

## 2020-07-30 PROCEDURE — 99499 RISK ADDL DX/OHS AUDIT: ICD-10-PCS | Mod: HCNC,S$GLB,, | Performed by: INTERNAL MEDICINE

## 2020-07-30 PROCEDURE — 99999 PR PBB SHADOW E&M-EST. PATIENT-LVL IV: CPT | Mod: PBBFAC,HCNC,, | Performed by: INTERNAL MEDICINE

## 2020-07-30 PROCEDURE — 1126F AMNT PAIN NOTED NONE PRSNT: CPT | Mod: HCNC,S$GLB,, | Performed by: INTERNAL MEDICINE

## 2020-07-30 PROCEDURE — 1101F PT FALLS ASSESS-DOCD LE1/YR: CPT | Mod: HCNC,CPTII,S$GLB, | Performed by: INTERNAL MEDICINE

## 2020-07-30 PROCEDURE — 1159F MED LIST DOCD IN RCRD: CPT | Mod: HCNC,S$GLB,, | Performed by: INTERNAL MEDICINE

## 2020-07-30 PROCEDURE — 99214 OFFICE O/P EST MOD 30 MIN: CPT | Mod: HCNC,S$GLB,, | Performed by: INTERNAL MEDICINE

## 2020-07-30 PROCEDURE — 3078F DIAST BP <80 MM HG: CPT | Mod: HCNC,CPTII,S$GLB, | Performed by: INTERNAL MEDICINE

## 2020-07-30 PROCEDURE — 1101F PR PT FALLS ASSESS DOC 0-1 FALLS W/OUT INJ PAST YR: ICD-10-PCS | Mod: HCNC,CPTII,S$GLB, | Performed by: INTERNAL MEDICINE

## 2020-07-30 PROCEDURE — 3075F SYST BP GE 130 - 139MM HG: CPT | Mod: HCNC,CPTII,S$GLB, | Performed by: INTERNAL MEDICINE

## 2020-07-30 RX ORDER — ANASTROZOLE 1 MG/1
1 TABLET ORAL DAILY
Qty: 90 TABLET | Refills: 3 | Status: SHIPPED | OUTPATIENT
Start: 2020-07-30 | End: 2021-05-04 | Stop reason: SDUPTHER

## 2020-07-30 NOTE — Clinical Note
[facility] [place] [facility] the orders, labs in [place] and clini visit in [place]

## 2020-07-30 NOTE — PROGRESS NOTES
An 79 year-old  woman well known to me.  The patient was diagnosed n   May 2016 with triple positive breast cancer.  The patient had a 1.9 cm   malignancy of the left breast, sentinel lymph node negative in association with   DCIS.  She underwent lumpectomy, underwent adjuvant TCH chemotherapy for six   cycles and now continues with Herceptin alone, has done well, but she has   multiple other issues in association.  The patient had a recent echocardiogram   and follows with Dr. Gray.  Echocardiogram at this visit shows a drop by 10%   from an ejection fraction of 65% to 55%.  Dr. Gray has cleared her to continue   with Herceptin and with the short-term echocardiogram follow up as the patient   has remained asymptomatic.  The patient also had some pulmonary nodules that are   being followed by a CT scan.  They are deemed to be benign, but need ongoing   followup.  She had been taking Boniva for postmenopausal osteopenia/osteoporosis   along with calcium and the patient has also had increased density on mammogram.    had  repeat mammogram, which Radiology recommends short-term   followup which was done and now she goes for annual mammogram most recent mammogram September 2019.  She receives Prolia and Arimidex     PHYSICAL EXAMINATION:  GENERAL:  Elderly woman.  Alert, awake, oriented.  VITAL SIGNS:    Wt Readings from Last 3 Encounters:   07/30/20 74.6 kg (164 lb 5.7 oz)   05/21/20 75.9 kg (167 lb 5.3 oz)   02/21/20 78.9 kg (174 lb)     Temp Readings from Last 3 Encounters:   07/30/20 98.4 °F (36.9 °C) (Temporal)   05/21/20 98.3 °F (36.8 °C) (Oral)   02/21/20 97.9 °F (36.6 °C) (Oral)     BP Readings from Last 3 Encounters:   05/21/20 124/72   02/21/20 (!) 150/85   01/30/20 135/74     Pulse Readings from Last 3 Encounters:   05/21/20 89   02/21/20 86   01/30/20 95     PHYSICAL EXAM:     Vitals:    07/30/20 1341   BP: 135/73   Pulse: 90   Resp: 12   Temp: 98.4 °F (36.9 °C)       GENERAL: Comfortable looking  patient. Patient is in no distress.  Awake, alert and oriented to time, person and place.  No anxiety, or agitation.      HEENT: Normal conjunctivae and eyelids. WNL.  PERRLA 3 to 4 mm. No icterus, no pallor, no congestion, and no discharge noted.     NECK:  Supple. Trachea is central.  No crepitus.  No JVD or masses.    RESPIRATORY:  No intercostal retractions.  No dullness to percussion.  Chest is clear to auscultation.  No rales, rhonchi or wheezes.  No crepitus.  Good air entry bilaterally.    CARDIOVASCULAR:  S1 and S2 are normally heard without murmurs or gallops.  All peripheral pulses are present.    ABDOMEN:  Normal abdomen.  No hepatosplenomegaly.  No free fluid.  Bowel sounds are present.  No hernia noted. No masses.  No rebound or tenderness.  No guarding or rigidity.  Umbilicus is midline.    LYMPHATICS:  No axillary, cervical, supraclavicular, submental, or inguinal lymphadenopathy.    SKIN/MUSCULOSKELETAL:  There is no evidence of excoriation marks or ecchmosis.  No rashes.  No cyanosis.  No clubbing.  No joint or skeletal deformities noted.  Normal range of motion.    NEUROLOGIC:  Higher functions are appropriate.  No cranial nerve deficits.  Normal [place].  Normal strength.  Motor and sensory functions are normal.  Deep tendon reflexes are normal.    GENITAL/RECTAL:  Exams are deferred.  LABORATORY EVALUATION:    Lab Results   Component Value Date    WBC 5.37 07/27/2020    HGB 13.4 07/27/2020    HCT 41.7 07/27/2020    MCV 94 07/27/2020     07/27/2020     CMP  Sodium   Date Value Ref Range Status   07/27/2020 134 (L) 136 - 145 mmol/L Final     Potassium   Date Value Ref Range Status   07/27/2020 5.3 (H) 3.5 - 5.1 mmol/L Final     Chloride   Date Value Ref Range Status   07/27/2020 99 95 - 110 mmol/L Final     CO2   Date Value Ref Range Status   07/27/2020 27 23 - 29 mmol/L Final     Glucose   Date Value Ref Range Status   07/27/2020 87 70 - 110 mg/dL Final     BUN, Bld   Date Value Ref Range  Status   07/27/2020 11 8 - 23 mg/dL Final     Creatinine   Date Value Ref Range Status   07/27/2020 1.1 0.5 - 1.4 mg/dL Final   01/18/2013 0.9 0.5 - 1.4 mg/dL Final     Calcium   Date Value Ref Range Status   07/27/2020 9.7 8.7 - 10.5 mg/dL Final   01/18/2013 8.9 8.7 - 10.5 mg/dL Final     Total Protein   Date Value Ref Range Status   07/27/2020 7.1 6.0 - 8.4 g/dL Final     Albumin   Date Value Ref Range Status   07/27/2020 4.2 3.5 - 5.2 g/dL Final     Total Bilirubin   Date Value Ref Range Status   07/27/2020 0.4 0.1 - 1.0 mg/dL Final     Comment:     For infants and newborns, interpretation of results should be based  on gestational age, weight and in agreement with clinical  observations.  Premature Infant recommended reference ranges:  Up to 24 hours.............<8.0 mg/dL  Up to 48 hours............<12.0 mg/dL  3-5 days..................<15.0 mg/dL  6-29 days.................<15.0 mg/dL       Alkaline Phosphatase   Date Value Ref Range Status   07/27/2020 74 55 - 135 U/L Final     AST   Date Value Ref Range Status   07/27/2020 36 10 - 40 U/L Final     ALT   Date Value Ref Range Status   07/27/2020 18 10 - 44 U/L Final     Anion Gap   Date Value Ref Range Status   07/27/2020 8 8 - 16 mmol/L Final   01/18/2013 9 5 - 15 meq/L Final     eGFR if    Date Value Ref Range Status   07/27/2020 54 (A) >60 mL/min/1.73 m^2 Final     eGFR if non    Date Value Ref Range Status   07/27/2020 47 (A) >60 mL/min/1.73 m^2 Final     Comment:     Calculation used to obtain the estimated glomerular filtration  rate (eGFR) is the CKD-EPI equation.        Echocardiogram, ejection fraction has dropped to 55%, but   still in the normal range.  .   Impression 1/2020 PET scan     No metastatic disease    CA 2729  67.3. elevated throughout these surveillances without corresponding changes on scan  mammo 9/2018 wnl    IMPRESSION:  1.  Triple positive breast cancer, T1, N0, M0, underwent TCH chemotherapy,    Completed 1 year of herceptin, now on arimidex .   Osteoporosis:  Boniva changed to prolia due now patient reports having recent dental work done will delay by 3 months    EF dropped somewhat followed by Cardiology.  This improved cards had a stress test , carotids checked  PET scan annually  Calcium supplementation minimum 1200 mg along with dental precautions.    on arimidex and stay on it.   6.  The patient has dyslipidemia.  Continue with Zocor and primary care follow   up with Dr. Franco.  7.  Degenerative joint pains.  Continue with Ultram as needed, take trazodone   for sleep and anxiety along with Xanax.may go for pain stimulant  8.  Mild CKD.  Continue to monitor.  No intervention necessary at this time.  9.  Gastroesophageal reflux disease.  Continue with omeprazole.  No changes or   worsening of symptoms at this point    10.psoas muscle  Tumor possibly a schwannoma confirmed with DR. Tovar  Patient lives in [place] she would like to get her mammogram in the [place] area . doctor's visit infusion and labs in [place] will arrange for this       aortic athrosclerosis stable follow with  pcp   Advance Care planning/ directives /living will/patient's wishes discussed with patient.  Patient has been given guidelines and instructions on completing these directives  COVID social distancing, face mask use, hand washing techniques and personal hygiene routine discussed with patient  Good exercise, nutrition and weight management discussed with patient  Health maintenance activities and follow-up with PCPs recommendations discussed with patient

## 2020-07-30 NOTE — Clinical Note
Delay prolia today she just had dental work, delay to 3 months   Cbc, cmp, [id number] in time for 3 mnths

## 2020-08-31 ENCOUNTER — TELEPHONE (OUTPATIENT)
Dept: HEMATOLOGY/ONCOLOGY | Facility: CLINIC | Age: 80
End: 2020-08-31

## 2020-08-31 NOTE — TELEPHONE ENCOUNTER
Spoke with pt regarding her next Prolia injection. Pt was calling to make sure that  sends in the auth for it due to her last one not covered but was notified that if  sends in auth it would get covered. Pt notified that a referral would be sent by . Pt scheduled on 11/3/20 for labs, appt and Prolia. A copy of her appts would be mailed to her per her request. Pt verbalized understanding and appreciation.

## 2020-08-31 NOTE — TELEPHONE ENCOUNTER
----- Message from Shruti Che sent at 8/31/2020  3:54 PM CDT -----  Regarding: prolia shot  Contact: Jing dawkins  Type: Needs Medical Advice  Who Called:  Jing Raya Call Back Number: [phone]  Additional Information: Pls call pt regarding authorization for October for her prolia shot

## 2020-10-05 ENCOUNTER — HOSPITAL ENCOUNTER (OUTPATIENT)
Dept: RADIOLOGY | Facility: HOSPITAL | Age: 80
Discharge: HOME | End: 2020-10-05
Attending: INTERNAL MEDICINE
Payer: MEDICARE

## 2020-10-05 ENCOUNTER — IMMUNIZATION (OUTPATIENT)
Dept: PHARMACY | Facility: CLINIC | Age: 80
End: 2020-10-05
Payer: MEDICARE

## 2020-10-05 DIAGNOSIS — C50.011 MALIGNANT NEOPLASM OF NIPPLE OF RIGHT BREAST IN FEMALE, UNSPECIFIED ESTROGEN RECEPTOR STATUS: ICD-10-CM

## 2020-10-05 PROCEDURE — 77066 DX MAMMO INCL CAD BI: CPT | Mod: TC,HCNC,PO

## 2020-10-05 PROCEDURE — 77062 BREAST TOMOSYNTHESIS BI: CPT | Mod: 26,HCNC,, | Performed by: RADIOLOGY

## 2020-10-05 PROCEDURE — 77062 MAMMO DIGITAL DIAGNOSTIC BILAT WITH TOMO: ICD-10-PCS | Mod: 26,HCNC,, | Performed by: RADIOLOGY

## 2020-10-05 PROCEDURE — 77066 MAMMO DIGITAL DIAGNOSTIC BILAT WITH TOMO: ICD-10-PCS | Mod: 26,HCNC,, | Performed by: RADIOLOGY

## 2020-10-05 PROCEDURE — 77066 DX MAMMO INCL CAD BI: CPT | Mod: 26,HCNC,, | Performed by: RADIOLOGY

## 2020-10-28 ENCOUNTER — TELEPHONE (OUTPATIENT)
Dept: HEMATOLOGY/ONCOLOGY | Facility: CLINIC | Age: 80
End: 2020-10-28

## 2020-10-28 NOTE — TELEPHONE ENCOUNTER
Spoke with pt regarding her message about her prolia. Pt notified that her prolia was auth by her insurance company. Pt has appts for lab,  and Prolia on 11/3/20. Pt verbalized understanding and appreciation.

## 2020-10-28 NOTE — TELEPHONE ENCOUNTER
----- Message from Yaa Barreto sent at 10/8/2020  9:51 AM CDT -----  Type: Needs Medical Advice  Who Called:  Patient  Best Call Back Number:   Additional Information: Calling to find out if the proper paperwork has been submitted to her pharmacy for her injection.

## 2020-11-03 ENCOUNTER — OFFICE VISIT (OUTPATIENT)
Dept: HEMATOLOGY/ONCOLOGY | Facility: CLINIC | Age: 80
End: 2020-11-03
Payer: MEDICARE

## 2020-11-03 ENCOUNTER — INFUSION (OUTPATIENT)
Dept: INFUSION THERAPY | Facility: HOSPITAL | Age: 80
End: 2020-11-03
Attending: INTERNAL MEDICINE
Payer: MEDICARE

## 2020-11-03 VITALS
HEART RATE: 77 BPM | TEMPERATURE: 98 F | RESPIRATION RATE: 18 BRPM | SYSTOLIC BLOOD PRESSURE: 147 MMHG | DIASTOLIC BLOOD PRESSURE: 87 MMHG

## 2020-11-03 VITALS
SYSTOLIC BLOOD PRESSURE: 147 MMHG | HEIGHT: 64 IN | DIASTOLIC BLOOD PRESSURE: 87 MMHG | RESPIRATION RATE: 18 BRPM | BODY MASS INDEX: 27.72 KG/M2 | TEMPERATURE: 98 F | HEART RATE: 77 BPM | WEIGHT: 162.38 LBS | OXYGEN SATURATION: 100 %

## 2020-11-03 DIAGNOSIS — M81.0 OSTEOPOROSIS, SENILE: ICD-10-CM

## 2020-11-03 DIAGNOSIS — C50.012 MALIGNANT NEOPLASM OF NIPPLE OF LEFT BREAST IN FEMALE, UNSPECIFIED ESTROGEN RECEPTOR STATUS: ICD-10-CM

## 2020-11-03 DIAGNOSIS — I10 ESSENTIAL HYPERTENSION: ICD-10-CM

## 2020-11-03 DIAGNOSIS — E78.5 HYPERLIPIDEMIA LDL GOAL <130: ICD-10-CM

## 2020-11-03 DIAGNOSIS — M81.0 OSTEOPOROSIS, SENILE: Primary | ICD-10-CM

## 2020-11-03 DIAGNOSIS — C50.011 MALIGNANT NEOPLASM OF NIPPLE OF RIGHT BREAST IN FEMALE, UNSPECIFIED ESTROGEN RECEPTOR STATUS: Primary | ICD-10-CM

## 2020-11-03 DIAGNOSIS — N18.30 STAGE 3 CHRONIC KIDNEY DISEASE, UNSPECIFIED WHETHER STAGE 3A OR 3B CKD: ICD-10-CM

## 2020-11-03 DIAGNOSIS — I70.0 AORTIC ATHEROSCLEROSIS: ICD-10-CM

## 2020-11-03 PROCEDURE — 99999 PR PBB SHADOW E&M-EST. PATIENT-LVL IV: CPT | Mod: PBBFAC,HCNC,, | Performed by: INTERNAL MEDICINE

## 2020-11-03 PROCEDURE — 1125F PR PAIN SEVERITY QUANTIFIED, PAIN PRESENT: ICD-10-PCS | Mod: HCNC,S$GLB,, | Performed by: INTERNAL MEDICINE

## 2020-11-03 PROCEDURE — 1101F PT FALLS ASSESS-DOCD LE1/YR: CPT | Mod: HCNC,CPTII,S$GLB, | Performed by: INTERNAL MEDICINE

## 2020-11-03 PROCEDURE — 99214 OFFICE O/P EST MOD 30 MIN: CPT | Mod: HCNC,S$GLB,, | Performed by: INTERNAL MEDICINE

## 2020-11-03 PROCEDURE — 63600175 PHARM REV CODE 636 W HCPCS: Mod: JG,HCNC,PN | Performed by: INTERNAL MEDICINE

## 2020-11-03 PROCEDURE — 1159F PR MEDICATION LIST DOCUMENTED IN MEDICAL RECORD: ICD-10-PCS | Mod: HCNC,S$GLB,, | Performed by: INTERNAL MEDICINE

## 2020-11-03 PROCEDURE — 99214 PR OFFICE/OUTPT VISIT, EST, LEVL IV, 30-39 MIN: ICD-10-PCS | Mod: HCNC,S$GLB,, | Performed by: INTERNAL MEDICINE

## 2020-11-03 PROCEDURE — 3079F PR MOST RECENT DIASTOLIC BLOOD PRESSURE 80-89 MM HG: ICD-10-PCS | Mod: HCNC,CPTII,S$GLB, | Performed by: INTERNAL MEDICINE

## 2020-11-03 PROCEDURE — 96372 THER/PROPH/DIAG INJ SC/IM: CPT | Mod: HCNC,PN

## 2020-11-03 PROCEDURE — 1159F MED LIST DOCD IN RCRD: CPT | Mod: HCNC,S$GLB,, | Performed by: INTERNAL MEDICINE

## 2020-11-03 PROCEDURE — 1101F PR PT FALLS ASSESS DOC 0-1 FALLS W/OUT INJ PAST YR: ICD-10-PCS | Mod: HCNC,CPTII,S$GLB, | Performed by: INTERNAL MEDICINE

## 2020-11-03 PROCEDURE — 3077F SYST BP >= 140 MM HG: CPT | Mod: HCNC,CPTII,S$GLB, | Performed by: INTERNAL MEDICINE

## 2020-11-03 PROCEDURE — 3077F PR MOST RECENT SYSTOLIC BLOOD PRESSURE >= 140 MM HG: ICD-10-PCS | Mod: HCNC,CPTII,S$GLB, | Performed by: INTERNAL MEDICINE

## 2020-11-03 PROCEDURE — 1125F AMNT PAIN NOTED PAIN PRSNT: CPT | Mod: HCNC,S$GLB,, | Performed by: INTERNAL MEDICINE

## 2020-11-03 PROCEDURE — 3079F DIAST BP 80-89 MM HG: CPT | Mod: HCNC,CPTII,S$GLB, | Performed by: INTERNAL MEDICINE

## 2020-11-03 PROCEDURE — 99999 PR PBB SHADOW E&M-EST. PATIENT-LVL IV: ICD-10-PCS | Mod: PBBFAC,HCNC,, | Performed by: INTERNAL MEDICINE

## 2020-11-03 RX ADMIN — DENOSUMAB 60 MG: 60 INJECTION SUBCUTANEOUS at 02:11

## 2020-11-03 NOTE — PROGRESS NOTES
80 year-old  woman well known to me.  The patient was diagnosed n   May 2016 with triple positive breast cancer.  The patient had a 1.9 cm   malignancy of the left breast, sentinel lymph node negative in association with   DCIS.  She underwent lumpectomy, underwent adjuvant TCH chemotherapy for six   cycles   and Herceptin alone for a year, has done well, but she has   multiple other issues in association.  The patient had Echocardiogram during Herceptin that showed  showed a EF drop by 10%   from an ejection fraction of 65% to 55%.  Dr. Gray had cleared her to continue   with Herceptin and with the short-term echocardiogram follow up as the patient   had remained asymptomatic. And she successfully completed Herceptin  The patient also had some pulmonary nodules that are   being followed by a CT scan.  They are deemed to be benign, but need ongoing   followup.  She had been taking Boniva for postmenopausal osteopenia/osteoporosis   along with calcium and the patient has also had increased density on mammogram.    had  repeat mammogram, which Radiology recommends short-term   followup which was done and now she goes for annual mammogram most recent mammogram September 2020.  She receives Prolia and Arimidex     PHYSICAL EXAMINATION:  GENERAL:  Elderly woman.  Alert, awake, oriented.  VITAL SIGNS:    Wt Readings from Last 3 Encounters:   11/03/20 73.6 kg (162 lb 5.9 oz)   07/30/20 74.6 kg (164 lb 5.7 oz)   05/21/20 75.9 kg (167 lb 5.3 oz)     Temp Readings from Last 3 Encounters:   11/03/20 98.2 °F (36.8 °C) (Temporal)   07/30/20 98.4 °F (36.9 °C) (Temporal)   05/21/20 98.3 °F (36.8 °C) (Oral)     BP Readings from Last 3 Encounters:   11/03/20 (!) 147/87   07/30/20 135/73   05/21/20 124/72     Pulse Readings from Last 3 Encounters:   11/03/20 77   07/30/20 90   05/21/20 89     PHYSICAL EXAM:     Vitals:    11/03/20 1256   BP: (!) 147/87   Pulse: 77   Resp: 18   Temp: 98.2 °F (36.8 °C)       GENERAL:  Comfortable looking patient. Patient is in no distress.  Awake, alert and oriented to time, person and place.  No anxiety, or agitation.      HEENT: Normal conjunctivae and eyelids. WNL.  PERRLA 3 to 4 mm. No icterus, no pallor, no congestion, and no discharge noted.     NECK:  Supple. Trachea is central.  No crepitus.  No JVD or masses.    RESPIRATORY:  No intercostal retractions.  No dullness to percussion.  Chest is clear to auscultation.  No rales, rhonchi or wheezes.  No crepitus.  Good air entry bilaterally.    CARDIOVASCULAR:  S1 and S2 are normally heard without murmurs or gallops.  All peripheral pulses are present.    ABDOMEN:  Normal abdomen.  No hepatosplenomegaly.  No free fluid.  Bowel sounds are present.  No hernia noted. No masses.  No rebound or tenderness.  No guarding or rigidity.  Umbilicus is midline.    LYMPHATICS:  No axillary, cervical, supraclavicular, submental, or inguinal lymphadenopathy.    SKIN/MUSCULOSKELETAL:  There is no evidence of excoriation marks or ecchmosis.  No rashes.  No cyanosis.  No clubbing.  No joint or skeletal deformities noted.  Normal range of motion.    NEUROLOGIC:  Higher functions are appropriate.  No cranial nerve deficits.  Normal [place].  Normal strength.  Motor and sensory functions are normal.  Deep tendon reflexes are normal.    GENITAL/RECTAL:  Exams are deferred.  LABORATORY EVALUATION:    Lab Results   Component Value Date    WBC 7.55 11/03/2020    HGB 14.2 11/03/2020    HCT 42.5 11/03/2020    MCV 91 11/03/2020     11/03/2020     CMP  Sodium   Date Value Ref Range Status   11/03/2020 132 (L) 136 - 145 mmol/L Final     Potassium   Date Value Ref Range Status   11/03/2020 4.8 3.5 - 5.1 mmol/L Final     Chloride   Date Value Ref Range Status   11/03/2020 95 95 - 110 mmol/L Final     CO2   Date Value Ref Range Status   11/03/2020 30 22 - 31 mmol/L Final     Glucose   Date Value Ref Range Status   11/03/2020 100 70 - 110 mg/dL Final     Comment:     The  ADA recommends the following guidelines for fasting glucose:  Normal:       less than 100 mg/dL  Prediabetes:  100 mg/dL to 125 mg/dL  Diabetes:     126 mg/dL or higher       BUN   Date Value Ref Range Status   11/03/2020 12 7 - 18 mg/dL Final     Creatinine   Date Value Ref Range Status   11/03/2020 1.04 0.50 - 1.40 mg/dL Final   01/18/2013 0.9 0.5 - 1.4 mg/dL Final     Calcium   Date Value Ref Range Status   11/03/2020 10.2 8.4 - 10.2 mg/dL Final   01/18/2013 8.9 8.7 - 10.5 mg/dL Final     Total Protein   Date Value Ref Range Status   11/03/2020 7.5 6.0 - 8.4 g/dL Final     Albumin   Date Value Ref Range Status   11/03/2020 4.6 3.5 - 5.2 g/dL Final     Total Bilirubin   Date Value Ref Range Status   11/03/2020 0.6 0.2 - 1.3 mg/dL Final     Alkaline Phosphatase   Date Value Ref Range Status   11/03/2020 73 38 - 145 U/L Final     AST   Date Value Ref Range Status   11/03/2020 52 (H) 14 - 36 U/L Final     ALT   Date Value Ref Range Status   11/03/2020 20 0 - 35 U/L Final     Anion Gap   Date Value Ref Range Status   11/03/2020 7 (L) 8 - 16 mmol/L Final   01/18/2013 9 5 - 15 meq/L Final     eGFR if    Date Value Ref Range Status   11/03/2020 58 (A) >60 mL/min/1.73 m^2 Final     eGFR if non    Date Value Ref Range Status   11/03/2020 50 (A) >60 mL/min/1.73 m^2 Final     Comment:     Calculation used to obtain the estimated glomerular filtration  rate (eGFR) is the CKD-EPI equation.        Echocardiogram, ejection fraction has dropped to 55%, but   still in the normal range.  .   Impression 1/2020 PET scan     No metastatic disease    CA 2729  67.3. elevated throughout these surveillances without corresponding changes on scan  mammo 9/2018 wnl    IMPRESSION:  1.  Triple positive breast cancer, T1, N0, M0, underwent [facility] chemotherapy,   Completed 1 year of herceptin, now on arimidex .   Osteoporosis:  Boniva changed to prolia due now dental work was over 3 months ago    EF dropped somewhat  followed by Cardiology.  This improved cards had a stress test , carotids checked  PET scan annually will delay this time till May 2021 to keep in line with 6 month follow-up she delayed Prolia by 3 months which has kept a on a staggering schedule with appointment  Calcium supplementation minimum 1200 mg along with dental precautions.    on arimidex and stay on it.   6.  The patient has dyslipidemia.  Continue with Zocor and primary care follow   up with Dr. Franco.  7.  Degenerative joint pains.  Continue with Ultram as needed, take trazodone   for sleep and anxiety along with Xanax.may go for pain stimulant  8.  Mild CKD.  Continue to monitor.  No intervention necessary at this time.  9.  Gastroesophageal reflux disease.  Continue with omeprazole.  No changes or   worsening of symptoms at this point    10.psoas muscle  Tumor possibly a schwannoma confirmed with DR. Tovar  Patient lives in [place] she would like to get her mammogram in the [place] area . doctor's visit infusion and labs in [place] will arrange for this       aortic athrosclerosis stable follow with  pcp   Advance Care planning/ directives /living will/patient's wishes discussed with patient.  Patient has been given guidelines and instructions on completing these directives  COVID social distancing, face mask use, hand washing techniques and personal hygiene routine discussed with patient  Good exercise, nutrition and weight management discussed with patient  Health maintenance activities and follow-up with PCPs recommendations discussed with patient

## 2020-11-06 NOTE — TELEPHONE ENCOUNTER
----- Message from Gordon Monreal sent at 6/17/2019  3:09 PM CDT -----  Contact: Patient  Type: Needs Medical Advice    Who Called:  Patient  Best Call Back Number: [phone]  Additional Information: Patient would like to reschedule fasting lab and PET CT Scan and to verify if upcoming appointment needs to be rescheduled. Thanks!   No

## 2020-11-11 ENCOUNTER — LAB VISIT (OUTPATIENT)
Dept: LAB | Facility: HOSPITAL | Age: 80
End: 2020-11-11
Attending: NURSE PRACTITIONER
Payer: MEDICARE

## 2020-11-11 DIAGNOSIS — N18.30 CHRONIC KIDNEY DISEASE, STAGE III (MODERATE): ICD-10-CM

## 2020-11-11 DIAGNOSIS — I10 ESSENTIAL HYPERTENSION: ICD-10-CM

## 2020-11-11 LAB
ALBUMIN SERPL BCP-MCNC: 4 G/DL (ref 3.5–5.2)
ALP SERPL-CCNC: 74 U/L (ref 55–135)
ALT SERPL W/O P-5'-P-CCNC: 16 U/L (ref 10–44)
ANION GAP SERPL CALC-SCNC: 8 MMOL/L (ref 8–16)
AST SERPL-CCNC: 31 U/L (ref 10–40)
BILIRUB SERPL-MCNC: 0.4 MG/DL (ref 0.1–1)
BUN SERPL-MCNC: 11 MG/DL (ref 8–23)
CALCIUM SERPL-MCNC: 9.4 MG/DL (ref 8.7–10.5)
CHLORIDE SERPL-SCNC: 97 MMOL/L (ref 95–110)
CO2 SERPL-SCNC: 28 MMOL/L (ref 23–29)
CREAT SERPL-MCNC: 1.1 MG/DL (ref 0.5–1.4)
EST. GFR  (AFRICAN AMERICAN): 54 ML/MIN/1.73 M^2
EST. GFR  (NON AFRICAN AMERICAN): 47 ML/MIN/1.73 M^2
GLUCOSE SERPL-MCNC: 96 MG/DL (ref 70–110)
POTASSIUM SERPL-SCNC: 5 MMOL/L (ref 3.5–5.1)
PROT SERPL-MCNC: 6.7 G/DL (ref 6–8.4)
SODIUM SERPL-SCNC: 133 MMOL/L (ref 136–145)
TSH SERPL DL<=0.005 MIU/L-ACNC: 0.78 UIU/ML (ref 0.4–4)

## 2020-11-11 PROCEDURE — 84443 ASSAY THYROID STIM HORMONE: CPT | Mod: HCNC

## 2020-11-11 PROCEDURE — 80053 COMPREHEN METABOLIC PANEL: CPT | Mod: HCNC

## 2020-11-11 PROCEDURE — 36415 COLL VENOUS BLD VENIPUNCTURE: CPT | Mod: HCNC

## 2020-11-18 ENCOUNTER — OFFICE VISIT (OUTPATIENT)
Dept: FAMILY MEDICINE | Facility: CLINIC | Age: 80
End: 2020-11-18
Payer: MEDICARE

## 2020-11-18 VITALS
OXYGEN SATURATION: 99 % | DIASTOLIC BLOOD PRESSURE: 72 MMHG | SYSTOLIC BLOOD PRESSURE: 132 MMHG | HEART RATE: 90 BPM | WEIGHT: 165.81 LBS | BODY MASS INDEX: 28.31 KG/M2 | HEIGHT: 64 IN

## 2020-11-18 DIAGNOSIS — I10 ESSENTIAL HYPERTENSION: ICD-10-CM

## 2020-11-18 DIAGNOSIS — G89.29 CHRONIC MIDLINE LOW BACK PAIN WITHOUT SCIATICA: ICD-10-CM

## 2020-11-18 DIAGNOSIS — M54.50 CHRONIC MIDLINE LOW BACK PAIN WITHOUT SCIATICA: ICD-10-CM

## 2020-11-18 DIAGNOSIS — E78.5 DYSLIPIDEMIA: ICD-10-CM

## 2020-11-18 DIAGNOSIS — Z00.00 ROUTINE PHYSICAL EXAMINATION: Primary | ICD-10-CM

## 2020-11-18 DIAGNOSIS — G89.29 OTHER CHRONIC PAIN: ICD-10-CM

## 2020-11-18 PROCEDURE — 3075F PR MOST RECENT SYSTOLIC BLOOD PRESS GE 130-139MM HG: ICD-10-PCS | Mod: HCNC,CPTII,S$GLB, | Performed by: INTERNAL MEDICINE

## 2020-11-18 PROCEDURE — 99999 PR PBB SHADOW E&M-EST. PATIENT-LVL IV: ICD-10-PCS | Mod: PBBFAC,HCNC,, | Performed by: INTERNAL MEDICINE

## 2020-11-18 PROCEDURE — 99397 PER PM REEVAL EST PAT 65+ YR: CPT | Mod: HCNC,S$GLB,, | Performed by: INTERNAL MEDICINE

## 2020-11-18 PROCEDURE — 99397 PR PREVENTIVE VISIT,EST,65 & OVER: ICD-10-PCS | Mod: HCNC,S$GLB,, | Performed by: INTERNAL MEDICINE

## 2020-11-18 PROCEDURE — 3078F DIAST BP <80 MM HG: CPT | Mod: HCNC,CPTII,S$GLB, | Performed by: INTERNAL MEDICINE

## 2020-11-18 PROCEDURE — 1101F PR PT FALLS ASSESS DOC 0-1 FALLS W/OUT INJ PAST YR: ICD-10-PCS | Mod: HCNC,CPTII,S$GLB, | Performed by: INTERNAL MEDICINE

## 2020-11-18 PROCEDURE — 99999 PR PBB SHADOW E&M-EST. PATIENT-LVL IV: CPT | Mod: PBBFAC,HCNC,, | Performed by: INTERNAL MEDICINE

## 2020-11-18 PROCEDURE — 3078F PR MOST RECENT DIASTOLIC BLOOD PRESSURE < 80 MM HG: ICD-10-PCS | Mod: HCNC,CPTII,S$GLB, | Performed by: INTERNAL MEDICINE

## 2020-11-18 PROCEDURE — 1101F PT FALLS ASSESS-DOCD LE1/YR: CPT | Mod: HCNC,CPTII,S$GLB, | Performed by: INTERNAL MEDICINE

## 2020-11-18 PROCEDURE — 99499 RISK ADDL DX/OHS AUDIT: ICD-10-PCS | Mod: S$GLB,,, | Performed by: INTERNAL MEDICINE

## 2020-11-18 PROCEDURE — 3288F FALL RISK ASSESSMENT DOCD: CPT | Mod: HCNC,CPTII,S$GLB, | Performed by: INTERNAL MEDICINE

## 2020-11-18 PROCEDURE — 99499 UNLISTED E&M SERVICE: CPT | Mod: S$GLB,,, | Performed by: INTERNAL MEDICINE

## 2020-11-18 PROCEDURE — 3288F PR FALLS RISK ASSESSMENT DOCUMENTED: ICD-10-PCS | Mod: HCNC,CPTII,S$GLB, | Performed by: INTERNAL MEDICINE

## 2020-11-18 PROCEDURE — 3075F SYST BP GE 130 - 139MM HG: CPT | Mod: HCNC,CPTII,S$GLB, | Performed by: INTERNAL MEDICINE

## 2020-11-18 RX ORDER — TRAMADOL HYDROCHLORIDE 50 MG/1
TABLET ORAL
Qty: 60 TABLET | Refills: 5 | Status: SHIPPED | OUTPATIENT
Start: 2020-11-18 | End: 2021-05-20 | Stop reason: SDUPTHER

## 2020-11-18 RX ORDER — LISINOPRIL 10 MG/1
TABLET ORAL
Qty: 270 TABLET | Refills: 3 | Status: SHIPPED | OUTPATIENT
Start: 2020-11-18 | End: 2021-11-16 | Stop reason: SDUPTHER

## 2020-11-18 NOTE — PROGRESS NOTES
Subjective:       Patient ID: Jing Tenorio is a 80 y.o. female.    Chief Complaint: Annual Exam    Here for routine health maintenance.      Chronic Low back pain/ inflammatory arthropathy. Controlled  Recall:  MRI showed tumor on nerve, but benign.  Has leg pain.  Failed epidural and ablation.  Saw neurosurgeon in [place] who did not want to do surgery as pt higher risk and thought may end up with chronic leg pain.  Recommended nerve stimulator.  Dr Rice had given meloxicam, which helps but pt has decreased kidney function so this was held.  Her new pain Dr, Dr Bean, offered Cymbalta? But pt scared of side affects. She also wants to avoid anything with potential addiction.  He also recommended a nerve stimulator but she is scared of this.  She will take an otc Advil about once a week.    Can not take nsaids with ckd   May get medical marijuana from her pain Dr.    HTN -   controlled.       HLD - controlled    Insomnia - controlled    Breast caner history - seeing oncology     Thrombocytopenia - mild  Atherosclerosis - no syncope    Review of Systems   Constitutional: Negative for appetite change and fever.   HENT: Negative for nosebleeds and trouble swallowing.    Eyes: Negative for discharge and visual disturbance.   Respiratory: Negative for choking and shortness of breath.    Cardiovascular: Negative for chest pain and palpitations.   Gastrointestinal: Negative for abdominal pain, nausea and vomiting.   Musculoskeletal: Negative for arthralgias and joint swelling.   Skin: Negative for rash and wound.   Neurological: Negative for dizziness and syncope.   Psychiatric/Behavioral: Negative for confusion and dysphoric mood.       Objective:      Vitals:    11/18/20 1233   BP: 132/72   Pulse: 90     Physical Exam  Vitals signs reviewed.   Eyes:      Conjunctiva/sclera: Conjunctivae normal.   Neck:      Musculoskeletal: Normal range of motion.      Thyroid: No thyromegaly.      Trachea: Trachea normal.    Cardiovascular:      Heart sounds: Normal heart sounds.      Comments: Edema negative  Pulmonary:      Effort: Pulmonary effort is normal.      Breath sounds: Normal breath sounds.   Abdominal:      Palpations: Abdomen is soft. There is no hepatomegaly.   Skin:     General: Skin is warm and dry.   Neurological:      Cranial Nerves: No cranial nerve deficit.      Comments: DTR decreased bilateral   Psychiatric:      Comments: Alert and Oriented            Assessment:       1. Routine physical examination    2. Essential hypertension    3. Chronic midline low back pain without sciatica    4. Other chronic pain    5. Dyslipidemia        Plan:       Routine physical examination    Essential hypertension  -     lisinopriL 10 MG tablet; 2 pills in the morning and 1 pill at night  Dispense: 270 tablet; Refill: 3    Chronic midline low back pain without sciatica    Other chronic pain  -     traMADoL (ULTRAM) 50 mg tablet; 1/2 - 1 po q 6 hour prn pain  Dispense: 60 tablet; Refill: 5    Dyslipidemia  -     Lipid Panel; Future; Expected date: 05/17/2021            Medication List with Changes/Refills   Current Medications    ANASTROZOLE (ARIMIDEX) 1 MG TAB    Take 1 tablet (1 mg total) by mouth once daily.    ASCORBIC ACID, VITAMIN C, (VITAMIN C) 500 MG TABLET    Take 500 mg by mouth once daily.    B COMPLEX VITAMINS TABLET    Take 1 tablet by mouth once daily.    CETIRIZINE (ZYRTEC) 10 MG TABLET    Take 1 tablet (10 mg total) by mouth once daily.    COENZYME Q10 (CO Q-10) 100 MG CAPSULE    Take 100 mg by mouth once daily.     DENOSUMAB (PROLIA) 60 MG/ML SYRG    Inject 60 mg into the skin every 6 (six) months.    FLUTICASONE PROPIONATE (FLONASE) 50 MCG/ACTUATION NASAL SPRAY    1 spray (50 mcg total) by Each Nostril route once daily.    MULTIVITAMIN ORAL    once daily. Every day    OMEPRAZOLE (PRILOSEC) 40 MG CAPSULE    Take 1 capsule (40 mg total) by mouth once daily.    SIMVASTATIN (ZOCOR) 20 MG TABLET    Take 1 tablet (20  mg total) by mouth every evening.    TRAZODONE (DESYREL) 100 MG TABLET    Take 1 tablet (100 mg total) by mouth every evening.    VITAMIN D (VITAMIN D3) 1000 UNITS TAB    Take 2,000 Units by mouth once daily.    VITAMIN E 400 UNIT CAPSULE    Take 400 Units by mouth once daily.   Changed and/or Refilled Medications    Modified Medication Previous Medication    LISINOPRIL 10 MG TABLET lisinopriL 10 MG tablet       2 pills in the morning and 1 pill at night    2 pills in the morning and 1 pill at night    TRAMADOL (ULTRAM) 50 MG TABLET traMADoL (ULTRAM) 50 mg tablet       1/2 - 1 po q 6 hour prn pain    1/2 - 1 po q 6 hour prn pain       Wellness reviewed    Continue current management and monitor.    Counseled on regular exercise, maintenance of a healthy weight, balanced diet rich in fruits/vegetables and lean protein, and avoidance of unhealthy habits like smoking and excessive alcohol intake.   Also, counseled on importance of being compliant with medication, health appointments, diet and exercise.     Follow up in about 6 months (around 5/11/2021).

## 2020-12-07 ENCOUNTER — PES CALL (OUTPATIENT)
Dept: ADMINISTRATIVE | Facility: CLINIC | Age: 80
End: 2020-12-07

## 2021-01-12 ENCOUNTER — OFFICE VISIT (OUTPATIENT)
Dept: HEMATOLOGY/ONCOLOGY | Facility: CLINIC | Age: 81
End: 2021-01-12
Payer: MEDICARE

## 2021-01-12 ENCOUNTER — LAB VISIT (OUTPATIENT)
Dept: LAB | Facility: HOSPITAL | Age: 81
End: 2021-01-12
Attending: INTERNAL MEDICINE
Payer: MEDICARE

## 2021-01-12 VITALS
OXYGEN SATURATION: 98 % | HEART RATE: 78 BPM | BODY MASS INDEX: 27.89 KG/M2 | WEIGHT: 163.38 LBS | SYSTOLIC BLOOD PRESSURE: 169 MMHG | TEMPERATURE: 99 F | HEIGHT: 64 IN | RESPIRATION RATE: 18 BRPM | DIASTOLIC BLOOD PRESSURE: 89 MMHG

## 2021-01-12 DIAGNOSIS — E78.5 HYPERLIPIDEMIA LDL GOAL <130: ICD-10-CM

## 2021-01-12 DIAGNOSIS — I10 ESSENTIAL HYPERTENSION: ICD-10-CM

## 2021-01-12 DIAGNOSIS — C50.011 MALIGNANT NEOPLASM OF NIPPLE OF RIGHT BREAST IN FEMALE, UNSPECIFIED ESTROGEN RECEPTOR STATUS: ICD-10-CM

## 2021-01-12 DIAGNOSIS — I70.0 AORTIC ATHEROSCLEROSIS: Primary | ICD-10-CM

## 2021-01-12 DIAGNOSIS — C50.012 MALIGNANT NEOPLASM INVOLVING BOTH NIPPLE AND AREOLA OF LEFT BREAST IN FEMALE, UNSPECIFIED ESTROGEN RECEPTOR STATUS: ICD-10-CM

## 2021-01-12 DIAGNOSIS — N18.30 STAGE 3 CHRONIC KIDNEY DISEASE, UNSPECIFIED WHETHER STAGE 3A OR 3B CKD: ICD-10-CM

## 2021-01-12 LAB
ALBUMIN SERPL BCP-MCNC: 4.5 G/DL (ref 3.5–5.2)
ALP SERPL-CCNC: 78 U/L (ref 38–145)
ALT SERPL W/O P-5'-P-CCNC: 17 U/L (ref 0–35)
ANION GAP SERPL CALC-SCNC: 5 MMOL/L (ref 8–16)
AST SERPL-CCNC: 47 U/L (ref 14–36)
BASOPHILS # BLD AUTO: 0.06 K/UL (ref 0–0.2)
BASOPHILS NFR BLD: 1 % (ref 0–1.9)
BILIRUB SERPL-MCNC: 0.5 MG/DL (ref 0.2–1.3)
CALCIUM SERPL-MCNC: 10.2 MG/DL (ref 8.4–10.2)
CHLORIDE SERPL-SCNC: 95 MMOL/L (ref 95–110)
CO2 SERPL-SCNC: 31 MMOL/L (ref 22–31)
CREAT SERPL-MCNC: 0.99 MG/DL (ref 0.5–1.4)
DIFFERENTIAL METHOD: ABNORMAL
EOSINOPHIL # BLD AUTO: 0.1 K/UL (ref 0–0.5)
EOSINOPHIL NFR BLD: 1.3 % (ref 0–8)
ERYTHROCYTE [DISTWIDTH] IN BLOOD BY AUTOMATED COUNT: 14 % (ref 11.5–14.5)
EST. GFR  (AFRICAN AMERICAN): >60 ML/MIN/1.73 M^2
EST. GFR  (NON AFRICAN AMERICAN): 53 ML/MIN/1.73 M^2
GLUCOSE SERPL-MCNC: 100 MG/DL (ref 70–110)
HCT VFR BLD AUTO: 40.7 % (ref 37–48.5)
HGB BLD-MCNC: 13.5 G/DL (ref 12–16)
IMM GRANULOCYTES # BLD AUTO: 0.01 K/UL (ref 0–0.04)
IMM GRANULOCYTES NFR BLD AUTO: 0.2 % (ref 0–0.5)
LYMPHOCYTES # BLD AUTO: 0.7 K/UL (ref 1–4.8)
LYMPHOCYTES NFR BLD: 11.9 % (ref 18–48)
MCH RBC QN AUTO: 29.9 PG (ref 27–31)
MCHC RBC AUTO-ENTMCNC: 33.2 G/DL (ref 32–36)
MCV RBC AUTO: 90 FL (ref 82–98)
MONOCYTES # BLD AUTO: 0.4 K/UL (ref 0.3–1)
MONOCYTES NFR BLD: 6.1 % (ref 4–15)
NEUTROPHILS # BLD AUTO: 4.7 K/UL (ref 1.8–7.7)
NEUTROPHILS NFR BLD: 79.5 % (ref 38–73)
NRBC BLD-RTO: 0 /100 WBC
PLATELET # BLD AUTO: 349 K/UL (ref 150–350)
PMV BLD AUTO: 9.9 FL (ref 9.2–12.9)
POTASSIUM SERPL-SCNC: 5.1 MMOL/L (ref 3.5–5.1)
PROT SERPL-MCNC: 7.2 G/DL (ref 6–8.4)
RBC # BLD AUTO: 4.51 M/UL (ref 4–5.4)
SODIUM SERPL-SCNC: 131 MMOL/L (ref 136–145)
UUN UR-MCNC: 9 MG/DL (ref 7–18)
WBC # BLD AUTO: 5.95 K/UL (ref 3.9–12.7)

## 2021-01-12 PROCEDURE — 1159F PR MEDICATION LIST DOCUMENTED IN MEDICAL RECORD: ICD-10-PCS | Mod: HCNC,S$GLB,, | Performed by: INTERNAL MEDICINE

## 2021-01-12 PROCEDURE — 99999 PR PBB SHADOW E&M-EST. PATIENT-LVL IV: ICD-10-PCS | Mod: PBBFAC,HCNC,, | Performed by: INTERNAL MEDICINE

## 2021-01-12 PROCEDURE — 80053 COMPREHEN METABOLIC PANEL: CPT | Mod: PN

## 2021-01-12 PROCEDURE — 1101F PT FALLS ASSESS-DOCD LE1/YR: CPT | Mod: HCNC,CPTII,S$GLB, | Performed by: INTERNAL MEDICINE

## 2021-01-12 PROCEDURE — 3288F PR FALLS RISK ASSESSMENT DOCUMENTED: ICD-10-PCS | Mod: HCNC,CPTII,S$GLB, | Performed by: INTERNAL MEDICINE

## 2021-01-12 PROCEDURE — 85025 COMPLETE CBC W/AUTO DIFF WBC: CPT | Mod: PN

## 2021-01-12 PROCEDURE — 99214 OFFICE O/P EST MOD 30 MIN: CPT | Mod: HCNC,S$GLB,, | Performed by: INTERNAL MEDICINE

## 2021-01-12 PROCEDURE — 80053 COMPREHEN METABOLIC PANEL: CPT

## 2021-01-12 PROCEDURE — 99214 PR OFFICE/OUTPT VISIT, EST, LEVL IV, 30-39 MIN: ICD-10-PCS | Mod: HCNC,S$GLB,, | Performed by: INTERNAL MEDICINE

## 2021-01-12 PROCEDURE — 3077F SYST BP >= 140 MM HG: CPT | Mod: HCNC,CPTII,S$GLB, | Performed by: INTERNAL MEDICINE

## 2021-01-12 PROCEDURE — 85025 COMPLETE CBC W/AUTO DIFF WBC: CPT

## 2021-01-12 PROCEDURE — 3288F FALL RISK ASSESSMENT DOCD: CPT | Mod: HCNC,CPTII,S$GLB, | Performed by: INTERNAL MEDICINE

## 2021-01-12 PROCEDURE — 36415 COLL VENOUS BLD VENIPUNCTURE: CPT | Mod: HCNC,PN

## 2021-01-12 PROCEDURE — 3077F PR MOST RECENT SYSTOLIC BLOOD PRESSURE >= 140 MM HG: ICD-10-PCS | Mod: HCNC,CPTII,S$GLB, | Performed by: INTERNAL MEDICINE

## 2021-01-12 PROCEDURE — 1101F PR PT FALLS ASSESS DOC 0-1 FALLS W/OUT INJ PAST YR: ICD-10-PCS | Mod: HCNC,CPTII,S$GLB, | Performed by: INTERNAL MEDICINE

## 2021-01-12 PROCEDURE — 99499 RISK ADDL DX/OHS AUDIT: ICD-10-PCS | Mod: S$GLB,,, | Performed by: INTERNAL MEDICINE

## 2021-01-12 PROCEDURE — 3079F DIAST BP 80-89 MM HG: CPT | Mod: HCNC,CPTII,S$GLB, | Performed by: INTERNAL MEDICINE

## 2021-01-12 PROCEDURE — 99499 UNLISTED E&M SERVICE: CPT | Mod: S$GLB,,, | Performed by: INTERNAL MEDICINE

## 2021-01-12 PROCEDURE — 1125F PR PAIN SEVERITY QUANTIFIED, PAIN PRESENT: ICD-10-PCS | Mod: HCNC,S$GLB,, | Performed by: INTERNAL MEDICINE

## 2021-01-12 PROCEDURE — 1159F MED LIST DOCD IN RCRD: CPT | Mod: HCNC,S$GLB,, | Performed by: INTERNAL MEDICINE

## 2021-01-12 PROCEDURE — 86300 IMMUNOASSAY TUMOR CA 15-3: CPT | Mod: HCNC

## 2021-01-12 PROCEDURE — 99999 PR PBB SHADOW E&M-EST. PATIENT-LVL IV: CPT | Mod: PBBFAC,HCNC,, | Performed by: INTERNAL MEDICINE

## 2021-01-12 PROCEDURE — 1125F AMNT PAIN NOTED PAIN PRSNT: CPT | Mod: HCNC,S$GLB,, | Performed by: INTERNAL MEDICINE

## 2021-01-12 PROCEDURE — 3079F PR MOST RECENT DIASTOLIC BLOOD PRESSURE 80-89 MM HG: ICD-10-PCS | Mod: HCNC,CPTII,S$GLB, | Performed by: INTERNAL MEDICINE

## 2021-01-12 RX ORDER — AMOXICILLIN 500 MG/1
CAPSULE ORAL
COMMUNITY
Start: 2021-01-11 | End: 2022-05-09

## 2021-01-15 LAB — CANCER AG27-29 SERPL-ACNC: 56.3 U/ML

## 2021-01-26 ENCOUNTER — TELEPHONE (OUTPATIENT)
Dept: HEMATOLOGY/ONCOLOGY | Facility: CLINIC | Age: 81
End: 2021-01-26

## 2021-02-04 ENCOUNTER — IMMUNIZATION (OUTPATIENT)
Dept: PRIMARY CARE CLINIC | Facility: CLINIC | Age: 81
End: 2021-02-04
Payer: MEDICARE

## 2021-02-04 DIAGNOSIS — Z23 NEED FOR VACCINATION: Primary | ICD-10-CM

## 2021-02-04 PROCEDURE — 91300 COVID-19, MRNA, LNP-S, PF, 30 MCG/0.3 ML DOSE VACCINE: CPT | Mod: S$GLB,,, | Performed by: FAMILY MEDICINE

## 2021-02-04 PROCEDURE — 0001A COVID-19, MRNA, LNP-S, PF, 30 MCG/0.3 ML DOSE VACCINE: ICD-10-PCS | Mod: S$GLB,,, | Performed by: FAMILY MEDICINE

## 2021-02-04 PROCEDURE — 0001A COVID-19, MRNA, LNP-S, PF, 30 MCG/0.3 ML DOSE VACCINE: CPT | Mod: S$GLB,,, | Performed by: FAMILY MEDICINE

## 2021-02-04 PROCEDURE — 91300 COVID-19, MRNA, LNP-S, PF, 30 MCG/0.3 ML DOSE VACCINE: ICD-10-PCS | Mod: S$GLB,,, | Performed by: FAMILY MEDICINE

## 2021-02-25 ENCOUNTER — IMMUNIZATION (OUTPATIENT)
Dept: PRIMARY CARE CLINIC | Facility: CLINIC | Age: 81
End: 2021-02-25
Payer: MEDICARE

## 2021-02-25 DIAGNOSIS — Z23 NEED FOR VACCINATION: Primary | ICD-10-CM

## 2021-02-25 PROCEDURE — 0002A COVID-19, MRNA, LNP-S, PF, 30 MCG/0.3 ML DOSE VACCINE: CPT | Mod: S$GLB,,, | Performed by: FAMILY MEDICINE

## 2021-02-25 PROCEDURE — 0002A COVID-19, MRNA, LNP-S, PF, 30 MCG/0.3 ML DOSE VACCINE: ICD-10-PCS | Mod: S$GLB,,, | Performed by: FAMILY MEDICINE

## 2021-02-25 PROCEDURE — 91300 COVID-19, MRNA, LNP-S, PF, 30 MCG/0.3 ML DOSE VACCINE: CPT | Mod: S$GLB,,, | Performed by: FAMILY MEDICINE

## 2021-02-25 PROCEDURE — 91300 COVID-19, MRNA, LNP-S, PF, 30 MCG/0.3 ML DOSE VACCINE: ICD-10-PCS | Mod: S$GLB,,, | Performed by: FAMILY MEDICINE

## 2021-03-11 ENCOUNTER — PES CALL (OUTPATIENT)
Dept: ADMINISTRATIVE | Facility: CLINIC | Age: 81
End: 2021-03-11

## 2021-03-30 ENCOUNTER — TELEPHONE (OUTPATIENT)
Dept: HEMATOLOGY/ONCOLOGY | Facility: CLINIC | Age: 81
End: 2021-03-30

## 2021-03-31 ENCOUNTER — TELEPHONE (OUTPATIENT)
Dept: FAMILY MEDICINE | Facility: CLINIC | Age: 81
End: 2021-03-31

## 2021-04-27 ENCOUNTER — HOSPITAL ENCOUNTER (OUTPATIENT)
Dept: RADIOLOGY | Facility: HOSPITAL | Age: 81
Discharge: HOME | End: 2021-04-27
Attending: INTERNAL MEDICINE
Payer: MEDICARE

## 2021-04-27 DIAGNOSIS — C50.012 MALIGNANT NEOPLASM INVOLVING BOTH NIPPLE AND AREOLA OF LEFT BREAST IN FEMALE, UNSPECIFIED ESTROGEN RECEPTOR STATUS: ICD-10-CM

## 2021-04-27 DIAGNOSIS — I70.0 AORTIC ATHEROSCLEROSIS: ICD-10-CM

## 2021-04-27 DIAGNOSIS — E78.5 HYPERLIPIDEMIA LDL GOAL <130: ICD-10-CM

## 2021-04-27 DIAGNOSIS — I10 ESSENTIAL HYPERTENSION: ICD-10-CM

## 2021-04-27 PROCEDURE — 78815 PET IMAGE W/CT SKULL-THIGH: CPT | Mod: 26,PS,, | Performed by: RADIOLOGY

## 2021-04-27 PROCEDURE — 78815 NM PET CT ROUTINE: ICD-10-PCS | Mod: 26,PS,, | Performed by: RADIOLOGY

## 2021-04-27 PROCEDURE — 78815 PET IMAGE W/CT SKULL-THIGH: CPT | Mod: TC,PO

## 2021-04-27 PROCEDURE — A9552 F18 FDG: HCPCS | Mod: PO

## 2021-04-28 ENCOUNTER — PES CALL (OUTPATIENT)
Dept: ADMINISTRATIVE | Facility: CLINIC | Age: 81
End: 2021-04-28

## 2021-05-03 RX ORDER — OMEPRAZOLE 20 MG/1
CAPSULE, DELAYED RELEASE ORAL
COMMUNITY
End: 2021-05-20 | Stop reason: SDUPTHER

## 2021-05-04 ENCOUNTER — TELEPHONE (OUTPATIENT)
Dept: HEMATOLOGY/ONCOLOGY | Facility: CLINIC | Age: 81
End: 2021-05-04

## 2021-05-04 ENCOUNTER — OFFICE VISIT (OUTPATIENT)
Dept: HEMATOLOGY/ONCOLOGY | Facility: CLINIC | Age: 81
End: 2021-05-04
Payer: MEDICARE

## 2021-05-04 ENCOUNTER — INFUSION (OUTPATIENT)
Dept: INFUSION THERAPY | Facility: HOSPITAL | Age: 81
End: 2021-05-04
Attending: INTERNAL MEDICINE
Payer: MEDICARE

## 2021-05-04 VITALS
RESPIRATION RATE: 18 BRPM | HEART RATE: 89 BPM | HEIGHT: 65 IN | SYSTOLIC BLOOD PRESSURE: 135 MMHG | BODY MASS INDEX: 27.05 KG/M2 | OXYGEN SATURATION: 99 % | WEIGHT: 162.38 LBS | DIASTOLIC BLOOD PRESSURE: 79 MMHG

## 2021-05-04 VITALS
SYSTOLIC BLOOD PRESSURE: 135 MMHG | TEMPERATURE: 98 F | HEART RATE: 89 BPM | RESPIRATION RATE: 16 BRPM | DIASTOLIC BLOOD PRESSURE: 79 MMHG

## 2021-05-04 DIAGNOSIS — M81.0 OSTEOPOROSIS, SENILE: Primary | ICD-10-CM

## 2021-05-04 DIAGNOSIS — C50.012 MALIGNANT NEOPLASM INVOLVING BOTH NIPPLE AND AREOLA OF LEFT BREAST IN FEMALE, UNSPECIFIED ESTROGEN RECEPTOR STATUS: ICD-10-CM

## 2021-05-04 DIAGNOSIS — C50.011 MALIGNANT NEOPLASM OF NIPPLE OF RIGHT BREAST IN FEMALE, UNSPECIFIED ESTROGEN RECEPTOR STATUS: ICD-10-CM

## 2021-05-04 DIAGNOSIS — I12.9 BENIGN HYPERTENSION WITH CKD (CHRONIC KIDNEY DISEASE) STAGE III: Primary | ICD-10-CM

## 2021-05-04 DIAGNOSIS — C50.012 MALIGNANT NEOPLASM OF NIPPLE OF LEFT BREAST IN FEMALE, UNSPECIFIED ESTROGEN RECEPTOR STATUS: ICD-10-CM

## 2021-05-04 DIAGNOSIS — N18.30 BENIGN HYPERTENSION WITH CKD (CHRONIC KIDNEY DISEASE) STAGE III: Primary | ICD-10-CM

## 2021-05-04 DIAGNOSIS — E78.5 HYPERLIPIDEMIA LDL GOAL <130: ICD-10-CM

## 2021-05-04 PROCEDURE — 3078F DIAST BP <80 MM HG: CPT | Mod: CPTII,S$GLB,, | Performed by: INTERNAL MEDICINE

## 2021-05-04 PROCEDURE — 1125F AMNT PAIN NOTED PAIN PRSNT: CPT | Mod: S$GLB,,, | Performed by: INTERNAL MEDICINE

## 2021-05-04 PROCEDURE — 99214 OFFICE O/P EST MOD 30 MIN: CPT | Mod: S$GLB,,, | Performed by: INTERNAL MEDICINE

## 2021-05-04 PROCEDURE — 3288F PR FALLS RISK ASSESSMENT DOCUMENTED: ICD-10-PCS | Mod: CPTII,S$GLB,, | Performed by: INTERNAL MEDICINE

## 2021-05-04 PROCEDURE — 96372 THER/PROPH/DIAG INJ SC/IM: CPT | Mod: PN

## 2021-05-04 PROCEDURE — 1125F PR PAIN SEVERITY QUANTIFIED, PAIN PRESENT: ICD-10-PCS | Mod: S$GLB,,, | Performed by: INTERNAL MEDICINE

## 2021-05-04 PROCEDURE — 99999 PR PBB SHADOW E&M-EST. PATIENT-LVL III: ICD-10-PCS | Mod: PBBFAC,,, | Performed by: INTERNAL MEDICINE

## 2021-05-04 PROCEDURE — 1101F PR PT FALLS ASSESS DOC 0-1 FALLS W/OUT INJ PAST YR: ICD-10-PCS | Mod: CPTII,S$GLB,, | Performed by: INTERNAL MEDICINE

## 2021-05-04 PROCEDURE — 1159F PR MEDICATION LIST DOCUMENTED IN MEDICAL RECORD: ICD-10-PCS | Mod: S$GLB,,, | Performed by: INTERNAL MEDICINE

## 2021-05-04 PROCEDURE — 3075F SYST BP GE 130 - 139MM HG: CPT | Mod: CPTII,S$GLB,, | Performed by: INTERNAL MEDICINE

## 2021-05-04 PROCEDURE — 1101F PT FALLS ASSESS-DOCD LE1/YR: CPT | Mod: CPTII,S$GLB,, | Performed by: INTERNAL MEDICINE

## 2021-05-04 PROCEDURE — 3288F FALL RISK ASSESSMENT DOCD: CPT | Mod: CPTII,S$GLB,, | Performed by: INTERNAL MEDICINE

## 2021-05-04 PROCEDURE — 99999 PR PBB SHADOW E&M-EST. PATIENT-LVL III: CPT | Mod: PBBFAC,,, | Performed by: INTERNAL MEDICINE

## 2021-05-04 PROCEDURE — 99214 PR OFFICE/OUTPT VISIT, EST, LEVL IV, 30-39 MIN: ICD-10-PCS | Mod: S$GLB,,, | Performed by: INTERNAL MEDICINE

## 2021-05-04 PROCEDURE — 1159F MED LIST DOCD IN RCRD: CPT | Mod: S$GLB,,, | Performed by: INTERNAL MEDICINE

## 2021-05-04 PROCEDURE — 3078F PR MOST RECENT DIASTOLIC BLOOD PRESSURE < 80 MM HG: ICD-10-PCS | Mod: CPTII,S$GLB,, | Performed by: INTERNAL MEDICINE

## 2021-05-04 PROCEDURE — 3075F PR MOST RECENT SYSTOLIC BLOOD PRESS GE 130-139MM HG: ICD-10-PCS | Mod: CPTII,S$GLB,, | Performed by: INTERNAL MEDICINE

## 2021-05-04 PROCEDURE — 63600175 PHARM REV CODE 636 W HCPCS: Mod: JG,PN | Performed by: INTERNAL MEDICINE

## 2021-05-04 RX ORDER — ANASTROZOLE 1 MG/1
1 TABLET ORAL DAILY
Qty: 90 TABLET | Refills: 3 | Status: SHIPPED | OUTPATIENT
Start: 2021-05-04 | End: 2021-05-04 | Stop reason: SDUPTHER

## 2021-05-04 RX ORDER — ANASTROZOLE 1 MG/1
1 TABLET ORAL DAILY
Qty: 90 TABLET | Refills: 3 | Status: SHIPPED | OUTPATIENT
Start: 2021-05-04 | End: 2022-05-09 | Stop reason: SDUPTHER

## 2021-05-04 RX ADMIN — DENOSUMAB 60 MG: 60 INJECTION SUBCUTANEOUS at 02:05

## 2021-05-05 ENCOUNTER — TELEPHONE (OUTPATIENT)
Dept: HEMATOLOGY/ONCOLOGY | Facility: CLINIC | Age: 81
End: 2021-05-05

## 2021-05-05 DIAGNOSIS — C50.011 MALIGNANT NEOPLASM OF NIPPLE OF RIGHT BREAST IN FEMALE, UNSPECIFIED ESTROGEN RECEPTOR STATUS: ICD-10-CM

## 2021-05-05 DIAGNOSIS — I12.9 BENIGN HYPERTENSION WITH CKD (CHRONIC KIDNEY DISEASE) STAGE III: Primary | ICD-10-CM

## 2021-05-05 DIAGNOSIS — N18.30 BENIGN HYPERTENSION WITH CKD (CHRONIC KIDNEY DISEASE) STAGE III: Primary | ICD-10-CM

## 2021-05-07 ENCOUNTER — TELEPHONE (OUTPATIENT)
Dept: HEMATOLOGY/ONCOLOGY | Facility: CLINIC | Age: 81
End: 2021-05-07

## 2021-05-07 DIAGNOSIS — I12.9 BENIGN HYPERTENSION WITH CKD (CHRONIC KIDNEY DISEASE) STAGE III: Primary | ICD-10-CM

## 2021-05-07 DIAGNOSIS — M81.0 OSTEOPOROSIS, SENILE: ICD-10-CM

## 2021-05-07 DIAGNOSIS — N18.30 BENIGN HYPERTENSION WITH CKD (CHRONIC KIDNEY DISEASE) STAGE III: Primary | ICD-10-CM

## 2021-05-13 ENCOUNTER — LAB VISIT (OUTPATIENT)
Dept: LAB | Facility: HOSPITAL | Age: 81
End: 2021-05-13
Attending: INTERNAL MEDICINE
Payer: MEDICARE

## 2021-05-13 DIAGNOSIS — E78.5 DYSLIPIDEMIA: ICD-10-CM

## 2021-05-13 LAB
CHOLEST SERPL-MCNC: 164 MG/DL (ref 120–199)
CHOLEST/HDLC SERPL: 2.1 {RATIO} (ref 2–5)
HDLC SERPL-MCNC: 79 MG/DL (ref 40–75)
HDLC SERPL: 48.2 % (ref 20–50)
LDLC SERPL CALC-MCNC: 65.4 MG/DL (ref 63–159)
NONHDLC SERPL-MCNC: 85 MG/DL
TRIGL SERPL-MCNC: 98 MG/DL (ref 30–150)

## 2021-05-13 PROCEDURE — 36415 COLL VENOUS BLD VENIPUNCTURE: CPT | Performed by: INTERNAL MEDICINE

## 2021-05-13 PROCEDURE — 80061 LIPID PANEL: CPT | Performed by: INTERNAL MEDICINE

## 2021-05-20 ENCOUNTER — OFFICE VISIT (OUTPATIENT)
Dept: FAMILY MEDICINE | Facility: CLINIC | Age: 81
End: 2021-05-20
Payer: MEDICARE

## 2021-05-20 VITALS
BODY MASS INDEX: 27.89 KG/M2 | SYSTOLIC BLOOD PRESSURE: 132 MMHG | HEIGHT: 64 IN | DIASTOLIC BLOOD PRESSURE: 74 MMHG | WEIGHT: 163.38 LBS

## 2021-05-20 DIAGNOSIS — G89.29 OTHER CHRONIC PAIN: ICD-10-CM

## 2021-05-20 DIAGNOSIS — K21.9 GASTROESOPHAGEAL REFLUX DISEASE, UNSPECIFIED WHETHER ESOPHAGITIS PRESENT: ICD-10-CM

## 2021-05-20 DIAGNOSIS — E78.5 DYSLIPIDEMIA: Primary | ICD-10-CM

## 2021-05-20 DIAGNOSIS — G47.00 INSOMNIA, UNSPECIFIED TYPE: ICD-10-CM

## 2021-05-20 PROCEDURE — 99499 RISK ADDL DX/OHS AUDIT: ICD-10-PCS | Mod: S$GLB,,, | Performed by: INTERNAL MEDICINE

## 2021-05-20 PROCEDURE — 99999 PR PBB SHADOW E&M-EST. PATIENT-LVL IV: ICD-10-PCS | Mod: PBBFAC,,, | Performed by: INTERNAL MEDICINE

## 2021-05-20 PROCEDURE — 99214 PR OFFICE/OUTPT VISIT, EST, LEVL IV, 30-39 MIN: ICD-10-PCS | Mod: S$GLB,,, | Performed by: INTERNAL MEDICINE

## 2021-05-20 PROCEDURE — 1101F PT FALLS ASSESS-DOCD LE1/YR: CPT | Mod: CPTII,S$GLB,, | Performed by: INTERNAL MEDICINE

## 2021-05-20 PROCEDURE — 99214 OFFICE O/P EST MOD 30 MIN: CPT | Mod: S$GLB,,, | Performed by: INTERNAL MEDICINE

## 2021-05-20 PROCEDURE — 99499 UNLISTED E&M SERVICE: CPT | Mod: S$GLB,,, | Performed by: INTERNAL MEDICINE

## 2021-05-20 PROCEDURE — 1159F PR MEDICATION LIST DOCUMENTED IN MEDICAL RECORD: ICD-10-PCS | Mod: S$GLB,,, | Performed by: INTERNAL MEDICINE

## 2021-05-20 PROCEDURE — 1159F MED LIST DOCD IN RCRD: CPT | Mod: S$GLB,,, | Performed by: INTERNAL MEDICINE

## 2021-05-20 PROCEDURE — 99999 PR PBB SHADOW E&M-EST. PATIENT-LVL IV: CPT | Mod: PBBFAC,,, | Performed by: INTERNAL MEDICINE

## 2021-05-20 PROCEDURE — 3288F PR FALLS RISK ASSESSMENT DOCUMENTED: ICD-10-PCS | Mod: CPTII,S$GLB,, | Performed by: INTERNAL MEDICINE

## 2021-05-20 PROCEDURE — 1101F PR PT FALLS ASSESS DOC 0-1 FALLS W/OUT INJ PAST YR: ICD-10-PCS | Mod: CPTII,S$GLB,, | Performed by: INTERNAL MEDICINE

## 2021-05-20 PROCEDURE — 1125F AMNT PAIN NOTED PAIN PRSNT: CPT | Mod: S$GLB,,, | Performed by: INTERNAL MEDICINE

## 2021-05-20 PROCEDURE — 3288F FALL RISK ASSESSMENT DOCD: CPT | Mod: CPTII,S$GLB,, | Performed by: INTERNAL MEDICINE

## 2021-05-20 PROCEDURE — 1125F PR PAIN SEVERITY QUANTIFIED, PAIN PRESENT: ICD-10-PCS | Mod: S$GLB,,, | Performed by: INTERNAL MEDICINE

## 2021-05-20 RX ORDER — SIMVASTATIN 20 MG/1
20 TABLET, FILM COATED ORAL NIGHTLY
Qty: 90 TABLET | Refills: 3 | Status: SHIPPED | OUTPATIENT
Start: 2021-05-20 | End: 2022-05-25 | Stop reason: SDUPTHER

## 2021-05-20 RX ORDER — OMEPRAZOLE 20 MG/1
CAPSULE, DELAYED RELEASE ORAL
Qty: 90 CAPSULE | Refills: 3 | Status: SHIPPED | OUTPATIENT
Start: 2021-05-20 | End: 2021-07-19 | Stop reason: SDUPTHER

## 2021-05-20 RX ORDER — TRAMADOL HYDROCHLORIDE 50 MG/1
TABLET ORAL
Qty: 60 TABLET | Refills: 5 | Status: SHIPPED | OUTPATIENT
Start: 2021-05-20 | End: 2021-09-08 | Stop reason: SDUPTHER

## 2021-05-20 RX ORDER — TRAZODONE HYDROCHLORIDE 100 MG/1
100 TABLET ORAL NIGHTLY
Qty: 90 TABLET | Refills: 3 | Status: SHIPPED | OUTPATIENT
Start: 2021-05-20 | End: 2022-05-25 | Stop reason: SDUPTHER

## 2021-05-20 RX ORDER — SIMVASTATIN 20 MG/1
20 TABLET, FILM COATED ORAL NIGHTLY
Qty: 90 TABLET | Refills: 3 | Status: SHIPPED | OUTPATIENT
Start: 2021-05-20 | End: 2021-05-20 | Stop reason: SDUPTHER

## 2021-05-25 ENCOUNTER — OFFICE VISIT (OUTPATIENT)
Dept: HOME HEALTH SERVICES | Facility: CLINIC | Age: 81
End: 2021-05-25
Payer: MEDICARE

## 2021-05-25 VITALS
HEIGHT: 65 IN | BODY MASS INDEX: 27.16 KG/M2 | HEART RATE: 94 BPM | SYSTOLIC BLOOD PRESSURE: 129 MMHG | TEMPERATURE: 98 F | WEIGHT: 163 LBS | DIASTOLIC BLOOD PRESSURE: 82 MMHG | OXYGEN SATURATION: 98 %

## 2021-05-25 DIAGNOSIS — R91.8 PULMONARY NODULES: ICD-10-CM

## 2021-05-25 DIAGNOSIS — G62.0 CHEMOTHERAPY-INDUCED NEUROPATHY: ICD-10-CM

## 2021-05-25 DIAGNOSIS — G47.00 INSOMNIA, UNSPECIFIED TYPE: ICD-10-CM

## 2021-05-25 DIAGNOSIS — I12.9 BENIGN HYPERTENSION WITH CKD (CHRONIC KIDNEY DISEASE) STAGE III: ICD-10-CM

## 2021-05-25 DIAGNOSIS — K21.9 GASTROESOPHAGEAL REFLUX DISEASE WITHOUT ESOPHAGITIS: ICD-10-CM

## 2021-05-25 DIAGNOSIS — M46.99 INFLAMMATORY SPONDYLOPATHY OF MULTIPLE SITES IN SPINE: ICD-10-CM

## 2021-05-25 DIAGNOSIS — C50.012 MALIGNANT NEOPLASM INVOLVING BOTH NIPPLE AND AREOLA OF LEFT BREAST IN FEMALE, UNSPECIFIED ESTROGEN RECEPTOR STATUS: ICD-10-CM

## 2021-05-25 DIAGNOSIS — M81.0 OSTEOPOROSIS, SENILE: ICD-10-CM

## 2021-05-25 DIAGNOSIS — E78.5 HYPERLIPIDEMIA LDL GOAL <130: ICD-10-CM

## 2021-05-25 DIAGNOSIS — Z00.00 ENCOUNTER FOR PREVENTIVE HEALTH EXAMINATION: Primary | ICD-10-CM

## 2021-05-25 DIAGNOSIS — N18.30 BENIGN HYPERTENSION WITH CKD (CHRONIC KIDNEY DISEASE) STAGE III: ICD-10-CM

## 2021-05-25 DIAGNOSIS — I10 ESSENTIAL HYPERTENSION: ICD-10-CM

## 2021-05-25 DIAGNOSIS — I70.0 AORTIC ATHEROSCLEROSIS: ICD-10-CM

## 2021-05-25 DIAGNOSIS — T45.1X5A CHEMOTHERAPY-INDUCED NEUROPATHY: ICD-10-CM

## 2021-05-25 PROBLEM — D69.2 SENILE PURPURA: Status: RESOLVED | Noted: 2020-01-06 | Resolved: 2021-05-25

## 2021-05-25 PROCEDURE — 99499 RISK ADDL DX/OHS AUDIT: ICD-10-PCS | Mod: S$GLB,,, | Performed by: NURSE PRACTITIONER

## 2021-05-25 PROCEDURE — 1101F PR PT FALLS ASSESS DOC 0-1 FALLS W/OUT INJ PAST YR: ICD-10-PCS | Mod: CPTII,S$GLB,, | Performed by: NURSE PRACTITIONER

## 2021-05-25 PROCEDURE — 3288F PR FALLS RISK ASSESSMENT DOCUMENTED: ICD-10-PCS | Mod: CPTII,S$GLB,, | Performed by: NURSE PRACTITIONER

## 2021-05-25 PROCEDURE — 1158F ADVNC CARE PLAN TLK DOCD: CPT | Mod: S$GLB,,, | Performed by: NURSE PRACTITIONER

## 2021-05-25 PROCEDURE — 1158F PR ADVANCE CARE PLANNING DISCUSS DOCUMENTED IN MEDICAL RECORD: ICD-10-PCS | Mod: S$GLB,,, | Performed by: NURSE PRACTITIONER

## 2021-05-25 PROCEDURE — 3288F FALL RISK ASSESSMENT DOCD: CPT | Mod: CPTII,S$GLB,, | Performed by: NURSE PRACTITIONER

## 2021-05-25 PROCEDURE — G0439 PR MEDICARE ANNUAL WELLNESS SUBSEQUENT VISIT: ICD-10-PCS | Mod: S$GLB,,, | Performed by: NURSE PRACTITIONER

## 2021-05-25 PROCEDURE — 1101F PT FALLS ASSESS-DOCD LE1/YR: CPT | Mod: CPTII,S$GLB,, | Performed by: NURSE PRACTITIONER

## 2021-05-25 PROCEDURE — G0439 PPPS, SUBSEQ VISIT: HCPCS | Mod: S$GLB,,, | Performed by: NURSE PRACTITIONER

## 2021-05-25 PROCEDURE — 99499 UNLISTED E&M SERVICE: CPT | Mod: S$GLB,,, | Performed by: NURSE PRACTITIONER

## 2021-06-23 ENCOUNTER — HOSPITAL ENCOUNTER (OUTPATIENT)
Dept: RADIOLOGY | Facility: HOSPITAL | Age: 81
Discharge: HOME | End: 2021-06-23
Attending: INTERNAL MEDICINE
Payer: MEDICARE

## 2021-06-23 DIAGNOSIS — N18.30 BENIGN HYPERTENSION WITH CKD (CHRONIC KIDNEY DISEASE) STAGE III: ICD-10-CM

## 2021-06-23 DIAGNOSIS — M81.0 OSTEOPOROSIS, SENILE: ICD-10-CM

## 2021-06-23 DIAGNOSIS — I12.9 BENIGN HYPERTENSION WITH CKD (CHRONIC KIDNEY DISEASE) STAGE III: ICD-10-CM

## 2021-06-23 PROCEDURE — 77080 DEXA BONE DENSITY SPINE HIP: ICD-10-PCS | Mod: 26,,, | Performed by: RADIOLOGY

## 2021-06-23 PROCEDURE — 77080 DXA BONE DENSITY AXIAL: CPT | Mod: TC

## 2021-06-23 PROCEDURE — 77080 DXA BONE DENSITY AXIAL: CPT | Mod: 26,,, | Performed by: RADIOLOGY

## 2021-07-09 NOTE — Clinical Note
Lucila Banuelos is a 1year old male who was brought in for this visit.   History was provided by the parent  HPI:   Patient presents with:  Nasal Congestion: w/ watery eyes x 4-5 days, no fever  sleeping and drinking ok  No current outpatient medications Please fax MRI order for MRI with contrast to [facility] where she had her prior MRI.   She will need a serum creatinine prior to the MRI (order entered).  Please arrange for a follow up in clinic with me after the MRI is complete.  She will need to bring the images with her.

## 2021-07-19 DIAGNOSIS — K21.9 GASTROESOPHAGEAL REFLUX DISEASE, UNSPECIFIED WHETHER ESOPHAGITIS PRESENT: ICD-10-CM

## 2021-07-20 ENCOUNTER — DOCUMENTATION ONLY (OUTPATIENT)
Dept: FAMILY MEDICINE | Facility: CLINIC | Age: 81
End: 2021-07-20

## 2021-07-21 RX ORDER — OMEPRAZOLE 40 MG/1
40 CAPSULE, DELAYED RELEASE ORAL DAILY
Qty: 90 CAPSULE | Refills: 3 | Status: SHIPPED | OUTPATIENT
Start: 2021-07-21 | End: 2021-07-23 | Stop reason: SDUPTHER

## 2021-07-23 DIAGNOSIS — K21.9 GASTROESOPHAGEAL REFLUX DISEASE, UNSPECIFIED WHETHER ESOPHAGITIS PRESENT: ICD-10-CM

## 2021-07-24 RX ORDER — OMEPRAZOLE 40 MG/1
40 CAPSULE, DELAYED RELEASE ORAL DAILY
Qty: 90 CAPSULE | Refills: 3 | OUTPATIENT
Start: 2021-07-24 | End: 2021-07-27 | Stop reason: SDUPTHER

## 2021-07-26 ENCOUNTER — LAB VISIT (OUTPATIENT)
Dept: LAB | Facility: HOSPITAL | Age: 81
End: 2021-07-26
Attending: INTERNAL MEDICINE
Payer: MEDICARE

## 2021-07-26 DIAGNOSIS — I12.9 BENIGN HYPERTENSION WITH CKD (CHRONIC KIDNEY DISEASE) STAGE III: ICD-10-CM

## 2021-07-26 DIAGNOSIS — N18.30 BENIGN HYPERTENSION WITH CKD (CHRONIC KIDNEY DISEASE) STAGE III: ICD-10-CM

## 2021-07-26 DIAGNOSIS — C50.011 MALIGNANT NEOPLASM OF NIPPLE OF RIGHT BREAST IN FEMALE, UNSPECIFIED ESTROGEN RECEPTOR STATUS: ICD-10-CM

## 2021-07-26 LAB
ALBUMIN SERPL BCP-MCNC: 4.1 G/DL (ref 3.5–5.2)
ALP SERPL-CCNC: 68 U/L (ref 55–135)
ALT SERPL W/O P-5'-P-CCNC: 18 U/L (ref 10–44)
ANION GAP SERPL CALC-SCNC: 10 MMOL/L (ref 8–16)
AST SERPL-CCNC: 30 U/L (ref 10–40)
BASOPHILS # BLD AUTO: 0.06 K/UL (ref 0–0.2)
BASOPHILS NFR BLD: 0.9 % (ref 0–1.9)
BILIRUB SERPL-MCNC: 0.5 MG/DL (ref 0.1–1)
BUN SERPL-MCNC: 13 MG/DL (ref 8–23)
CALCIUM SERPL-MCNC: 10.1 MG/DL (ref 8.7–10.5)
CHLORIDE SERPL-SCNC: 96 MMOL/L (ref 95–110)
CO2 SERPL-SCNC: 29 MMOL/L (ref 23–29)
CREAT SERPL-MCNC: 1 MG/DL (ref 0.5–1.4)
DIFFERENTIAL METHOD: ABNORMAL
EOSINOPHIL # BLD AUTO: 0.1 K/UL (ref 0–0.5)
EOSINOPHIL NFR BLD: 1.9 % (ref 0–8)
ERYTHROCYTE [DISTWIDTH] IN BLOOD BY AUTOMATED COUNT: 14.3 % (ref 11.5–14.5)
EST. GFR  (AFRICAN AMERICAN): >60 ML/MIN/1.73 M^2
EST. GFR  (NON AFRICAN AMERICAN): 53 ML/MIN/1.73 M^2
GLUCOSE SERPL-MCNC: 90 MG/DL (ref 70–110)
HCT VFR BLD AUTO: 40.7 % (ref 37–48.5)
HGB BLD-MCNC: 13.3 G/DL (ref 12–16)
IMM GRANULOCYTES # BLD AUTO: 0.03 K/UL (ref 0–0.04)
IMM GRANULOCYTES NFR BLD AUTO: 0.5 % (ref 0–0.5)
LYMPHOCYTES # BLD AUTO: 0.7 K/UL (ref 1–4.8)
LYMPHOCYTES NFR BLD: 10.9 % (ref 18–48)
MCH RBC QN AUTO: 30 PG (ref 27–31)
MCHC RBC AUTO-ENTMCNC: 32.7 G/DL (ref 32–36)
MCV RBC AUTO: 92 FL (ref 82–98)
MONOCYTES # BLD AUTO: 0.5 K/UL (ref 0.3–1)
MONOCYTES NFR BLD: 7.1 % (ref 4–15)
NEUTROPHILS # BLD AUTO: 5 K/UL (ref 1.8–7.7)
NEUTROPHILS NFR BLD: 78.7 % (ref 38–73)
NRBC BLD-RTO: 0 /100 WBC
PLATELET # BLD AUTO: 334 K/UL (ref 150–450)
PMV BLD AUTO: 9.9 FL (ref 9.2–12.9)
POTASSIUM SERPL-SCNC: 4.8 MMOL/L (ref 3.5–5.1)
PROT SERPL-MCNC: 7 G/DL (ref 6–8.4)
RBC # BLD AUTO: 4.43 M/UL (ref 4–5.4)
SODIUM SERPL-SCNC: 135 MMOL/L (ref 136–145)
WBC # BLD AUTO: 6.32 K/UL (ref 3.9–12.7)

## 2021-07-26 PROCEDURE — 80053 COMPREHEN METABOLIC PANEL: CPT | Performed by: INTERNAL MEDICINE

## 2021-07-26 PROCEDURE — 86300 IMMUNOASSAY TUMOR CA 15-3: CPT | Performed by: INTERNAL MEDICINE

## 2021-07-26 PROCEDURE — 36415 COLL VENOUS BLD VENIPUNCTURE: CPT | Performed by: INTERNAL MEDICINE

## 2021-07-26 PROCEDURE — 85025 COMPLETE CBC W/AUTO DIFF WBC: CPT | Performed by: INTERNAL MEDICINE

## 2021-07-27 DIAGNOSIS — K21.9 GASTROESOPHAGEAL REFLUX DISEASE, UNSPECIFIED WHETHER ESOPHAGITIS PRESENT: ICD-10-CM

## 2021-07-27 RX ORDER — OMEPRAZOLE 40 MG/1
40 CAPSULE, DELAYED RELEASE ORAL EVERY MORNING
Qty: 90 CAPSULE | Refills: 3 | Status: SHIPPED | OUTPATIENT
Start: 2021-07-27 | End: 2023-03-14

## 2021-07-28 LAB — CANCER AG27-29 SERPL-ACNC: 55.6 U/ML

## 2021-08-02 ENCOUNTER — OFFICE VISIT (OUTPATIENT)
Dept: HEMATOLOGY/ONCOLOGY | Facility: CLINIC | Age: 81
End: 2021-08-02
Payer: MEDICARE

## 2021-08-02 VITALS
OXYGEN SATURATION: 99 % | SYSTOLIC BLOOD PRESSURE: 142 MMHG | WEIGHT: 164.25 LBS | RESPIRATION RATE: 19 BRPM | TEMPERATURE: 97 F | HEIGHT: 64 IN | HEART RATE: 81 BPM | DIASTOLIC BLOOD PRESSURE: 78 MMHG | BODY MASS INDEX: 28.04 KG/M2

## 2021-08-02 DIAGNOSIS — N18.30 BENIGN HYPERTENSION WITH CKD (CHRONIC KIDNEY DISEASE) STAGE III: Primary | ICD-10-CM

## 2021-08-02 DIAGNOSIS — M81.0 OSTEOPOROSIS, SENILE: ICD-10-CM

## 2021-08-02 DIAGNOSIS — G89.29 CHRONIC BILATERAL LOW BACK PAIN WITH LEFT-SIDED SCIATICA: ICD-10-CM

## 2021-08-02 DIAGNOSIS — I12.9 BENIGN HYPERTENSION WITH CKD (CHRONIC KIDNEY DISEASE) STAGE III: Primary | ICD-10-CM

## 2021-08-02 DIAGNOSIS — E78.5 HYPERLIPIDEMIA LDL GOAL <130: ICD-10-CM

## 2021-08-02 DIAGNOSIS — M51.36 DDD (DEGENERATIVE DISC DISEASE), LUMBAR: ICD-10-CM

## 2021-08-02 DIAGNOSIS — C50.012 MALIGNANT NEOPLASM OF NIPPLE OF LEFT BREAST IN FEMALE, UNSPECIFIED ESTROGEN RECEPTOR STATUS: ICD-10-CM

## 2021-08-02 DIAGNOSIS — M54.42 CHRONIC BILATERAL LOW BACK PAIN WITH LEFT-SIDED SCIATICA: ICD-10-CM

## 2021-08-02 DIAGNOSIS — N18.30 STAGE 3 CHRONIC KIDNEY DISEASE, UNSPECIFIED WHETHER STAGE 3A OR 3B CKD: ICD-10-CM

## 2021-08-02 DIAGNOSIS — C50.012 MALIGNANT NEOPLASM INVOLVING BOTH NIPPLE AND AREOLA OF LEFT BREAST IN FEMALE, UNSPECIFIED ESTROGEN RECEPTOR STATUS: ICD-10-CM

## 2021-08-02 PROCEDURE — 1125F PR PAIN SEVERITY QUANTIFIED, PAIN PRESENT: ICD-10-PCS | Mod: CPTII,S$GLB,, | Performed by: INTERNAL MEDICINE

## 2021-08-02 PROCEDURE — 1125F AMNT PAIN NOTED PAIN PRSNT: CPT | Mod: CPTII,S$GLB,, | Performed by: INTERNAL MEDICINE

## 2021-08-02 PROCEDURE — 3288F FALL RISK ASSESSMENT DOCD: CPT | Mod: CPTII,S$GLB,, | Performed by: INTERNAL MEDICINE

## 2021-08-02 PROCEDURE — 1159F MED LIST DOCD IN RCRD: CPT | Mod: CPTII,S$GLB,, | Performed by: INTERNAL MEDICINE

## 2021-08-02 PROCEDURE — 1159F PR MEDICATION LIST DOCUMENTED IN MEDICAL RECORD: ICD-10-PCS | Mod: CPTII,S$GLB,, | Performed by: INTERNAL MEDICINE

## 2021-08-02 PROCEDURE — 3077F SYST BP >= 140 MM HG: CPT | Mod: CPTII,S$GLB,, | Performed by: INTERNAL MEDICINE

## 2021-08-02 PROCEDURE — 3288F PR FALLS RISK ASSESSMENT DOCUMENTED: ICD-10-PCS | Mod: CPTII,S$GLB,, | Performed by: INTERNAL MEDICINE

## 2021-08-02 PROCEDURE — 1101F PT FALLS ASSESS-DOCD LE1/YR: CPT | Mod: CPTII,S$GLB,, | Performed by: INTERNAL MEDICINE

## 2021-08-02 PROCEDURE — 3078F DIAST BP <80 MM HG: CPT | Mod: CPTII,S$GLB,, | Performed by: INTERNAL MEDICINE

## 2021-08-02 PROCEDURE — 99999 PR PBB SHADOW E&M-EST. PATIENT-LVL IV: ICD-10-PCS | Mod: PBBFAC,,, | Performed by: INTERNAL MEDICINE

## 2021-08-02 PROCEDURE — 99499 UNLISTED E&M SERVICE: CPT | Mod: HCNC,S$GLB,, | Performed by: INTERNAL MEDICINE

## 2021-08-02 PROCEDURE — 1101F PR PT FALLS ASSESS DOC 0-1 FALLS W/OUT INJ PAST YR: ICD-10-PCS | Mod: CPTII,S$GLB,, | Performed by: INTERNAL MEDICINE

## 2021-08-02 PROCEDURE — 99499 RISK ADDL DX/OHS AUDIT: ICD-10-PCS | Mod: HCNC,S$GLB,, | Performed by: INTERNAL MEDICINE

## 2021-08-02 PROCEDURE — 99214 OFFICE O/P EST MOD 30 MIN: CPT | Mod: S$GLB,,, | Performed by: INTERNAL MEDICINE

## 2021-08-02 PROCEDURE — 3077F PR MOST RECENT SYSTOLIC BLOOD PRESSURE >= 140 MM HG: ICD-10-PCS | Mod: CPTII,S$GLB,, | Performed by: INTERNAL MEDICINE

## 2021-08-02 PROCEDURE — 99999 PR PBB SHADOW E&M-EST. PATIENT-LVL IV: CPT | Mod: PBBFAC,,, | Performed by: INTERNAL MEDICINE

## 2021-08-02 PROCEDURE — 3078F PR MOST RECENT DIASTOLIC BLOOD PRESSURE < 80 MM HG: ICD-10-PCS | Mod: CPTII,S$GLB,, | Performed by: INTERNAL MEDICINE

## 2021-08-02 PROCEDURE — 99214 PR OFFICE/OUTPT VISIT, EST, LEVL IV, 30-39 MIN: ICD-10-PCS | Mod: S$GLB,,, | Performed by: INTERNAL MEDICINE

## 2021-08-06 ENCOUNTER — HOSPITAL ENCOUNTER (OUTPATIENT)
Dept: RADIOLOGY | Facility: HOSPITAL | Age: 81
Discharge: HOME | End: 2021-08-06
Attending: INTERNAL MEDICINE
Payer: MEDICARE

## 2021-08-06 DIAGNOSIS — G89.29 CHRONIC BILATERAL LOW BACK PAIN WITH LEFT-SIDED SCIATICA: ICD-10-CM

## 2021-08-06 DIAGNOSIS — E78.5 HYPERLIPIDEMIA LDL GOAL <130: ICD-10-CM

## 2021-08-06 DIAGNOSIS — C50.012 MALIGNANT NEOPLASM OF NIPPLE OF LEFT BREAST IN FEMALE, UNSPECIFIED ESTROGEN RECEPTOR STATUS: ICD-10-CM

## 2021-08-06 DIAGNOSIS — M81.0 OSTEOPOROSIS, SENILE: ICD-10-CM

## 2021-08-06 DIAGNOSIS — N18.30 STAGE 3 CHRONIC KIDNEY DISEASE, UNSPECIFIED WHETHER STAGE 3A OR 3B CKD: ICD-10-CM

## 2021-08-06 DIAGNOSIS — C50.012 MALIGNANT NEOPLASM INVOLVING BOTH NIPPLE AND AREOLA OF LEFT BREAST IN FEMALE, UNSPECIFIED ESTROGEN RECEPTOR STATUS: ICD-10-CM

## 2021-08-06 DIAGNOSIS — M54.42 CHRONIC BILATERAL LOW BACK PAIN WITH LEFT-SIDED SCIATICA: ICD-10-CM

## 2021-08-06 PROCEDURE — 71046 X-RAY EXAM CHEST 2 VIEWS: CPT | Mod: 26,,, | Performed by: RADIOLOGY

## 2021-08-06 PROCEDURE — 71046 X-RAY EXAM CHEST 2 VIEWS: CPT | Mod: TC,FY

## 2021-08-06 PROCEDURE — 71046 XR CHEST PA AND LATERAL: ICD-10-PCS | Mod: 26,,, | Performed by: RADIOLOGY

## 2021-09-08 DIAGNOSIS — G89.29 OTHER CHRONIC PAIN: ICD-10-CM

## 2021-09-08 RX ORDER — TRAMADOL HYDROCHLORIDE 50 MG/1
TABLET ORAL
Qty: 60 TABLET | Refills: 5 | Status: SHIPPED | OUTPATIENT
Start: 2021-09-08 | End: 2022-04-07

## 2021-10-06 ENCOUNTER — HOSPITAL ENCOUNTER (OUTPATIENT)
Dept: RADIOLOGY | Facility: HOSPITAL | Age: 81
Discharge: HOME | End: 2021-10-06
Attending: INTERNAL MEDICINE
Payer: MEDICARE

## 2021-10-06 DIAGNOSIS — Z12.31 ENCOUNTER FOR SCREENING MAMMOGRAM FOR MALIGNANT NEOPLASM OF BREAST: ICD-10-CM

## 2021-10-06 PROCEDURE — 77067 SCR MAMMO BI INCL CAD: CPT | Mod: TC,HCNC,PO

## 2021-10-06 PROCEDURE — 77067 MAMMO DIGITAL SCREENING BILAT WITH TOMO: ICD-10-PCS | Mod: 26,HCNC,, | Performed by: RADIOLOGY

## 2021-10-06 PROCEDURE — 77063 MAMMO DIGITAL SCREENING BILAT WITH TOMO: ICD-10-PCS | Mod: 26,HCNC,, | Performed by: RADIOLOGY

## 2021-10-06 PROCEDURE — 77063 BREAST TOMOSYNTHESIS BI: CPT | Mod: 26,HCNC,, | Performed by: RADIOLOGY

## 2021-10-06 PROCEDURE — 77067 SCR MAMMO BI INCL CAD: CPT | Mod: 26,HCNC,, | Performed by: RADIOLOGY

## 2021-10-20 ENCOUNTER — IMMUNIZATION (OUTPATIENT)
Dept: PRIMARY CARE CLINIC | Facility: CLINIC | Age: 81
End: 2021-10-20
Payer: MEDICARE

## 2021-10-20 DIAGNOSIS — Z23 NEED FOR VACCINATION: Primary | ICD-10-CM

## 2021-10-20 PROCEDURE — 0003A COVID-19, MRNA, LNP-S, PF, 30 MCG/0.3 ML DOSE VACCINE: CPT | Mod: S$GLB,,, | Performed by: FAMILY MEDICINE

## 2021-10-20 PROCEDURE — 0003A COVID-19, MRNA, LNP-S, PF, 30 MCG/0.3 ML DOSE VACCINE: ICD-10-PCS | Mod: S$GLB,,, | Performed by: FAMILY MEDICINE

## 2021-10-20 PROCEDURE — 91300 COVID-19, MRNA, LNP-S, PF, 30 MCG/0.3 ML DOSE VACCINE: CPT | Mod: S$GLB,,, | Performed by: FAMILY MEDICINE

## 2021-10-20 PROCEDURE — 91300 COVID-19, MRNA, LNP-S, PF, 30 MCG/0.3 ML DOSE VACCINE: ICD-10-PCS | Mod: S$GLB,,, | Performed by: FAMILY MEDICINE

## 2021-11-05 ENCOUNTER — LAB VISIT (OUTPATIENT)
Dept: LAB | Facility: HOSPITAL | Age: 81
End: 2021-11-05
Attending: INTERNAL MEDICINE
Payer: MEDICARE

## 2021-11-05 DIAGNOSIS — E78.5 HYPERLIPIDEMIA LDL GOAL <130: ICD-10-CM

## 2021-11-05 DIAGNOSIS — C50.011 MALIGNANT NEOPLASM OF NIPPLE OF RIGHT BREAST IN FEMALE, UNSPECIFIED ESTROGEN RECEPTOR STATUS: ICD-10-CM

## 2021-11-05 DIAGNOSIS — I12.9 BENIGN HYPERTENSION WITH CKD (CHRONIC KIDNEY DISEASE) STAGE III: ICD-10-CM

## 2021-11-05 DIAGNOSIS — N18.30 BENIGN HYPERTENSION WITH CKD (CHRONIC KIDNEY DISEASE) STAGE III: ICD-10-CM

## 2021-11-05 DIAGNOSIS — C50.012 MALIGNANT NEOPLASM INVOLVING BOTH NIPPLE AND AREOLA OF LEFT BREAST IN FEMALE, UNSPECIFIED ESTROGEN RECEPTOR STATUS: ICD-10-CM

## 2021-11-05 LAB
ALBUMIN SERPL BCP-MCNC: 4 G/DL (ref 3.5–5.2)
ALP SERPL-CCNC: 69 U/L (ref 55–135)
ALT SERPL W/O P-5'-P-CCNC: 20 U/L (ref 10–44)
ANION GAP SERPL CALC-SCNC: 11 MMOL/L (ref 8–16)
AST SERPL-CCNC: 37 U/L (ref 10–40)
BASOPHILS # BLD AUTO: 0.06 K/UL (ref 0–0.2)
BASOPHILS NFR BLD: 1 % (ref 0–1.9)
BILIRUB SERPL-MCNC: 0.6 MG/DL (ref 0.1–1)
BUN SERPL-MCNC: 13 MG/DL (ref 8–23)
CALCIUM SERPL-MCNC: 10 MG/DL (ref 8.7–10.5)
CHLORIDE SERPL-SCNC: 98 MMOL/L (ref 95–110)
CO2 SERPL-SCNC: 25 MMOL/L (ref 23–29)
CREAT SERPL-MCNC: 1.1 MG/DL (ref 0.5–1.4)
DIFFERENTIAL METHOD: ABNORMAL
EOSINOPHIL # BLD AUTO: 0.1 K/UL (ref 0–0.5)
EOSINOPHIL NFR BLD: 1.3 % (ref 0–8)
ERYTHROCYTE [DISTWIDTH] IN BLOOD BY AUTOMATED COUNT: 14 % (ref 11.5–14.5)
EST. GFR  (AFRICAN AMERICAN): 54 ML/MIN/1.73 M^2
EST. GFR  (NON AFRICAN AMERICAN): 47 ML/MIN/1.73 M^2
GLUCOSE SERPL-MCNC: 104 MG/DL (ref 70–110)
HCT VFR BLD AUTO: 40.1 % (ref 37–48.5)
HGB BLD-MCNC: 13.2 G/DL (ref 12–16)
IMM GRANULOCYTES # BLD AUTO: 0.03 K/UL (ref 0–0.04)
IMM GRANULOCYTES NFR BLD AUTO: 0.5 % (ref 0–0.5)
LYMPHOCYTES # BLD AUTO: 0.5 K/UL (ref 1–4.8)
LYMPHOCYTES NFR BLD: 8.7 % (ref 18–48)
MCH RBC QN AUTO: 30.6 PG (ref 27–31)
MCHC RBC AUTO-ENTMCNC: 32.9 G/DL (ref 32–36)
MCV RBC AUTO: 93 FL (ref 82–98)
MONOCYTES # BLD AUTO: 0.4 K/UL (ref 0.3–1)
MONOCYTES NFR BLD: 6.7 % (ref 4–15)
NEUTROPHILS # BLD AUTO: 5 K/UL (ref 1.8–7.7)
NEUTROPHILS NFR BLD: 81.8 % (ref 38–73)
NRBC BLD-RTO: 0 /100 WBC
PLATELET # BLD AUTO: 352 K/UL (ref 150–450)
PMV BLD AUTO: 9.5 FL (ref 9.2–12.9)
POTASSIUM SERPL-SCNC: 4.8 MMOL/L (ref 3.5–5.1)
PROT SERPL-MCNC: 7 G/DL (ref 6–8.4)
RBC # BLD AUTO: 4.31 M/UL (ref 4–5.4)
SODIUM SERPL-SCNC: 134 MMOL/L (ref 136–145)
WBC # BLD AUTO: 6.1 K/UL (ref 3.9–12.7)

## 2021-11-05 PROCEDURE — 85025 COMPLETE CBC W/AUTO DIFF WBC: CPT | Mod: HCNC | Performed by: INTERNAL MEDICINE

## 2021-11-05 PROCEDURE — 86300 IMMUNOASSAY TUMOR CA 15-3: CPT | Mod: HCNC | Performed by: INTERNAL MEDICINE

## 2021-11-05 PROCEDURE — 80053 COMPREHEN METABOLIC PANEL: CPT | Mod: HCNC | Performed by: INTERNAL MEDICINE

## 2021-11-08 ENCOUNTER — INFUSION (OUTPATIENT)
Dept: INFUSION THERAPY | Facility: HOSPITAL | Age: 81
End: 2021-11-08
Attending: INTERNAL MEDICINE
Payer: MEDICARE

## 2021-11-08 ENCOUNTER — OFFICE VISIT (OUTPATIENT)
Dept: HEMATOLOGY/ONCOLOGY | Facility: CLINIC | Age: 81
End: 2021-11-08
Payer: MEDICARE

## 2021-11-08 VITALS — BODY MASS INDEX: 28.18 KG/M2 | WEIGHT: 164.19 LBS

## 2021-11-08 VITALS
TEMPERATURE: 97 F | BODY MASS INDEX: 28.04 KG/M2 | SYSTOLIC BLOOD PRESSURE: 164 MMHG | WEIGHT: 164.25 LBS | OXYGEN SATURATION: 99 % | HEART RATE: 83 BPM | RESPIRATION RATE: 19 BRPM | DIASTOLIC BLOOD PRESSURE: 68 MMHG | HEIGHT: 64 IN

## 2021-11-08 DIAGNOSIS — C50.012 MALIGNANT NEOPLASM OF NIPPLE OF LEFT BREAST IN FEMALE, UNSPECIFIED ESTROGEN RECEPTOR STATUS: ICD-10-CM

## 2021-11-08 DIAGNOSIS — M81.0 OSTEOPOROSIS, SENILE: Primary | ICD-10-CM

## 2021-11-08 DIAGNOSIS — E78.5 HYPERLIPIDEMIA LDL GOAL <130: ICD-10-CM

## 2021-11-08 DIAGNOSIS — C50.612 MALIGNANT NEOPLASM OF AXILLARY TAIL OF LEFT FEMALE BREAST, UNSPECIFIED ESTROGEN RECEPTOR STATUS: ICD-10-CM

## 2021-11-08 DIAGNOSIS — I10 ESSENTIAL HYPERTENSION: ICD-10-CM

## 2021-11-08 DIAGNOSIS — N18.30 STAGE 3 CHRONIC KIDNEY DISEASE, UNSPECIFIED WHETHER STAGE 3A OR 3B CKD: ICD-10-CM

## 2021-11-08 DIAGNOSIS — C50.012 MALIGNANT NEOPLASM INVOLVING BOTH NIPPLE AND AREOLA OF LEFT BREAST IN FEMALE, UNSPECIFIED ESTROGEN RECEPTOR STATUS: ICD-10-CM

## 2021-11-08 DIAGNOSIS — I12.9 BENIGN HYPERTENSION WITH CKD (CHRONIC KIDNEY DISEASE) STAGE III: Primary | ICD-10-CM

## 2021-11-08 DIAGNOSIS — N18.30 BENIGN HYPERTENSION WITH CKD (CHRONIC KIDNEY DISEASE) STAGE III: Primary | ICD-10-CM

## 2021-11-08 DIAGNOSIS — M81.0 OSTEOPOROSIS, SENILE: ICD-10-CM

## 2021-11-08 LAB — CANCER AG27-29 SERPL-ACNC: 58.4 U/ML

## 2021-11-08 PROCEDURE — 99999 PR PBB SHADOW E&M-EST. PATIENT-LVL V: ICD-10-PCS | Mod: PBBFAC,HCNC,, | Performed by: INTERNAL MEDICINE

## 2021-11-08 PROCEDURE — 1126F AMNT PAIN NOTED NONE PRSNT: CPT | Mod: HCNC,CPTII,S$GLB, | Performed by: INTERNAL MEDICINE

## 2021-11-08 PROCEDURE — 1101F PR PT FALLS ASSESS DOC 0-1 FALLS W/OUT INJ PAST YR: ICD-10-PCS | Mod: HCNC,CPTII,S$GLB, | Performed by: INTERNAL MEDICINE

## 2021-11-08 PROCEDURE — 96372 THER/PROPH/DIAG INJ SC/IM: CPT

## 2021-11-08 PROCEDURE — 3288F PR FALLS RISK ASSESSMENT DOCUMENTED: ICD-10-PCS | Mod: HCNC,CPTII,S$GLB, | Performed by: INTERNAL MEDICINE

## 2021-11-08 PROCEDURE — 1126F PR PAIN SEVERITY QUANTIFIED, NO PAIN PRESENT: ICD-10-PCS | Mod: HCNC,CPTII,S$GLB, | Performed by: INTERNAL MEDICINE

## 2021-11-08 PROCEDURE — 63600175 PHARM REV CODE 636 W HCPCS: Mod: JG | Performed by: INTERNAL MEDICINE

## 2021-11-08 PROCEDURE — 99214 PR OFFICE/OUTPT VISIT, EST, LEVL IV, 30-39 MIN: ICD-10-PCS | Mod: HCNC,S$GLB,, | Performed by: INTERNAL MEDICINE

## 2021-11-08 PROCEDURE — 99999 PR PBB SHADOW E&M-EST. PATIENT-LVL V: CPT | Mod: PBBFAC,HCNC,, | Performed by: INTERNAL MEDICINE

## 2021-11-08 PROCEDURE — 3077F PR MOST RECENT SYSTOLIC BLOOD PRESSURE >= 140 MM HG: ICD-10-PCS | Mod: HCNC,CPTII,S$GLB, | Performed by: INTERNAL MEDICINE

## 2021-11-08 PROCEDURE — 3288F FALL RISK ASSESSMENT DOCD: CPT | Mod: HCNC,CPTII,S$GLB, | Performed by: INTERNAL MEDICINE

## 2021-11-08 PROCEDURE — 3078F DIAST BP <80 MM HG: CPT | Mod: HCNC,CPTII,S$GLB, | Performed by: INTERNAL MEDICINE

## 2021-11-08 PROCEDURE — 99214 OFFICE O/P EST MOD 30 MIN: CPT | Mod: HCNC,S$GLB,, | Performed by: INTERNAL MEDICINE

## 2021-11-08 PROCEDURE — 1101F PT FALLS ASSESS-DOCD LE1/YR: CPT | Mod: HCNC,CPTII,S$GLB, | Performed by: INTERNAL MEDICINE

## 2021-11-08 PROCEDURE — 3077F SYST BP >= 140 MM HG: CPT | Mod: HCNC,CPTII,S$GLB, | Performed by: INTERNAL MEDICINE

## 2021-11-08 PROCEDURE — 3078F PR MOST RECENT DIASTOLIC BLOOD PRESSURE < 80 MM HG: ICD-10-PCS | Mod: HCNC,CPTII,S$GLB, | Performed by: INTERNAL MEDICINE

## 2021-11-08 PROCEDURE — 1159F MED LIST DOCD IN RCRD: CPT | Mod: HCNC,CPTII,S$GLB, | Performed by: INTERNAL MEDICINE

## 2021-11-08 PROCEDURE — 1159F PR MEDICATION LIST DOCUMENTED IN MEDICAL RECORD: ICD-10-PCS | Mod: HCNC,CPTII,S$GLB, | Performed by: INTERNAL MEDICINE

## 2021-11-08 RX ADMIN — DENOSUMAB 60 MG: 60 INJECTION SUBCUTANEOUS at 01:11

## 2021-11-16 DIAGNOSIS — I10 ESSENTIAL HYPERTENSION: ICD-10-CM

## 2021-11-18 ENCOUNTER — DOCUMENTATION ONLY (OUTPATIENT)
Dept: FAMILY MEDICINE | Facility: CLINIC | Age: 81
End: 2021-11-18
Payer: MEDICARE

## 2021-11-18 DIAGNOSIS — I10 ESSENTIAL HYPERTENSION: ICD-10-CM

## 2021-11-18 RX ORDER — LISINOPRIL 10 MG/1
10 TABLET ORAL DAILY
Qty: 270 TABLET | Refills: 3 | Status: SHIPPED | OUTPATIENT
Start: 2021-11-18 | End: 2021-11-18 | Stop reason: SDUPTHER

## 2021-11-18 RX ORDER — LISINOPRIL 10 MG/1
10 TABLET ORAL DAILY
Qty: 270 TABLET | Refills: 3 | Status: SHIPPED | OUTPATIENT
Start: 2021-11-18 | End: 2022-05-25 | Stop reason: SDUPTHER

## 2021-11-19 ENCOUNTER — LAB VISIT (OUTPATIENT)
Dept: LAB | Facility: HOSPITAL | Age: 81
End: 2021-11-19
Attending: INTERNAL MEDICINE
Payer: MEDICARE

## 2021-11-19 DIAGNOSIS — E78.5 DYSLIPIDEMIA: ICD-10-CM

## 2021-11-19 LAB
CHOLEST SERPL-MCNC: 168 MG/DL (ref 120–199)
CHOLEST/HDLC SERPL: 2 {RATIO} (ref 2–5)
HDLC SERPL-MCNC: 86 MG/DL (ref 40–75)
HDLC SERPL: 51.2 % (ref 20–50)
LDLC SERPL CALC-MCNC: 68.2 MG/DL (ref 63–159)
NONHDLC SERPL-MCNC: 82 MG/DL
TRIGL SERPL-MCNC: 69 MG/DL (ref 30–150)

## 2021-11-19 PROCEDURE — 36415 COLL VENOUS BLD VENIPUNCTURE: CPT | Mod: HCNC | Performed by: INTERNAL MEDICINE

## 2021-11-19 PROCEDURE — 80061 LIPID PANEL: CPT | Mod: HCNC | Performed by: INTERNAL MEDICINE

## 2021-11-30 ENCOUNTER — OFFICE VISIT (OUTPATIENT)
Dept: FAMILY MEDICINE | Facility: CLINIC | Age: 81
End: 2021-11-30
Payer: MEDICARE

## 2021-11-30 VITALS
DIASTOLIC BLOOD PRESSURE: 76 MMHG | SYSTOLIC BLOOD PRESSURE: 122 MMHG | OXYGEN SATURATION: 98 % | BODY MASS INDEX: 26.61 KG/M2 | HEIGHT: 66 IN | HEART RATE: 83 BPM | WEIGHT: 165.56 LBS

## 2021-11-30 DIAGNOSIS — Z12.11 SCREEN FOR COLON CANCER: ICD-10-CM

## 2021-11-30 DIAGNOSIS — Z00.00 ROUTINE PHYSICAL EXAMINATION: Primary | ICD-10-CM

## 2021-11-30 DIAGNOSIS — E78.5 DYSLIPIDEMIA: ICD-10-CM

## 2021-11-30 DIAGNOSIS — I10 ESSENTIAL HYPERTENSION: ICD-10-CM

## 2021-11-30 PROCEDURE — 99999 PR PBB SHADOW E&M-EST. PATIENT-LVL IV: CPT | Mod: PBBFAC,HCNC,, | Performed by: INTERNAL MEDICINE

## 2021-11-30 PROCEDURE — 99397 PR PREVENTIVE VISIT,EST,65 & OVER: ICD-10-PCS | Mod: HCNC,S$GLB,, | Performed by: INTERNAL MEDICINE

## 2021-11-30 PROCEDURE — 99499 UNLISTED E&M SERVICE: CPT | Mod: S$GLB,,, | Performed by: INTERNAL MEDICINE

## 2021-11-30 PROCEDURE — 99499 RISK ADDL DX/OHS AUDIT: ICD-10-PCS | Mod: S$GLB,,, | Performed by: INTERNAL MEDICINE

## 2021-11-30 PROCEDURE — 99397 PER PM REEVAL EST PAT 65+ YR: CPT | Mod: HCNC,S$GLB,, | Performed by: INTERNAL MEDICINE

## 2021-11-30 PROCEDURE — 99999 PR PBB SHADOW E&M-EST. PATIENT-LVL IV: ICD-10-PCS | Mod: PBBFAC,HCNC,, | Performed by: INTERNAL MEDICINE

## 2021-12-01 ENCOUNTER — TELEPHONE (OUTPATIENT)
Dept: GASTROENTEROLOGY | Facility: CLINIC | Age: 81
End: 2021-12-01
Payer: MEDICARE

## 2022-03-04 ENCOUNTER — TELEPHONE (OUTPATIENT)
Dept: GASTROENTEROLOGY | Facility: CLINIC | Age: 82
End: 2022-03-04
Payer: MEDICARE

## 2022-03-14 ENCOUNTER — TELEPHONE (OUTPATIENT)
Dept: GASTROENTEROLOGY | Facility: CLINIC | Age: 82
End: 2022-03-14
Payer: MEDICARE

## 2022-05-04 ENCOUNTER — HOSPITAL ENCOUNTER (OUTPATIENT)
Dept: RADIOLOGY | Facility: HOSPITAL | Age: 82
Discharge: HOME | End: 2022-05-04
Attending: INTERNAL MEDICINE
Payer: MEDICARE

## 2022-05-04 VITALS — HEIGHT: 65 IN | WEIGHT: 167 LBS | BODY MASS INDEX: 27.82 KG/M2

## 2022-05-04 DIAGNOSIS — E78.5 HYPERLIPIDEMIA LDL GOAL <130: ICD-10-CM

## 2022-05-04 DIAGNOSIS — C50.612 MALIGNANT NEOPLASM OF AXILLARY TAIL OF LEFT FEMALE BREAST, UNSPECIFIED ESTROGEN RECEPTOR STATUS: ICD-10-CM

## 2022-05-04 DIAGNOSIS — N18.30 BENIGN HYPERTENSION WITH CKD (CHRONIC KIDNEY DISEASE) STAGE III: ICD-10-CM

## 2022-05-04 DIAGNOSIS — I10 ESSENTIAL HYPERTENSION: ICD-10-CM

## 2022-05-04 DIAGNOSIS — I12.9 BENIGN HYPERTENSION WITH CKD (CHRONIC KIDNEY DISEASE) STAGE III: ICD-10-CM

## 2022-05-04 LAB — GLUCOSE SERPL-MCNC: 96 MG/DL (ref 70–110)

## 2022-05-04 PROCEDURE — A9552 F18 FDG: HCPCS | Mod: PO,GZ

## 2022-05-06 ENCOUNTER — LAB VISIT (OUTPATIENT)
Dept: LAB | Facility: HOSPITAL | Age: 82
End: 2022-05-06
Attending: INTERNAL MEDICINE
Payer: MEDICARE

## 2022-05-06 DIAGNOSIS — C50.612 MALIGNANT NEOPLASM OF AXILLARY TAIL OF LEFT FEMALE BREAST, UNSPECIFIED ESTROGEN RECEPTOR STATUS: ICD-10-CM

## 2022-05-06 DIAGNOSIS — E78.5 HYPERLIPIDEMIA LDL GOAL <130: ICD-10-CM

## 2022-05-06 DIAGNOSIS — N18.30 BENIGN HYPERTENSION WITH CKD (CHRONIC KIDNEY DISEASE) STAGE III: ICD-10-CM

## 2022-05-06 DIAGNOSIS — I12.9 BENIGN HYPERTENSION WITH CKD (CHRONIC KIDNEY DISEASE) STAGE III: ICD-10-CM

## 2022-05-06 DIAGNOSIS — I10 ESSENTIAL HYPERTENSION: ICD-10-CM

## 2022-05-06 LAB
ALBUMIN SERPL BCP-MCNC: 3.8 G/DL (ref 3.5–5.2)
ALP SERPL-CCNC: 66 U/L (ref 55–135)
ALT SERPL W/O P-5'-P-CCNC: 20 U/L (ref 10–44)
ANION GAP SERPL CALC-SCNC: 9 MMOL/L (ref 8–16)
AST SERPL-CCNC: 32 U/L (ref 10–40)
BASOPHILS # BLD AUTO: 0.07 K/UL (ref 0–0.2)
BASOPHILS NFR BLD: 1 % (ref 0–1.9)
BILIRUB SERPL-MCNC: 0.5 MG/DL (ref 0.1–1)
BUN SERPL-MCNC: 14 MG/DL (ref 8–23)
CALCIUM SERPL-MCNC: 9.4 MG/DL (ref 8.7–10.5)
CHLORIDE SERPL-SCNC: 97 MMOL/L (ref 95–110)
CO2 SERPL-SCNC: 26 MMOL/L (ref 23–29)
CREAT SERPL-MCNC: 1.1 MG/DL (ref 0.5–1.4)
DIFFERENTIAL METHOD: ABNORMAL
EOSINOPHIL # BLD AUTO: 0.1 K/UL (ref 0–0.5)
EOSINOPHIL NFR BLD: 1.4 % (ref 0–8)
ERYTHROCYTE [DISTWIDTH] IN BLOOD BY AUTOMATED COUNT: 14.5 % (ref 11.5–14.5)
EST. GFR  (AFRICAN AMERICAN): 54 ML/MIN/1.73 M^2
EST. GFR  (NON AFRICAN AMERICAN): 47 ML/MIN/1.73 M^2
GLUCOSE SERPL-MCNC: 91 MG/DL (ref 70–110)
HCT VFR BLD AUTO: 39 % (ref 37–48.5)
HGB BLD-MCNC: 13.1 G/DL (ref 12–16)
IMM GRANULOCYTES # BLD AUTO: 0.02 K/UL (ref 0–0.04)
IMM GRANULOCYTES NFR BLD AUTO: 0.3 % (ref 0–0.5)
LYMPHOCYTES # BLD AUTO: 0.6 K/UL (ref 1–4.8)
LYMPHOCYTES NFR BLD: 8.3 % (ref 18–48)
MCH RBC QN AUTO: 30 PG (ref 27–31)
MCHC RBC AUTO-ENTMCNC: 33.6 G/DL (ref 32–36)
MCV RBC AUTO: 89 FL (ref 82–98)
MONOCYTES # BLD AUTO: 0.5 K/UL (ref 0.3–1)
MONOCYTES NFR BLD: 6.9 % (ref 4–15)
NEUTROPHILS # BLD AUTO: 5.7 K/UL (ref 1.8–7.7)
NEUTROPHILS NFR BLD: 82.1 % (ref 38–73)
NRBC BLD-RTO: 0 /100 WBC
PLATELET # BLD AUTO: 348 K/UL (ref 150–450)
PMV BLD AUTO: 9.8 FL (ref 9.2–12.9)
POTASSIUM SERPL-SCNC: 4.6 MMOL/L (ref 3.5–5.1)
PROT SERPL-MCNC: 6.8 G/DL (ref 6–8.4)
RBC # BLD AUTO: 4.36 M/UL (ref 4–5.4)
SODIUM SERPL-SCNC: 132 MMOL/L (ref 136–145)
WBC # BLD AUTO: 6.96 K/UL (ref 3.9–12.7)

## 2022-05-06 PROCEDURE — 85025 COMPLETE CBC W/AUTO DIFF WBC: CPT | Performed by: INTERNAL MEDICINE

## 2022-05-06 PROCEDURE — 80053 COMPREHEN METABOLIC PANEL: CPT | Performed by: INTERNAL MEDICINE

## 2022-05-06 PROCEDURE — 86300 IMMUNOASSAY TUMOR CA 15-3: CPT | Performed by: INTERNAL MEDICINE

## 2022-05-06 NOTE — PROGRESS NOTES
81 year-old  woman well known to me.  The patient was diagnosed n   May 2016 with triple positive breast cancer.  The patient had a 1.9 cm   malignancy of the left breast, sentinel lymph node negative in association with   DCIS.  She underwent lumpectomy, underwent adjuvant TCH chemotherapy for six   cycles   and Herceptin alone for a year, has done well, but she has   multiple other issues in association.  The patient had Echocardiogram during Herceptin that showed  showed a EF drop by 10%   from an ejection fraction of 65% to 55%.  Dr. Gray had cleared her to continue   with Herceptin and with the short-term echocardiogram follow up as the patient   had remained asymptomatic. And she successfully completed Herceptin  The patient also had some pulmonary nodules that are   being followed by a CT scan.  They are deemed to be benign, but need ongoing   followup.  She had been taking Boniva for postmenopausal osteopenia/osteoporosis   along with calcium and the patient has also had increased density on mammogram.    had  repeat mammogram, which Radiology recommends short-term   followup which was done and now she goes for annual mammogram most recent mammogram September 2020.  She receives Prolia and Arimidex      ..    PHYSICAL EXAM:   .  Wt Readings from Last 3 Encounters:   05/04/22 75.8 kg (167 lb)   11/30/21 75.1 kg (165 lb 9.1 oz)   11/08/21 74.5 kg (164 lb 3.2 oz)     Temp Readings from Last 3 Encounters:   11/08/21 97.4 °F (36.3 °C) (Temporal)   08/02/21 97.2 °F (36.2 °C) (Temporal)   05/25/21 98 °F (36.7 °C)     BP Readings from Last 3 Encounters:   11/30/21 122/76   11/08/21 (!) 164/68   08/02/21 (!) 142/78     Pulse Readings from Last 3 Encounters:   11/30/21 83   11/08/21 83   08/02/21 81     GENERAL: Comfortable looking patient. Patient is in no distress.  Awake, alert and oriented to time, person and place.  No anxiety, or agitation.      HEENT: Normal conjunctivae and eyelids. WNL.  PERRLA 3  to 4 mm. No icterus, no pallor, no congestion, and no discharge noted.     NECK:  Supple. Trachea is central.  No crepitus.  No JVD or masses.    RESPIRATORY:  No intercostal retractions.  No dullness to percussion.  Chest is clear to auscultation.  No rales, rhonchi or wheezes.  No crepitus.  Good air entry bilaterally.    CARDIOVASCULAR:  S1 and S2 are normally heard without murmurs or gallops.  All peripheral pulses are present.    ABDOMEN:  Normal abdomen.  No hepatosplenomegaly.  No free fluid.  Bowel sounds are present.  No hernia noted. No masses.  No rebound or tenderness.  No guarding or rigidity.  Umbilicus is midline.    LYMPHATICS:  No axillary, cervical, supraclavicular, submental, or inguinal lymphadenopathy.    SKIN/MUSCULOSKELETAL:  There is no evidence of excoriation marks or ecchmosis.  No rashes.  No cyanosis.  No clubbing.  No joint or skeletal deformities noted.  Normal range of motion.    NEUROLOGIC:  Higher functions are appropriate.  No cranial nerve deficits.  Normal car[place]na.  Normal strength.  Motor and sensory functions are normal.  Deep tendon reflexes are normal.    GENITAL/RECTAL:  Exams are deferred.  LABORATORY EVALUATION:    Lab Results   Component Value Date    WBC 6.96 05/06/2022    HGB 13.1 05/06/2022    HCT 39.0 05/06/2022    MCV 89 05/06/2022     05/06/2022     CMP  Sodium   Date Value Ref Range Status   05/06/2022 132 (L) 136 - 145 mmol/L Final     Potassium   Date Value Ref Range Status   05/06/2022 4.6 3.5 - 5.1 mmol/L Final     Chloride   Date Value Ref Range Status   05/06/2022 97 95 - 110 mmol/L Final     CO2   Date Value Ref Range Status   05/06/2022 26 23 - 29 mmol/L Final     Glucose   Date Value Ref Range Status   05/06/2022 91 70 - 110 mg/dL Final     BUN   Date Value Ref Range Status   05/06/2022 14 8 - 23 mg/dL Final     Creatinine   Date Value Ref Range Status   05/06/2022 1.1 0.5 - 1.4 mg/dL Final   01/18/2013 0.9 0.5 - 1.4 mg/dL Final     Calcium   Date Value  Ref Range Status   05/06/2022 9.4 8.7 - 10.5 mg/dL Final   01/18/2013 8.9 8.7 - 10.5 mg/dL Final     Total Protein   Date Value Ref Range Status   05/06/2022 6.8 6.0 - 8.4 g/dL Final     Albumin   Date Value Ref Range Status   05/06/2022 3.8 3.5 - 5.2 g/dL Final     Total Bilirubin   Date Value Ref Range Status   05/06/2022 0.5 0.1 - 1.0 mg/dL Final     Comment:     For infants and newborns, interpretation of results should be based  on gestational age, weight and in agreement with clinical  observations.    Premature Infant recommended reference ranges:  Up to 24 hours.............<8.0 mg/dL  Up to 48 hours............<12.0 mg/dL  3-5 days..................<15.0 mg/dL  6-29 days.................<15.0 mg/dL       Alkaline Phosphatase   Date Value Ref Range Status   05/06/2022 66 55 - 135 U/L Final     AST   Date Value Ref Range Status   05/06/2022 32 10 - 40 U/L Final     ALT   Date Value Ref Range Status   05/06/2022 20 10 - 44 U/L Final     Anion Gap   Date Value Ref Range Status   05/06/2022 9 8 - 16 mmol/L Final   01/18/2013 9 5 - 15 meq/L Final     eGFR if    Date Value Ref Range Status   05/06/2022 54 (A) >60 mL/min/1.73 m^2 Final     eGFR if non    Date Value Ref Range Status   05/06/2022 47 (A) >60 mL/min/1.73 m^2 Final     Comment:     Calculation used to obtain the estimated glomerular filtration  rate (eGFR) is the CKD-EPI equation.        Echocardiogram, ejection fraction has dropped to 55%, but   PET scan April 27, 2021 -for metastases  CA 2729 :58.4 4./27/21 55.6 July 2021   mammo10/2021 wnl  Pet 5./2022 neg  IMPRESSION:  1.  Triple positive breast cancer 2016 , T1, N0, M0, underwent [facility] chemotherapy,   Completed 1 year of herceptin, now on arimidex .      EF dropped somewhat followed by Cardiology.  This improved cards had a stress test , carotids checked  PET scan  May 2021   Done 4/22 next in 5/23  Osteoporosis; .    Proceed  with prolia rtc 11/22 for next dose with  cbc, cmp, [id number] next bone density due 6/2023  Return to clinic in  11/22 when next Prolia is due with CBC CMP tumor marker   6.  The patient has dyslipidemia.  Continue with Zocor and primary care follow   up with Dr. Franco.  7.  Degenerative joint pains.  Continue with Ultram as needed, take trazodone   for sleep and anxiety along with Xanax.may go for pain stimulant  8.  Mild CKD.  Continue to monitor.  No intervention necessary at this time.  9.  Gastroesophageal reflux disease.  Continue with omeprazole.  No changes or   worsening of symptoms at this point    10.psoas muscle  Tumor possibly a schwannoma confirmed with DR. Tovar  Patient lives in [place] she would like to get her mammogram in the [place] area . doctor's visit infusion and labs in [place] will arrange for this       aortic athrosclerosis stable follow with  pcp   covid vaccinations completed

## 2022-05-09 ENCOUNTER — OFFICE VISIT (OUTPATIENT)
Dept: HEMATOLOGY/ONCOLOGY | Facility: CLINIC | Age: 82
End: 2022-05-09
Payer: MEDICARE

## 2022-05-09 ENCOUNTER — INFUSION (OUTPATIENT)
Dept: INFUSION THERAPY | Facility: HOSPITAL | Age: 82
End: 2022-05-09
Attending: INTERNAL MEDICINE
Payer: MEDICARE

## 2022-05-09 VITALS
SYSTOLIC BLOOD PRESSURE: 181 MMHG | DIASTOLIC BLOOD PRESSURE: 77 MMHG | WEIGHT: 164.25 LBS | OXYGEN SATURATION: 98 % | TEMPERATURE: 97 F | HEART RATE: 84 BPM | BODY MASS INDEX: 27.33 KG/M2 | RESPIRATION RATE: 16 BRPM

## 2022-05-09 VITALS
SYSTOLIC BLOOD PRESSURE: 162 MMHG | WEIGHT: 164.25 LBS | TEMPERATURE: 97 F | RESPIRATION RATE: 16 BRPM | BODY MASS INDEX: 27.33 KG/M2 | DIASTOLIC BLOOD PRESSURE: 84 MMHG | OXYGEN SATURATION: 98 % | HEART RATE: 84 BPM

## 2022-05-09 DIAGNOSIS — C50.012 MALIGNANT NEOPLASM OF NIPPLE OF LEFT BREAST IN FEMALE, UNSPECIFIED ESTROGEN RECEPTOR STATUS: ICD-10-CM

## 2022-05-09 DIAGNOSIS — I12.9 BENIGN HYPERTENSION WITH CKD (CHRONIC KIDNEY DISEASE) STAGE III: ICD-10-CM

## 2022-05-09 DIAGNOSIS — N18.30 BENIGN HYPERTENSION WITH CKD (CHRONIC KIDNEY DISEASE) STAGE III: ICD-10-CM

## 2022-05-09 DIAGNOSIS — E78.5 HYPERLIPIDEMIA LDL GOAL <130: Primary | ICD-10-CM

## 2022-05-09 DIAGNOSIS — C50.011 MALIGNANT NEOPLASM OF NIPPLE OF RIGHT BREAST IN FEMALE, UNSPECIFIED ESTROGEN RECEPTOR STATUS: ICD-10-CM

## 2022-05-09 DIAGNOSIS — N18.30 STAGE 3 CHRONIC KIDNEY DISEASE, UNSPECIFIED WHETHER STAGE 3A OR 3B CKD: ICD-10-CM

## 2022-05-09 DIAGNOSIS — M81.0 OSTEOPOROSIS, SENILE: Primary | ICD-10-CM

## 2022-05-09 PROCEDURE — 3077F SYST BP >= 140 MM HG: CPT | Mod: CPTII,S$GLB,, | Performed by: INTERNAL MEDICINE

## 2022-05-09 PROCEDURE — 99999 PR PBB SHADOW E&M-EST. PATIENT-LVL III: CPT | Mod: PBBFAC,,, | Performed by: INTERNAL MEDICINE

## 2022-05-09 PROCEDURE — 99214 OFFICE O/P EST MOD 30 MIN: CPT | Mod: S$GLB,,, | Performed by: INTERNAL MEDICINE

## 2022-05-09 PROCEDURE — 96372 THER/PROPH/DIAG INJ SC/IM: CPT

## 2022-05-09 PROCEDURE — 99999 PR PBB SHADOW E&M-EST. PATIENT-LVL III: ICD-10-PCS | Mod: PBBFAC,,, | Performed by: INTERNAL MEDICINE

## 2022-05-09 PROCEDURE — 1159F MED LIST DOCD IN RCRD: CPT | Mod: CPTII,S$GLB,, | Performed by: INTERNAL MEDICINE

## 2022-05-09 PROCEDURE — 63600175 PHARM REV CODE 636 W HCPCS: Mod: JG | Performed by: INTERNAL MEDICINE

## 2022-05-09 PROCEDURE — 1159F PR MEDICATION LIST DOCUMENTED IN MEDICAL RECORD: ICD-10-PCS | Mod: CPTII,S$GLB,, | Performed by: INTERNAL MEDICINE

## 2022-05-09 PROCEDURE — 3288F FALL RISK ASSESSMENT DOCD: CPT | Mod: CPTII,S$GLB,, | Performed by: INTERNAL MEDICINE

## 2022-05-09 PROCEDURE — 99499 RISK ADDL DX/OHS AUDIT: ICD-10-PCS | Mod: S$GLB,,, | Performed by: INTERNAL MEDICINE

## 2022-05-09 PROCEDURE — 1125F PR PAIN SEVERITY QUANTIFIED, PAIN PRESENT: ICD-10-PCS | Mod: CPTII,S$GLB,, | Performed by: INTERNAL MEDICINE

## 2022-05-09 PROCEDURE — 3078F DIAST BP <80 MM HG: CPT | Mod: CPTII,S$GLB,, | Performed by: INTERNAL MEDICINE

## 2022-05-09 PROCEDURE — 99499 UNLISTED E&M SERVICE: CPT | Mod: S$GLB,,, | Performed by: INTERNAL MEDICINE

## 2022-05-09 PROCEDURE — 3077F PR MOST RECENT SYSTOLIC BLOOD PRESSURE >= 140 MM HG: ICD-10-PCS | Mod: CPTII,S$GLB,, | Performed by: INTERNAL MEDICINE

## 2022-05-09 PROCEDURE — 1100F PR PT FALLS ASSESS DOC 2+ FALLS/FALL W/INJURY/YR: ICD-10-PCS | Mod: CPTII,S$GLB,, | Performed by: INTERNAL MEDICINE

## 2022-05-09 PROCEDURE — 99214 PR OFFICE/OUTPT VISIT, EST, LEVL IV, 30-39 MIN: ICD-10-PCS | Mod: S$GLB,,, | Performed by: INTERNAL MEDICINE

## 2022-05-09 PROCEDURE — 1125F AMNT PAIN NOTED PAIN PRSNT: CPT | Mod: CPTII,S$GLB,, | Performed by: INTERNAL MEDICINE

## 2022-05-09 PROCEDURE — 1100F PTFALLS ASSESS-DOCD GE2>/YR: CPT | Mod: CPTII,S$GLB,, | Performed by: INTERNAL MEDICINE

## 2022-05-09 PROCEDURE — 3078F PR MOST RECENT DIASTOLIC BLOOD PRESSURE < 80 MM HG: ICD-10-PCS | Mod: CPTII,S$GLB,, | Performed by: INTERNAL MEDICINE

## 2022-05-09 PROCEDURE — 3288F PR FALLS RISK ASSESSMENT DOCUMENTED: ICD-10-PCS | Mod: CPTII,S$GLB,, | Performed by: INTERNAL MEDICINE

## 2022-05-09 RX ORDER — ANASTROZOLE 1 MG/1
1 TABLET ORAL DAILY
Qty: 90 TABLET | Refills: 6 | Status: SHIPPED | OUTPATIENT
Start: 2022-05-09 | End: 2023-05-09 | Stop reason: SDUPTHER

## 2022-05-09 RX ADMIN — DENOSUMAB 60 MG: 60 INJECTION SUBCUTANEOUS at 12:05

## 2022-05-09 NOTE — PLAN OF CARE
Patient given prolia per MD orders. Tolerated well. Calcium 9.4. Denies dental work, taking supplements. Jose J knight 6/23/21.

## 2022-05-10 LAB — CANCER AG27-29 SERPL-ACNC: 53.9 U/ML

## 2022-05-16 ENCOUNTER — LAB VISIT (OUTPATIENT)
Dept: LAB | Facility: HOSPITAL | Age: 82
End: 2022-05-16
Attending: INTERNAL MEDICINE
Payer: MEDICARE

## 2022-05-16 DIAGNOSIS — E78.5 DYSLIPIDEMIA: ICD-10-CM

## 2022-05-16 LAB
CHOLEST SERPL-MCNC: 167 MG/DL (ref 120–199)
CHOLEST/HDLC SERPL: 1.8 {RATIO} (ref 2–5)
HDLC SERPL-MCNC: 91 MG/DL (ref 40–75)
HDLC SERPL: 54.5 % (ref 20–50)
LDLC SERPL CALC-MCNC: 62.6 MG/DL (ref 63–159)
NONHDLC SERPL-MCNC: 76 MG/DL
TRIGL SERPL-MCNC: 67 MG/DL (ref 30–150)

## 2022-05-16 PROCEDURE — 36415 COLL VENOUS BLD VENIPUNCTURE: CPT | Performed by: INTERNAL MEDICINE

## 2022-05-16 PROCEDURE — 80061 LIPID PANEL: CPT | Performed by: INTERNAL MEDICINE

## 2022-05-25 ENCOUNTER — OFFICE VISIT (OUTPATIENT)
Dept: FAMILY MEDICINE | Facility: CLINIC | Age: 82
End: 2022-05-25
Payer: MEDICARE

## 2022-05-25 VITALS
OXYGEN SATURATION: 99 % | HEART RATE: 84 BPM | SYSTOLIC BLOOD PRESSURE: 134 MMHG | BODY MASS INDEX: 27.1 KG/M2 | TEMPERATURE: 99 F | DIASTOLIC BLOOD PRESSURE: 80 MMHG | WEIGHT: 162.69 LBS | HEIGHT: 65 IN

## 2022-05-25 DIAGNOSIS — M81.0 OSTEOPOROSIS, SENILE: ICD-10-CM

## 2022-05-25 DIAGNOSIS — E78.5 DYSLIPIDEMIA: ICD-10-CM

## 2022-05-25 DIAGNOSIS — Z12.31 ENCOUNTER FOR SCREENING MAMMOGRAM FOR BREAST CANCER: ICD-10-CM

## 2022-05-25 DIAGNOSIS — T45.1X5A CHEMOTHERAPY-INDUCED NEUROPATHY: ICD-10-CM

## 2022-05-25 DIAGNOSIS — I10 ESSENTIAL HYPERTENSION: Primary | ICD-10-CM

## 2022-05-25 DIAGNOSIS — G62.0 CHEMOTHERAPY-INDUCED NEUROPATHY: ICD-10-CM

## 2022-05-25 DIAGNOSIS — G47.00 INSOMNIA, UNSPECIFIED TYPE: ICD-10-CM

## 2022-05-25 DIAGNOSIS — M46.99 INFLAMMATORY SPONDYLOPATHY OF MULTIPLE SITES IN SPINE: ICD-10-CM

## 2022-05-25 DIAGNOSIS — I70.0 ATHEROSCLEROSIS OF AORTA: ICD-10-CM

## 2022-05-25 PROCEDURE — 1160F PR REVIEW ALL MEDS BY PRESCRIBER/CLIN PHARMACIST DOCUMENTED: ICD-10-PCS | Mod: CPTII,S$GLB,, | Performed by: INTERNAL MEDICINE

## 2022-05-25 PROCEDURE — 99499 UNLISTED E&M SERVICE: CPT | Mod: S$GLB,,, | Performed by: INTERNAL MEDICINE

## 2022-05-25 PROCEDURE — 3079F PR MOST RECENT DIASTOLIC BLOOD PRESSURE 80-89 MM HG: ICD-10-PCS | Mod: CPTII,S$GLB,, | Performed by: INTERNAL MEDICINE

## 2022-05-25 PROCEDURE — 99999 PR PBB SHADOW E&M-EST. PATIENT-LVL IV: CPT | Mod: PBBFAC,,, | Performed by: INTERNAL MEDICINE

## 2022-05-25 PROCEDURE — 3079F DIAST BP 80-89 MM HG: CPT | Mod: CPTII,S$GLB,, | Performed by: INTERNAL MEDICINE

## 2022-05-25 PROCEDURE — 1160F RVW MEDS BY RX/DR IN RCRD: CPT | Mod: CPTII,S$GLB,, | Performed by: INTERNAL MEDICINE

## 2022-05-25 PROCEDURE — 3288F FALL RISK ASSESSMENT DOCD: CPT | Mod: CPTII,S$GLB,, | Performed by: INTERNAL MEDICINE

## 2022-05-25 PROCEDURE — 1101F PT FALLS ASSESS-DOCD LE1/YR: CPT | Mod: CPTII,S$GLB,, | Performed by: INTERNAL MEDICINE

## 2022-05-25 PROCEDURE — 99214 OFFICE O/P EST MOD 30 MIN: CPT | Mod: S$GLB,,, | Performed by: INTERNAL MEDICINE

## 2022-05-25 PROCEDURE — 99499 RISK ADDL DX/OHS AUDIT: ICD-10-PCS | Mod: S$GLB,,, | Performed by: INTERNAL MEDICINE

## 2022-05-25 PROCEDURE — 1126F AMNT PAIN NOTED NONE PRSNT: CPT | Mod: CPTII,S$GLB,, | Performed by: INTERNAL MEDICINE

## 2022-05-25 PROCEDURE — 99999 PR PBB SHADOW E&M-EST. PATIENT-LVL IV: ICD-10-PCS | Mod: PBBFAC,,, | Performed by: INTERNAL MEDICINE

## 2022-05-25 PROCEDURE — 3288F PR FALLS RISK ASSESSMENT DOCUMENTED: ICD-10-PCS | Mod: CPTII,S$GLB,, | Performed by: INTERNAL MEDICINE

## 2022-05-25 PROCEDURE — 1101F PR PT FALLS ASSESS DOC 0-1 FALLS W/OUT INJ PAST YR: ICD-10-PCS | Mod: CPTII,S$GLB,, | Performed by: INTERNAL MEDICINE

## 2022-05-25 PROCEDURE — 1159F PR MEDICATION LIST DOCUMENTED IN MEDICAL RECORD: ICD-10-PCS | Mod: CPTII,S$GLB,, | Performed by: INTERNAL MEDICINE

## 2022-05-25 PROCEDURE — 99214 PR OFFICE/OUTPT VISIT, EST, LEVL IV, 30-39 MIN: ICD-10-PCS | Mod: S$GLB,,, | Performed by: INTERNAL MEDICINE

## 2022-05-25 PROCEDURE — 3075F SYST BP GE 130 - 139MM HG: CPT | Mod: CPTII,S$GLB,, | Performed by: INTERNAL MEDICINE

## 2022-05-25 PROCEDURE — 3075F PR MOST RECENT SYSTOLIC BLOOD PRESS GE 130-139MM HG: ICD-10-PCS | Mod: CPTII,S$GLB,, | Performed by: INTERNAL MEDICINE

## 2022-05-25 PROCEDURE — 1126F PR PAIN SEVERITY QUANTIFIED, NO PAIN PRESENT: ICD-10-PCS | Mod: CPTII,S$GLB,, | Performed by: INTERNAL MEDICINE

## 2022-05-25 PROCEDURE — 1159F MED LIST DOCD IN RCRD: CPT | Mod: CPTII,S$GLB,, | Performed by: INTERNAL MEDICINE

## 2022-05-25 RX ORDER — TRAZODONE HYDROCHLORIDE 100 MG/1
100 TABLET ORAL NIGHTLY
Qty: 90 TABLET | Refills: 3 | Status: SHIPPED | OUTPATIENT
Start: 2022-05-25 | End: 2023-06-01 | Stop reason: SDUPTHER

## 2022-05-25 RX ORDER — SIMVASTATIN 20 MG/1
20 TABLET, FILM COATED ORAL NIGHTLY
Qty: 90 TABLET | Refills: 3 | Status: SHIPPED | OUTPATIENT
Start: 2022-05-25 | End: 2023-06-01 | Stop reason: SDUPTHER

## 2022-05-25 RX ORDER — LISINOPRIL 10 MG/1
TABLET ORAL
Qty: 270 TABLET | Refills: 3 | Status: SHIPPED | OUTPATIENT
Start: 2022-05-25 | End: 2023-06-01 | Stop reason: SDUPTHER

## 2022-05-25 NOTE — PROGRESS NOTES
Subjective:       Patient ID: Jing Tenorio is a 81 y.o. female.    Chief Complaint: No chief complaint on file.    CKD III - stable with her typical pattern for the past few years ie upper 40s to 50s.  She no longer takes NSAIDS and her BP is well controlled preventing further damage.      Chronic Low back pain/ inflammatory arthropathy. Controlled with 1/2 tab tramadol bid.  Recall:  MRI showed tumor on nerve, but benign.  Has leg pain.  Failed epidural and ablation.  Saw neurosurgeon in [place] who did not want to do surgery as pt higher risk and thought may end up with chronic leg pain.  Recommended nerve stimulator.  Dr Rice had given meloxicam, which helps but pt has decreased kidney function so this was held.  Her new pain Dr, Dr Bean, offered Cymbalta? But pt scared of side affects. She also wants to avoid anything with potential addiction.  He also recommended a nerve stimulator but she is scared of this.     HTN -   controlled.       HLD - controlled    Insomnia - controlled    Breast cancer history - seeing oncology     Thrombocytopenia - better   Atherosclerosis - no syncope    Review of Systems   Constitutional: Negative for appetite change and fever.   HENT: Negative for nosebleeds and trouble swallowing.    Eyes: Negative for discharge and visual disturbance.   Respiratory: Negative for choking and shortness of breath.    Cardiovascular: Negative for chest pain and palpitations.   Gastrointestinal: Negative for abdominal pain, nausea and vomiting.   Musculoskeletal: Negative for arthralgias and joint swelling.   Skin: Negative for rash and wound.   Neurological: Negative for dizziness and syncope.   Psychiatric/Behavioral: Negative for confusion and dysphoric mood.       Objective:      Vitals:    05/25/22 1131   BP: 134/80   Pulse: 84   Temp: 98.5 °F (36.9 °C)     Physical Exam  Vitals reviewed.   Eyes:      Conjunctiva/sclera: Conjunctivae normal.   Cardiovascular:      Rate and Rhythm:  Normal rate and regular rhythm.   Pulmonary:      Effort: Pulmonary effort is normal.      Breath sounds: Normal breath sounds.   Musculoskeletal:      Cervical back: Normal range of motion.      Comments: Normal ROM bilateral    Skin:     General: Skin is warm and dry.   Neurological:      Cranial Nerves: No cranial nerve deficit (grossly intact).   Psychiatric:      Comments: Alert and orientated           Assessment:       1. Essential hypertension    2. Dyslipidemia    3. Encounter for screening mammogram for breast cancer    4. Osteoporosis, senile    5. Insomnia, unspecified type    6. Inflammatory spondylopathy of multiple sites in spine    7. Chemotherapy-induced neuropathy    8. Atherosclerosis of aorta        Plan:       Essential hypertension  -     lisinopriL 10 MG tablet; 2 pills in the morning and 1 pill at night  Dispense: 270 tablet; Refill: 3    Dyslipidemia  -     simvastatin (ZOCOR) 20 MG tablet; Take 1 tablet (20 mg total) by mouth every evening.  Dispense: 90 tablet; Refill: 3    Encounter for screening mammogram for breast cancer  -     Mammo Digital Screening Bilat; Future; Expected date: 10/06/2022    Osteoporosis, senile    Insomnia, unspecified type  -     traZODone (DESYREL) 100 MG tablet; Take 1 tablet (100 mg total) by mouth every evening.  Dispense: 90 tablet; Refill: 3    Inflammatory spondylopathy of multiple sites in spine    Chemotherapy-induced neuropathy    Atherosclerosis of aorta            Medication List with Changes/Refills   Current Medications    ANASTROZOLE (ARIMIDEX) 1 MG TAB    Take 1 tablet (1 mg total) by mouth once daily.    ASCORBIC ACID, VITAMIN C, (VITAMIN C) 500 MG TABLET    Take 500 mg by mouth once daily.    B COMPLEX VITAMINS TABLET    Take 1 tablet by mouth once daily.    CETIRIZINE (ZYRTEC) 10 MG TABLET    Take 1 tablet (10 mg total) by mouth once daily.    COENZYME Q10 100 MG CAPSULE    Take 100 mg by mouth once daily.    DENOSUMAB (PROLIA) 60 MG/ML SYRG     Inject 60 mg into the skin every 6 (six) months.    FLUTICASONE PROPIONATE (FLONASE) 50 MCG/ACTUATION NASAL SPRAY    1 spray (50 mcg total) by Each Nostril route once daily.    MULTIVITAMIN ORAL    once daily. Every day    OMEPRAZOLE (PRILOSEC) 40 MG CAPSULE    Take 1 capsule (40 mg total) by mouth every morning.    TRAMADOL (ULTRAM) 50 MG TABLET    TAKE 1/2 TO 1 TABLET BY MOUTH EVERY 6 HOURS AS NEEDED FOR PAIN    VITAMIN D (VITAMIN D3) 1000 UNITS TAB    Take 2,000 Units by mouth once daily.    VITAMIN E 400 UNIT CAPSULE    Take 400 Units by mouth once daily.   Changed and/or Refilled Medications    Modified Medication Previous Medication    LISINOPRIL 10 MG TABLET lisinopriL 10 MG tablet       2 pills in the morning and 1 pill at night    Take 1 tablet (10 mg total) by mouth once daily. 2 pills in the morning and 1 pill at night    SIMVASTATIN (ZOCOR) 20 MG TABLET simvastatin (ZOCOR) 20 MG tablet       Take 1 tablet (20 mg total) by mouth every evening.    Take 1 tablet (20 mg total) by mouth every evening.    TRAZODONE (DESYREL) 100 MG TABLET traZODone (DESYREL) 100 MG tablet       Take 1 tablet (100 mg total) by mouth every evening.    Take 1 tablet (100 mg total) by mouth every evening.     Other diagnoses were reviewed and found stable or previously improved and will continue to monitor.    Continue current management and monitor.    Counseled on regular exercise, maintenance of a healthy weight, balanced diet rich in fruits/vegetables and lean protein, and avoidance of unhealthy habits like smoking and excessive alcohol intake.   Also, counseled on importance of being compliant with medication, health appointments, diet and exercise.     Follow up in about 6 months (around 11/25/2022). 's labs

## 2022-05-30 ENCOUNTER — IMMUNIZATION (OUTPATIENT)
Dept: PRIMARY CARE CLINIC | Facility: CLINIC | Age: 82
End: 2022-05-30
Payer: MEDICARE

## 2022-05-30 DIAGNOSIS — Z23 NEED FOR VACCINATION: Primary | ICD-10-CM

## 2022-05-30 PROCEDURE — 91305 COVID-19, MRNA, LNP-S, PF, 30 MCG/0.3 ML DOSE VACCINE (PFIZER): ICD-10-PCS | Mod: S$GLB,,, | Performed by: FAMILY MEDICINE

## 2022-05-30 PROCEDURE — 0054A COVID-19, MRNA, LNP-S, PF, 30 MCG/0.3 ML DOSE VACCINE (PFIZER): ICD-10-PCS | Mod: S$GLB,,, | Performed by: FAMILY MEDICINE

## 2022-05-30 PROCEDURE — 91305 COVID-19, MRNA, LNP-S, PF, 30 MCG/0.3 ML DOSE VACCINE (PFIZER): CPT | Mod: S$GLB,,, | Performed by: FAMILY MEDICINE

## 2022-05-30 PROCEDURE — 0054A COVID-19, MRNA, LNP-S, PF, 30 MCG/0.3 ML DOSE VACCINE (PFIZER): CPT | Mod: S$GLB,,, | Performed by: FAMILY MEDICINE

## 2022-10-11 ENCOUNTER — HOSPITAL ENCOUNTER (OUTPATIENT)
Dept: RADIOLOGY | Facility: CLINIC | Age: 82
Discharge: HOME | End: 2022-10-11
Attending: INTERNAL MEDICINE
Payer: MEDICARE

## 2022-10-11 DIAGNOSIS — Z12.31 ENCOUNTER FOR SCREENING MAMMOGRAM FOR BREAST CANCER: ICD-10-CM

## 2022-10-11 PROCEDURE — 77063 BREAST TOMOSYNTHESIS BI: CPT | Mod: TC,PO

## 2022-10-11 PROCEDURE — 77063 BREAST TOMOSYNTHESIS BI: CPT | Mod: 26,,, | Performed by: RADIOLOGY

## 2022-10-11 PROCEDURE — 77067 SCR MAMMO BI INCL CAD: CPT | Mod: 26,,, | Performed by: RADIOLOGY

## 2022-10-11 PROCEDURE — 77067 SCR MAMMO BI INCL CAD: CPT | Mod: TC,PO

## 2022-10-11 PROCEDURE — 77067 MAMMO DIGITAL SCREENING BILAT WITH TOMO: ICD-10-PCS | Mod: 26,,, | Performed by: RADIOLOGY

## 2022-10-11 PROCEDURE — 77063 MAMMO DIGITAL SCREENING BILAT WITH TOMO: ICD-10-PCS | Mod: 26,,, | Performed by: RADIOLOGY

## 2022-10-26 NOTE — TELEPHONE ENCOUNTER
Spoke with pt informed her that provider was out of clinic, calling to reschedule pt verbalized understanding rescheduled appointment phone call ended.  
(E4) spontaneous

## 2022-11-03 ENCOUNTER — TELEPHONE (OUTPATIENT)
Dept: HEMATOLOGY/ONCOLOGY | Facility: CLINIC | Age: 82
End: 2022-11-03
Payer: MEDICARE

## 2022-11-04 ENCOUNTER — LAB VISIT (OUTPATIENT)
Dept: LAB | Facility: HOSPITAL | Age: 82
End: 2022-11-04
Attending: INTERNAL MEDICINE
Payer: MEDICARE

## 2022-11-04 DIAGNOSIS — N18.30 STAGE 3 CHRONIC KIDNEY DISEASE, UNSPECIFIED WHETHER STAGE 3A OR 3B CKD: ICD-10-CM

## 2022-11-04 DIAGNOSIS — I12.9 BENIGN HYPERTENSION WITH CKD (CHRONIC KIDNEY DISEASE) STAGE III: ICD-10-CM

## 2022-11-04 DIAGNOSIS — N18.30 BENIGN HYPERTENSION WITH CKD (CHRONIC KIDNEY DISEASE) STAGE III: ICD-10-CM

## 2022-11-04 DIAGNOSIS — C50.012 MALIGNANT NEOPLASM OF NIPPLE OF LEFT BREAST IN FEMALE, UNSPECIFIED ESTROGEN RECEPTOR STATUS: ICD-10-CM

## 2022-11-04 LAB
ALBUMIN SERPL BCP-MCNC: 4 G/DL (ref 3.5–5.2)
ALP SERPL-CCNC: 67 U/L (ref 55–135)
ALT SERPL W/O P-5'-P-CCNC: 22 U/L (ref 10–44)
ANION GAP SERPL CALC-SCNC: 10 MMOL/L (ref 8–16)
AST SERPL-CCNC: 37 U/L (ref 10–40)
BASOPHILS # BLD AUTO: 0.07 K/UL (ref 0–0.2)
BASOPHILS NFR BLD: 1.1 % (ref 0–1.9)
BILIRUB SERPL-MCNC: 0.6 MG/DL (ref 0.1–1)
BUN SERPL-MCNC: 9 MG/DL (ref 8–23)
CALCIUM SERPL-MCNC: 10 MG/DL (ref 8.7–10.5)
CHLORIDE SERPL-SCNC: 97 MMOL/L (ref 95–110)
CO2 SERPL-SCNC: 26 MMOL/L (ref 23–29)
CREAT SERPL-MCNC: 1 MG/DL (ref 0.5–1.4)
DIFFERENTIAL METHOD: ABNORMAL
EOSINOPHIL # BLD AUTO: 0.1 K/UL (ref 0–0.5)
EOSINOPHIL NFR BLD: 1.3 % (ref 0–8)
ERYTHROCYTE [DISTWIDTH] IN BLOOD BY AUTOMATED COUNT: 14.2 % (ref 11.5–14.5)
EST. GFR  (NO RACE VARIABLE): 56 ML/MIN/1.73 M^2
GLUCOSE SERPL-MCNC: 95 MG/DL (ref 70–110)
HCT VFR BLD AUTO: 39.3 % (ref 37–48.5)
HGB BLD-MCNC: 13.1 G/DL (ref 12–16)
IMM GRANULOCYTES # BLD AUTO: 0.01 K/UL (ref 0–0.04)
IMM GRANULOCYTES NFR BLD AUTO: 0.2 % (ref 0–0.5)
LYMPHOCYTES # BLD AUTO: 0.5 K/UL (ref 1–4.8)
LYMPHOCYTES NFR BLD: 8.6 % (ref 18–48)
MCH RBC QN AUTO: 30.5 PG (ref 27–31)
MCHC RBC AUTO-ENTMCNC: 33.3 G/DL (ref 32–36)
MCV RBC AUTO: 91 FL (ref 82–98)
MONOCYTES # BLD AUTO: 0.5 K/UL (ref 0.3–1)
MONOCYTES NFR BLD: 7.4 % (ref 4–15)
NEUTROPHILS # BLD AUTO: 5.1 K/UL (ref 1.8–7.7)
NEUTROPHILS NFR BLD: 81.4 % (ref 38–73)
NRBC BLD-RTO: 0 /100 WBC
PLATELET # BLD AUTO: 374 K/UL (ref 150–450)
PMV BLD AUTO: 9.8 FL (ref 9.2–12.9)
POTASSIUM SERPL-SCNC: 4.8 MMOL/L (ref 3.5–5.1)
PROT SERPL-MCNC: 6.7 G/DL (ref 6–8.4)
RBC # BLD AUTO: 4.3 M/UL (ref 4–5.4)
SODIUM SERPL-SCNC: 133 MMOL/L (ref 136–145)
WBC # BLD AUTO: 6.31 K/UL (ref 3.9–12.7)

## 2022-11-04 PROCEDURE — 85025 COMPLETE CBC W/AUTO DIFF WBC: CPT | Performed by: INTERNAL MEDICINE

## 2022-11-04 PROCEDURE — 80053 COMPREHEN METABOLIC PANEL: CPT | Performed by: INTERNAL MEDICINE

## 2022-11-04 PROCEDURE — 86300 IMMUNOASSAY TUMOR CA 15-3: CPT | Performed by: INTERNAL MEDICINE

## 2022-11-07 LAB — CANCER AG27-29 SERPL-ACNC: 56.1 U/ML

## 2022-11-09 ENCOUNTER — OFFICE VISIT (OUTPATIENT)
Dept: HEMATOLOGY/ONCOLOGY | Facility: CLINIC | Age: 82
End: 2022-11-09
Payer: MEDICARE

## 2022-11-09 ENCOUNTER — INFUSION (OUTPATIENT)
Dept: INFUSION THERAPY | Facility: HOSPITAL | Age: 82
End: 2022-11-09
Attending: INTERNAL MEDICINE
Payer: MEDICARE

## 2022-11-09 VITALS
HEIGHT: 65 IN | OXYGEN SATURATION: 98 % | BODY MASS INDEX: 27.36 KG/M2 | SYSTOLIC BLOOD PRESSURE: 148 MMHG | HEART RATE: 76 BPM | WEIGHT: 164.25 LBS | DIASTOLIC BLOOD PRESSURE: 73 MMHG

## 2022-11-09 VITALS
DIASTOLIC BLOOD PRESSURE: 87 MMHG | OXYGEN SATURATION: 99 % | BODY MASS INDEX: 27.32 KG/M2 | RESPIRATION RATE: 15 BRPM | SYSTOLIC BLOOD PRESSURE: 169 MMHG | TEMPERATURE: 97 F | HEIGHT: 65 IN | WEIGHT: 164 LBS | HEART RATE: 75 BPM

## 2022-11-09 DIAGNOSIS — N18.30 STAGE 3 CHRONIC KIDNEY DISEASE, UNSPECIFIED WHETHER STAGE 3A OR 3B CKD: ICD-10-CM

## 2022-11-09 DIAGNOSIS — E78.5 HYPERLIPIDEMIA LDL GOAL <130: ICD-10-CM

## 2022-11-09 DIAGNOSIS — C50.012 MALIGNANT NEOPLASM OF NIPPLE OF LEFT BREAST IN FEMALE, UNSPECIFIED ESTROGEN RECEPTOR STATUS: ICD-10-CM

## 2022-11-09 DIAGNOSIS — I10 ESSENTIAL HYPERTENSION: ICD-10-CM

## 2022-11-09 DIAGNOSIS — M81.0 OSTEOPOROSIS, SENILE: Primary | ICD-10-CM

## 2022-11-09 DIAGNOSIS — M81.0 OSTEOPOROSIS, SENILE: ICD-10-CM

## 2022-11-09 DIAGNOSIS — C50.011 MALIGNANT NEOPLASM OF NIPPLE OF RIGHT BREAST IN FEMALE, UNSPECIFIED ESTROGEN RECEPTOR STATUS: Primary | ICD-10-CM

## 2022-11-09 DIAGNOSIS — I70.0 AORTIC ATHEROSCLEROSIS: ICD-10-CM

## 2022-11-09 DIAGNOSIS — C50.012 MALIGNANT NEOPLASM INVOLVING BOTH NIPPLE AND AREOLA OF LEFT BREAST IN FEMALE, UNSPECIFIED ESTROGEN RECEPTOR STATUS: ICD-10-CM

## 2022-11-09 DIAGNOSIS — M85.89 OTHER SPECIFIED DISORDERS OF BONE DENSITY AND STRUCTURE, MULTIPLE SITES: ICD-10-CM

## 2022-11-09 PROCEDURE — 3077F SYST BP >= 140 MM HG: CPT | Mod: CPTII,S$GLB,, | Performed by: INTERNAL MEDICINE

## 2022-11-09 PROCEDURE — 3078F PR MOST RECENT DIASTOLIC BLOOD PRESSURE < 80 MM HG: ICD-10-PCS | Mod: CPTII,S$GLB,, | Performed by: INTERNAL MEDICINE

## 2022-11-09 PROCEDURE — 99999 PR PBB SHADOW E&M-EST. PATIENT-LVL IV: ICD-10-PCS | Mod: PBBFAC,,, | Performed by: INTERNAL MEDICINE

## 2022-11-09 PROCEDURE — 99999 PR PBB SHADOW E&M-EST. PATIENT-LVL IV: CPT | Mod: PBBFAC,,, | Performed by: INTERNAL MEDICINE

## 2022-11-09 PROCEDURE — 99499 RISK ADDL DX/OHS AUDIT: ICD-10-PCS | Mod: S$GLB,,, | Performed by: INTERNAL MEDICINE

## 2022-11-09 PROCEDURE — 3078F DIAST BP <80 MM HG: CPT | Mod: CPTII,S$GLB,, | Performed by: INTERNAL MEDICINE

## 2022-11-09 PROCEDURE — 3288F FALL RISK ASSESSMENT DOCD: CPT | Mod: CPTII,S$GLB,, | Performed by: INTERNAL MEDICINE

## 2022-11-09 PROCEDURE — 3077F PR MOST RECENT SYSTOLIC BLOOD PRESSURE >= 140 MM HG: ICD-10-PCS | Mod: CPTII,S$GLB,, | Performed by: INTERNAL MEDICINE

## 2022-11-09 PROCEDURE — 63600175 PHARM REV CODE 636 W HCPCS: Mod: JG | Performed by: INTERNAL MEDICINE

## 2022-11-09 PROCEDURE — 99214 PR OFFICE/OUTPT VISIT, EST, LEVL IV, 30-39 MIN: ICD-10-PCS | Mod: S$GLB,,, | Performed by: INTERNAL MEDICINE

## 2022-11-09 PROCEDURE — 1159F MED LIST DOCD IN RCRD: CPT | Mod: CPTII,S$GLB,, | Performed by: INTERNAL MEDICINE

## 2022-11-09 PROCEDURE — 3288F PR FALLS RISK ASSESSMENT DOCUMENTED: ICD-10-PCS | Mod: CPTII,S$GLB,, | Performed by: INTERNAL MEDICINE

## 2022-11-09 PROCEDURE — 1126F PR PAIN SEVERITY QUANTIFIED, NO PAIN PRESENT: ICD-10-PCS | Mod: CPTII,S$GLB,, | Performed by: INTERNAL MEDICINE

## 2022-11-09 PROCEDURE — 1159F PR MEDICATION LIST DOCUMENTED IN MEDICAL RECORD: ICD-10-PCS | Mod: CPTII,S$GLB,, | Performed by: INTERNAL MEDICINE

## 2022-11-09 PROCEDURE — 1126F AMNT PAIN NOTED NONE PRSNT: CPT | Mod: CPTII,S$GLB,, | Performed by: INTERNAL MEDICINE

## 2022-11-09 PROCEDURE — 99499 UNLISTED E&M SERVICE: CPT | Mod: S$GLB,,, | Performed by: INTERNAL MEDICINE

## 2022-11-09 PROCEDURE — 96372 THER/PROPH/DIAG INJ SC/IM: CPT

## 2022-11-09 PROCEDURE — 1101F PR PT FALLS ASSESS DOC 0-1 FALLS W/OUT INJ PAST YR: ICD-10-PCS | Mod: CPTII,S$GLB,, | Performed by: INTERNAL MEDICINE

## 2022-11-09 PROCEDURE — 99214 OFFICE O/P EST MOD 30 MIN: CPT | Mod: S$GLB,,, | Performed by: INTERNAL MEDICINE

## 2022-11-09 PROCEDURE — 1101F PT FALLS ASSESS-DOCD LE1/YR: CPT | Mod: CPTII,S$GLB,, | Performed by: INTERNAL MEDICINE

## 2022-11-09 RX ADMIN — DENOSUMAB 60 MG: 60 INJECTION SUBCUTANEOUS at 11:11

## 2022-11-09 NOTE — PROGRESS NOTES
82 year-old  woman well known to me.  The patient was diagnosed n   May 2016 with triple positive breast cancer.  The patient had a 1.9 cm   malignancy of the left breast, sentinel lymph node negative in association with   DCIS.  She underwent lumpectomy, underwent adjuvant TCH chemotherapy for six   cycles   and Herceptin alone for a year, has done well, but she has   multiple other issues in association.  The patient had Echocardiogram during Herceptin that showed  showed a EF drop by 10%   from an ejection fraction of 65% to 55%.  Dr. Gray had cleared her to continue   with Herceptin and with the short-term echocardiogram follow up as the patient   had remained asymptomatic. And she successfully completed Herceptin  The patient also had some pulmonary nodules that are   being followed by a CT scan.  They are deemed to be benign, but need ongoing   followup.  She had been taking Boniva for postmenopausal osteopenia/osteoporosis   along with calcium and the patient has also had increased density on mammogram.    had  repeat mammogram, which Radiology recommends short-term   followup which was done and now she goes for annual mammogram most recent mammogram September 2020.  She receives Prolia and Arimidex      ..    PHYSICAL EXAM:   .  Wt Readings from Last 3 Encounters:   11/09/22 74.5 kg (164 lb 3.9 oz)   05/25/22 73.8 kg (162 lb 11.2 oz)   05/09/22 74.5 kg (164 lb 3.9 oz)     Temp Readings from Last 3 Encounters:   05/25/22 98.5 °F (36.9 °C) (Oral)   05/09/22 96.9 °F (36.1 °C)   05/09/22 96.9 °F (36.1 °C) (Temporal)     BP Readings from Last 3 Encounters:   11/09/22 (!) 148/73   05/25/22 134/80   05/09/22 (!) 162/84     Pulse Readings from Last 3 Encounters:   11/09/22 76   05/25/22 84   05/09/22 84     GENERAL: Comfortable looking patient. Patient is in no distress.  Awake, alert and oriented to time, person and place.  No anxiety, or agitation.      HEENT: Normal conjunctivae and eyelids. WNL.   PERRLA 3 to 4 mm. No icterus, no pallor, no congestion, and no discharge noted.     NECK:  Supple. Trachea is central.  No crepitus.  No JVD or masses.    RESPIRATORY:  No intercostal retractions.  No dullness to percussion.  Chest is clear to auscultation.  No rales, rhonchi or wheezes.  No crepitus.  Good air entry bilaterally.    CARDIOVASCULAR:  S1 and S2 are normally heard without murmurs or gallops.  All peripheral pulses are present.    ABDOMEN:  Normal abdomen.  No hepatosplenomegaly.  No free fluid.  Bowel sounds are present.  No hernia noted. No masses.  No rebound or tenderness.  No guarding or rigidity.  Umbilicus is midline.    LYMPHATICS:  No axillary, cervical, supraclavicular, submental, or inguinal lymphadenopathy.    SKIN/MUSCULOSKELETAL:  There is no evidence of excoriation marks or ecchmosis.  No rashes.  No cyanosis.  No clubbing.  No joint or skeletal deformities noted.  Normal range of motion.    NEUROLOGIC:  Higher functions are appropriate.  No cranial nerve deficits.  Normal [place].  Normal strength.  Motor and sensory functions are normal.  Deep tendon reflexes are normal.    GENITAL/RECTAL:  Exams are deferred.  LABORATORY EVALUATION:    Lab Results   Component Value Date    WBC 6.31 11/04/2022    HGB 13.1 11/04/2022    HCT 39.3 11/04/2022    MCV 91 11/04/2022     11/04/2022     CMP  Sodium   Date Value Ref Range Status   11/04/2022 133 (L) 136 - 145 mmol/L Final     Potassium   Date Value Ref Range Status   11/04/2022 4.8 3.5 - 5.1 mmol/L Final     Chloride   Date Value Ref Range Status   11/04/2022 97 95 - 110 mmol/L Final     CO2   Date Value Ref Range Status   11/04/2022 26 23 - 29 mmol/L Final     Glucose   Date Value Ref Range Status   11/04/2022 95 70 - 110 mg/dL Final     BUN   Date Value Ref Range Status   11/04/2022 9 8 - 23 mg/dL Final     Creatinine   Date Value Ref Range Status   11/04/2022 1.0 0.5 - 1.4 mg/dL Final   01/18/2013 0.9 0.5 - 1.4 mg/dL Final     Calcium    Date Value Ref Range Status   11/04/2022 10.0 8.7 - 10.5 mg/dL Final   01/18/2013 8.9 8.7 - 10.5 mg/dL Final     Total Protein   Date Value Ref Range Status   11/04/2022 6.7 6.0 - 8.4 g/dL Final     Albumin   Date Value Ref Range Status   11/04/2022 4.0 3.5 - 5.2 g/dL Final     Total Bilirubin   Date Value Ref Range Status   11/04/2022 0.6 0.1 - 1.0 mg/dL Final     Comment:     For infants and newborns, interpretation of results should be based  on gestational age, weight and in agreement with clinical  observations.    Premature Infant recommended reference ranges:  Up to 24 hours.............<8.0 mg/dL  Up to 48 hours............<12.0 mg/dL  3-5 days..................<15.0 mg/dL  6-29 days.................<15.0 mg/dL       Alkaline Phosphatase   Date Value Ref Range Status   11/04/2022 67 55 - 135 U/L Final     AST   Date Value Ref Range Status   11/04/2022 37 10 - 40 U/L Final     ALT   Date Value Ref Range Status   11/04/2022 22 10 - 44 U/L Final     Anion Gap   Date Value Ref Range Status   11/04/2022 10 8 - 16 mmol/L Final   01/18/2013 9 5 - 15 meq/L Final     eGFR if    Date Value Ref Range Status   05/06/2022 54 (A) >60 mL/min/1.73 m^2 Final     eGFR if non    Date Value Ref Range Status   05/06/2022 47 (A) >60 mL/min/1.73 m^2 Final     Comment:     Calculation used to obtain the estimated glomerular filtration  rate (eGFR) is the CKD-EPI equation.        Echocardiogram, ejection fraction has dropped to 55%, but   PET scan April 27, 2021 -for metastases  CA 2729 :58.4 4./27/21 55.6 July 2021   mammo10/2021 wnl  Pet 5./2022 neg  IMPRESSION:  1.  Triple positive breast cancer 2016 , T1, N0, M0, underwent [facility] chemotherapy,   Completed 1 year of herceptin, now on arimidex .      EF dropped somewhat followed by Cardiology.  This improved cards had a stress test , carotids checked  PET scan  May 2021   Done 4/22 next in 5/23  Osteoporosis; .    Proceed  with prolia rtc 5/23 for  next dose with cbc, cmp, [id number] next bone density due 6/2023and pet /  Mammo was abnormal having a redo on rt side next week, will phrev after ammo    6.  The patient has dyslipidemia.  Continue with Zocor and primary care follow   up with Dr. Franco.  7.  Degenerative joint pains.  Continue with Ultram as needed, take trazodone   for sleep and anxiety along with Xanax.may go for pain stimulant  8.  Mild CKD.  Continue to monitor.  No intervention necessary at this time.  9.  Gastroesophageal reflux disease.  Continue with omeprazole.  No changes or   worsening of symptoms at this point    10.psoas muscle  Tumor possibly a schwannoma confirmed with DR. Tovar  Patient lives in [place] she would like to get her mammogram in the [place] area . doctor's visit infusion and labs in [place] will arrange for this       aortic athrosclerosis stable follow with  pcp   covid vaccinations completed

## 2022-11-09 NOTE — Clinical Note
Proceed  with prolia  rt 5/23 for next dose with cbc, cmp, [id number] , bone density and pet / Mammo was abnormal having a redo on rt side next week, will phrev after mammo Please remind

## 2022-11-09 NOTE — PLAN OF CARE
Problem: Fall Injury Risk  Goal: Absence of Fall and Fall-Related Injury  Outcome: Ongoing, Progressing  Intervention: Identify and Manage Contributors  Flowsheets (Taken 11/9/2022 1118)  Self-Care Promotion: independence encouraged  Medication Review/Management: medications reviewed  Intervention: Promote Injury-Free Environment  Flowsheets (Taken 11/9/2022 1118)  Safety Promotion/Fall Prevention: medications reviewed

## 2022-11-17 ENCOUNTER — HOSPITAL ENCOUNTER (OUTPATIENT)
Dept: RADIOLOGY | Facility: HOSPITAL | Age: 82
Discharge: HOME | End: 2022-11-17
Attending: INTERNAL MEDICINE
Payer: MEDICARE

## 2022-11-17 DIAGNOSIS — R92.8 ABNORMAL MAMMOGRAM OF RIGHT BREAST: ICD-10-CM

## 2022-11-17 PROCEDURE — 77061 BREAST TOMOSYNTHESIS UNI: CPT | Mod: 26,RT,, | Performed by: RADIOLOGY

## 2022-11-17 PROCEDURE — 77065 DX MAMMO INCL CAD UNI: CPT | Mod: TC,RT

## 2022-11-17 PROCEDURE — 77065 MAMMO DIGITAL DIAGNOSTIC RIGHT WITH TOMO: ICD-10-PCS | Mod: 26,RT,, | Performed by: RADIOLOGY

## 2022-11-17 PROCEDURE — 77061 MAMMO DIGITAL DIAGNOSTIC RIGHT WITH TOMO: ICD-10-PCS | Mod: 26,RT,, | Performed by: RADIOLOGY

## 2022-11-17 PROCEDURE — 77065 DX MAMMO INCL CAD UNI: CPT | Mod: 26,RT,, | Performed by: RADIOLOGY

## 2022-11-18 DIAGNOSIS — C50.011 MALIGNANT NEOPLASM OF NIPPLE OF RIGHT BREAST IN FEMALE, UNSPECIFIED ESTROGEN RECEPTOR STATUS: Primary | ICD-10-CM

## 2022-11-19 ENCOUNTER — PES CALL (OUTPATIENT)
Dept: ADMINISTRATIVE | Facility: CLINIC | Age: 82
End: 2022-11-19
Payer: MEDICARE

## 2022-11-22 ENCOUNTER — TELEPHONE (OUTPATIENT)
Dept: HEMATOLOGY/ONCOLOGY | Facility: CLINIC | Age: 82
End: 2022-11-22
Payer: MEDICARE

## 2022-11-22 NOTE — TELEPHONE ENCOUNTER
GenSurg referral: spoke with patient. Will call after biopsy to schedule if needed.Gave scheduling desk information verbally, Patient repeats correctly.

## 2022-11-29 ENCOUNTER — HOSPITAL ENCOUNTER (OUTPATIENT)
Dept: RADIOLOGY | Facility: HOSPITAL | Age: 82
Discharge: HOME | End: 2022-11-29
Attending: INTERNAL MEDICINE
Payer: MEDICARE

## 2022-11-29 ENCOUNTER — TELEPHONE (OUTPATIENT)
Dept: HEMATOLOGY/ONCOLOGY | Facility: CLINIC | Age: 82
End: 2022-11-29
Payer: MEDICARE

## 2022-11-29 DIAGNOSIS — R92.8 ABNORMAL MAMMOGRAM OF RIGHT BREAST: ICD-10-CM

## 2022-11-29 PROCEDURE — 76098 MAMMO BREAST SPECIMEN: ICD-10-PCS | Mod: 26,59,, | Performed by: RADIOLOGY

## 2022-11-29 PROCEDURE — 76098 X-RAY EXAM SURGICAL SPECIMEN: CPT | Mod: 26,59,, | Performed by: RADIOLOGY

## 2022-11-29 PROCEDURE — 88305 TISSUE EXAM BY PATHOLOGIST: CPT | Performed by: STUDENT IN AN ORGANIZED HEALTH CARE EDUCATION/TRAINING PROGRAM

## 2022-11-29 PROCEDURE — 19081 MAMMO BREAST STEREOTACTIC BREAST BIOPSY RIGHT: ICD-10-PCS | Mod: RT,,, | Performed by: RADIOLOGY

## 2022-11-29 PROCEDURE — 25000003 PHARM REV CODE 250: Performed by: INTERNAL MEDICINE

## 2022-11-29 PROCEDURE — 88305 TISSUE EXAM BY PATHOLOGIST: ICD-10-PCS | Mod: 26,,, | Performed by: STUDENT IN AN ORGANIZED HEALTH CARE EDUCATION/TRAINING PROGRAM

## 2022-11-29 PROCEDURE — A4648 IMPLANTABLE TISSUE MARKER: HCPCS

## 2022-11-29 PROCEDURE — 19081 BX BREAST 1ST LESION STRTCTC: CPT | Mod: RT,,, | Performed by: RADIOLOGY

## 2022-11-29 PROCEDURE — 76098 X-RAY EXAM SURGICAL SPECIMEN: CPT | Mod: TC,59

## 2022-11-29 PROCEDURE — 88305 TISSUE EXAM BY PATHOLOGIST: CPT | Mod: 26,,, | Performed by: STUDENT IN AN ORGANIZED HEALTH CARE EDUCATION/TRAINING PROGRAM

## 2022-11-29 RX ADMIN — LIDOCAINE HYDROCHLORIDE 30 ML: 10; .005 INJECTION, SOLUTION EPIDURAL; INFILTRATION; INTRACAUDAL; PERINEURAL at 01:11

## 2022-11-30 ENCOUNTER — TELEPHONE (OUTPATIENT)
Dept: RADIOLOGY | Facility: HOSPITAL | Age: 82
End: 2022-11-30
Payer: MEDICARE

## 2022-11-30 NOTE — TELEPHONE ENCOUNTER
Called patient to follow up after breast biopsy done 11/29/2022. Patient reports no s/s of infection or concerns. Patient informed once results are back from pathology this nurse will call and notify her.

## 2022-12-01 ENCOUNTER — OFFICE VISIT (OUTPATIENT)
Dept: FAMILY MEDICINE | Facility: CLINIC | Age: 82
End: 2022-12-01
Payer: MEDICARE

## 2022-12-01 ENCOUNTER — TELEPHONE (OUTPATIENT)
Dept: FAMILY MEDICINE | Facility: CLINIC | Age: 82
End: 2022-12-01

## 2022-12-01 VITALS
OXYGEN SATURATION: 97 % | BODY MASS INDEX: 27.36 KG/M2 | WEIGHT: 164.25 LBS | SYSTOLIC BLOOD PRESSURE: 134 MMHG | HEIGHT: 65 IN | DIASTOLIC BLOOD PRESSURE: 76 MMHG | HEART RATE: 83 BPM | TEMPERATURE: 98 F

## 2022-12-01 DIAGNOSIS — E78.5 DYSLIPIDEMIA: ICD-10-CM

## 2022-12-01 DIAGNOSIS — I10 ESSENTIAL HYPERTENSION: Primary | ICD-10-CM

## 2022-12-01 DIAGNOSIS — R92.8 ABNORMAL MAMMOGRAM: ICD-10-CM

## 2022-12-01 LAB
COMMENT: NORMAL
FINAL PATHOLOGIC DIAGNOSIS: NORMAL
GROSS: NORMAL
Lab: NORMAL

## 2022-12-01 PROCEDURE — 1159F MED LIST DOCD IN RCRD: CPT | Mod: CPTII,S$GLB,, | Performed by: INTERNAL MEDICINE

## 2022-12-01 PROCEDURE — 3288F PR FALLS RISK ASSESSMENT DOCUMENTED: ICD-10-PCS | Mod: CPTII,S$GLB,, | Performed by: INTERNAL MEDICINE

## 2022-12-01 PROCEDURE — 90694 FLU VACCINE - QUADRIVALENT - ADJUVANTED: ICD-10-PCS | Mod: S$GLB,,, | Performed by: INTERNAL MEDICINE

## 2022-12-01 PROCEDURE — 1125F AMNT PAIN NOTED PAIN PRSNT: CPT | Mod: CPTII,S$GLB,, | Performed by: INTERNAL MEDICINE

## 2022-12-01 PROCEDURE — 99214 PR OFFICE/OUTPT VISIT, EST, LEVL IV, 30-39 MIN: ICD-10-PCS | Mod: 25,S$GLB,, | Performed by: INTERNAL MEDICINE

## 2022-12-01 PROCEDURE — G0008 ADMIN INFLUENZA VIRUS VAC: HCPCS | Mod: S$GLB,,, | Performed by: INTERNAL MEDICINE

## 2022-12-01 PROCEDURE — G0008 FLU VACCINE - QUADRIVALENT - ADJUVANTED: ICD-10-PCS | Mod: S$GLB,,, | Performed by: INTERNAL MEDICINE

## 2022-12-01 PROCEDURE — 99999 PR PBB SHADOW E&M-EST. PATIENT-LVL IV: CPT | Mod: PBBFAC,,, | Performed by: INTERNAL MEDICINE

## 2022-12-01 PROCEDURE — 3078F PR MOST RECENT DIASTOLIC BLOOD PRESSURE < 80 MM HG: ICD-10-PCS | Mod: CPTII,S$GLB,, | Performed by: INTERNAL MEDICINE

## 2022-12-01 PROCEDURE — 1101F PT FALLS ASSESS-DOCD LE1/YR: CPT | Mod: CPTII,S$GLB,, | Performed by: INTERNAL MEDICINE

## 2022-12-01 PROCEDURE — 1159F PR MEDICATION LIST DOCUMENTED IN MEDICAL RECORD: ICD-10-PCS | Mod: CPTII,S$GLB,, | Performed by: INTERNAL MEDICINE

## 2022-12-01 PROCEDURE — 3078F DIAST BP <80 MM HG: CPT | Mod: CPTII,S$GLB,, | Performed by: INTERNAL MEDICINE

## 2022-12-01 PROCEDURE — 3288F FALL RISK ASSESSMENT DOCD: CPT | Mod: CPTII,S$GLB,, | Performed by: INTERNAL MEDICINE

## 2022-12-01 PROCEDURE — 1101F PR PT FALLS ASSESS DOC 0-1 FALLS W/OUT INJ PAST YR: ICD-10-PCS | Mod: CPTII,S$GLB,, | Performed by: INTERNAL MEDICINE

## 2022-12-01 PROCEDURE — 1160F PR REVIEW ALL MEDS BY PRESCRIBER/CLIN PHARMACIST DOCUMENTED: ICD-10-PCS | Mod: CPTII,S$GLB,, | Performed by: INTERNAL MEDICINE

## 2022-12-01 PROCEDURE — 1160F RVW MEDS BY RX/DR IN RCRD: CPT | Mod: CPTII,S$GLB,, | Performed by: INTERNAL MEDICINE

## 2022-12-01 PROCEDURE — 99214 OFFICE O/P EST MOD 30 MIN: CPT | Mod: 25,S$GLB,, | Performed by: INTERNAL MEDICINE

## 2022-12-01 PROCEDURE — 1125F PR PAIN SEVERITY QUANTIFIED, PAIN PRESENT: ICD-10-PCS | Mod: CPTII,S$GLB,, | Performed by: INTERNAL MEDICINE

## 2022-12-01 PROCEDURE — 99999 PR PBB SHADOW E&M-EST. PATIENT-LVL IV: ICD-10-PCS | Mod: PBBFAC,,, | Performed by: INTERNAL MEDICINE

## 2022-12-01 PROCEDURE — 3075F PR MOST RECENT SYSTOLIC BLOOD PRESS GE 130-139MM HG: ICD-10-PCS | Mod: CPTII,S$GLB,, | Performed by: INTERNAL MEDICINE

## 2022-12-01 PROCEDURE — 3075F SYST BP GE 130 - 139MM HG: CPT | Mod: CPTII,S$GLB,, | Performed by: INTERNAL MEDICINE

## 2022-12-01 PROCEDURE — 90694 VACC AIIV4 NO PRSRV 0.5ML IM: CPT | Mod: S$GLB,,, | Performed by: INTERNAL MEDICINE

## 2022-12-01 NOTE — PROGRESS NOTES
HDF  given into left deltoid.  Vaccine verified with Raine SERRANO LPN.  2 patient identifier used. well tolerated. Instructed to wait in lobby 15 minutes post injection for safety and what s/s to notify registration with.  See Immunization or MAR for NDC, Lot, and Expiration date.

## 2022-12-01 NOTE — PROGRESS NOTES
Subjective:       Patient ID: Jing Tenorio is a 82 y.o. female.    Chief Complaint: Hypertension    Abnormal right mammogram s/p biopsy.  Pathology pending.   Breast cancer history - s/p removal, chemo, radiation.  seeing oncology     CKD III - stable with her typical pattern for the past few years ie upper 40s to 50s.  She no longer takes NSAIDS and her BP is well controlled preventing further damage.      Chronic Low back pain/ inflammatory arthropathy. Controlled with 1/2 tab tramadol bid.  Recall:  MRI showed tumor on nerve, but benign.  Has leg pain.  Failed epidural and ablation.  Saw neurosurgeon in [place] who did not want to do surgery as pt higher risk and thought may end up with chronic leg pain.  Recommended nerve stimulator.  Dr Rice had given meloxicam, which helps but pt has decreased kidney function so this was held.  Her new pain Dr, Dr Bean, offered Cymbalta? But pt scared of side affects. She also wants to avoid anything with potential addiction.  He also recommended a nerve stimulator but she is scared of this.     HTN -   controlled.       HLD - controlled    Insomnia - controlled    Thrombocytopenia - better   Atherosclerosis - no syncope    Review of Systems   Constitutional:  Negative for appetite change and fever.   HENT:  Negative for nosebleeds and trouble swallowing.    Eyes:  Negative for discharge and visual disturbance.   Respiratory:  Negative for choking and shortness of breath.    Cardiovascular:  Negative for chest pain and palpitations.   Gastrointestinal:  Negative for abdominal pain, nausea and vomiting.   Musculoskeletal:  Negative for arthralgias and joint swelling.   Skin:  Negative for rash and wound.   Neurological:  Negative for dizziness and syncope.   Psychiatric/Behavioral:  Negative for confusion and dysphoric mood.      Objective:      Vitals:    12/01/22 0957   BP: 134/76   Pulse: 83   Temp: 97.9 °F (36.6 °C)     Physical Exam  Vitals reviewed.    Constitutional:       Appearance: Normal appearance.   Eyes:      Conjunctiva/sclera: Conjunctivae normal.   Cardiovascular:      Rate and Rhythm: Normal rate and regular rhythm.   Pulmonary:      Effort: Pulmonary effort is normal.      Breath sounds: Normal breath sounds.   Musculoskeletal:      Cervical back: Normal range of motion.      Comments: Normal ROM bilateral    Skin:     General: Skin is warm and dry.   Neurological:      Mental Status: She is alert.      Cranial Nerves: No cranial nerve deficit (grossly intact).   Psychiatric:      Comments: Alert and orientated         Assessment:       1. Essential hypertension    2. Dyslipidemia    3. Abnormal mammogram          Plan:       Essential hypertension    Dyslipidemia  -     Lipid Panel; Future; Expected date: 05/30/2023    Abnormal mammogram            Medication List with Changes/Refills   Current Medications    ANASTROZOLE (ARIMIDEX) 1 MG TAB    Take 1 tablet (1 mg total) by mouth once daily.    ASCORBIC ACID, VITAMIN C, (VITAMIN C) 500 MG TABLET    Take 500 mg by mouth once daily.    B COMPLEX VITAMINS TABLET    Take 1 tablet by mouth once daily.    CETIRIZINE (ZYRTEC) 10 MG TABLET    Take 1 tablet (10 mg total) by mouth once daily.    COENZYME Q10 100 MG CAPSULE    Take 100 mg by mouth once daily.    DENOSUMAB (PROLIA) 60 MG/ML SYRG    Inject 60 mg into the skin every 6 (six) months.    FLUTICASONE PROPIONATE (FLONASE) 50 MCG/ACTUATION NASAL SPRAY    1 spray (50 mcg total) by Each Nostril route once daily.    LISINOPRIL 10 MG TABLET    2 pills in the morning and 1 pill at night    MULTIVITAMIN ORAL    once daily. Every day    OMEPRAZOLE (PRILOSEC) 40 MG CAPSULE    Take 1 capsule (40 mg total) by mouth every morning.    SIMVASTATIN (ZOCOR) 20 MG TABLET    Take 1 tablet (20 mg total) by mouth every evening.    TRAMADOL (ULTRAM) 50 MG TABLET    TAKE 1/2 TO 1 TABLET BY MOUTH EVERY 6 HOURS AS NEEDED FOR PAIN    TRAZODONE (DESYREL) 100 MG TABLET    Take  1 tablet (100 mg total) by mouth every evening.    VITAMIN D (VITAMIN D3) 1000 UNITS TAB    Take 2,000 Units by mouth once daily.    VITAMIN E 400 UNIT CAPSULE    Take 400 Units by mouth once daily.       Continue current management and monitor.    Counseled on regular exercise, maintenance of a healthy weight, balanced diet rich in fruits/vegetables and lean protein, and avoidance of unhealthy habits like smoking and excessive alcohol intake.   Also, counseled on importance of being compliant with medication, health appointments, diet and exercise.     Follow up in about 6 months (around 6/1/2023).  Annual

## 2022-12-02 ENCOUNTER — TELEPHONE (OUTPATIENT)
Dept: HEMATOLOGY/ONCOLOGY | Facility: CLINIC | Age: 82
End: 2022-12-02
Payer: MEDICARE

## 2022-12-02 ENCOUNTER — TELEPHONE (OUTPATIENT)
Dept: RADIOLOGY | Facility: HOSPITAL | Age: 82
End: 2022-12-02
Payer: MEDICARE

## 2022-12-02 ENCOUNTER — TELEPHONE (OUTPATIENT)
Dept: FAMILY MEDICINE | Facility: CLINIC | Age: 82
End: 2022-12-02

## 2022-12-02 NOTE — TELEPHONE ENCOUNTER
Pt is scheduled for f/u with .    ----- Message from Jasmin Reyes, LPN sent at 12/2/2022 10:36 AM CST -----  Patient called and notified of bening breast biopsy results. Patient informed to follow up in 6 months. Pathology charted, orders placed, will schedule in 6 months.

## 2022-12-02 NOTE — TELEPHONE ENCOUNTER
Patient called and notified of bening breast biopsy results. Patient informed to follow up in 6 months. Pathology charted, orders placed, will schedule in 6 months.

## 2022-12-02 NOTE — TELEPHONE ENCOUNTER
----- Message from Jasmin Reyes, LPN sent at 12/2/2022 10:36 AM CST -----  Patient called and notified of bening breast biopsy results. Patient informed to follow up in 6 months. Pathology charted, orders placed, will schedule in 6 months.

## 2022-12-02 NOTE — TELEPHONE ENCOUNTER
Duplicate msg.    ----- Message from Jasmin Reyes, LPN sent at 12/2/2022 10:36 AM CST -----  Patient called and notified of bening breast biopsy results. Patient informed to follow up in 6 months. Pathology charted, orders placed, will schedule in 6 months.

## 2022-12-07 ENCOUNTER — IMMUNIZATION (OUTPATIENT)
Dept: FAMILY MEDICINE | Facility: CLINIC | Age: 82
End: 2022-12-07
Payer: MEDICARE

## 2022-12-07 DIAGNOSIS — Z23 NEED FOR VACCINATION: Primary | ICD-10-CM

## 2022-12-07 PROCEDURE — 91312 COVID-19, MRNA, LNP-S, BIVALENT BOOSTER, PF, 30 MCG/0.3 ML DOSE: ICD-10-PCS | Mod: S$GLB,,, | Performed by: FAMILY MEDICINE

## 2022-12-07 PROCEDURE — 91312 COVID-19, MRNA, LNP-S, BIVALENT BOOSTER, PF, 30 MCG/0.3 ML DOSE: CPT | Mod: S$GLB,,, | Performed by: FAMILY MEDICINE

## 2022-12-07 PROCEDURE — 0124A COVID-19, MRNA, LNP-S, BIVALENT BOOSTER, PF, 30 MCG/0.3 ML DOSE: CPT | Mod: PBBFAC | Performed by: FAMILY MEDICINE

## 2022-12-09 ENCOUNTER — OFFICE VISIT (OUTPATIENT)
Dept: HEMATOLOGY/ONCOLOGY | Facility: CLINIC | Age: 82
End: 2022-12-09
Payer: MEDICARE

## 2022-12-09 VITALS
DIASTOLIC BLOOD PRESSURE: 67 MMHG | OXYGEN SATURATION: 98 % | HEIGHT: 65 IN | SYSTOLIC BLOOD PRESSURE: 147 MMHG | BODY MASS INDEX: 27.18 KG/M2 | HEART RATE: 80 BPM | TEMPERATURE: 98 F | WEIGHT: 163.13 LBS | RESPIRATION RATE: 16 BRPM

## 2022-12-09 DIAGNOSIS — Z85.9 HISTORY OF CANCER: ICD-10-CM

## 2022-12-09 DIAGNOSIS — C50.012 MALIGNANT NEOPLASM INVOLVING BOTH NIPPLE AND AREOLA OF LEFT BREAST IN FEMALE, UNSPECIFIED ESTROGEN RECEPTOR STATUS: ICD-10-CM

## 2022-12-09 DIAGNOSIS — I10 ESSENTIAL HYPERTENSION: ICD-10-CM

## 2022-12-09 DIAGNOSIS — G62.0 CHEMOTHERAPY-INDUCED NEUROPATHY: Primary | ICD-10-CM

## 2022-12-09 DIAGNOSIS — T45.1X5A CHEMOTHERAPY-INDUCED NEUROPATHY: Primary | ICD-10-CM

## 2022-12-09 DIAGNOSIS — N18.30 STAGE 3 CHRONIC KIDNEY DISEASE, UNSPECIFIED WHETHER STAGE 3A OR 3B CKD: ICD-10-CM

## 2022-12-09 DIAGNOSIS — M81.0 OSTEOPOROSIS, SENILE: ICD-10-CM

## 2022-12-09 PROCEDURE — 3077F SYST BP >= 140 MM HG: CPT | Mod: CPTII,S$GLB,, | Performed by: INTERNAL MEDICINE

## 2022-12-09 PROCEDURE — 99499 RISK ADDL DX/OHS AUDIT: ICD-10-PCS | Mod: S$GLB,,, | Performed by: INTERNAL MEDICINE

## 2022-12-09 PROCEDURE — 99999 PR PBB SHADOW E&M-EST. PATIENT-LVL III: CPT | Mod: PBBFAC,,, | Performed by: INTERNAL MEDICINE

## 2022-12-09 PROCEDURE — 99999 PR PBB SHADOW E&M-EST. PATIENT-LVL III: ICD-10-PCS | Mod: PBBFAC,,, | Performed by: INTERNAL MEDICINE

## 2022-12-09 PROCEDURE — 99214 PR OFFICE/OUTPT VISIT, EST, LEVL IV, 30-39 MIN: ICD-10-PCS | Mod: S$GLB,,, | Performed by: INTERNAL MEDICINE

## 2022-12-09 PROCEDURE — 1159F MED LIST DOCD IN RCRD: CPT | Mod: CPTII,S$GLB,, | Performed by: INTERNAL MEDICINE

## 2022-12-09 PROCEDURE — 1159F PR MEDICATION LIST DOCUMENTED IN MEDICAL RECORD: ICD-10-PCS | Mod: CPTII,S$GLB,, | Performed by: INTERNAL MEDICINE

## 2022-12-09 PROCEDURE — 1125F PR PAIN SEVERITY QUANTIFIED, PAIN PRESENT: ICD-10-PCS | Mod: CPTII,S$GLB,, | Performed by: INTERNAL MEDICINE

## 2022-12-09 PROCEDURE — 3077F PR MOST RECENT SYSTOLIC BLOOD PRESSURE >= 140 MM HG: ICD-10-PCS | Mod: CPTII,S$GLB,, | Performed by: INTERNAL MEDICINE

## 2022-12-09 PROCEDURE — 3078F PR MOST RECENT DIASTOLIC BLOOD PRESSURE < 80 MM HG: ICD-10-PCS | Mod: CPTII,S$GLB,, | Performed by: INTERNAL MEDICINE

## 2022-12-09 PROCEDURE — 99214 OFFICE O/P EST MOD 30 MIN: CPT | Mod: S$GLB,,, | Performed by: INTERNAL MEDICINE

## 2022-12-09 PROCEDURE — 1101F PT FALLS ASSESS-DOCD LE1/YR: CPT | Mod: CPTII,S$GLB,, | Performed by: INTERNAL MEDICINE

## 2022-12-09 PROCEDURE — 1125F AMNT PAIN NOTED PAIN PRSNT: CPT | Mod: CPTII,S$GLB,, | Performed by: INTERNAL MEDICINE

## 2022-12-09 PROCEDURE — 3078F DIAST BP <80 MM HG: CPT | Mod: CPTII,S$GLB,, | Performed by: INTERNAL MEDICINE

## 2022-12-09 PROCEDURE — 3288F FALL RISK ASSESSMENT DOCD: CPT | Mod: CPTII,S$GLB,, | Performed by: INTERNAL MEDICINE

## 2022-12-09 PROCEDURE — 1101F PR PT FALLS ASSESS DOC 0-1 FALLS W/OUT INJ PAST YR: ICD-10-PCS | Mod: CPTII,S$GLB,, | Performed by: INTERNAL MEDICINE

## 2022-12-09 PROCEDURE — 99499 UNLISTED E&M SERVICE: CPT | Mod: S$GLB,,, | Performed by: INTERNAL MEDICINE

## 2022-12-09 PROCEDURE — 3288F PR FALLS RISK ASSESSMENT DOCUMENTED: ICD-10-PCS | Mod: CPTII,S$GLB,, | Performed by: INTERNAL MEDICINE

## 2022-12-09 NOTE — PROGRESS NOTES
82 year-old  woman well known to me.  The patient was diagnosed n   May 2016 with triple positive breast cancer.  The patient had a 1.9 cm   malignancy of the left breast, sentinel lymph node negative in association with   DCIS.  She underwent lumpectomy, underwent adjuvant TCH chemotherapy for six   cycles   and Herceptin alone for a year, has done well, but she has   multiple other issues in association.  The patient had Echocardiogram during Herceptin that showed  showed a EF drop by 10%   from an ejection fraction of 65% to 55%.  Dr. Gray had cleared her to continue   with Herceptin and with the short-term echocardiogram follow up as the patient   had remained asymptomatic. And she successfully completed Herceptin  The patient also had some pulmonary nodules that are   being followed by a CT scan.  They are deemed to be benign, but need ongoing   followup.  She had been taking Boniva for postmenopausal osteopenia/osteoporosis   along with calcium and the patient has also had increased density on mammogram.    had  repeat mammogram, which Radiology recommends short-term   followup which was done and now she goes for annual mammogram most recent mammogram September 2020.  She receives Prolia and Arimidex      ..    PHYSICAL EXAM:   .  Wt Readings from Last 3 Encounters:   12/09/22 74 kg (163 lb 2.3 oz)   12/01/22 74.5 kg (164 lb 3.9 oz)   11/09/22 74.4 kg (164 lb)     Temp Readings from Last 3 Encounters:   12/09/22 97.7 °F (36.5 °C) (Temporal)   12/01/22 97.9 °F (36.6 °C) (Oral)   11/09/22 97 °F (36.1 °C)     BP Readings from Last 3 Encounters:   12/09/22 (!) 147/67   12/01/22 134/76   11/09/22 (!) 169/87     Pulse Readings from Last 3 Encounters:   12/09/22 80   12/01/22 83   11/09/22 75     GENERAL: Comfortable looking patient. Patient is in no distress.  Awake, alert and oriented to time, person and place.  No anxiety, or agitation.      HEENT: Normal conjunctivae and eyelids. WNL.  PERRLA 3 to 4  mm. No icterus, no pallor, no congestion, and no discharge noted.     NECK:  Supple. Trachea is central.  No crepitus.  No JVD or masses.    RESPIRATORY:  No intercostal retractions.  No dullness to percussion.  Chest is clear to auscultation.  No rales, rhonchi or wheezes.  No crepitus.  Good air entry bilaterally.    CARDIOVASCULAR:  S1 and S2 are normally heard without murmurs or gallops.  All peripheral pulses are present.    ABDOMEN:  Normal abdomen.  No hepatosplenomegaly.  No free fluid.  Bowel sounds are present.  No hernia noted. No masses.  No rebound or tenderness.  No guarding or rigidity.  Umbilicus is midline.    LYMPHATICS:  No axillary, cervical, supraclavicular, submental, or inguinal lymphadenopathy.    SKIN/MUSCULOSKELETAL:  There is no evidence of excoriation marks or ecchmosis.  No rashes.  No cyanosis.  No clubbing.  No joint or skeletal deformities noted.  Normal range of motion.    NEUROLOGIC:  Higher functions are appropriate.  No cranial nerve deficits.  Normal carolina.  Normal strength.  Motor and sensory functions are normal.  Deep tendon reflexes are normal.    GENITAL/RECTAL:  Exams are deferred.  LABORATORY EVALUATION:    Lab Results   Component Value Date    WBC 6.31 11/04/2022    HGB 13.1 11/04/2022    HCT 39.3 11/04/2022    MCV 91 11/04/2022     11/04/2022     CMP  Sodium   Date Value Ref Range Status   11/04/2022 133 (L) 136 - 145 mmol/L Final     Potassium   Date Value Ref Range Status   11/04/2022 4.8 3.5 - 5.1 mmol/L Final     Chloride   Date Value Ref Range Status   11/04/2022 97 95 - 110 mmol/L Final     CO2   Date Value Ref Range Status   11/04/2022 26 23 - 29 mmol/L Final     Glucose   Date Value Ref Range Status   11/04/2022 95 70 - 110 mg/dL Final     BUN   Date Value Ref Range Status   11/04/2022 9 8 - 23 mg/dL Final     Creatinine   Date Value Ref Range Status   11/04/2022 1.0 0.5 - 1.4 mg/dL Final   01/18/2013 0.9 0.5 - 1.4 mg/dL Final     Calcium   Date Value Ref  Range Status   11/04/2022 10.0 8.7 - 10.5 mg/dL Final   01/18/2013 8.9 8.7 - 10.5 mg/dL Final     Total Protein   Date Value Ref Range Status   11/04/2022 6.7 6.0 - 8.4 g/dL Final     Albumin   Date Value Ref Range Status   11/04/2022 4.0 3.5 - 5.2 g/dL Final     Total Bilirubin   Date Value Ref Range Status   11/04/2022 0.6 0.1 - 1.0 mg/dL Final     Comment:     For infants and newborns, interpretation of results should be based  on gestational age, weight and in agreement with clinical  observations.    Premature Infant recommended reference ranges:  Up to 24 hours.............<8.0 mg/dL  Up to 48 hours............<12.0 mg/dL  3-5 days..................<15.0 mg/dL  6-29 days.................<15.0 mg/dL       Alkaline Phosphatase   Date Value Ref Range Status   11/04/2022 67 55 - 135 U/L Final     AST   Date Value Ref Range Status   11/04/2022 37 10 - 40 U/L Final     ALT   Date Value Ref Range Status   11/04/2022 22 10 - 44 U/L Final     Anion Gap   Date Value Ref Range Status   11/04/2022 10 8 - 16 mmol/L Final   01/18/2013 9 5 - 15 meq/L Final     eGFR if    Date Value Ref Range Status   05/06/2022 54 (A) >60 mL/min/1.73 m^2 Final     eGFR if non    Date Value Ref Range Status   05/06/2022 47 (A) >60 mL/min/1.73 m^2 Final     Comment:     Calculation used to obtain the estimated glomerular filtration  rate (eGFR) is the CKD-EPI equation.        Echocardiogram, ejection fraction has dropped to 55%, but   PET scan April 27, 2021 -for metastases  CA 2729 :58.4 4./27/21 55.6 July 2021   mammo10/2021 wnl  Pet 5./2022 neg  11/2922 breast bx  Final Pathologic Diagnosis Breast, right, stereotactic-guided core needle biopsies of calcifications:   - Hyalinizing fibroadenomatoid nodules   - Columnar cell changes   - Apocrine metaplasia   - Dilated and ectatic ducts   - Coarse microcalcifications, calcium phosphate type, associated with   fibroadenomatoid nodules   - Negative for atypical  hyperplasia or carcinoma      IMPRESSION:  1.  Triple positive breast cancer 2016 , T1, N0, M0, underwent TCH chemotherapy,   Completed 1 year of herceptin, now on arimidex .      EF dropped somewhat followed by Cardiology.  This improved cards had a stress test , carotids checked  PET scan next in 5/23  Osteoporosis; .    Proceed  with prolia rtc 5/23 for next dose with cbc, cmp, [id number] next bone density due 6/2023and pet /  Mammo was [id number]/17/22  had a redo on rt side  and bx was negative for malignancy 11/29/22 next due 5/23 ( 6 months)  6.  The patient has dyslipidemia.  Continue with Zocor and primary care follow   up with Dr. Franco.  7.  Degenerative joint pains.  Continue with Ultram as needed, take trazodone   for sleep and anxiety along with Xanax.may go for pain stimulant  8.  Mild CKD.  Continue to monitor.  No intervention necessary at this time.  9.  Gastroesophageal reflux disease.  Continue with omeprazole.  No changes or   worsening of symptoms at this point    10.psoas muscle  Tumor possibly a schwannoma confirmed with DR. Tovar  Patient lives in [place] she would like to get her mammogram in the [place] area . doctor's visit infusion and labs in [place] will arrange for this       aortic athrosclerosis stable follow with  pcp   covid vaccinations completed

## 2023-01-13 ENCOUNTER — PES CALL (OUTPATIENT)
Dept: ADMINISTRATIVE | Facility: CLINIC | Age: 83
End: 2023-01-13
Payer: MEDICARE

## 2023-04-27 ENCOUNTER — TELEPHONE (OUTPATIENT)
Dept: HEMATOLOGY/ONCOLOGY | Facility: CLINIC | Age: 83
End: 2023-04-27
Payer: MEDICARE

## 2023-05-08 ENCOUNTER — HOSPITAL ENCOUNTER (OUTPATIENT)
Dept: RADIOLOGY | Facility: HOSPITAL | Age: 83
Discharge: HOME | End: 2023-05-08
Attending: INTERNAL MEDICINE
Payer: MEDICARE

## 2023-05-08 VITALS — BODY MASS INDEX: 26.66 KG/M2 | WEIGHT: 160 LBS | HEIGHT: 65 IN

## 2023-05-08 DIAGNOSIS — C50.011 MALIGNANT NEOPLASM OF NIPPLE OF RIGHT BREAST IN FEMALE, UNSPECIFIED ESTROGEN RECEPTOR STATUS: ICD-10-CM

## 2023-05-08 LAB — GLUCOSE SERPL-MCNC: 100 MG/DL (ref 70–110)

## 2023-05-08 PROCEDURE — A9552 F18 FDG: HCPCS | Mod: PO

## 2023-05-09 ENCOUNTER — OFFICE VISIT (OUTPATIENT)
Dept: HEMATOLOGY/ONCOLOGY | Facility: CLINIC | Age: 83
End: 2023-05-09
Payer: MEDICARE

## 2023-05-09 VITALS
HEART RATE: 87 BPM | DIASTOLIC BLOOD PRESSURE: 74 MMHG | TEMPERATURE: 97 F | WEIGHT: 162.69 LBS | RESPIRATION RATE: 10 BRPM | SYSTOLIC BLOOD PRESSURE: 150 MMHG | OXYGEN SATURATION: 99 % | HEIGHT: 65 IN | BODY MASS INDEX: 27.1 KG/M2

## 2023-05-09 DIAGNOSIS — I12.9 BENIGN HYPERTENSION WITH CKD (CHRONIC KIDNEY DISEASE) STAGE III: ICD-10-CM

## 2023-05-09 DIAGNOSIS — N18.30 BENIGN HYPERTENSION WITH CKD (CHRONIC KIDNEY DISEASE) STAGE III: ICD-10-CM

## 2023-05-09 DIAGNOSIS — C50.012 MALIGNANT NEOPLASM INVOLVING BOTH NIPPLE AND AREOLA OF LEFT BREAST IN FEMALE, UNSPECIFIED ESTROGEN RECEPTOR STATUS: ICD-10-CM

## 2023-05-09 DIAGNOSIS — C50.011 MALIGNANT NEOPLASM OF NIPPLE OF RIGHT BREAST IN FEMALE, UNSPECIFIED ESTROGEN RECEPTOR STATUS: ICD-10-CM

## 2023-05-09 DIAGNOSIS — N18.30 STAGE 3 CHRONIC KIDNEY DISEASE, UNSPECIFIED WHETHER STAGE 3A OR 3B CKD: ICD-10-CM

## 2023-05-09 DIAGNOSIS — M81.0 OSTEOPOROSIS, SENILE: ICD-10-CM

## 2023-05-09 DIAGNOSIS — E78.5 HYPERLIPIDEMIA LDL GOAL <130: ICD-10-CM

## 2023-05-09 DIAGNOSIS — I10 ESSENTIAL HYPERTENSION: Primary | ICD-10-CM

## 2023-05-09 DIAGNOSIS — C50.012 MALIGNANT NEOPLASM OF NIPPLE OF LEFT BREAST IN FEMALE, UNSPECIFIED ESTROGEN RECEPTOR STATUS: ICD-10-CM

## 2023-05-09 PROCEDURE — 1101F PR PT FALLS ASSESS DOC 0-1 FALLS W/OUT INJ PAST YR: ICD-10-PCS | Mod: CPTII,S$GLB,, | Performed by: INTERNAL MEDICINE

## 2023-05-09 PROCEDURE — 1101F PT FALLS ASSESS-DOCD LE1/YR: CPT | Mod: CPTII,S$GLB,, | Performed by: INTERNAL MEDICINE

## 2023-05-09 PROCEDURE — 99999 PR PBB SHADOW E&M-EST. PATIENT-LVL IV: CPT | Mod: PBBFAC,,, | Performed by: INTERNAL MEDICINE

## 2023-05-09 PROCEDURE — 99214 OFFICE O/P EST MOD 30 MIN: CPT | Mod: S$GLB,,, | Performed by: INTERNAL MEDICINE

## 2023-05-09 PROCEDURE — 99214 PR OFFICE/OUTPT VISIT, EST, LEVL IV, 30-39 MIN: ICD-10-PCS | Mod: S$GLB,,, | Performed by: INTERNAL MEDICINE

## 2023-05-09 PROCEDURE — 3077F PR MOST RECENT SYSTOLIC BLOOD PRESSURE >= 140 MM HG: ICD-10-PCS | Mod: CPTII,S$GLB,, | Performed by: INTERNAL MEDICINE

## 2023-05-09 PROCEDURE — 1158F ADVNC CARE PLAN TLK DOCD: CPT | Mod: CPTII,S$GLB,, | Performed by: INTERNAL MEDICINE

## 2023-05-09 PROCEDURE — 1125F PR PAIN SEVERITY QUANTIFIED, PAIN PRESENT: ICD-10-PCS | Mod: CPTII,S$GLB,, | Performed by: INTERNAL MEDICINE

## 2023-05-09 PROCEDURE — 3078F DIAST BP <80 MM HG: CPT | Mod: CPTII,S$GLB,, | Performed by: INTERNAL MEDICINE

## 2023-05-09 PROCEDURE — 1158F PR ADVANCE CARE PLANNING DISCUSS DOCUMENTED IN MEDICAL RECORD: ICD-10-PCS | Mod: CPTII,S$GLB,, | Performed by: INTERNAL MEDICINE

## 2023-05-09 PROCEDURE — 99999 PR PBB SHADOW E&M-EST. PATIENT-LVL IV: ICD-10-PCS | Mod: PBBFAC,,, | Performed by: INTERNAL MEDICINE

## 2023-05-09 PROCEDURE — 1159F MED LIST DOCD IN RCRD: CPT | Mod: CPTII,S$GLB,, | Performed by: INTERNAL MEDICINE

## 2023-05-09 PROCEDURE — 99499 RISK ADDL DX/OHS AUDIT: ICD-10-PCS | Mod: S$GLB,,, | Performed by: INTERNAL MEDICINE

## 2023-05-09 PROCEDURE — 3288F FALL RISK ASSESSMENT DOCD: CPT | Mod: CPTII,S$GLB,, | Performed by: INTERNAL MEDICINE

## 2023-05-09 PROCEDURE — 1159F PR MEDICATION LIST DOCUMENTED IN MEDICAL RECORD: ICD-10-PCS | Mod: CPTII,S$GLB,, | Performed by: INTERNAL MEDICINE

## 2023-05-09 PROCEDURE — 3078F PR MOST RECENT DIASTOLIC BLOOD PRESSURE < 80 MM HG: ICD-10-PCS | Mod: CPTII,S$GLB,, | Performed by: INTERNAL MEDICINE

## 2023-05-09 PROCEDURE — 99499 UNLISTED E&M SERVICE: CPT | Mod: S$GLB,,, | Performed by: INTERNAL MEDICINE

## 2023-05-09 PROCEDURE — 1125F AMNT PAIN NOTED PAIN PRSNT: CPT | Mod: CPTII,S$GLB,, | Performed by: INTERNAL MEDICINE

## 2023-05-09 PROCEDURE — 3288F PR FALLS RISK ASSESSMENT DOCUMENTED: ICD-10-PCS | Mod: CPTII,S$GLB,, | Performed by: INTERNAL MEDICINE

## 2023-05-09 PROCEDURE — 3077F SYST BP >= 140 MM HG: CPT | Mod: CPTII,S$GLB,, | Performed by: INTERNAL MEDICINE

## 2023-05-09 RX ORDER — ANASTROZOLE 1 MG/1
1 TABLET ORAL DAILY
Qty: 90 TABLET | Refills: 6 | Status: SHIPPED | OUTPATIENT
Start: 2023-05-09

## 2023-05-09 RX ORDER — ANASTROZOLE 1 MG/1
1 TABLET ORAL DAILY
Qty: 90 TABLET | Refills: 6 | Status: SHIPPED | OUTPATIENT
Start: 2023-05-09 | End: 2023-05-09 | Stop reason: SDUPTHER

## 2023-05-09 NOTE — PROGRESS NOTES
82 year-old  woman well known to me.  The patient was diagnosed n   May 2016 with triple positive breast cancer.  The patient had a 1.9 cm   malignancy of the left breast, sentinel lymph node negative in association with   DCIS.  She underwent lumpectomy, underwent adjuvant TCH chemotherapy for six   cycles   and Herceptin alone for a year, has done well, but she has   multiple other issues in association.  The patient had Echocardiogram during Herceptin that showed  showed a EF drop by 10%   from an ejection fraction of 65% to 55%.  Dr. Gray had cleared her to continue   with Herceptin and with the short-term echocardiogram follow up as the patient   had remained asymptomatic. And she successfully completed Herceptin  The patient also had some pulmonary nodules that are   being followed by a CT scan.  They are deemed to be benign, but need ongoing   followup.  She had been taking Boniva for postmenopausal osteopenia/osteoporosis   along with calcium and the patient has also had increased density on mammogram.    had  repeat mammogram, which Radiology recommends short-term   followup which was done and now she goes for annual mammogram most recent mammogram September 2020.  She receives Prolia and Arimidex      ..    PHYSICAL EXAM:   .  Wt Readings from Last 3 Encounters:   05/09/23 73.8 kg (162 lb 11.2 oz)   05/08/23 72.6 kg (160 lb)   12/09/22 74 kg (163 lb 2.3 oz)     Temp Readings from Last 3 Encounters:   05/09/23 96.8 °F (36 °C) (Temporal)   12/09/22 97.7 °F (36.5 °C) (Temporal)   12/01/22 97.9 °F (36.6 °C) (Oral)     BP Readings from Last 3 Encounters:   05/09/23 (!) 150/74   12/09/22 (!) 147/67   12/01/22 134/76     Pulse Readings from Last 3 Encounters:   05/09/23 87   12/09/22 80   12/01/22 83     GENERAL: Comfortable looking patient. Patient is in no distress.  Awake, alert and oriented to time, person and place.  No anxiety, or agitation.      HEENT: Normal conjunctivae and eyelids. WNL.   PERRLA 3 to 4 mm. No icterus, no pallor, no congestion, and no discharge noted.     NECK:  Supple. Trachea is central.  No crepitus.  No JVD or masses.    RESPIRATORY:  No intercostal retractions.  No dullness to percussion.  Chest is clear to auscultation.  No rales, rhonchi or wheezes.  No crepitus.  Good air entry bilaterally.    CARDIOVASCULAR:  S1 and S2 are normally heard without murmurs or gallops.  All peripheral pulses are present.    ABDOMEN:  Normal abdomen.  No hepatosplenomegaly.  No free fluid.  Bowel sounds are present.  No hernia noted. No masses.  No rebound or tenderness.  No guarding or rigidity.  Umbilicus is midline.    LYMPHATICS:  No axillary, cervical, supraclavicular, submental, or inguinal lymphadenopathy.    SKIN/MUSCULOSKELETAL:  There is no evidence of excoriation marks or ecchmosis.  No rashes.  No cyanosis.  No clubbing.  No joint or skeletal deformities noted.  Normal range of motion.    NEUROLOGIC:  Higher functions are appropriate.  No cranial nerve deficits.  Normal carolina.  Normal strength.  Motor and sensory functions are normal.  Deep tendon reflexes are normal.    GENITAL/RECTAL:  Exams are deferred.  LABORATORY EVALUATION:    Lab Results   Component Value Date    WBC 7.35 05/08/2023    HGB 13.4 05/08/2023    HCT 40.9 05/08/2023    MCV 93 05/08/2023     05/08/2023     CMP  Sodium   Date Value Ref Range Status   05/08/2023 136 136 - 145 mmol/L Final     Potassium   Date Value Ref Range Status   05/08/2023 4.7 3.5 - 5.1 mmol/L Final     Chloride   Date Value Ref Range Status   05/08/2023 101 95 - 110 mmol/L Final     CO2   Date Value Ref Range Status   05/08/2023 26 23 - 29 mmol/L Final     Glucose   Date Value Ref Range Status   05/08/2023 91 70 - 110 mg/dL Final     BUN   Date Value Ref Range Status   05/08/2023 11 8 - 23 mg/dL Final     Creatinine   Date Value Ref Range Status   05/08/2023 0.9 0.5 - 1.4 mg/dL Final   01/18/2013 0.9 0.5 - 1.4 mg/dL Final     Calcium   Date  Value Ref Range Status   05/08/2023 9.8 8.7 - 10.5 mg/dL Final   01/18/2013 8.9 8.7 - 10.5 mg/dL Final     Total Protein   Date Value Ref Range Status   05/08/2023 7.3 6.0 - 8.4 g/dL Final     Albumin   Date Value Ref Range Status   05/08/2023 4.2 3.5 - 5.2 g/dL Final     Total Bilirubin   Date Value Ref Range Status   05/08/2023 0.7 0.1 - 1.0 mg/dL Final     Comment:     For infants and newborns, interpretation of results should be based  on gestational age, weight and in agreement with clinical  observations.    Premature Infant recommended reference ranges:  Up to 24 hours.............<8.0 mg/dL  Up to 48 hours............<12.0 mg/dL  3-5 days..................<15.0 mg/dL  6-29 days.................<15.0 mg/dL       Alkaline Phosphatase   Date Value Ref Range Status   05/08/2023 65 55 - 135 U/L Final     AST   Date Value Ref Range Status   05/08/2023 34 10 - 40 U/L Final     ALT   Date Value Ref Range Status   05/08/2023 19 10 - 44 U/L Final     Anion Gap   Date Value Ref Range Status   05/08/2023 9 8 - 16 mmol/L Final   01/18/2013 9 5 - 15 meq/L Final     eGFR if    Date Value Ref Range Status   05/06/2022 54 (A) >60 mL/min/1.73 m^2 Final     eGFR if non    Date Value Ref Range Status   05/06/2022 47 (A) >60 mL/min/1.73 m^2 Final     Comment:     Calculation used to obtain the estimated glomerular filtration  rate (eGFR) is the CKD-EPI equation.        Echocardiogram, ejection fraction has dropped to 55%, but   PET scan April 27, 2021 -for metastases  CA 2729 :58.4 4./27/21 55.6 July 2021   mammo10/2021 wnl  Pet 5./2022 neg  11/2922 breast bx  Final Pathologic Diagnosis Breast, right, stereotactic-guided core needle biopsies of calcifications:   - Hyalinizing fibroadenomatoid nodules   - Columnar cell changes   - Apocrine metaplasia   - Dilated and ectatic ducts   - Coarse microcalcifications, calcium phosphate type, associated with   fibroadenomatoid nodules   - Negative for  atypical hyperplasia or carcinoma      IMPRESSION:  1.  Triple positive breast cancer 2016 , T1, N0, M0, underwent TCH chemotherapy,   Completed 1 year of herceptin, now on arimidex .      EF dropped somewhat followed by Cardiology.  This improved cards had a stress test , carotids checked  PET scan 5/23 neg next in a year should suffice  Osteoporosis; .    Proceed  with prolia rtc 5/23 for next dose with cbc, cmp, [id number] next bone density due 6/2023and pet /  Mammo was fezwrbtt23/17/22  had a redo on rt side  and bx was negative for malignancy 11/29/22 next due 11/23 ( 6 months). Proceed with 5/23  6.  The patient has dyslipidemia.  Continue with Zocor and primary care follow   up with Dr. Franco.  7.  Degenerative joint pains.  Continue with Ultram as needed, take trazodone   for sleep and anxiety along with Xanax.may go for pain stimulant  8.  Mild CKD.  Continue to monitor.  No intervention necessary at this time.  9.  Gastroesophageal reflux disease.  Continue with omeprazole.  No changes or   worsening of symptoms at this point    10.psoas muscle  Tumor possibly a schwannoma confirmed with DR. Tovar  Patient lives in [place] she would like to get her mammogram in the [place] area . doctor's visit infusion and labs in [place] will arrange for this       aortic athrosclerosis stable follow with  pcp   covid vaccinations completed      Advance Care Planning     Date: 05/09/2023    Power of   I initiated the process of advance care planning today and explained the importance of this process to the patient.  I introduced the concept of advance directives to the patient, as well. Then the patient received detailed information about the importance of designating a Health Care Power of  (HCPOA). She was also instructed to communicate with this person about their wishes for future healthcare, should she become sick and lose decision-making capacity.

## 2023-05-10 ENCOUNTER — INFUSION (OUTPATIENT)
Dept: INFUSION THERAPY | Facility: HOSPITAL | Age: 83
End: 2023-05-10
Attending: INTERNAL MEDICINE
Payer: MEDICARE

## 2023-05-10 VITALS
TEMPERATURE: 98 F | HEIGHT: 65 IN | RESPIRATION RATE: 18 BRPM | BODY MASS INDEX: 27.05 KG/M2 | DIASTOLIC BLOOD PRESSURE: 82 MMHG | SYSTOLIC BLOOD PRESSURE: 153 MMHG | HEART RATE: 81 BPM | OXYGEN SATURATION: 99 % | WEIGHT: 162.38 LBS

## 2023-05-10 DIAGNOSIS — M81.0 OSTEOPOROSIS, SENILE: Primary | ICD-10-CM

## 2023-05-10 DIAGNOSIS — C50.012 MALIGNANT NEOPLASM OF NIPPLE OF LEFT BREAST IN FEMALE, UNSPECIFIED ESTROGEN RECEPTOR STATUS: ICD-10-CM

## 2023-05-10 PROCEDURE — 63600175 PHARM REV CODE 636 W HCPCS: Mod: JZ,JG | Performed by: INTERNAL MEDICINE

## 2023-05-10 PROCEDURE — 96372 THER/PROPH/DIAG INJ SC/IM: CPT

## 2023-05-10 RX ADMIN — DENOSUMAB 60 MG: 60 INJECTION SUBCUTANEOUS at 10:05

## 2023-05-17 ENCOUNTER — HOSPITAL ENCOUNTER (OUTPATIENT)
Dept: RADIOLOGY | Facility: HOSPITAL | Age: 83
Discharge: HOME | End: 2023-05-17
Attending: INTERNAL MEDICINE
Payer: MEDICARE

## 2023-05-17 DIAGNOSIS — R92.8 ABNORMAL MAMMOGRAM OF RIGHT BREAST: ICD-10-CM

## 2023-05-17 DIAGNOSIS — C50.012 MALIGNANT NEOPLASM INVOLVING BOTH NIPPLE AND AREOLA OF LEFT BREAST IN FEMALE, UNSPECIFIED ESTROGEN RECEPTOR STATUS: ICD-10-CM

## 2023-05-17 PROCEDURE — 77062 BREAST TOMOSYNTHESIS BI: CPT | Mod: 26,,, | Performed by: RADIOLOGY

## 2023-05-17 PROCEDURE — 77066 MAMMO DIGITAL DIAGNOSTIC BILAT WITH TOMO: ICD-10-PCS | Mod: 26,,, | Performed by: RADIOLOGY

## 2023-05-17 PROCEDURE — 77066 DX MAMMO INCL CAD BI: CPT | Mod: TC

## 2023-05-17 PROCEDURE — 77062 MAMMO DIGITAL DIAGNOSTIC BILAT WITH TOMO: ICD-10-PCS | Mod: 26,,, | Performed by: RADIOLOGY

## 2023-05-17 PROCEDURE — 77066 DX MAMMO INCL CAD BI: CPT | Mod: 26,,, | Performed by: RADIOLOGY

## 2023-05-25 ENCOUNTER — LAB VISIT (OUTPATIENT)
Dept: LAB | Facility: HOSPITAL | Age: 83
End: 2023-05-25
Attending: INTERNAL MEDICINE
Payer: MEDICARE

## 2023-05-25 DIAGNOSIS — E78.5 DYSLIPIDEMIA: ICD-10-CM

## 2023-05-25 LAB
CHOLEST SERPL-MCNC: 163 MG/DL (ref 120–199)
CHOLEST/HDLC SERPL: 1.8 {RATIO} (ref 2–5)
HDLC SERPL-MCNC: 91 MG/DL (ref 40–75)
HDLC SERPL: 55.8 % (ref 20–50)
LDLC SERPL CALC-MCNC: 62.6 MG/DL (ref 63–159)
NONHDLC SERPL-MCNC: 72 MG/DL
TRIGL SERPL-MCNC: 47 MG/DL (ref 30–150)

## 2023-05-25 PROCEDURE — 36415 COLL VENOUS BLD VENIPUNCTURE: CPT | Performed by: INTERNAL MEDICINE

## 2023-05-25 PROCEDURE — 80061 LIPID PANEL: CPT | Performed by: INTERNAL MEDICINE

## 2023-06-01 ENCOUNTER — OFFICE VISIT (OUTPATIENT)
Dept: FAMILY MEDICINE | Facility: CLINIC | Age: 83
End: 2023-06-01
Payer: MEDICARE

## 2023-06-01 VITALS
HEIGHT: 65 IN | SYSTOLIC BLOOD PRESSURE: 124 MMHG | WEIGHT: 161.81 LBS | HEART RATE: 84 BPM | BODY MASS INDEX: 26.96 KG/M2 | TEMPERATURE: 98 F | OXYGEN SATURATION: 96 % | DIASTOLIC BLOOD PRESSURE: 78 MMHG

## 2023-06-01 DIAGNOSIS — I10 ESSENTIAL HYPERTENSION: ICD-10-CM

## 2023-06-01 DIAGNOSIS — M46.99 INFLAMMATORY SPONDYLOPATHY OF MULTIPLE SITES IN SPINE: ICD-10-CM

## 2023-06-01 DIAGNOSIS — T45.1X5A CHEMOTHERAPY-INDUCED NEUROPATHY: ICD-10-CM

## 2023-06-01 DIAGNOSIS — G89.29 OTHER CHRONIC PAIN: ICD-10-CM

## 2023-06-01 DIAGNOSIS — G47.00 INSOMNIA, UNSPECIFIED TYPE: ICD-10-CM

## 2023-06-01 DIAGNOSIS — G62.0 CHEMOTHERAPY-INDUCED NEUROPATHY: ICD-10-CM

## 2023-06-01 DIAGNOSIS — I70.0 AORTIC ATHEROSCLEROSIS: ICD-10-CM

## 2023-06-01 DIAGNOSIS — E78.5 DYSLIPIDEMIA: ICD-10-CM

## 2023-06-01 PROCEDURE — 3074F PR MOST RECENT SYSTOLIC BLOOD PRESSURE < 130 MM HG: ICD-10-PCS | Mod: CPTII,S$GLB,, | Performed by: INTERNAL MEDICINE

## 2023-06-01 PROCEDURE — 1101F PT FALLS ASSESS-DOCD LE1/YR: CPT | Mod: CPTII,S$GLB,, | Performed by: INTERNAL MEDICINE

## 2023-06-01 PROCEDURE — 1157F ADVNC CARE PLAN IN RCRD: CPT | Mod: CPTII,S$GLB,, | Performed by: INTERNAL MEDICINE

## 2023-06-01 PROCEDURE — 1159F MED LIST DOCD IN RCRD: CPT | Mod: CPTII,S$GLB,, | Performed by: INTERNAL MEDICINE

## 2023-06-01 PROCEDURE — 99397 PR PREVENTIVE VISIT,EST,65 & OVER: ICD-10-PCS | Mod: S$GLB,,, | Performed by: INTERNAL MEDICINE

## 2023-06-01 PROCEDURE — 1159F PR MEDICATION LIST DOCUMENTED IN MEDICAL RECORD: ICD-10-PCS | Mod: CPTII,S$GLB,, | Performed by: INTERNAL MEDICINE

## 2023-06-01 PROCEDURE — 1160F RVW MEDS BY RX/DR IN RCRD: CPT | Mod: CPTII,S$GLB,, | Performed by: INTERNAL MEDICINE

## 2023-06-01 PROCEDURE — 99397 PER PM REEVAL EST PAT 65+ YR: CPT | Mod: S$GLB,,, | Performed by: INTERNAL MEDICINE

## 2023-06-01 PROCEDURE — 1160F PR REVIEW ALL MEDS BY PRESCRIBER/CLIN PHARMACIST DOCUMENTED: ICD-10-PCS | Mod: CPTII,S$GLB,, | Performed by: INTERNAL MEDICINE

## 2023-06-01 PROCEDURE — 3288F FALL RISK ASSESSMENT DOCD: CPT | Mod: CPTII,S$GLB,, | Performed by: INTERNAL MEDICINE

## 2023-06-01 PROCEDURE — 3078F DIAST BP <80 MM HG: CPT | Mod: CPTII,S$GLB,, | Performed by: INTERNAL MEDICINE

## 2023-06-01 PROCEDURE — 1125F PR PAIN SEVERITY QUANTIFIED, PAIN PRESENT: ICD-10-PCS | Mod: CPTII,S$GLB,, | Performed by: INTERNAL MEDICINE

## 2023-06-01 PROCEDURE — 3074F SYST BP LT 130 MM HG: CPT | Mod: CPTII,S$GLB,, | Performed by: INTERNAL MEDICINE

## 2023-06-01 PROCEDURE — 1125F AMNT PAIN NOTED PAIN PRSNT: CPT | Mod: CPTII,S$GLB,, | Performed by: INTERNAL MEDICINE

## 2023-06-01 PROCEDURE — 3288F PR FALLS RISK ASSESSMENT DOCUMENTED: ICD-10-PCS | Mod: CPTII,S$GLB,, | Performed by: INTERNAL MEDICINE

## 2023-06-01 PROCEDURE — 99999 PR PBB SHADOW E&M-EST. PATIENT-LVL III: CPT | Mod: PBBFAC,,, | Performed by: INTERNAL MEDICINE

## 2023-06-01 PROCEDURE — 1101F PR PT FALLS ASSESS DOC 0-1 FALLS W/OUT INJ PAST YR: ICD-10-PCS | Mod: CPTII,S$GLB,, | Performed by: INTERNAL MEDICINE

## 2023-06-01 PROCEDURE — 1157F PR ADVANCE CARE PLAN OR EQUIV PRESENT IN MEDICAL RECORD: ICD-10-PCS | Mod: CPTII,S$GLB,, | Performed by: INTERNAL MEDICINE

## 2023-06-01 PROCEDURE — 99999 PR PBB SHADOW E&M-EST. PATIENT-LVL III: ICD-10-PCS | Mod: PBBFAC,,, | Performed by: INTERNAL MEDICINE

## 2023-06-01 PROCEDURE — 3078F PR MOST RECENT DIASTOLIC BLOOD PRESSURE < 80 MM HG: ICD-10-PCS | Mod: CPTII,S$GLB,, | Performed by: INTERNAL MEDICINE

## 2023-06-01 RX ORDER — TRAMADOL HYDROCHLORIDE 50 MG/1
50 TABLET ORAL EVERY 12 HOURS PRN
Qty: 60 TABLET | Refills: 5 | Status: SHIPPED | OUTPATIENT
Start: 2023-06-01 | End: 2023-11-30 | Stop reason: SDUPTHER

## 2023-06-01 RX ORDER — LISINOPRIL 10 MG/1
TABLET ORAL
Qty: 270 TABLET | Refills: 3 | Status: SHIPPED | OUTPATIENT
Start: 2023-06-01 | End: 2023-06-01 | Stop reason: SDUPTHER

## 2023-06-01 RX ORDER — LISINOPRIL 10 MG/1
TABLET ORAL
Qty: 270 TABLET | Refills: 3 | Status: SHIPPED | OUTPATIENT
Start: 2023-06-01 | End: 2023-08-01 | Stop reason: SDUPTHER

## 2023-06-01 RX ORDER — SIMVASTATIN 20 MG/1
20 TABLET, FILM COATED ORAL NIGHTLY
Qty: 90 TABLET | Refills: 3 | Status: SHIPPED | OUTPATIENT
Start: 2023-06-01 | End: 2023-06-01 | Stop reason: SDUPTHER

## 2023-06-01 RX ORDER — SIMVASTATIN 20 MG/1
20 TABLET, FILM COATED ORAL NIGHTLY
Qty: 90 TABLET | Refills: 3 | Status: SHIPPED | OUTPATIENT
Start: 2023-06-01

## 2023-06-01 RX ORDER — TRAZODONE HYDROCHLORIDE 100 MG/1
100 TABLET ORAL NIGHTLY
Qty: 90 TABLET | Refills: 3 | Status: SHIPPED | OUTPATIENT
Start: 2023-06-01

## 2023-06-01 RX ORDER — TRAMADOL HYDROCHLORIDE 50 MG/1
50 TABLET ORAL EVERY 12 HOURS PRN
Qty: 60 TABLET | Refills: 5 | Status: SHIPPED | OUTPATIENT
Start: 2023-06-01 | End: 2023-06-01 | Stop reason: SDUPTHER

## 2023-06-01 RX ORDER — TRAZODONE HYDROCHLORIDE 100 MG/1
100 TABLET ORAL NIGHTLY
Qty: 90 TABLET | Refills: 3 | Status: SHIPPED | OUTPATIENT
Start: 2023-06-01 | End: 2023-06-01 | Stop reason: SDUPTHER

## 2023-06-01 NOTE — PROGRESS NOTES
Subjective:       Patient ID: Jing Tenorio is a 82 y.o. female.  Chief Complaint: Hypertension     HPI    Here for routine health maintenance.      Breast cancer history - s/p removal, chemo, radiation.  seeing oncology      CKD III - stable with her typical pattern for the past few years ie upper 40s to 50s.  She no longer takes NSAIDS and her BP is well controlled preventing further damage.       Chronic Low back pain/ inflammatory arthropathy. Controlled with 1/2 tab tramadol bid.  Recall:  MRI showed tumor on nerve, but benign.  Has leg pain.  Failed epidural and ablation.  Saw neurosurgeon in [place] who did not want to do surgery as pt higher risk and thought may end up with chronic leg pain.  Recommended nerve stimulator.  Dr Rice had given meloxicam, which helps but pt has decreased kidney function so this was held.  Her new pain Dr, Dr Bean, offered Cymbalta? But pt scared of side affects. She also wants to avoid anything with potential addiction.  He also recommended a nerve stimulator but she is scared of this.      HTN -   controlled.       HLD - controlled     Insomnia - controlled     Thrombocytopenia - better   Atherosclerosis - no syncope       Assessment:       1. Insomnia, unspecified type    2. Dyslipidemia    3. Essential hypertension    4. Other chronic pain    5. Inflammatory spondylopathy of multiple sites in spine    6. Aortic atherosclerosis    7. Chemotherapy-induced neuropathy        Plan:       Insomnia, unspecified type  -     Discontinue: traZODone (DESYREL) 100 MG tablet; Take 1 tablet (100 mg total) by mouth every evening.  Dispense: 90 tablet; Refill: 3  -     Discontinue: traZODone (DESYREL) 100 MG tablet; Take 1 tablet (100 mg total) by mouth every evening.  Dispense: 90 tablet; Refill: 3  -     traZODone (DESYREL) 100 MG tablet; Take 1 tablet (100 mg total) by mouth every evening.  Dispense: 90 tablet; Refill: 3    Dyslipidemia  -     Discontinue: simvastatin (ZOCOR) 20  MG tablet; Take 1 tablet (20 mg total) by mouth every evening.  Dispense: 90 tablet; Refill: 3  -     Discontinue: simvastatin (ZOCOR) 20 MG tablet; Take 1 tablet (20 mg total) by mouth every evening.  Dispense: 90 tablet; Refill: 3  -     simvastatin (ZOCOR) 20 MG tablet; Take 1 tablet (20 mg total) by mouth every evening.  Dispense: 90 tablet; Refill: 3  -     Lipid Panel; Future; Expected date: 11/28/2023    Essential hypertension  -     Discontinue: lisinopriL 10 MG tablet; 2 pills in the morning and 1 pill at night  Dispense: 270 tablet; Refill: 3  -     Discontinue: lisinopriL 10 MG tablet; 2 pills in the morning and 1 pill at night  Dispense: 270 tablet; Refill: 3  -     lisinopriL 10 MG tablet; 2 pills in the morning and 1 pill at night  Dispense: 270 tablet; Refill: 3    Other chronic pain  -     Discontinue: traMADoL (ULTRAM) 50 mg tablet; Take 1 tablet (50 mg total) by mouth every 12 (twelve) hours as needed for Pain.  Dispense: 60 tablet; Refill: 5  -     traMADoL (ULTRAM) 50 mg tablet; Take 1 tablet (50 mg total) by mouth every 12 (twelve) hours as needed for Pain.  Dispense: 60 tablet; Refill: 5    Inflammatory spondylopathy of multiple sites in spine    Aortic atherosclerosis    Chemotherapy-induced neuropathy            Continue current management and monitor.  Other diagnoses were reviewed and found stable and will continue to monitor.  Counseled on regular exercise, maintenance of a healthy weight, balanced diet rich in fruits/vegetables and lean protein, and avoidance of unhealthy habits like smoking and excessive alcohol intake.   Also, counseled on importance of being compliant with medication, health appointments, diet and exercise.     Follow up in about 6 months (around 12/1/2023).      Medication List with Changes/Refills   Current Medications    ANASTROZOLE (ARIMIDEX) 1 MG TAB    Take 1 tablet (1 mg total) by mouth once daily.    ASCORBIC ACID, VITAMIN C, (VITAMIN C) 500 MG TABLET    Take  500 mg by mouth once daily.    B COMPLEX VITAMINS TABLET    Take 1 tablet by mouth once daily.    CETIRIZINE (ZYRTEC) 10 MG TABLET    Take 1 tablet (10 mg total) by mouth once daily.    COENZYME Q10 100 MG CAPSULE    Take 100 mg by mouth once daily.    DENOSUMAB (PROLIA) 60 MG/ML SYRG    Inject 60 mg into the skin every 6 (six) months.    FLUTICASONE PROPIONATE (FLONASE) 50 MCG/ACTUATION NASAL SPRAY    1 spray (50 mcg total) by Each Nostril route once daily.    MULTIVITAMIN ORAL    once daily. Every day    OMEPRAZOLE (PRILOSEC) 40 MG CAPSULE    TAKE 1 CAPSULE(40 MG) BY MOUTH EVERY MORNING    VITAMIN D (VITAMIN D3) 1000 UNITS TAB    Take 2,000 Units by mouth once daily.    VITAMIN E 400 UNIT CAPSULE    Take 400 Units by mouth once daily.   Changed and/or Refilled Medications    Modified Medication Previous Medication    LISINOPRIL 10 MG TABLET lisinopriL 10 MG tablet       2 pills in the morning and 1 pill at night    2 pills in the morning and 1 pill at night    SIMVASTATIN (ZOCOR) 20 MG TABLET simvastatin (ZOCOR) 20 MG tablet       Take 1 tablet (20 mg total) by mouth every evening.    Take 1 tablet (20 mg total) by mouth every evening.    TRAMADOL (ULTRAM) 50 MG TABLET traMADoL (ULTRAM) 50 mg tablet       Take 1 tablet (50 mg total) by mouth every 12 (twelve) hours as needed for Pain.    TAKE 1/2 TO 1 TABLET BY MOUTH EVERY 6 HOURS AS NEEDED FOR PAIN    TRAZODONE (DESYREL) 100 MG TABLET traZODone (DESYREL) 100 MG tablet       Take 1 tablet (100 mg total) by mouth every evening.    Take 1 tablet (100 mg total) by mouth every evening.       BP Readings from Last 3 Encounters:   06/01/23 124/78   05/10/23 (!) 153/82   05/09/23 (!) 150/74     Hemoglobin A1C   Date Value Ref Range Status   04/10/2006 5.6 4.5 - 6.2 % Final     Lab Results   Component Value Date    TSH 0.777 11/11/2020     Lab Results   Component Value Date    LDLCALC 62.6 (L) 05/25/2023    LDLCALC 62.6 (L) 05/16/2022    LDLCALC 68.2 11/19/2021     Lab  Results   Component Value Date    TRIG 47 05/25/2023    TRIG 67 05/16/2022    TRIG 69 11/19/2021     Wt Readings from Last 3 Encounters:   06/01/23 73.4 kg (161 lb 13.1 oz)   05/10/23 73.7 kg (162 lb 6.4 oz)   05/09/23 73.8 kg (162 lb 11.2 oz)     Lab Results   Component Value Date    HGB 13.4 05/08/2023    HCT 40.9 05/08/2023    WBC 7.35 05/08/2023    ALT 19 05/08/2023    AST 34 05/08/2023     05/08/2023    K 4.7 05/08/2023    CREATININE 0.9 05/08/2023           Review of Systems   Constitutional:  Negative for diaphoresis and fever.   HENT:  Negative for drooling and nosebleeds.    Eyes:  Negative for discharge and redness.   Respiratory:  Negative for apnea and choking.    Cardiovascular:  Negative for chest pain and palpitations.   Gastrointestinal:  Negative for abdominal pain and nausea.   Skin:  Negative for color change.   Neurological:  Negative for seizures and syncope.   Psychiatric/Behavioral:  Negative for behavioral problems.          Objective:      Vitals:    06/01/23 1254   BP: 124/78   Pulse: 84   Temp: 98.3 °F (36.8 °C)     Physical Exam  Vitals reviewed.   Constitutional:       Appearance: Normal appearance.   Eyes:      Conjunctiva/sclera: Conjunctivae normal.   Cardiovascular:      Rate and Rhythm: Normal rate.   Pulmonary:      Effort: Pulmonary effort is normal.      Breath sounds: Normal breath sounds.   Musculoskeletal:      Cervical back: Normal range of motion.      Comments: Normal ROM bilateral    Skin:     General: Skin is warm and dry.   Neurological:      Mental Status: She is alert.      Cranial Nerves: Cranial nerve deficit: grossly intact.   Psychiatric:      Comments: Alert and orientated

## 2023-06-02 ENCOUNTER — TELEPHONE (OUTPATIENT)
Dept: FAMILY MEDICINE | Facility: CLINIC | Age: 83
End: 2023-06-02
Payer: MEDICARE

## 2023-06-02 NOTE — TELEPHONE ENCOUNTER
----- Message from Macy Romano sent at 6/2/2023  9:26 AM CDT -----  Type: Needs Medical Advice  Who Called:  pt     Best Call Back Number: [phone]    Additional Information: pt is requesting an call back regarding office visit on 6/1 , pt also wants orders put in for a fasting lab , please advise

## 2023-06-02 NOTE — TELEPHONE ENCOUNTER
----- Message from Macy Romano sent at 6/2/2023  9:26 AM CDT -----  Type: Needs Medical Advice  Who Called:  pt     Best Call Back Number: [phone]    Additional Information: pt is requesting an call back regarding office visit on 6/1 , pt also wants orders put in for a fasting lab , please advise

## 2023-06-26 ENCOUNTER — HOSPITAL ENCOUNTER (OUTPATIENT)
Dept: RADIOLOGY | Facility: HOSPITAL | Age: 83
Discharge: HOME | End: 2023-06-26
Attending: INTERNAL MEDICINE
Payer: MEDICARE

## 2023-06-26 DIAGNOSIS — C50.011 MALIGNANT NEOPLASM OF NIPPLE OF RIGHT BREAST IN FEMALE, UNSPECIFIED ESTROGEN RECEPTOR STATUS: ICD-10-CM

## 2023-06-26 DIAGNOSIS — M85.89 OTHER SPECIFIED DISORDERS OF BONE DENSITY AND STRUCTURE, MULTIPLE SITES: ICD-10-CM

## 2023-06-26 PROCEDURE — 77080 DXA BONE DENSITY AXIAL: CPT | Mod: TC,PO

## 2023-06-28 ENCOUNTER — TELEPHONE (OUTPATIENT)
Dept: HEMATOLOGY/ONCOLOGY | Facility: CLINIC | Age: 83
End: 2023-06-28
Payer: MEDICARE

## 2023-06-28 NOTE — TELEPHONE ENCOUNTER
----- Message from Mechelle Benedict, Patient Care Assistant sent at 6/28/2023  1:14 PM CDT -----  Type: Needs Medical Advice  Who Called:  jing  [place] Call Back Number: [phone]      Additional Information:  Jing  wants to know the results of her bone DENSITY , please call to further discuss, thanks

## 2023-08-01 DIAGNOSIS — I10 ESSENTIAL HYPERTENSION: ICD-10-CM

## 2023-08-01 NOTE — TELEPHONE ENCOUNTER
No care due was identified.  [facility] Embedded Care Due Messages. Reference number: [id number].   8/01/2023 3:00:53 PM CDT

## 2023-08-02 RX ORDER — LISINOPRIL 10 MG/1
TABLET ORAL
Qty: 270 TABLET | Refills: 3 | Status: SHIPPED | OUTPATIENT
Start: 2023-08-02

## 2023-11-03 ENCOUNTER — TELEPHONE (OUTPATIENT)
Dept: HEMATOLOGY/ONCOLOGY | Facility: CLINIC | Age: 83
End: 2023-11-03
Payer: MEDICARE

## 2023-11-08 ENCOUNTER — LAB VISIT (OUTPATIENT)
Dept: LAB | Facility: HOSPITAL | Age: 83
End: 2023-11-08
Attending: INTERNAL MEDICINE
Payer: MEDICARE

## 2023-11-08 DIAGNOSIS — C50.012 MALIGNANT NEOPLASM INVOLVING BOTH NIPPLE AND AREOLA OF LEFT BREAST IN FEMALE, UNSPECIFIED ESTROGEN RECEPTOR STATUS: ICD-10-CM

## 2023-11-08 DIAGNOSIS — I10 ESSENTIAL HYPERTENSION: ICD-10-CM

## 2023-11-08 DIAGNOSIS — E78.5 HYPERLIPIDEMIA LDL GOAL <130: ICD-10-CM

## 2023-11-08 DIAGNOSIS — C50.012 MALIGNANT NEOPLASM OF NIPPLE OF LEFT BREAST IN FEMALE, UNSPECIFIED ESTROGEN RECEPTOR STATUS: ICD-10-CM

## 2023-11-08 LAB
ALBUMIN SERPL BCP-MCNC: 3.8 G/DL (ref 3.5–5.2)
ALP SERPL-CCNC: 87 U/L (ref 55–135)
ALT SERPL W/O P-5'-P-CCNC: 19 U/L (ref 10–44)
ANION GAP SERPL CALC-SCNC: 10 MMOL/L (ref 8–16)
AST SERPL-CCNC: 33 U/L (ref 10–40)
BASOPHILS # BLD AUTO: 0.06 K/UL (ref 0–0.2)
BASOPHILS NFR BLD: 1 % (ref 0–1.9)
BILIRUB SERPL-MCNC: 0.5 MG/DL (ref 0.1–1)
BUN SERPL-MCNC: 12 MG/DL (ref 8–23)
CALCIUM SERPL-MCNC: 9.9 MG/DL (ref 8.7–10.5)
CHLORIDE SERPL-SCNC: 95 MMOL/L (ref 95–110)
CO2 SERPL-SCNC: 27 MMOL/L (ref 23–29)
CREAT SERPL-MCNC: 1 MG/DL (ref 0.5–1.4)
DIFFERENTIAL METHOD: ABNORMAL
EOSINOPHIL # BLD AUTO: 0.1 K/UL (ref 0–0.5)
EOSINOPHIL NFR BLD: 1.1 % (ref 0–8)
ERYTHROCYTE [DISTWIDTH] IN BLOOD BY AUTOMATED COUNT: 15.3 % (ref 11.5–14.5)
EST. GFR  (NO RACE VARIABLE): 55 ML/MIN/1.73 M^2
GLUCOSE SERPL-MCNC: 94 MG/DL (ref 70–110)
HCT VFR BLD AUTO: 37.2 % (ref 37–48.5)
HGB BLD-MCNC: 12.5 G/DL (ref 12–16)
IMM GRANULOCYTES # BLD AUTO: 0.02 K/UL (ref 0–0.04)
IMM GRANULOCYTES NFR BLD AUTO: 0.3 % (ref 0–0.5)
LYMPHOCYTES # BLD AUTO: 0.5 K/UL (ref 1–4.8)
LYMPHOCYTES NFR BLD: 7.9 % (ref 18–48)
MCH RBC QN AUTO: 29.6 PG (ref 27–31)
MCHC RBC AUTO-ENTMCNC: 33.6 G/DL (ref 32–36)
MCV RBC AUTO: 88 FL (ref 82–98)
MONOCYTES # BLD AUTO: 0.5 K/UL (ref 0.3–1)
MONOCYTES NFR BLD: 8 % (ref 4–15)
NEUTROPHILS # BLD AUTO: 5 K/UL (ref 1.8–7.7)
NEUTROPHILS NFR BLD: 81.7 % (ref 38–73)
NRBC BLD-RTO: 0 /100 WBC
PLATELET # BLD AUTO: 396 K/UL (ref 150–450)
PMV BLD AUTO: 9.3 FL (ref 9.2–12.9)
POTASSIUM SERPL-SCNC: 4.7 MMOL/L (ref 3.5–5.1)
PROT SERPL-MCNC: 7.1 G/DL (ref 6–8.4)
RBC # BLD AUTO: 4.23 M/UL (ref 4–5.4)
SODIUM SERPL-SCNC: 132 MMOL/L (ref 136–145)
WBC # BLD AUTO: 6.09 K/UL (ref 3.9–12.7)

## 2023-11-08 PROCEDURE — 80053 COMPREHEN METABOLIC PANEL: CPT | Performed by: INTERNAL MEDICINE

## 2023-11-08 PROCEDURE — 36415 COLL VENOUS BLD VENIPUNCTURE: CPT | Performed by: INTERNAL MEDICINE

## 2023-11-08 PROCEDURE — 86300 IMMUNOASSAY TUMOR CA 15-3: CPT | Mod: 91 | Performed by: INTERNAL MEDICINE

## 2023-11-08 PROCEDURE — 86300 IMMUNOASSAY TUMOR CA 15-3: CPT | Performed by: INTERNAL MEDICINE

## 2023-11-08 PROCEDURE — 85025 COMPLETE CBC W/AUTO DIFF WBC: CPT | Performed by: INTERNAL MEDICINE

## 2023-11-09 LAB — CANCER AG15-3 SERPL IA-ACNC: 48 U/ML

## 2023-11-10 ENCOUNTER — OFFICE VISIT (OUTPATIENT)
Dept: HEMATOLOGY/ONCOLOGY | Facility: CLINIC | Age: 83
End: 2023-11-10
Payer: MEDICARE

## 2023-11-10 ENCOUNTER — INFUSION (OUTPATIENT)
Dept: INFUSION THERAPY | Facility: HOSPITAL | Age: 83
End: 2023-11-10
Attending: INTERNAL MEDICINE
Payer: MEDICARE

## 2023-11-10 VITALS
HEART RATE: 75 BPM | RESPIRATION RATE: 15 BRPM | BODY MASS INDEX: 25.68 KG/M2 | HEIGHT: 66 IN | TEMPERATURE: 97 F | SYSTOLIC BLOOD PRESSURE: 170 MMHG | OXYGEN SATURATION: 100 % | WEIGHT: 159.81 LBS | DIASTOLIC BLOOD PRESSURE: 85 MMHG

## 2023-11-10 VITALS
SYSTOLIC BLOOD PRESSURE: 184 MMHG | TEMPERATURE: 97 F | BODY MASS INDEX: 25.68 KG/M2 | OXYGEN SATURATION: 100 % | HEART RATE: 75 BPM | HEIGHT: 66 IN | RESPIRATION RATE: 12 BRPM | WEIGHT: 159.81 LBS | DIASTOLIC BLOOD PRESSURE: 84 MMHG

## 2023-11-10 DIAGNOSIS — M81.0 OSTEOPOROSIS, SENILE: Primary | ICD-10-CM

## 2023-11-10 DIAGNOSIS — C50.012 MALIGNANT NEOPLASM OF NIPPLE OF LEFT BREAST IN FEMALE, UNSPECIFIED ESTROGEN RECEPTOR STATUS: ICD-10-CM

## 2023-11-10 DIAGNOSIS — C50.012 MALIGNANT NEOPLASM INVOLVING BOTH NIPPLE AND AREOLA OF LEFT BREAST IN FEMALE, UNSPECIFIED ESTROGEN RECEPTOR STATUS: Primary | ICD-10-CM

## 2023-11-10 DIAGNOSIS — E78.5 HYPERLIPIDEMIA LDL GOAL <130: ICD-10-CM

## 2023-11-10 DIAGNOSIS — N18.30 BENIGN HYPERTENSION WITH CKD (CHRONIC KIDNEY DISEASE) STAGE III: ICD-10-CM

## 2023-11-10 DIAGNOSIS — I12.9 BENIGN HYPERTENSION WITH CKD (CHRONIC KIDNEY DISEASE) STAGE III: ICD-10-CM

## 2023-11-10 DIAGNOSIS — I10 ESSENTIAL HYPERTENSION: ICD-10-CM

## 2023-11-10 LAB — CANCER AG27-29 SERPL-ACNC: 59.5 U/ML

## 2023-11-10 PROCEDURE — 3077F SYST BP >= 140 MM HG: CPT | Mod: HCNC,CPTII,S$GLB, | Performed by: INTERNAL MEDICINE

## 2023-11-10 PROCEDURE — 1101F PR PT FALLS ASSESS DOC 0-1 FALLS W/OUT INJ PAST YR: ICD-10-PCS | Mod: HCNC,CPTII,S$GLB, | Performed by: INTERNAL MEDICINE

## 2023-11-10 PROCEDURE — 99214 OFFICE O/P EST MOD 30 MIN: CPT | Mod: HCNC,S$GLB,, | Performed by: INTERNAL MEDICINE

## 2023-11-10 PROCEDURE — 1157F ADVNC CARE PLAN IN RCRD: CPT | Mod: HCNC,CPTII,S$GLB, | Performed by: INTERNAL MEDICINE

## 2023-11-10 PROCEDURE — 63600175 PHARM REV CODE 636 W HCPCS: Mod: JZ,JG | Performed by: INTERNAL MEDICINE

## 2023-11-10 PROCEDURE — 1101F PT FALLS ASSESS-DOCD LE1/YR: CPT | Mod: HCNC,CPTII,S$GLB, | Performed by: INTERNAL MEDICINE

## 2023-11-10 PROCEDURE — 99999 PR PBB SHADOW E&M-EST. PATIENT-LVL V: CPT | Mod: PBBFAC,HCNC,, | Performed by: INTERNAL MEDICINE

## 2023-11-10 PROCEDURE — 1157F PR ADVANCE CARE PLAN OR EQUIV PRESENT IN MEDICAL RECORD: ICD-10-PCS | Mod: HCNC,CPTII,S$GLB, | Performed by: INTERNAL MEDICINE

## 2023-11-10 PROCEDURE — 3079F PR MOST RECENT DIASTOLIC BLOOD PRESSURE 80-89 MM HG: ICD-10-PCS | Mod: HCNC,CPTII,S$GLB, | Performed by: INTERNAL MEDICINE

## 2023-11-10 PROCEDURE — 1159F PR MEDICATION LIST DOCUMENTED IN MEDICAL RECORD: ICD-10-PCS | Mod: HCNC,CPTII,S$GLB, | Performed by: INTERNAL MEDICINE

## 2023-11-10 PROCEDURE — 3288F FALL RISK ASSESSMENT DOCD: CPT | Mod: HCNC,CPTII,S$GLB, | Performed by: INTERNAL MEDICINE

## 2023-11-10 PROCEDURE — 99214 PR OFFICE/OUTPT VISIT, EST, LEVL IV, 30-39 MIN: ICD-10-PCS | Mod: HCNC,S$GLB,, | Performed by: INTERNAL MEDICINE

## 2023-11-10 PROCEDURE — 3077F PR MOST RECENT SYSTOLIC BLOOD PRESSURE >= 140 MM HG: ICD-10-PCS | Mod: HCNC,CPTII,S$GLB, | Performed by: INTERNAL MEDICINE

## 2023-11-10 PROCEDURE — 99999 PR PBB SHADOW E&M-EST. PATIENT-LVL V: ICD-10-PCS | Mod: PBBFAC,HCNC,, | Performed by: INTERNAL MEDICINE

## 2023-11-10 PROCEDURE — 96372 THER/PROPH/DIAG INJ SC/IM: CPT

## 2023-11-10 PROCEDURE — 1125F PR PAIN SEVERITY QUANTIFIED, PAIN PRESENT: ICD-10-PCS | Mod: HCNC,CPTII,S$GLB, | Performed by: INTERNAL MEDICINE

## 2023-11-10 PROCEDURE — 3288F PR FALLS RISK ASSESSMENT DOCUMENTED: ICD-10-PCS | Mod: HCNC,CPTII,S$GLB, | Performed by: INTERNAL MEDICINE

## 2023-11-10 PROCEDURE — 3079F DIAST BP 80-89 MM HG: CPT | Mod: HCNC,CPTII,S$GLB, | Performed by: INTERNAL MEDICINE

## 2023-11-10 PROCEDURE — 1125F AMNT PAIN NOTED PAIN PRSNT: CPT | Mod: HCNC,CPTII,S$GLB, | Performed by: INTERNAL MEDICINE

## 2023-11-10 PROCEDURE — 1159F MED LIST DOCD IN RCRD: CPT | Mod: HCNC,CPTII,S$GLB, | Performed by: INTERNAL MEDICINE

## 2023-11-10 RX ADMIN — DENOSUMAB 60 MG: 60 INJECTION SUBCUTANEOUS at 11:11

## 2023-11-10 NOTE — PROGRESS NOTES
83 year-old  woman well known to me.  The patient was diagnosed n   May 2016 with triple positive breast cancer.  The patient had a 1.9 cm   malignancy of the left breast, sentinel lymph node negative in association with   DCIS.  She underwent lumpectomy, underwent adjuvant TCH chemotherapy for six   cycles   and Herceptin alone for a year, has done well, but she has   multiple other issues in association.  The patient had Echocardiogram during Herceptin that showed  showed a EF drop by 10%   from an ejection fraction of 65% to 55%.  Dr. Gray had cleared her to continue   with Herceptin and with the short-term echocardiogram follow up as the patient   had remained asymptomatic. And she successfully completed Herceptin  The patient also had some pulmonary nodules that are   being followed by a CT scan.  They are deemed to be benign, but need ongoing   followup.  She had been taking Boniva for postmenopausal osteopenia/osteoporosis   along with calcium and the patient has also had increased density on mammogram.    had  repeat mammogram, which Radiology recommends short-term   followup which was done and now she goes for annual mammogram most recent mammogram September 2020.  She receives Prolia and Arimidex      ..    PHYSICAL EXAM:   .  Wt Readings from Last 3 Encounters:   11/10/23 72.5 kg (159 lb 13.3 oz)   06/01/23 73.4 kg (161 lb 13.1 oz)   05/10/23 73.7 kg (162 lb 6.4 oz)     Temp Readings from Last 3 Encounters:   11/10/23 97.1 °F (36.2 °C) (Temporal)   06/01/23 98.3 °F (36.8 °C) (Oral)   05/10/23 98.1 °F (36.7 °C)     BP Readings from Last 3 Encounters:   11/10/23 (!) 184/84   06/01/23 124/78   05/10/23 (!) 153/82     Pulse Readings from Last 3 Encounters:   11/10/23 75   06/01/23 84   05/10/23 81     GENERAL: Comfortable looking patient. Patient is in no distress.  Awake, alert and oriented to time, person and place.  No anxiety, or agitation.      HEENT: Normal conjunctivae and eyelids. WNL.   PERRLA 3 to 4 mm. No icterus, no pallor, no congestion, and no discharge noted.     NECK:  Supple. Trachea is central.  No crepitus.  No JVD or masses.    RESPIRATORY:  No intercostal retractions.  No dullness to percussion.  Chest is clear to auscultation.  No rales, rhonchi or wheezes.  No crepitus.  Good air entry bilaterally.    CARDIOVASCULAR:  S1 and S2 are normally heard without murmurs or gallops.  All peripheral pulses are present.    ABDOMEN:  Normal abdomen.  No hepatosplenomegaly.  No free fluid.  Bowel sounds are present.  No hernia noted. No masses.  No rebound or tenderness.  No guarding or rigidity.  Umbilicus is midline.    LYMPHATICS:  No axillary, cervical, supraclavicular, submental, or inguinal lymphadenopathy.    SKIN/MUSCULOSKELETAL:  There is no evidence of excoriation marks or ecchmosis.  No rashes.  No cyanosis.  No clubbing.  No joint or skeletal deformities noted.  Normal range of motion.    NEUROLOGIC:  Higher functions are appropriate.  No cranial nerve deficits.  Normal [place].  Normal strength.  Motor and sensory functions are normal.  Deep tendon reflexes are normal.    GENITAL/RECTAL:  Exams are deferred.  LABORATORY EVALUATION:    Lab Results   Component Value Date    WBC 6.09 11/08/2023    HGB 12.5 11/08/2023    HCT 37.2 11/08/2023    MCV 88 11/08/2023     11/08/2023     CMP  Sodium   Date Value Ref Range Status   11/08/2023 132 (L) 136 - 145 mmol/L Final     Potassium   Date Value Ref Range Status   11/08/2023 4.7 3.5 - 5.1 mmol/L Final     Chloride   Date Value Ref Range Status   11/08/2023 95 95 - 110 mmol/L Final     CO2   Date Value Ref Range Status   11/08/2023 27 23 - 29 mmol/L Final     Glucose   Date Value Ref Range Status   11/08/2023 94 70 - 110 mg/dL Final     BUN   Date Value Ref Range Status   11/08/2023 12 8 - 23 mg/dL Final     Creatinine   Date Value Ref Range Status   11/08/2023 1.0 0.5 - 1.4 mg/dL Final   01/18/2013 0.9 0.5 - 1.4 mg/dL Final     Calcium    Date Value Ref Range Status   11/08/2023 9.9 8.7 - 10.5 mg/dL Final   01/18/2013 8.9 8.7 - 10.5 mg/dL Final     Total Protein   Date Value Ref Range Status   11/08/2023 7.1 6.0 - 8.4 g/dL Final     Albumin   Date Value Ref Range Status   11/08/2023 3.8 3.5 - 5.2 g/dL Final     Total Bilirubin   Date Value Ref Range Status   11/08/2023 0.5 0.1 - 1.0 mg/dL Final     Comment:     For infants and newborns, interpretation of results should be based  on gestational age, weight and in agreement with clinical  observations.    Premature Infant recommended reference ranges:  Up to 24 hours.............<8.0 mg/dL  Up to 48 hours............<12.0 mg/dL  3-5 days..................<15.0 mg/dL  6-29 days.................<15.0 mg/dL       Alkaline Phosphatase   Date Value Ref Range Status   11/08/2023 87 55 - 135 U/L Final     AST   Date Value Ref Range Status   11/08/2023 33 10 - 40 U/L Final     ALT   Date Value Ref Range Status   11/08/2023 19 10 - 44 U/L Final     Anion Gap   Date Value Ref Range Status   11/08/2023 10 8 - 16 mmol/L Final   01/18/2013 9 5 - 15 meq/L Final     eGFR if    Date Value Ref Range Status   05/06/2022 54 (A) >60 mL/min/1.73 m^2 Final     eGFR if non    Date Value Ref Range Status   05/06/2022 47 (A) >60 mL/min/1.73 m^2 Final     Comment:     Calculation used to obtain the estimated glomerular filtration  rate (eGFR) is the CKD-EPI equation.        Echocardiogram, ejection fraction has dropped to 55%, but   PET scan April 27, 2021 -for metastases  CA 2729 :58.4 4./27/21 55.6 July 2021   mammo10/2021 wnl  Pet 5./2022 neg  11/2922 breast bx  Final Pathologic Diagnosis Breast, right, stereotactic-guided core needle biopsies of calcifications:   - Hyalinizing fibroadenomatoid nodules   - Columnar cell changes   - Apocrine metaplasia   - Dilated and ectatic ducts   - Coarse microcalcifications, calcium phosphate type, associated with   fibroadenomatoid nodules   - Negative  for atypical hyperplasia or carcinoma      IMPRESSION:  1.  Triple positive breast cancer 2016 , T1, N0, M0, underwent TCH chemotherapy,   Completed 1 year of herceptin, now on arimidex .      EF dropped somewhat followed by Cardiology.  This improved cards had a stress test , carotids checked  PET scan 5/23 neg next in a year should suffice  Osteoporosis; .    Proceed  with prolia rtc 11/23 for next dose with cbc, cmp, [id number] next bone density due 6/2025 and pet 5/24  Mammo was [id number]/17/22  had a redo on rt side  and bx was negative for malignancy 11/29/22 next due 11/23 ( 6 months).   6.  The patient has dyslipidemia.  Continue with Zocor and primary care follow   up with Dr. Franco.  7.  Degenerative joint pains.  Continue with Ultram as needed, take trazodone   for sleep and anxiety along with Xanax.may go for pain stimulant  8.  Mild CKD.  Continue to monitor.  No intervention necessary at this time.  9.  Gastroesophageal reflux disease.  Continue with omeprazole.  No changes or   worsening of symptoms at this point    10.psoas muscle  Tumor possibly a schwannoma confirmed with DR. Tovar  Patient lives in [place] she would like to get her mammogram in the [place] area . doctor's visit infusion and labs in [place] will arrange for this       aortic athrosclerosis stable follow with  pcp   covid vaccinations completed      Advance Care Planning     Date: 05/09/2023    Power of   I initiated the process of advance care planning today and explained the importance of this process to the patient.  I introduced the concept of advance directives to the patient, as well. Then the patient received detailed information about the importance of designating a Health Care Power of  (HCPOA). She was also instructed to communicate with this person about their wishes for future healthcare, should she become sick and lose decision-making capacity.

## 2023-11-10 NOTE — PLAN OF CARE
Problem: Fall Injury Risk  Goal: Absence of Fall and Fall-Related Injury  Outcome: Ongoing, Progressing  Intervention: Identify and Manage Contributors  Flowsheets (Taken 11/10/2023 1121)  Self-Care Promotion:   independence encouraged   BADL personal objects within reach   safe use of adaptive equipment encouraged  Medication Review/Management: medications reviewed  Intervention: Promote Injury-Free Environment  Flowsheets (Taken 11/10/2023 1121)  Safety Promotion/Fall Prevention: medications reviewed

## 2023-11-30 DIAGNOSIS — G89.29 OTHER CHRONIC PAIN: ICD-10-CM

## 2023-11-30 RX ORDER — TRAMADOL HYDROCHLORIDE 50 MG/1
50 TABLET ORAL EVERY 12 HOURS PRN
Qty: 60 TABLET | Refills: 0 | Status: SHIPPED | OUTPATIENT
Start: 2023-11-30 | End: 2023-12-05 | Stop reason: SDUPTHER

## 2023-11-30 NOTE — TELEPHONE ENCOUNTER
No care due was identified.  [facility] Embedded Care Due Messages. Reference number: [id number].   11/30/2023 11:17:36 AM CST

## 2023-12-01 ENCOUNTER — LAB VISIT (OUTPATIENT)
Dept: LAB | Facility: HOSPITAL | Age: 83
End: 2023-12-01
Attending: INTERNAL MEDICINE
Payer: MEDICARE

## 2023-12-01 DIAGNOSIS — E78.5 DYSLIPIDEMIA: ICD-10-CM

## 2023-12-01 LAB
CHOLEST SERPL-MCNC: 161 MG/DL (ref 120–199)
CHOLEST/HDLC SERPL: 1.8 {RATIO} (ref 2–5)
HDLC SERPL-MCNC: 89 MG/DL (ref 40–75)
HDLC SERPL: 55.3 % (ref 20–50)
LDLC SERPL CALC-MCNC: 63 MG/DL (ref 63–159)
NONHDLC SERPL-MCNC: 72 MG/DL
TRIGL SERPL-MCNC: 45 MG/DL (ref 30–150)

## 2023-12-01 PROCEDURE — 80061 LIPID PANEL: CPT | Performed by: INTERNAL MEDICINE

## 2023-12-01 PROCEDURE — 36415 COLL VENOUS BLD VENIPUNCTURE: CPT | Performed by: INTERNAL MEDICINE

## 2023-12-05 ENCOUNTER — OFFICE VISIT (OUTPATIENT)
Dept: FAMILY MEDICINE | Facility: CLINIC | Age: 83
End: 2023-12-05
Payer: MEDICARE

## 2023-12-05 VITALS
TEMPERATURE: 98 F | HEIGHT: 66 IN | HEART RATE: 86 BPM | WEIGHT: 162.06 LBS | DIASTOLIC BLOOD PRESSURE: 80 MMHG | BODY MASS INDEX: 26.05 KG/M2 | OXYGEN SATURATION: 99 % | SYSTOLIC BLOOD PRESSURE: 138 MMHG

## 2023-12-05 DIAGNOSIS — E78.5 DYSLIPIDEMIA: ICD-10-CM

## 2023-12-05 DIAGNOSIS — I10 ESSENTIAL HYPERTENSION: Primary | ICD-10-CM

## 2023-12-05 DIAGNOSIS — Z23 NEED FOR VACCINATION: ICD-10-CM

## 2023-12-05 DIAGNOSIS — G89.29 OTHER CHRONIC PAIN: ICD-10-CM

## 2023-12-05 PROCEDURE — 3075F SYST BP GE 130 - 139MM HG: CPT | Mod: HCNC,CPTII,S$GLB, | Performed by: INTERNAL MEDICINE

## 2023-12-05 PROCEDURE — 1160F RVW MEDS BY RX/DR IN RCRD: CPT | Mod: HCNC,CPTII,S$GLB, | Performed by: INTERNAL MEDICINE

## 2023-12-05 PROCEDURE — 3079F DIAST BP 80-89 MM HG: CPT | Mod: HCNC,CPTII,S$GLB, | Performed by: INTERNAL MEDICINE

## 2023-12-05 PROCEDURE — 1101F PT FALLS ASSESS-DOCD LE1/YR: CPT | Mod: HCNC,CPTII,S$GLB, | Performed by: INTERNAL MEDICINE

## 2023-12-05 PROCEDURE — G0008 FLU VACCINE - QUADRIVALENT - ADJUVANTED: ICD-10-PCS | Mod: HCNC,S$GLB,, | Performed by: INTERNAL MEDICINE

## 2023-12-05 PROCEDURE — 1101F PR PT FALLS ASSESS DOC 0-1 FALLS W/OUT INJ PAST YR: ICD-10-PCS | Mod: HCNC,CPTII,S$GLB, | Performed by: INTERNAL MEDICINE

## 2023-12-05 PROCEDURE — 1159F PR MEDICATION LIST DOCUMENTED IN MEDICAL RECORD: ICD-10-PCS | Mod: HCNC,CPTII,S$GLB, | Performed by: INTERNAL MEDICINE

## 2023-12-05 PROCEDURE — 1159F MED LIST DOCD IN RCRD: CPT | Mod: HCNC,CPTII,S$GLB, | Performed by: INTERNAL MEDICINE

## 2023-12-05 PROCEDURE — 1160F PR REVIEW ALL MEDS BY PRESCRIBER/CLIN PHARMACIST DOCUMENTED: ICD-10-PCS | Mod: HCNC,CPTII,S$GLB, | Performed by: INTERNAL MEDICINE

## 2023-12-05 PROCEDURE — 1125F PR PAIN SEVERITY QUANTIFIED, PAIN PRESENT: ICD-10-PCS | Mod: HCNC,CPTII,S$GLB, | Performed by: INTERNAL MEDICINE

## 2023-12-05 PROCEDURE — 99999 PR PBB SHADOW E&M-EST. PATIENT-LVL IV: CPT | Mod: PBBFAC,HCNC,, | Performed by: INTERNAL MEDICINE

## 2023-12-05 PROCEDURE — 3075F PR MOST RECENT SYSTOLIC BLOOD PRESS GE 130-139MM HG: ICD-10-PCS | Mod: HCNC,CPTII,S$GLB, | Performed by: INTERNAL MEDICINE

## 2023-12-05 PROCEDURE — 3288F PR FALLS RISK ASSESSMENT DOCUMENTED: ICD-10-PCS | Mod: HCNC,CPTII,S$GLB, | Performed by: INTERNAL MEDICINE

## 2023-12-05 PROCEDURE — 99214 PR OFFICE/OUTPT VISIT, EST, LEVL IV, 30-39 MIN: ICD-10-PCS | Mod: HCNC,S$GLB,, | Performed by: INTERNAL MEDICINE

## 2023-12-05 PROCEDURE — 99999 PR PBB SHADOW E&M-EST. PATIENT-LVL IV: ICD-10-PCS | Mod: PBBFAC,HCNC,, | Performed by: INTERNAL MEDICINE

## 2023-12-05 PROCEDURE — 90694 FLU VACCINE - QUADRIVALENT - ADJUVANTED: ICD-10-PCS | Mod: HCNC,S$GLB,, | Performed by: INTERNAL MEDICINE

## 2023-12-05 PROCEDURE — 3079F PR MOST RECENT DIASTOLIC BLOOD PRESSURE 80-89 MM HG: ICD-10-PCS | Mod: HCNC,CPTII,S$GLB, | Performed by: INTERNAL MEDICINE

## 2023-12-05 PROCEDURE — 3288F FALL RISK ASSESSMENT DOCD: CPT | Mod: HCNC,CPTII,S$GLB, | Performed by: INTERNAL MEDICINE

## 2023-12-05 PROCEDURE — 99214 OFFICE O/P EST MOD 30 MIN: CPT | Mod: HCNC,S$GLB,, | Performed by: INTERNAL MEDICINE

## 2023-12-05 PROCEDURE — 1125F AMNT PAIN NOTED PAIN PRSNT: CPT | Mod: HCNC,CPTII,S$GLB, | Performed by: INTERNAL MEDICINE

## 2023-12-05 PROCEDURE — 1157F PR ADVANCE CARE PLAN OR EQUIV PRESENT IN MEDICAL RECORD: ICD-10-PCS | Mod: HCNC,CPTII,S$GLB, | Performed by: INTERNAL MEDICINE

## 2023-12-05 PROCEDURE — 1157F ADVNC CARE PLAN IN RCRD: CPT | Mod: HCNC,CPTII,S$GLB, | Performed by: INTERNAL MEDICINE

## 2023-12-05 PROCEDURE — 90694 VACC AIIV4 NO PRSRV 0.5ML IM: CPT | Mod: HCNC,S$GLB,, | Performed by: INTERNAL MEDICINE

## 2023-12-05 PROCEDURE — G0008 ADMIN INFLUENZA VIRUS VAC: HCPCS | Mod: HCNC,S$GLB,, | Performed by: INTERNAL MEDICINE

## 2023-12-05 RX ORDER — TRAMADOL HYDROCHLORIDE 50 MG/1
50 TABLET ORAL EVERY 12 HOURS PRN
Qty: 60 TABLET | Refills: 5 | Status: SHIPPED | OUTPATIENT
Start: 2023-12-26

## 2023-12-05 NOTE — PROGRESS NOTES
Subjective:       Patient ID: Jing Tenorio is a 83 y.o. female.  Chief Complaint: Hypertension     HPI    Here for routine health maintenance.       Breast cancer history - s/p removal, chemo, radiation.  seeing oncology      CKD III - stable with her typical pattern for the past few years ie upper 40s to 50s.  She no longer takes NSAIDS and her BP is well controlled preventing further damage.       Chronic Low back pain/ inflammatory arthropathy. Controlled with 1/2 tab tramadol bid.  Recall:  MRI showed tumor on nerve, but benign.  Has leg pain.  Failed epidural and ablation.  Saw neurosurgeon in [place] who did not want to do surgery as pt higher risk and thought may end up with chronic leg pain.  Recommended nerve stimulator.  Dr Rice had given meloxicam, which helps but pt has decreased kidney function so this was held.  Her new pain Dr, Dr Bean, offered Cymbalta? But pt scared of side affects. She also wants to avoid anything with potential addiction.  He also recommended a nerve stimulator but she is scared of this.      HTN -   controlled.       HLD - controlled     Insomnia - controlled     Thrombocytopenia - better   Atherosclerosis - no syncope    Assessment:       1. Routine physical examination    2. Essential hypertension    3. Dyslipidemia    4. Other chronic pain    5. Need for vaccination        Plan:       Routine physical examination    Essential hypertension    Dyslipidemia  -     Lipid Panel; Future; Expected date: 06/02/2024    Other chronic pain  -     traMADoL (ULTRAM) 50 mg tablet; Take 1 tablet (50 mg total) by mouth every 12 (twelve) hours as needed for Pain.  Dispense: 60 tablet; Refill: 5    Need for vaccination  -     Influenza - Quadrivalent (Adjuvanted)            Wellness reviewed          Continue current management and monitor.  Other diagnoses were reviewed and found stable and will continue to monitor.  Counseled on regular exercise, maintenance of a healthy weight,  balanced diet rich in fruits/vegetables and lean protein, and avoidance of unhealthy habits like smoking and excessive alcohol intake.   Also, counseled on importance of being compliant with medication, health appointments, diet and exercise.     Follow up in about 6 months (around 6/5/2024).      Medication List with Changes/Refills   Current Medications    ANASTROZOLE (ARIMIDEX) 1 MG TAB    Take 1 tablet (1 mg total) by mouth once daily.    ASCORBIC ACID, VITAMIN C, (VITAMIN C) 500 MG TABLET    Take 500 mg by mouth once daily.    B COMPLEX VITAMINS TABLET    Take 1 tablet by mouth once daily.    CETIRIZINE (ZYRTEC) 10 MG TABLET    Take 1 tablet (10 mg total) by mouth once daily.    COENZYME Q10 100 MG CAPSULE    Take 100 mg by mouth once daily.    DENOSUMAB (PROLIA) 60 MG/ML SYRG    Inject 60 mg into the skin every 6 (six) months.    FLUTICASONE PROPIONATE (FLONASE) 50 MCG/ACTUATION NASAL SPRAY    1 spray (50 mcg total) by Each Nostril route once daily.    LISINOPRIL 10 MG TABLET    2 pills in the morning and 1 pill at night    MULTIVITAMIN ORAL    once daily. Every day    OMEPRAZOLE (PRILOSEC) 40 MG CAPSULE    TAKE 1 CAPSULE(40 MG) BY MOUTH EVERY MORNING    SIMVASTATIN (ZOCOR) 20 MG TABLET    Take 1 tablet (20 mg total) by mouth every evening.    TRAZODONE (DESYREL) 100 MG TABLET    Take 1 tablet (100 mg total) by mouth every evening.    VITAMIN D (VITAMIN D3) 1000 UNITS TAB    Take 2,000 Units by mouth once daily.    VITAMIN E 400 UNIT CAPSULE    Take 400 Units by mouth once daily.   Changed and/or Refilled Medications    Modified Medication Previous Medication    TRAMADOL (ULTRAM) 50 MG TABLET traMADoL (ULTRAM) 50 mg tablet       Take 1 tablet (50 mg total) by mouth every 12 (twelve) hours as needed for Pain.    Take 1 tablet (50 mg total) by mouth every 12 (twelve) hours as needed for Pain.       BP Readings from Last 3 Encounters:   12/05/23 138/80   11/10/23 (!) 170/85   11/10/23 (!) 184/84     Hemoglobin  A1C   Date Value Ref Range Status   04/10/2006 5.6 4.5 - 6.2 % Final     Lab Results   Component Value Date    TSH 0.777 11/11/2020     Lab Results   Component Value Date    LDLCALC 63.0 12/01/2023    LDLCALC 62.6 (L) 05/25/2023    LDLCALC 62.6 (L) 05/16/2022     Lab Results   Component Value Date    TRIG 45 12/01/2023    TRIG 47 05/25/2023    TRIG 67 05/16/2022     Wt Readings from Last 3 Encounters:   12/05/23 73.5 kg (162 lb 0.6 oz)   11/10/23 72.5 kg (159 lb 13.3 oz)   11/10/23 72.5 kg (159 lb 13.3 oz)     Lab Results   Component Value Date    HGB 12.5 11/08/2023    HCT 37.2 11/08/2023    WBC 6.09 11/08/2023    ALT 19 11/08/2023    AST 33 11/08/2023     (L) 11/08/2023    K 4.7 11/08/2023    CREATININE 1.0 11/08/2023           Review of Systems        Objective:      Vitals:    12/05/23 1309   BP: 138/80   Pulse: 86   Temp: 97.6 °F (36.4 °C)     Physical Exam  Vitals reviewed.   Constitutional:       Appearance: Normal appearance.   Eyes:      Conjunctiva/sclera: Conjunctivae normal.   Cardiovascular:      Rate and Rhythm: Normal rate.   Pulmonary:      Effort: Pulmonary effort is normal.      Breath sounds: Normal breath sounds.   Musculoskeletal:      Cervical back: Normal range of motion.      Comments: Normal ROM bilateral    Skin:     General: Skin is warm and dry.   Neurological:      Mental Status: She is alert.      Cranial Nerves: Cranial nerve deficit: grossly intact.   Psychiatric:      Comments: Alert and orientated

## 2023-12-07 ENCOUNTER — HOSPITAL ENCOUNTER (OUTPATIENT)
Dept: RADIOLOGY | Facility: HOSPITAL | Age: 83
Discharge: HOME | End: 2023-12-07
Attending: INTERNAL MEDICINE
Payer: MEDICARE

## 2023-12-07 DIAGNOSIS — C50.012 MALIGNANT NEOPLASM INVOLVING BOTH NIPPLE AND AREOLA OF LEFT BREAST IN FEMALE, UNSPECIFIED ESTROGEN RECEPTOR STATUS: ICD-10-CM

## 2023-12-07 PROCEDURE — 77066 MAMMO DIGITAL DIAGNOSTIC BILAT WITH TOMO: ICD-10-PCS | Mod: 26,,, | Performed by: RADIOLOGY

## 2023-12-07 PROCEDURE — 77062 MAMMO DIGITAL DIAGNOSTIC BILAT WITH TOMO: ICD-10-PCS | Mod: 26,,, | Performed by: RADIOLOGY

## 2023-12-07 PROCEDURE — 77066 DX MAMMO INCL CAD BI: CPT | Mod: 26,,, | Performed by: RADIOLOGY

## 2023-12-07 PROCEDURE — 77066 DX MAMMO INCL CAD BI: CPT | Mod: TC

## 2023-12-07 PROCEDURE — 77062 BREAST TOMOSYNTHESIS BI: CPT | Mod: 26,,, | Performed by: RADIOLOGY

## 2024-01-09 ENCOUNTER — OFFICE VISIT (OUTPATIENT)
Dept: HOME HEALTH SERVICES | Facility: CLINIC | Age: 84
End: 2024-01-09
Payer: MEDICARE

## 2024-01-09 VITALS
DIASTOLIC BLOOD PRESSURE: 89 MMHG | BODY MASS INDEX: 25.24 KG/M2 | SYSTOLIC BLOOD PRESSURE: 148 MMHG | HEART RATE: 88 BPM | OXYGEN SATURATION: 98 % | WEIGHT: 154 LBS

## 2024-01-09 DIAGNOSIS — I70.0 AORTIC ATHEROSCLEROSIS: ICD-10-CM

## 2024-01-09 DIAGNOSIS — G62.0 CHEMOTHERAPY-INDUCED NEUROPATHY: ICD-10-CM

## 2024-01-09 DIAGNOSIS — Z99.89 DEPENDENCE ON OTHER ENABLING MACHINES AND DEVICES: ICD-10-CM

## 2024-01-09 DIAGNOSIS — G47.00 INSOMNIA, UNSPECIFIED TYPE: ICD-10-CM

## 2024-01-09 DIAGNOSIS — C50.012 MALIGNANT NEOPLASM INVOLVING BOTH NIPPLE AND AREOLA OF LEFT BREAST IN FEMALE, UNSPECIFIED ESTROGEN RECEPTOR STATUS: ICD-10-CM

## 2024-01-09 DIAGNOSIS — Z00.00 ENCOUNTER FOR PREVENTIVE HEALTH EXAMINATION: Primary | ICD-10-CM

## 2024-01-09 DIAGNOSIS — I10 ESSENTIAL HYPERTENSION: ICD-10-CM

## 2024-01-09 DIAGNOSIS — M81.0 OSTEOPOROSIS, SENILE: ICD-10-CM

## 2024-01-09 DIAGNOSIS — T45.1X5A CHEMOTHERAPY-INDUCED NEUROPATHY: ICD-10-CM

## 2024-01-09 DIAGNOSIS — E78.5 HYPERLIPIDEMIA LDL GOAL <130: ICD-10-CM

## 2024-01-09 DIAGNOSIS — M46.99 INFLAMMATORY SPONDYLOPATHY OF MULTIPLE SITES IN SPINE: ICD-10-CM

## 2024-01-09 DIAGNOSIS — K21.9 GASTROESOPHAGEAL REFLUX DISEASE WITHOUT ESOPHAGITIS: ICD-10-CM

## 2024-01-09 PROCEDURE — 1157F ADVNC CARE PLAN IN RCRD: CPT | Mod: CPTII,S$GLB,, | Performed by: NURSE PRACTITIONER

## 2024-01-09 PROCEDURE — 3077F SYST BP >= 140 MM HG: CPT | Mod: CPTII,S$GLB,, | Performed by: NURSE PRACTITIONER

## 2024-01-09 PROCEDURE — 1125F AMNT PAIN NOTED PAIN PRSNT: CPT | Mod: CPTII,S$GLB,, | Performed by: NURSE PRACTITIONER

## 2024-01-09 PROCEDURE — 1170F FXNL STATUS ASSESSED: CPT | Mod: CPTII,S$GLB,, | Performed by: NURSE PRACTITIONER

## 2024-01-09 PROCEDURE — 3079F DIAST BP 80-89 MM HG: CPT | Mod: CPTII,S$GLB,, | Performed by: NURSE PRACTITIONER

## 2024-01-09 PROCEDURE — 1159F MED LIST DOCD IN RCRD: CPT | Mod: CPTII,S$GLB,, | Performed by: NURSE PRACTITIONER

## 2024-01-09 PROCEDURE — G0439 PPPS, SUBSEQ VISIT: HCPCS | Mod: S$GLB,,, | Performed by: NURSE PRACTITIONER

## 2024-01-09 PROCEDURE — 3288F FALL RISK ASSESSMENT DOCD: CPT | Mod: CPTII,S$GLB,, | Performed by: NURSE PRACTITIONER

## 2024-01-09 PROCEDURE — 1101F PT FALLS ASSESS-DOCD LE1/YR: CPT | Mod: CPTII,S$GLB,, | Performed by: NURSE PRACTITIONER

## 2024-01-09 PROCEDURE — 1160F RVW MEDS BY RX/DR IN RCRD: CPT | Mod: CPTII,S$GLB,, | Performed by: NURSE PRACTITIONER

## 2024-01-09 NOTE — PATIENT INSTRUCTIONS
Counseling and Referral of Other Preventative  (Italic type indicates deductible and co-insurance are waived)    Patient Name: Jing Tenorio  Today's Date: 1/9/2024    Health Maintenance       Date Due Completion Date    Shingles Vaccine (1 of 2) Never done ---    RSV Vaccine (Age 60+ and Pregnant patients) (1 - 1-dose 60+ series) Never done ---    COVID-19 Vaccine (6 - 2023-24 season) 09/01/2023 12/7/2022    DEXA Scan 06/26/2026 6/26/2023    TETANUS VACCINE 07/18/2027 7/18/2017    Lipid Panel 12/01/2028 12/1/2023        No orders of the defined types were placed in this encounter.    The following information is provided to all patients.  This information is to help you find resources for any of the problems found today that may be affecting your health:                  Living healthy guide: www.[facility].[place].[facility]      Understanding Diabetes: www.diabetes.org      Eating healthy: www.cdc.gov/healthyweight      [facility] home safety checklist: www.cdc.gov/steadi/patient.html      Agency on Aging: www.goea.[place].[facility]      Alcoholics anonymous (AA): www.aa.org      Physical Activity: www.ashwini.nih.gov/up1rbyj      Tobacco use: www.quitwithusla.org

## 2024-03-23 DIAGNOSIS — K21.9 GASTROESOPHAGEAL REFLUX DISEASE, UNSPECIFIED WHETHER ESOPHAGITIS PRESENT: ICD-10-CM

## 2024-03-23 NOTE — TELEPHONE ENCOUNTER
No care due was identified.  [facility] Embedded Care Due Messages. Reference number: [id number].   3/23/2024 8:53:36 AM CDT

## 2024-03-25 RX ORDER — OMEPRAZOLE 40 MG/1
CAPSULE, DELAYED RELEASE ORAL
Qty: 90 CAPSULE | Refills: 2 | Status: SHIPPED | OUTPATIENT
Start: 2024-03-25

## 2024-03-25 NOTE — TELEPHONE ENCOUNTER
Refill Decision Note   Jing Jona  is requesting a refill authorization.  Brief Assessment and Rationale for Refill:  Approve     Medication Therapy Plan:         Comments:     Note composed:4:35 AM 03/25/2024

## 2024-04-12 ENCOUNTER — OFFICE VISIT (OUTPATIENT)
Dept: FAMILY MEDICINE | Facility: CLINIC | Age: 84
End: 2024-04-12
Payer: MEDICARE

## 2024-04-12 VITALS
BODY MASS INDEX: 25.51 KG/M2 | OXYGEN SATURATION: 96 % | SYSTOLIC BLOOD PRESSURE: 130 MMHG | HEART RATE: 86 BPM | DIASTOLIC BLOOD PRESSURE: 84 MMHG | WEIGHT: 155.63 LBS

## 2024-04-12 DIAGNOSIS — R42 VERTIGO: ICD-10-CM

## 2024-04-12 DIAGNOSIS — H65.93 MIDDLE EAR EFFUSION, BILATERAL: Primary | ICD-10-CM

## 2024-04-12 PROCEDURE — 3075F SYST BP GE 130 - 139MM HG: CPT | Mod: CPTII,S$GLB,,

## 2024-04-12 PROCEDURE — 3288F FALL RISK ASSESSMENT DOCD: CPT | Mod: CPTII,S$GLB,,

## 2024-04-12 PROCEDURE — 1159F MED LIST DOCD IN RCRD: CPT | Mod: CPTII,S$GLB,,

## 2024-04-12 PROCEDURE — 3079F DIAST BP 80-89 MM HG: CPT | Mod: CPTII,S$GLB,,

## 2024-04-12 PROCEDURE — 1157F ADVNC CARE PLAN IN RCRD: CPT | Mod: CPTII,S$GLB,,

## 2024-04-12 PROCEDURE — 1101F PT FALLS ASSESS-DOCD LE1/YR: CPT | Mod: CPTII,S$GLB,,

## 2024-04-12 PROCEDURE — 99213 OFFICE O/P EST LOW 20 MIN: CPT | Mod: S$GLB,,,

## 2024-04-12 PROCEDURE — 99999 PR PBB SHADOW E&M-EST. PATIENT-LVL III: CPT | Mod: PBBFAC,,,

## 2024-04-12 RX ORDER — FLUTICASONE PROPIONATE 50 MCG
1 SPRAY, SUSPENSION (ML) NASAL DAILY
Qty: 9.9 ML | Refills: 0 | Status: SHIPPED | OUTPATIENT
Start: 2024-04-12

## 2024-04-12 RX ORDER — CETIRIZINE HYDROCHLORIDE 10 MG/1
10 TABLET ORAL DAILY
Qty: 30 TABLET | Refills: 0 | Status: SHIPPED | OUTPATIENT
Start: 2024-04-12 | End: 2024-06-14

## 2024-04-12 NOTE — PROGRESS NOTES
"Name: Jing Tenorio  MRN: [id number]  : 1940  PCP: Nakul Franco MD    HPI    Patient follows with Dr. Franco, new to me. She reports dizziness over the last 3 days. She complains of episodic dizziness that lasts for a few seconds and then subsided. She does state she has "double vision" when she has the dizziness that quickly subsided. She does report dizziness is started after certain head movements. She denies any hearing changes. She reports having recent URI infection with congestion and cough. Upper respiratory symptoms have since subsided. She denies any headaches.     Review of Systems   Constitutional:  Negative for fatigue and fever.   Respiratory:  Positive for cough. Negative for shortness of breath.    Cardiovascular:  Negative for chest pain and palpitations.   Gastrointestinal:  Negative for nausea and vomiting.   Neurological:  Positive for dizziness. Negative for headaches.       Patient Active Problem List   Diagnosis    DDD (degenerative disc disease), lumbar    Spondylolisthesis    Hyperlipidemia LDL goal <130    Chronic pain due to trauma    Insomnia    Chronic kidney disease, stage III (moderate)    Essential hypertension    Malignant neoplasm involving both nipple and areola of left breast in female    Pulmonary nodules    Gastroesophageal reflux disease    Osteoarthritis of both knees    Malignant neoplasm of left breast    Osteoporosis, senile    Aortic atherosclerosis    Chronic bilateral low back pain with left-sided sciatica    Physical debility    Lumbar spondylosis    History of cancer    Lumbar radiculopathy    Partial oculomotor nerve palsy    Pain in lower limb    Partial third nerve palsy    Hip pain    Inflammatory spondylopathy of multiple sites in spine    Benign hypertension with CKD (chronic kidney disease) stage III    Stage 3 chronic kidney disease    Generalized OA    Postural kyphosis of thoracic region    BMI 29.0-29.9,adult    Stress incontinence    " Chemotherapy-induced neuropathy    Long term (current) use of aromatase inhibitors       Vitals:    04/12/24 1140   BP: 130/84   Pulse: 86       Physical Exam  Constitutional:       General: She is not in acute distress.     Appearance: She is well-developed.   HENT:      Head: Normocephalic and atraumatic.      Right Ear: External ear normal. A middle ear effusion is present.      Left Ear: External ear normal. A middle ear effusion is present.   Eyes:      Conjunctiva/sclera: Conjunctivae normal.      Pupils: Pupils are equal, round, and reactive to light.   Neck:      Thyroid: No thyromegaly.   Cardiovascular:      Rate and Rhythm: Normal rate and regular rhythm.      Pulses: Normal pulses.      Heart sounds: Normal heart sounds, S1 normal and S2 normal.   Pulmonary:      Effort: Pulmonary effort is normal. No respiratory distress.      Breath sounds: Normal breath sounds.   Chest:      Chest wall: No tenderness.   Musculoskeletal:         General: No swelling or tenderness. Normal range of motion.      Cervical back: Normal range of motion and neck supple.   Skin:     General: Skin is warm and dry.      Coloration: Skin is not jaundiced or pale.   Neurological:      General: No focal deficit present.      Mental Status: She is alert and oriented to person, place, and time.      Cranial Nerves: No cranial nerve deficit.   Psychiatric:         Mood and Affect: Mood normal.         Behavior: Behavior normal.         1. Middle ear effusion, bilateral  -     cetirizine (ZYRTEC) 10 MG tablet; Take 1 tablet (10 mg total) by mouth once daily.  Dispense: 30 tablet; Refill: 0  -     fluticasone propionate (FLONASE) 50 mcg/actuation nasal spray; 1 spray (50 mcg total) by Each Nostril route once daily.  Dispense: 9.9 mL; Refill: 0    2. Vertigo  Likely secondary to viral URI. Encouraged to follow up via Mychart/telephone in 2 weeks if symptoms persist       Follow up as needed      JOCELYNE Mares  04/12/2024

## 2024-04-22 ENCOUNTER — TELEPHONE (OUTPATIENT)
Dept: OTOLARYNGOLOGY | Facility: CLINIC | Age: 84
End: 2024-04-22
Payer: MEDICARE

## 2024-04-22 NOTE — TELEPHONE ENCOUNTER
----- Message from Rachelle Jacques sent at 4/22/2024  9:18 AM CDT -----  Contact: Self  Type:  Sooner Appointment Request    Caller is requesting a sooner appointment.  Caller declined first available appointment listed below.  Caller will not accept being placed on the waitlist and is requesting a message be sent to doctor.    Name of Caller:  Self  When is the first available appointment?  5/29/24  Symptoms:  dizzy fluid in ear  Would the patient rather a call back or a response via [facility]? call  Best Call Back Number: [phone]  Additional Information:  Please call the patient back at the phone number listed above to advise. Thank you!

## 2024-04-22 NOTE — TELEPHONE ENCOUNTER
1st attempt to contact pt per call back msg received for scheduling.  No answer; LVM to call back office.  Unable to send portal msg due to inactivation.

## 2024-05-03 ENCOUNTER — TELEPHONE (OUTPATIENT)
Dept: HEMATOLOGY/ONCOLOGY | Facility: CLINIC | Age: 84
End: 2024-05-03
Payer: MEDICARE

## 2024-05-03 NOTE — TELEPHONE ENCOUNTER
Called pt to verify dental precautions. No answer. Left vm.    ----- Message from Stephenie Max sent at 5/3/2024  8:43 AM CDT -----  Please review patient's Appointment Desk for an upcoming PROLIA INJECTION appointment.       The following are needed for this appointment.   Reminding to please contact patient, if needed:      ORDER.  Current / active in the Epic Therapy Plan, signed within a year.  LABS. Serum Calcium within 6 weeks of treatment.    DEXA SCAN within the last 2 years   DENTAL PRECAUTION CONFIRMED WITH PATIENT. No recent invasive dental procedures within the last month (tooth extraction, etc). A patient will need to bring a Dental Clearance signed by a dental provider if there was any recent dental procedure within the last month.   FOLLOW-UP APPOINTMENT within the last 12 months with the diagnosis mentioned in the visit notes.         Any item unavailable by the day prior to the scheduled appointment will cause the appointment to be CANCELLED.        To reschedule appointments, please contact [facility] INFUSION SCHEDULING POOL or call [phone].       Thank you,  [facility], Infusion Department

## 2024-05-14 ENCOUNTER — LAB VISIT (OUTPATIENT)
Dept: LAB | Facility: HOSPITAL | Age: 84
End: 2024-05-14
Attending: INTERNAL MEDICINE
Payer: MEDICARE

## 2024-05-14 DIAGNOSIS — C50.012 MALIGNANT NEOPLASM INVOLVING BOTH NIPPLE AND AREOLA OF LEFT BREAST IN FEMALE, UNSPECIFIED ESTROGEN RECEPTOR STATUS: ICD-10-CM

## 2024-05-14 DIAGNOSIS — E78.5 DYSLIPIDEMIA: ICD-10-CM

## 2024-05-14 LAB
ALBUMIN SERPL BCP-MCNC: 3.6 G/DL (ref 3.5–5.2)
ALP SERPL-CCNC: 82 U/L (ref 55–135)
ALT SERPL W/O P-5'-P-CCNC: 17 U/L (ref 10–44)
ANION GAP SERPL CALC-SCNC: 10 MMOL/L (ref 8–16)
AST SERPL-CCNC: 31 U/L (ref 10–40)
BASOPHILS # BLD AUTO: 0.07 K/UL (ref 0–0.2)
BASOPHILS NFR BLD: 0.9 % (ref 0–1.9)
BILIRUB SERPL-MCNC: 0.5 MG/DL (ref 0.1–1)
BUN SERPL-MCNC: 10 MG/DL (ref 8–23)
CALCIUM SERPL-MCNC: 10.1 MG/DL (ref 8.7–10.5)
CHLORIDE SERPL-SCNC: 95 MMOL/L (ref 95–110)
CHOLEST SERPL-MCNC: 167 MG/DL (ref 120–199)
CHOLEST/HDLC SERPL: 2 {RATIO} (ref 2–5)
CO2 SERPL-SCNC: 25 MMOL/L (ref 23–29)
CREAT SERPL-MCNC: 1 MG/DL (ref 0.5–1.4)
DIFFERENTIAL METHOD BLD: ABNORMAL
EOSINOPHIL # BLD AUTO: 0.1 K/UL (ref 0–0.5)
EOSINOPHIL NFR BLD: 0.7 % (ref 0–8)
ERYTHROCYTE [DISTWIDTH] IN BLOOD BY AUTOMATED COUNT: 14.8 % (ref 11.5–14.5)
EST. GFR  (NO RACE VARIABLE): 55 ML/MIN/1.73 M^2
GLUCOSE SERPL-MCNC: 87 MG/DL (ref 70–110)
HCT VFR BLD AUTO: 39.5 % (ref 37–48.5)
HDLC SERPL-MCNC: 82 MG/DL (ref 40–75)
HDLC SERPL: 49.1 % (ref 20–50)
HGB BLD-MCNC: 13.2 G/DL (ref 12–16)
IMM GRANULOCYTES # BLD AUTO: 0.04 K/UL (ref 0–0.04)
IMM GRANULOCYTES NFR BLD AUTO: 0.5 % (ref 0–0.5)
LDLC SERPL CALC-MCNC: 75.8 MG/DL (ref 63–159)
LYMPHOCYTES # BLD AUTO: 0.6 K/UL (ref 1–4.8)
LYMPHOCYTES NFR BLD: 8.1 % (ref 18–48)
MCH RBC QN AUTO: 29.6 PG (ref 27–31)
MCHC RBC AUTO-ENTMCNC: 33.4 G/DL (ref 32–36)
MCV RBC AUTO: 89 FL (ref 82–98)
MONOCYTES # BLD AUTO: 0.5 K/UL (ref 0.3–1)
MONOCYTES NFR BLD: 6.6 % (ref 4–15)
NEUTROPHILS # BLD AUTO: 6.4 K/UL (ref 1.8–7.7)
NEUTROPHILS NFR BLD: 83.2 % (ref 38–73)
NONHDLC SERPL-MCNC: 85 MG/DL
NRBC BLD-RTO: 0 /100 WBC
PLATELET # BLD AUTO: 401 K/UL (ref 150–450)
PMV BLD AUTO: 9.6 FL (ref 9.2–12.9)
POTASSIUM SERPL-SCNC: 4.4 MMOL/L (ref 3.5–5.1)
PROT SERPL-MCNC: 6.9 G/DL (ref 6–8.4)
RBC # BLD AUTO: 4.46 M/UL (ref 4–5.4)
SODIUM SERPL-SCNC: 130 MMOL/L (ref 136–145)
TRIGL SERPL-MCNC: 46 MG/DL (ref 30–150)
WBC # BLD AUTO: 7.68 K/UL (ref 3.9–12.7)

## 2024-05-14 PROCEDURE — 80053 COMPREHEN METABOLIC PANEL: CPT | Performed by: INTERNAL MEDICINE

## 2024-05-14 PROCEDURE — 36415 COLL VENOUS BLD VENIPUNCTURE: CPT | Performed by: INTERNAL MEDICINE

## 2024-05-14 PROCEDURE — 85025 COMPLETE CBC W/AUTO DIFF WBC: CPT | Performed by: INTERNAL MEDICINE

## 2024-05-14 PROCEDURE — 80061 LIPID PANEL: CPT | Performed by: INTERNAL MEDICINE

## 2024-05-14 PROCEDURE — 86300 IMMUNOASSAY TUMOR CA 15-3: CPT | Performed by: INTERNAL MEDICINE

## 2024-05-15 LAB — CANCER AG27-29 SERPL-ACNC: 61.6 U/ML

## 2024-05-17 ENCOUNTER — OFFICE VISIT (OUTPATIENT)
Dept: HEMATOLOGY/ONCOLOGY | Facility: CLINIC | Age: 84
End: 2024-05-17
Payer: MEDICARE

## 2024-05-17 ENCOUNTER — INFUSION (OUTPATIENT)
Dept: INFUSION THERAPY | Facility: HOSPITAL | Age: 84
End: 2024-05-17
Attending: INTERNAL MEDICINE
Payer: MEDICARE

## 2024-05-17 VITALS
BODY MASS INDEX: 25.97 KG/M2 | SYSTOLIC BLOOD PRESSURE: 150 MMHG | DIASTOLIC BLOOD PRESSURE: 70 MMHG | OXYGEN SATURATION: 97 % | HEIGHT: 65 IN | WEIGHT: 155.88 LBS | HEART RATE: 83 BPM | TEMPERATURE: 97 F | RESPIRATION RATE: 12 BRPM

## 2024-05-17 VITALS
HEIGHT: 65 IN | TEMPERATURE: 97 F | DIASTOLIC BLOOD PRESSURE: 70 MMHG | SYSTOLIC BLOOD PRESSURE: 150 MMHG | WEIGHT: 155.88 LBS | BODY MASS INDEX: 25.97 KG/M2 | OXYGEN SATURATION: 97 % | HEART RATE: 83 BPM | RESPIRATION RATE: 18 BRPM

## 2024-05-17 DIAGNOSIS — I10 ESSENTIAL HYPERTENSION: Primary | ICD-10-CM

## 2024-05-17 DIAGNOSIS — C50.012 MALIGNANT NEOPLASM INVOLVING BOTH NIPPLE AND AREOLA OF LEFT BREAST IN FEMALE, UNSPECIFIED ESTROGEN RECEPTOR STATUS: Primary | ICD-10-CM

## 2024-05-17 DIAGNOSIS — C50.012 MALIGNANT NEOPLASM INVOLVING BOTH NIPPLE AND AREOLA OF LEFT BREAST IN FEMALE, UNSPECIFIED ESTROGEN RECEPTOR STATUS: ICD-10-CM

## 2024-05-17 DIAGNOSIS — M81.0 OSTEOPOROSIS, SENILE: Primary | ICD-10-CM

## 2024-05-17 DIAGNOSIS — E78.5 HYPERLIPIDEMIA LDL GOAL <130: ICD-10-CM

## 2024-05-17 DIAGNOSIS — M81.0 OSTEOPOROSIS, SENILE: ICD-10-CM

## 2024-05-17 DIAGNOSIS — C50.012 MALIGNANT NEOPLASM OF NIPPLE OF LEFT BREAST IN FEMALE, UNSPECIFIED ESTROGEN RECEPTOR STATUS: ICD-10-CM

## 2024-05-17 PROCEDURE — 99214 OFFICE O/P EST MOD 30 MIN: CPT | Mod: S$GLB,,, | Performed by: INTERNAL MEDICINE

## 2024-05-17 PROCEDURE — 63600175 PHARM REV CODE 636 W HCPCS: Mod: JZ,JG | Performed by: INTERNAL MEDICINE

## 2024-05-17 PROCEDURE — 1125F AMNT PAIN NOTED PAIN PRSNT: CPT | Mod: CPTII,S$GLB,, | Performed by: INTERNAL MEDICINE

## 2024-05-17 PROCEDURE — 1157F ADVNC CARE PLAN IN RCRD: CPT | Mod: CPTII,S$GLB,, | Performed by: INTERNAL MEDICINE

## 2024-05-17 PROCEDURE — 3077F SYST BP >= 140 MM HG: CPT | Mod: CPTII,S$GLB,, | Performed by: INTERNAL MEDICINE

## 2024-05-17 PROCEDURE — 3288F FALL RISK ASSESSMENT DOCD: CPT | Mod: CPTII,S$GLB,, | Performed by: INTERNAL MEDICINE

## 2024-05-17 PROCEDURE — 99999 PR PBB SHADOW E&M-EST. PATIENT-LVL IV: CPT | Mod: PBBFAC,,, | Performed by: INTERNAL MEDICINE

## 2024-05-17 PROCEDURE — 96372 THER/PROPH/DIAG INJ SC/IM: CPT

## 2024-05-17 PROCEDURE — 1101F PT FALLS ASSESS-DOCD LE1/YR: CPT | Mod: CPTII,S$GLB,, | Performed by: INTERNAL MEDICINE

## 2024-05-17 PROCEDURE — 3078F DIAST BP <80 MM HG: CPT | Mod: CPTII,S$GLB,, | Performed by: INTERNAL MEDICINE

## 2024-05-17 RX ADMIN — DENOSUMAB 60 MG: 60 INJECTION SUBCUTANEOUS at 10:05

## 2024-05-17 NOTE — PLAN OF CARE
Problem: Fall Injury Risk  Goal: Absence of Fall and Fall-Related Injury  Outcome: Progressing  Intervention: Identify and Manage Contributors  Flowsheets (Taken 5/17/2024 1029)  Self-Care Promotion: independence encouraged  Medication Review/Management: medications reviewed  Intervention: Promote Injury-Free Environment  Flowsheets (Taken 5/17/2024 1029)  Safety Promotion/Fall Prevention: medications reviewed

## 2024-05-17 NOTE — PROGRESS NOTES
83 year-old  woman well known to me.  The patient was diagnosed n   May 2016 with triple positive breast cancer.  The patient had a 1.9 cm   malignancy of the left breast, sentinel lymph node negative in association with   DCIS.  She underwent lumpectomy, underwent adjuvant TCH chemotherapy for six   cycles   and Herceptin alone for a year, has done well, but she has   multiple other issues in association.  The patient had Echocardiogram during Herceptin that showed  showed a EF drop by 10%   from an ejection fraction of 65% to 55%.  Dr. Gray had cleared her to continue   with Herceptin and with the short-term echocardiogram follow up as the patient   had remained asymptomatic. And she successfully completed Herceptin  The patient also had some pulmonary nodules that are   being followed by a CT scan.  They are deemed to be benign, but need ongoing   followup.  She had been taking Boniva for postmenopausal osteopenia/osteoporosis   along with calcium and the patient has also had increased density on mammogram.    had  repeat mammogram, which Radiology recommends short-term   followup which was done and now she goes for annual mammogram most recent mammogram September 2020.  She receives Prolia and Arimidex      ..    PHYSICAL EXAM:   .  Wt Readings from Last 3 Encounters:   05/17/24 70.7 kg (155 lb 13.8 oz)   04/12/24 70.6 kg (155 lb 10.3 oz)   01/09/24 69.9 kg (154 lb)     Temp Readings from Last 3 Encounters:   05/17/24 97 °F (36.1 °C) (Temporal)   12/05/23 97.6 °F (36.4 °C) (Oral)   11/10/23 97.2 °F (36.2 °C)     BP Readings from Last 3 Encounters:   05/17/24 (!) 150/70   04/12/24 130/84   01/09/24 (!) 148/89     Pulse Readings from Last 3 Encounters:   05/17/24 83   04/12/24 86   01/09/24 88     GENERAL: Comfortable looking patient. Patient is in no distress.  Awake, alert and oriented to time, person and place.  No anxiety, or agitation.      HEENT: Normal conjunctivae and eyelids. WNL.  PERRLA 3 to  4 mm. No icterus, no pallor, no congestion, and no discharge noted.     NECK:  Supple. Trachea is central.  No crepitus.  No JVD or masses.    RESPIRATORY:  No intercostal retractions.  No dullness to percussion.  Chest is clear to auscultation.  No rales, rhonchi or wheezes.  No crepitus.  Good air entry bilaterally.    CARDIOVASCULAR:  S1 and S2 are normally heard without murmurs or gallops.  All peripheral pulses are present.    ABDOMEN:  Normal abdomen.  No hepatosplenomegaly.  No free fluid.  Bowel sounds are present.  No hernia noted. No masses.  No rebound or tenderness.  No guarding or rigidity.  Umbilicus is midline.    LYMPHATICS:  No axillary, cervical, supraclavicular, submental, or inguinal lymphadenopathy.    SKIN/MUSCULOSKELETAL:  There is no evidence of excoriation marks or ecchmosis.  No rashes.  No cyanosis.  No clubbing.  No joint or skeletal deformities noted.  Normal range of motion.    NEUROLOGIC:  Higher functions are appropriate.  No cranial nerve deficits.  Normal [place].  Normal strength.  Motor and sensory functions are normal.  Deep tendon reflexes are normal.    GENITAL/RECTAL:  Exams are deferred.  LABORATORY EVALUATION:    Lab Results   Component Value Date    WBC 7.68 05/14/2024    HGB 13.2 05/14/2024    HCT 39.5 05/14/2024    MCV 89 05/14/2024     05/14/2024     CMP  Sodium   Date Value Ref Range Status   05/14/2024 130 (L) 136 - 145 mmol/L Final     Potassium   Date Value Ref Range Status   05/14/2024 4.4 3.5 - 5.1 mmol/L Final     Chloride   Date Value Ref Range Status   05/14/2024 95 95 - 110 mmol/L Final     CO2   Date Value Ref Range Status   05/14/2024 25 23 - 29 mmol/L Final     Glucose   Date Value Ref Range Status   05/14/2024 87 70 - 110 mg/dL Final     BUN   Date Value Ref Range Status   05/14/2024 10 8 - 23 mg/dL Final     Creatinine   Date Value Ref Range Status   05/14/2024 1.0 0.5 - 1.4 mg/dL Final   01/18/2013 0.9 0.5 - 1.4 mg/dL Final     Calcium   Date Value  Ref Range Status   05/14/2024 10.1 8.7 - 10.5 mg/dL Final   01/18/2013 8.9 8.7 - 10.5 mg/dL Final     Total Protein   Date Value Ref Range Status   05/14/2024 6.9 6.0 - 8.4 g/dL Final     Albumin   Date Value Ref Range Status   05/14/2024 3.6 3.5 - 5.2 g/dL Final     Total Bilirubin   Date Value Ref Range Status   05/14/2024 0.5 0.1 - 1.0 mg/dL Final     Comment:     For infants and newborns, interpretation of results should be based  on gestational age, weight and in agreement with clinical  observations.    Premature Infant recommended reference ranges:  Up to 24 hours.............<8.0 mg/dL  Up to 48 hours............<12.0 mg/dL  3-5 days..................<15.0 mg/dL  6-29 days.................<15.0 mg/dL       Alkaline Phosphatase   Date Value Ref Range Status   05/14/2024 82 55 - 135 U/L Final     AST   Date Value Ref Range Status   05/14/2024 31 10 - 40 U/L Final     ALT   Date Value Ref Range Status   05/14/2024 17 10 - 44 U/L Final     Anion Gap   Date Value Ref Range Status   05/14/2024 10 8 - 16 mmol/L Final   01/18/2013 9 5 - 15 meq/L Final     eGFR if    Date Value Ref Range Status   05/06/2022 54 (A) >60 mL/min/1.73 m^2 Final     eGFR if non    Date Value Ref Range Status   05/06/2022 47 (A) >60 mL/min/1.73 m^2 Final     Comment:     Calculation used to obtain the estimated glomerular filtration  rate (eGFR) is the CKD-EPI equation.        Echocardiogram, ejection fraction has dropped to 55%, but   PET scan April 27, 2021 -for metastases  CA 2729 :58.4 4./27/21 55.6 July 2021   mammo10/2021 wnl  Pet 5./2022 neg  11/2922 breast bx  Final Pathologic Diagnosis Breast, right, stereotactic-guided core needle biopsies of calcifications:   - Hyalinizing fibroadenomatoid nodules   - Columnar cell changes   - Apocrine metaplasia   - Dilated and ectatic ducts   - Coarse microcalcifications, calcium phosphate type, associated with   fibroadenomatoid nodules   - Negative for atypical  hyperplasia or carcinoma      IMPRESSION:  1.  Triple positive breast cancer 2016 , T1, N0, M0, underwent TCH chemotherapy,   Completed 1 year of herceptin, now on arimidex .      EF dropped somewhat followed by Cardiology.  This improved cards had a stress test , carotids checked  PET scan 5/23 neg next in a year should suffice  Osteoporosis; .    Proceed  with prolia rtc 5/24 for next dose with cbc, cmp, [id number] next bone density due 6/2025 and pet 5/24 see me with pet  Mammo was [id number]/22  had a redo on rt side  and bx was negative for malignancy 1  Mammo 12/23 wnl next due 12/24  6.  The patient has dyslipidemia.  Continue with Zocor and primary care follow   up with Dr. Franco.  7.  Degenerative joint pains.  Continue with Ultram as needed, take trazodone   for sleep and anxiety along with Xanax.may go for pain stimulant  8.  Mild CKD.  Continue to monitor.  No intervention necessary at this time.  9.  Gastroesophageal reflux disease.  Continue with omeprazole.  No changes or   worsening of symptoms at this point    10.psoas muscle  Tumor possibly a schwannoma confirmed with DR. Tovar  Patient lives in [place] she would like to get her mammogram in the [place] area . doctor's visit infusion and labs in [place] will arrange for this       aortic athrosclerosis stable follow with  pcp   covid vaccinations completed      Advance Care Planning     Date: 05/09/2023    Power of   I initiated the process of advance care planning today and explained the importance of this process to the patient.  I introduced the concept of advance directives to the patient, as well. Then the patient received detailed information about the importance of designating a Health Care Power of  (HCPOA). She was also instructed to communicate with this person about their wishes for future healthcare, should she become sick and lose decision-making capacity.

## 2024-05-31 ENCOUNTER — OFFICE VISIT (OUTPATIENT)
Dept: FAMILY MEDICINE | Facility: CLINIC | Age: 84
End: 2024-05-31
Payer: MEDICARE

## 2024-05-31 VITALS
HEART RATE: 83 BPM | BODY MASS INDEX: 26.08 KG/M2 | SYSTOLIC BLOOD PRESSURE: 138 MMHG | WEIGHT: 156.5 LBS | HEIGHT: 65 IN | OXYGEN SATURATION: 95 % | DIASTOLIC BLOOD PRESSURE: 76 MMHG

## 2024-05-31 DIAGNOSIS — N18.31 CHRONIC KIDNEY DISEASE, STAGE 3A: ICD-10-CM

## 2024-05-31 DIAGNOSIS — G89.29 CHRONIC MIDLINE LOW BACK PAIN WITHOUT SCIATICA: ICD-10-CM

## 2024-05-31 DIAGNOSIS — E78.5 DYSLIPIDEMIA: ICD-10-CM

## 2024-05-31 DIAGNOSIS — G89.29 OTHER CHRONIC PAIN: ICD-10-CM

## 2024-05-31 DIAGNOSIS — I10 ESSENTIAL HYPERTENSION: Primary | ICD-10-CM

## 2024-05-31 DIAGNOSIS — M54.50 CHRONIC MIDLINE LOW BACK PAIN WITHOUT SCIATICA: ICD-10-CM

## 2024-05-31 DIAGNOSIS — F51.01 PRIMARY INSOMNIA: ICD-10-CM

## 2024-05-31 DIAGNOSIS — G47.00 INSOMNIA, UNSPECIFIED TYPE: ICD-10-CM

## 2024-05-31 DIAGNOSIS — E78.2 MIXED HYPERLIPIDEMIA: ICD-10-CM

## 2024-05-31 PROCEDURE — 1101F PT FALLS ASSESS-DOCD LE1/YR: CPT | Mod: CPTII,S$GLB,, | Performed by: INTERNAL MEDICINE

## 2024-05-31 PROCEDURE — 99214 OFFICE O/P EST MOD 30 MIN: CPT | Mod: S$GLB,,, | Performed by: INTERNAL MEDICINE

## 2024-05-31 PROCEDURE — 3288F FALL RISK ASSESSMENT DOCD: CPT | Mod: CPTII,S$GLB,, | Performed by: INTERNAL MEDICINE

## 2024-05-31 PROCEDURE — 3078F DIAST BP <80 MM HG: CPT | Mod: CPTII,S$GLB,, | Performed by: INTERNAL MEDICINE

## 2024-05-31 PROCEDURE — 3075F SYST BP GE 130 - 139MM HG: CPT | Mod: CPTII,S$GLB,, | Performed by: INTERNAL MEDICINE

## 2024-05-31 PROCEDURE — 1160F RVW MEDS BY RX/DR IN RCRD: CPT | Mod: CPTII,S$GLB,, | Performed by: INTERNAL MEDICINE

## 2024-05-31 PROCEDURE — 99999 PR PBB SHADOW E&M-EST. PATIENT-LVL IV: CPT | Mod: PBBFAC,,, | Performed by: INTERNAL MEDICINE

## 2024-05-31 PROCEDURE — 1125F AMNT PAIN NOTED PAIN PRSNT: CPT | Mod: CPTII,S$GLB,, | Performed by: INTERNAL MEDICINE

## 2024-05-31 PROCEDURE — 1159F MED LIST DOCD IN RCRD: CPT | Mod: CPTII,S$GLB,, | Performed by: INTERNAL MEDICINE

## 2024-05-31 PROCEDURE — 1157F ADVNC CARE PLAN IN RCRD: CPT | Mod: CPTII,S$GLB,, | Performed by: INTERNAL MEDICINE

## 2024-05-31 RX ORDER — TRAZODONE HYDROCHLORIDE 100 MG/1
100 TABLET ORAL NIGHTLY
Qty: 90 TABLET | Refills: 3 | Status: SHIPPED | OUTPATIENT
Start: 2024-05-31

## 2024-05-31 RX ORDER — LISINOPRIL 10 MG/1
TABLET ORAL
Qty: 270 TABLET | Refills: 3 | Status: SHIPPED | OUTPATIENT
Start: 2024-05-31 | End: 2024-05-31 | Stop reason: SDUPTHER

## 2024-05-31 RX ORDER — LISINOPRIL 10 MG/1
TABLET ORAL
Qty: 270 TABLET | Refills: 3 | Status: SHIPPED | OUTPATIENT
Start: 2024-05-31

## 2024-05-31 RX ORDER — SIMVASTATIN 20 MG/1
20 TABLET, FILM COATED ORAL NIGHTLY
Qty: 90 TABLET | Refills: 3 | Status: SHIPPED | OUTPATIENT
Start: 2024-05-31

## 2024-05-31 RX ORDER — TRAMADOL HYDROCHLORIDE 50 MG/1
50 TABLET ORAL EVERY 12 HOURS PRN
Qty: 60 TABLET | Refills: 5 | Status: SHIPPED | OUTPATIENT
Start: 2024-05-31

## 2024-05-31 RX ORDER — TRAMADOL HYDROCHLORIDE 50 MG/1
50 TABLET ORAL EVERY 12 HOURS PRN
Qty: 60 TABLET | Refills: 5 | Status: SHIPPED | OUTPATIENT
Start: 2024-05-31 | End: 2024-05-31 | Stop reason: SDUPTHER

## 2024-05-31 NOTE — PROGRESS NOTES
Subjective:       Patient ID: Jing Tenorio is a 83 y.o. female.  Chief Complaint: Hypertension     HPI    Breast cancer history - s/p removal, chemo, radiation.  seeing oncology      CKD III - stable with her typical pattern for the past few years ie upper 40s to 50s.  She no longer takes NSAIDS and her BP is well controlled preventing further damage.       Chronic Low back pain/ inflammatory arthropathy. Controlled with 1/2 tab tramadol bid.  Recall:  MRI showed tumor on nerve, but benign.  Has leg pain.  Failed epidural and ablation.  Saw neurosurgeon in [place] who did not want to do surgery as pt higher risk and thought may end up with chronic leg pain.  Recommended nerve stimulator.  Dr [place] had given meloxicam, which helps but pt has decreased kidney function so this was held.  Her new pain Dr, Dr Bean, offered Cymbalta? But pt scared of side affects. She also wants to avoid anything with potential addiction.  He also recommended a nerve stimulator but she is scared of this.      HTN -   controlled.       HLD - controlled     Insomnia - controlled     Thrombocytopenia - better   Atherosclerosis - no syncope       Assessment:       1. Essential hypertension    2. Mixed hyperlipidemia    3. Chronic kidney disease, stage 3a    4. Chronic midline low back pain without sciatica    5. Primary insomnia    6. Insomnia, unspecified type    7. Other chronic pain    8. Dyslipidemia        Plan:       Essential hypertension  -     Discontinue: lisinopriL 10 MG tablet; 2 pills in the morning and 1 pill at night  Dispense: 270 tablet; Refill: 3  -     lisinopriL 10 MG tablet; 2 pills in the morning and 1 pill at night  Dispense: 270 tablet; Refill: 3    Mixed hyperlipidemia    Chronic kidney disease, stage 3a    Chronic midline low back pain without sciatica    Primary insomnia    Insomnia, unspecified type  -     traZODone (DESYREL) 100 MG tablet; Take 1 tablet (100 mg total) by mouth every evening.  Dispense: 90  tablet; Refill: 3    Other chronic pain  -     Discontinue: traMADoL (ULTRAM) 50 mg tablet; Take 1 tablet (50 mg total) by mouth every 12 (twelve) hours as needed for Pain.  Dispense: 60 tablet; Refill: 5  -     traMADoL (ULTRAM) 50 mg tablet; Take 1 tablet (50 mg total) by mouth every 12 (twelve) hours as needed for Pain.  Dispense: 60 tablet; Refill: 5    Dyslipidemia  -     simvastatin (ZOCOR) 20 MG tablet; Take 1 tablet (20 mg total) by mouth every evening.  Dispense: 90 tablet; Refill: 3            Continue current management and monitor.  Other diagnoses were reviewed and found stable and will continue to monitor.  Counseled on regular exercise, maintenance of a healthy weight, balanced diet rich in fruits/vegetables and lean protein, and avoidance of unhealthy habits like smoking and excessive alcohol intake.   Also, counseled on importance of being compliant with medication, health appointments, diet and exercise.     Follow up in about 6 months (around 11/21/2024).      Medication List with Changes/Refills   Current Medications    ANASTROZOLE (ARIMIDEX) 1 MG TAB    Take 1 tablet (1 mg total) by mouth once daily.    ASCORBIC ACID, VITAMIN C, (VITAMIN C) 500 MG TABLET    Take 500 mg by mouth once daily.    B COMPLEX VITAMINS TABLET    Take 1 tablet by mouth once daily.    CETIRIZINE (ZYRTEC) 10 MG TABLET    Take 1 tablet (10 mg total) by mouth once daily.    COENZYME Q10 100 MG CAPSULE    Take 100 mg by mouth once daily.    DENOSUMAB (PROLIA) 60 MG/ML SYRG    Inject 60 mg into the skin every 6 (six) months.    FLUTICASONE PROPIONATE (FLONASE) 50 MCG/ACTUATION NASAL SPRAY    1 spray (50 mcg total) by Each Nostril route once daily.    FLUTICASONE PROPIONATE (FLONASE) 50 MCG/ACTUATION NASAL SPRAY    1 spray (50 mcg total) by Each Nostril route once daily.    MULTIVITAMIN ORAL    once daily. Every day    OMEPRAZOLE (PRILOSEC) 40 MG CAPSULE    TAKE 1 CAPSULE(40 MG) BY MOUTH EVERY MORNING    VITAMIN D (VITAMIN D3)  1000 UNITS TAB    Take 2,000 Units by mouth once daily.    VITAMIN E 400 UNIT CAPSULE    Take 400 Units by mouth once daily.   Changed and/or Refilled Medications    Modified Medication Previous Medication    LISINOPRIL 10 MG TABLET lisinopriL 10 MG tablet       2 pills in the morning and 1 pill at night    2 pills in the morning and 1 pill at night    SIMVASTATIN (ZOCOR) 20 MG TABLET simvastatin (ZOCOR) 20 MG tablet       Take 1 tablet (20 mg total) by mouth every evening.    Take 1 tablet (20 mg total) by mouth every evening.    TRAMADOL (ULTRAM) 50 MG TABLET traMADoL (ULTRAM) 50 mg tablet       Take 1 tablet (50 mg total) by mouth every 12 (twelve) hours as needed for Pain.    Take 1 tablet (50 mg total) by mouth every 12 (twelve) hours as needed for Pain.    TRAZODONE (DESYREL) 100 MG TABLET traZODone (DESYREL) 100 MG tablet       Take 1 tablet (100 mg total) by mouth every evening.    Take 1 tablet (100 mg total) by mouth every evening.       BP Readings from Last 3 Encounters:   05/31/24 138/76   05/17/24 (!) 150/70   05/17/24 (!) 150/70     Hemoglobin A1C   Date Value Ref Range Status   04/10/2006 5.6 4.5 - 6.2 % Final     Lab Results   Component Value Date    TSH 0.777 11/11/2020     Lab Results   Component Value Date    LDLCALC 75.8 05/14/2024    LDLCALC 63.0 12/01/2023    LDLCALC 62.6 (L) 05/25/2023     Lab Results   Component Value Date    TRIG 46 05/14/2024    TRIG 45 12/01/2023    TRIG 47 05/25/2023     Wt Readings from Last 3 Encounters:   05/31/24 71 kg (156 lb 8.4 oz)   05/17/24 70.7 kg (155 lb 13.8 oz)   05/17/24 70.7 kg (155 lb 13.8 oz)     Lab Results   Component Value Date    HGB 13.2 05/14/2024    HCT 39.5 05/14/2024    WBC 7.68 05/14/2024    ALT 17 05/14/2024    AST 31 05/14/2024     (L) 05/14/2024    K 4.4 05/14/2024    CREATININE 1.0 05/14/2024           Review of Systems        Objective:      Vitals:    05/31/24 1257   BP: 138/76   Pulse: 83     Physical Exam  Vitals reviewed.    Constitutional:       Appearance: Normal appearance.   Eyes:      Conjunctiva/sclera: Conjunctivae normal.   Cardiovascular:      Rate and Rhythm: Normal rate.   Pulmonary:      Effort: Pulmonary effort is normal.      Breath sounds: Normal breath sounds.   Musculoskeletal:      Cervical back: Normal range of motion.      Comments: Normal ROM bilateral    Skin:     General: Skin is warm and dry.   Neurological:      Mental Status: She is alert.      Cranial Nerves: Cranial nerve deficit: grossly intact.   Psychiatric:      Comments: Alert and orientated

## 2024-06-12 ENCOUNTER — HOSPITAL ENCOUNTER (OUTPATIENT)
Dept: RADIOLOGY | Facility: HOSPITAL | Age: 84
Discharge: HOME | End: 2024-06-12
Attending: INTERNAL MEDICINE
Payer: MEDICARE

## 2024-06-12 VITALS — HEIGHT: 65 IN | WEIGHT: 155 LBS | BODY MASS INDEX: 25.83 KG/M2

## 2024-06-12 DIAGNOSIS — C50.012 MALIGNANT NEOPLASM INVOLVING BOTH NIPPLE AND AREOLA OF LEFT BREAST IN FEMALE, UNSPECIFIED ESTROGEN RECEPTOR STATUS: ICD-10-CM

## 2024-06-12 LAB — GLUCOSE SERPL-MCNC: 91 MG/DL (ref 70–110)

## 2024-06-12 PROCEDURE — A9552 F18 FDG: HCPCS | Mod: PO | Performed by: INTERNAL MEDICINE

## 2024-06-12 PROCEDURE — 82962 GLUCOSE BLOOD TEST: CPT | Mod: PO

## 2024-06-12 PROCEDURE — 78815 PET IMAGE W/CT SKULL-THIGH: CPT | Mod: TC,PO

## 2024-06-12 PROCEDURE — 78815 PET IMAGE W/CT SKULL-THIGH: CPT | Mod: 26,PS,, | Performed by: RADIOLOGY

## 2024-06-12 RX ORDER — FLUDEOXYGLUCOSE F18 500 MCI/ML
13 INJECTION INTRAVENOUS
Status: COMPLETED | OUTPATIENT
Start: 2024-06-12 | End: 2024-06-12

## 2024-06-12 RX ADMIN — FLUDEOXYGLUCOSE F-18 13 MILLICURIE: 500 INJECTION INTRAVENOUS at 10:06

## 2024-06-14 ENCOUNTER — OFFICE VISIT (OUTPATIENT)
Dept: HEMATOLOGY/ONCOLOGY | Facility: CLINIC | Age: 84
End: 2024-06-14
Payer: MEDICARE

## 2024-06-14 VITALS
OXYGEN SATURATION: 98 % | RESPIRATION RATE: 14 BRPM | WEIGHT: 155.44 LBS | HEIGHT: 65 IN | HEART RATE: 81 BPM | TEMPERATURE: 97 F | DIASTOLIC BLOOD PRESSURE: 70 MMHG | BODY MASS INDEX: 25.9 KG/M2 | SYSTOLIC BLOOD PRESSURE: 138 MMHG

## 2024-06-14 DIAGNOSIS — C50.012 MALIGNANT NEOPLASM INVOLVING BOTH NIPPLE AND AREOLA OF LEFT BREAST IN FEMALE, UNSPECIFIED ESTROGEN RECEPTOR STATUS: ICD-10-CM

## 2024-06-14 DIAGNOSIS — I10 ESSENTIAL HYPERTENSION: ICD-10-CM

## 2024-06-14 DIAGNOSIS — C50.012 MALIGNANT NEOPLASM OF NIPPLE OF LEFT BREAST IN FEMALE, UNSPECIFIED ESTROGEN RECEPTOR STATUS: ICD-10-CM

## 2024-06-14 DIAGNOSIS — E78.5 HYPERLIPIDEMIA LDL GOAL <130: ICD-10-CM

## 2024-06-14 DIAGNOSIS — C50.011 MALIGNANT NEOPLASM OF NIPPLE OF RIGHT BREAST IN FEMALE, UNSPECIFIED ESTROGEN RECEPTOR STATUS: ICD-10-CM

## 2024-06-14 DIAGNOSIS — N18.31 CHRONIC KIDNEY DISEASE, STAGE 3A: Primary | ICD-10-CM

## 2024-06-14 PROCEDURE — 99999 PR PBB SHADOW E&M-EST. PATIENT-LVL IV: CPT | Mod: PBBFAC,HCNC,, | Performed by: INTERNAL MEDICINE

## 2024-06-14 RX ORDER — ANASTROZOLE 1 MG/1
1 TABLET ORAL DAILY
Qty: 90 TABLET | Refills: 6 | Status: SHIPPED | OUTPATIENT
Start: 2024-06-14

## 2024-06-14 RX ORDER — ANASTROZOLE 1 MG/1
1 TABLET ORAL DAILY
Qty: 90 TABLET | Refills: 6 | Status: SHIPPED | OUTPATIENT
Start: 2024-06-14 | End: 2024-06-14 | Stop reason: SDUPTHER

## 2024-06-14 NOTE — PROGRESS NOTES
83 year-old  woman well known to me.  The patient was diagnosed n   May 2016 with triple positive breast cancer.  The patient had a 1.9 cm   malignancy of the left breast, sentinel lymph node negative in association with   DCIS.  She underwent lumpectomy, underwent adjuvant TCH chemotherapy for six   cycles   and Herceptin alone for a year, has done well, but she has   multiple other issues in association.  The patient had Echocardiogram during Herceptin that showed  showed a EF drop by 10%   from an ejection fraction of 65% to 55%.  Dr. Gray had cleared her to continue   with Herceptin and with the short-term echocardiogram follow up as the patient   had remained asymptomatic. And she successfully completed Herceptin  The patient also had some pulmonary nodules that are   being followed by a CT scan.  They are deemed to be benign, but need ongoing   followup.  She had been taking Boniva for postmenopausal osteopenia/osteoporosis   along with calcium and the patient has also had increased density on mammogram.    .  She receives Prolia and Arimidex      ..    PHYSICAL EXAM:   .  Wt Readings from Last 3 Encounters:   06/14/24 70.5 kg (155 lb 6.8 oz)   06/12/24 70.3 kg (155 lb)   05/31/24 71 kg (156 lb 8.4 oz)     Temp Readings from Last 3 Encounters:   06/14/24 97.4 °F (36.3 °C) (Temporal)   05/17/24 97 °F (36.1 °C)   05/17/24 97 °F (36.1 °C) (Temporal)     BP Readings from Last 3 Encounters:   06/14/24 138/70   05/31/24 138/76   05/17/24 (!) 150/70     Pulse Readings from Last 3 Encounters:   06/14/24 81   05/31/24 83   05/17/24 83     VITAL SIGNS:  as above   GENERAL: appears well-built, well-nourished.  No anxiety, no agitation, and in no distress.  Patient is awake, alert, oriented and cooperative.  HEENT:  Showed no congestion. Trachea is central no obvious icterus or pallor noted no hoarseness. no obvious JVD   NECK:  Supple.  No JVD. No obvious cervical submental or supraclavicular  adenopathy.  RS:the visualized portion of  Chest expands well. chest appears symmetric, no audible wheezes.  No dyspnea recognized  ABDOMEN:  abdomen appears undistended.  EXTREMITIES:  Without edema.  NEUROLOGICAL:  The patient is appropriate, higher functions are normal.  No  obvious neurological deficits.  normal judgement normal thought content  No confusion, no speech impediment. Cranial nerves are intact and show no deficit. No gross motor deficits noted   SKIN MUSCULOSKELETAL: no joint or skeletal deformity, no clubbing of nails.  No visible rash ecchymosis or petechiae   LABORATORY EVALUATION:    Lab Results   Component Value Date    WBC 7.68 05/14/2024    HGB 13.2 05/14/2024    HCT 39.5 05/14/2024    MCV 89 05/14/2024     05/14/2024     CMP  Sodium   Date Value Ref Range Status   05/14/2024 130 (L) 136 - 145 mmol/L Final     Potassium   Date Value Ref Range Status   05/14/2024 4.4 3.5 - 5.1 mmol/L Final     Chloride   Date Value Ref Range Status   05/14/2024 95 95 - 110 mmol/L Final     CO2   Date Value Ref Range Status   05/14/2024 25 23 - 29 mmol/L Final     Glucose   Date Value Ref Range Status   05/14/2024 87 70 - 110 mg/dL Final     BUN   Date Value Ref Range Status   05/14/2024 10 8 - 23 mg/dL Final     Creatinine   Date Value Ref Range Status   05/14/2024 1.0 0.5 - 1.4 mg/dL Final   01/18/2013 0.9 0.5 - 1.4 mg/dL Final     Calcium   Date Value Ref Range Status   05/14/2024 10.1 8.7 - 10.5 mg/dL Final   01/18/2013 8.9 8.7 - 10.5 mg/dL Final     Total Protein   Date Value Ref Range Status   05/14/2024 6.9 6.0 - 8.4 g/dL Final     Albumin   Date Value Ref Range Status   05/14/2024 3.6 3.5 - 5.2 g/dL Final     Total Bilirubin   Date Value Ref Range Status   05/14/2024 0.5 0.1 - 1.0 mg/dL Final     Comment:     For infants and newborns, interpretation of results should be based  on gestational age, weight and in agreement with clinical  observations.    Premature Infant recommended reference  ranges:  Up to 24 hours.............<8.0 mg/dL  Up to 48 hours............<12.0 mg/dL  3-5 days..................<15.0 mg/dL  6-29 days.................<15.0 mg/dL       Alkaline Phosphatase   Date Value Ref Range Status   05/14/2024 82 55 - 135 U/L Final     AST   Date Value Ref Range Status   05/14/2024 31 10 - 40 U/L Final     ALT   Date Value Ref Range Status   05/14/2024 17 10 - 44 U/L Final     Anion Gap   Date Value Ref Range Status   05/14/2024 10 8 - 16 mmol/L Final   01/18/2013 9 5 - 15 meq/L Final     eGFR if    Date Value Ref Range Status   05/06/2022 54 (A) >60 mL/min/1.73 m^2 Final     eGFR if non    Date Value Ref Range Status   05/06/2022 47 (A) >60 mL/min/1.73 m^2 Final     Comment:     Calculation used to obtain the estimated glomerular filtration  rate (eGFR) is the CKD-EPI equation.        Echocardiogram, ejection fraction has dropped to 55%, but   PET scan April 27, 2021 -for metastases  CA 2729 :58.4 4./27/21 55.6 July 2021   mammo10/2021 wnl  Pet 5./2022 neg  11/2922 breast bx  Final Pathologic Diagnosis Breast, right, stereotactic-guided core needle biopsies of calcifications:   - Hyalinizing fibroadenomatoid nodules   - Columnar cell changes   - Apocrine metaplasia   - Dilated and ectatic ducts   - Coarse microcalcifications, calcium phosphate type, associated with   fibroadenomatoid nodules   - Negative for atypical hyperplasia or carcinoma      IMPRESSION:  1.  Triple positive breast cancer 2016 , T1, N0, M0, underwent [facility] chemotherapy,   Completed 1 year of herceptin, now on arimidex .      EF dropped somewhat followed by Cardiology.  This improved cards had a stress test , carotids checked  Pet 6/24  Osteoporosis; .    Next prolia 11/24 for next dose with cbc, cmp, [id number] next bone density due 6/2025   Mammo was [id number]/17/22  had a redo on rt side  and bx was negative for malignancy 1  Mammo 12/23 wnl next due 12/24  6.  The patient has dyslipidemia.   Continue with Zocor and primary care follow   up with Dr. Franco.  7.  Degenerative joint pains.  Continue with Ultram as needed, take trazodone   for sleep and anxiety along with Xanax.may go for pain stimulant  8.  Mild CKD.  Continue to monitor.  No intervention necessary at this time.  9.  Gastroesophageal reflux disease.  Continue with omeprazole.  No changes or   worsening of symptoms at this point    10.psoas muscle  Tumor possibly a schwannoma confirmed with DR. Tovar  Patient lives in [place] she would like to get her mammogram in the [place] area . doctor's visit infusion and labs in [place] will arrange for this       aortic athrosclerosis stable follow with  pcp   covid vaccinations completed      Advance Care Planning     Date: 05/09/2023    Power of   I initiated the process of advance care planning today and explained the importance of this process to the patient.  I introduced the concept of advance directives to the patient, as well. Then the patient received detailed information about the importance of designating a Health Care Power of  (HCPOA). She was also instructed to communicate with this person about their wishes for future healthcare, should she become sick and lose decision-making capacity.

## 2024-11-05 ENCOUNTER — TELEPHONE (OUTPATIENT)
Dept: HEMATOLOGY/ONCOLOGY | Facility: CLINIC | Age: 84
End: 2024-11-05
Payer: MEDICARE

## 2024-11-05 NOTE — TELEPHONE ENCOUNTER
Pt will have everything completed by her appt date.      ----- Message from Stephenie sent at 11/5/2024 11:53 AM CST -----  Please review patient's Appointment Desk for an upcoming PROLIA INJECTION appointment.       The following are needed for this appointment.   Reminding to please contact patient, if needed:      ORDER.  Current / active in the Epic Therapy Plan, signed within a year.  LABS. Serum Calcium within 6 weeks of treatment.    DEXA SCAN within the last 2 years   DENTAL PRECAUTION CONFIRMED WITH PATIENT. No recent invasive dental procedures within the last month (tooth extraction, etc). A patient will need to bring a Dental Clearance signed by a dental provider if there was any recent dental procedure within the last month.   FOLLOW-UP APPOINTMENT within the last 12 months with the diagnosis mentioned in the visit notes.         Any item unavailable by the day prior to the scheduled appointment will cause the appointment to be CANCELLED.        To reschedule appointments, please contact [facility] INFUSION SCHEDULING POOL or call [phone].       Thank you,  [facility], Infusion Department

## 2024-11-15 ENCOUNTER — LAB VISIT (OUTPATIENT)
Dept: LAB | Facility: HOSPITAL | Age: 84
End: 2024-11-15
Attending: INTERNAL MEDICINE
Payer: MEDICARE

## 2024-11-15 DIAGNOSIS — C50.012 MALIGNANT NEOPLASM INVOLVING BOTH NIPPLE AND AREOLA OF LEFT BREAST IN FEMALE, UNSPECIFIED ESTROGEN RECEPTOR STATUS: ICD-10-CM

## 2024-11-15 LAB
ALBUMIN SERPL BCP-MCNC: 4 G/DL (ref 3.5–5.2)
ALP SERPL-CCNC: 76 U/L (ref 40–150)
ALT SERPL W/O P-5'-P-CCNC: 20 U/L (ref 10–44)
ANION GAP SERPL CALC-SCNC: 12 MMOL/L (ref 8–16)
AST SERPL-CCNC: 40 U/L (ref 10–40)
BASOPHILS # BLD AUTO: 0.12 K/UL (ref 0–0.2)
BASOPHILS NFR BLD: 1.7 % (ref 0–1.9)
BILIRUB SERPL-MCNC: 0.6 MG/DL (ref 0.1–1)
BUN SERPL-MCNC: 15 MG/DL (ref 8–23)
CALCIUM SERPL-MCNC: 9.8 MG/DL (ref 8.7–10.5)
CHLORIDE SERPL-SCNC: 97 MMOL/L (ref 95–110)
CO2 SERPL-SCNC: 24 MMOL/L (ref 23–29)
CREAT SERPL-MCNC: 1 MG/DL (ref 0.5–1.4)
DIFFERENTIAL METHOD BLD: ABNORMAL
EOSINOPHIL # BLD AUTO: 0.1 K/UL (ref 0–0.5)
EOSINOPHIL NFR BLD: 1.8 % (ref 0–8)
ERYTHROCYTE [DISTWIDTH] IN BLOOD BY AUTOMATED COUNT: 14.6 % (ref 11.5–14.5)
EST. GFR  (NO RACE VARIABLE): 55 ML/MIN/1.73 M^2
GLUCOSE SERPL-MCNC: 78 MG/DL (ref 70–110)
HCT VFR BLD AUTO: 40.2 % (ref 37–48.5)
HGB BLD-MCNC: 13.4 G/DL (ref 12–16)
IMM GRANULOCYTES # BLD AUTO: 0.02 K/UL (ref 0–0.04)
IMM GRANULOCYTES NFR BLD AUTO: 0.3 % (ref 0–0.5)
LYMPHOCYTES # BLD AUTO: 0.8 K/UL (ref 1–4.8)
LYMPHOCYTES NFR BLD: 11.1 % (ref 18–48)
MCH RBC QN AUTO: 30.9 PG (ref 27–31)
MCHC RBC AUTO-ENTMCNC: 33.3 G/DL (ref 32–36)
MCV RBC AUTO: 93 FL (ref 82–98)
MONOCYTES # BLD AUTO: 0.5 K/UL (ref 0.3–1)
MONOCYTES NFR BLD: 6.8 % (ref 4–15)
NEUTROPHILS # BLD AUTO: 5.6 K/UL (ref 1.8–7.7)
NEUTROPHILS NFR BLD: 78.3 % (ref 38–73)
NRBC BLD-RTO: 0 /100 WBC
PLATELET # BLD AUTO: 368 K/UL (ref 150–450)
PMV BLD AUTO: 9.9 FL (ref 9.2–12.9)
POTASSIUM SERPL-SCNC: 4.4 MMOL/L (ref 3.5–5.1)
PROT SERPL-MCNC: 7.1 G/DL (ref 6–8.4)
RBC # BLD AUTO: 4.33 M/UL (ref 4–5.4)
SODIUM SERPL-SCNC: 133 MMOL/L (ref 136–145)
WBC # BLD AUTO: 7.11 K/UL (ref 3.9–12.7)

## 2024-11-15 PROCEDURE — 85025 COMPLETE CBC W/AUTO DIFF WBC: CPT | Performed by: NURSE PRACTITIONER

## 2024-11-15 PROCEDURE — 80053 COMPREHEN METABOLIC PANEL: CPT | Performed by: NURSE PRACTITIONER

## 2024-11-15 PROCEDURE — 36415 COLL VENOUS BLD VENIPUNCTURE: CPT | Performed by: NURSE PRACTITIONER

## 2024-11-15 PROCEDURE — 86300 IMMUNOASSAY TUMOR CA 15-3: CPT | Performed by: NURSE PRACTITIONER

## 2024-11-18 LAB — CANCER AG27-29 SERPL-ACNC: 61 U/ML

## 2024-11-19 ENCOUNTER — INFUSION (OUTPATIENT)
Dept: INFUSION THERAPY | Facility: HOSPITAL | Age: 84
End: 2024-11-19
Attending: INTERNAL MEDICINE
Payer: MEDICARE

## 2024-11-19 ENCOUNTER — OFFICE VISIT (OUTPATIENT)
Dept: HEMATOLOGY/ONCOLOGY | Facility: CLINIC | Age: 84
End: 2024-11-19
Payer: MEDICARE

## 2024-11-19 VITALS
TEMPERATURE: 97 F | HEART RATE: 84 BPM | DIASTOLIC BLOOD PRESSURE: 77 MMHG | HEIGHT: 65 IN | SYSTOLIC BLOOD PRESSURE: 168 MMHG | RESPIRATION RATE: 18 BRPM | BODY MASS INDEX: 25.97 KG/M2 | WEIGHT: 155.88 LBS

## 2024-11-19 VITALS
DIASTOLIC BLOOD PRESSURE: 77 MMHG | RESPIRATION RATE: 16 BRPM | BODY MASS INDEX: 25.97 KG/M2 | HEIGHT: 65 IN | SYSTOLIC BLOOD PRESSURE: 168 MMHG | TEMPERATURE: 97 F | OXYGEN SATURATION: 99 % | WEIGHT: 155.88 LBS | HEART RATE: 84 BPM

## 2024-11-19 DIAGNOSIS — R91.8 PULMONARY NODULES: ICD-10-CM

## 2024-11-19 DIAGNOSIS — C50.012 MALIGNANT NEOPLASM OF NIPPLE OF LEFT BREAST IN FEMALE, UNSPECIFIED ESTROGEN RECEPTOR STATUS: ICD-10-CM

## 2024-11-19 DIAGNOSIS — M81.0 OSTEOPOROSIS, SENILE: Primary | ICD-10-CM

## 2024-11-19 DIAGNOSIS — M81.0 OSTEOPOROSIS, SENILE: ICD-10-CM

## 2024-11-19 DIAGNOSIS — C50.612 MALIGNANT NEOPLASM OF AXILLARY TAIL OF LEFT FEMALE BREAST, UNSPECIFIED ESTROGEN RECEPTOR STATUS: ICD-10-CM

## 2024-11-19 DIAGNOSIS — Z85.3 HISTORY OF LEFT BREAST CANCER: ICD-10-CM

## 2024-11-19 DIAGNOSIS — I10 ESSENTIAL HYPERTENSION: ICD-10-CM

## 2024-11-19 DIAGNOSIS — E78.5 HYPERLIPIDEMIA LDL GOAL <130: ICD-10-CM

## 2024-11-19 DIAGNOSIS — N18.31 CHRONIC KIDNEY DISEASE, STAGE 3A: Primary | ICD-10-CM

## 2024-11-19 PROCEDURE — 1159F MED LIST DOCD IN RCRD: CPT | Mod: HCNC,CPTII,S$GLB, | Performed by: INTERNAL MEDICINE

## 2024-11-19 PROCEDURE — 1126F AMNT PAIN NOTED NONE PRSNT: CPT | Mod: HCNC,CPTII,S$GLB, | Performed by: INTERNAL MEDICINE

## 2024-11-19 PROCEDURE — 63600175 PHARM REV CODE 636 W HCPCS: Mod: JZ,JG | Performed by: INTERNAL MEDICINE

## 2024-11-19 PROCEDURE — 3288F FALL RISK ASSESSMENT DOCD: CPT | Mod: HCNC,CPTII,S$GLB, | Performed by: INTERNAL MEDICINE

## 2024-11-19 PROCEDURE — 96372 THER/PROPH/DIAG INJ SC/IM: CPT

## 2024-11-19 PROCEDURE — 99999 PR PBB SHADOW E&M-EST. PATIENT-LVL V: CPT | Mod: PBBFAC,HCNC,, | Performed by: INTERNAL MEDICINE

## 2024-11-19 PROCEDURE — 99214 OFFICE O/P EST MOD 30 MIN: CPT | Mod: HCNC,S$GLB,, | Performed by: INTERNAL MEDICINE

## 2024-11-19 PROCEDURE — 1101F PT FALLS ASSESS-DOCD LE1/YR: CPT | Mod: HCNC,CPTII,S$GLB, | Performed by: INTERNAL MEDICINE

## 2024-11-19 PROCEDURE — 1157F ADVNC CARE PLAN IN RCRD: CPT | Mod: HCNC,CPTII,S$GLB, | Performed by: INTERNAL MEDICINE

## 2024-11-19 RX ADMIN — DENOSUMAB 60 MG: 60 INJECTION SUBCUTANEOUS at 12:11

## 2024-11-19 NOTE — PLAN OF CARE
Problem: Fatigue  Goal: Improved Activity Tolerance  11/19/2024 1220 by Micaela Rehman, RN  Outcome: Met  11/19/2024 1220 by Micaela Rehman, RN  Outcome: Progressing

## 2024-11-19 NOTE — PROGRESS NOTES
84 year-old  woman well known to me.  The patient was diagnosed n   May 2016 with triple positive breast cancer.  The patient had a 1.9 cm   malignancy of the left breast, sentinel lymph node negative in association with   DCIS.  She underwent lumpectomy, underwent adjuvant TCH chemotherapy for six   cycles   and Herceptin alone for a year, has done well, but she has   multiple other issues in association.  The patient had Echocardiogram during Herceptin that showed  showed a EF drop by 10%   from an ejection fraction of 65% to 55%.  Dr. Gray had cleared her to continue   with Herceptin and with the short-term echocardiogram follow up as the patient   had remained asymptomatic. And she successfully completed Herceptin  The patient also had some pulmonary nodules that are   being followed by a CT scan.  They are deemed to be benign, but need ongoing   followup.  She had been taking Boniva for postmenopausal osteopenia/osteoporosis   along with calcium and the patient has also had increased density on mammogram.    .  She receives Prolia and Arimidex      ..    PHYSICAL EXAM:   .  Wt Readings from Last 3 Encounters:   11/19/24 70.7 kg (155 lb 13.8 oz)   06/14/24 70.5 kg (155 lb 6.8 oz)   06/12/24 70.3 kg (155 lb)     Temp Readings from Last 3 Encounters:   11/19/24 97.2 °F (36.2 °C) (Temporal)   06/14/24 97.4 °F (36.3 °C) (Temporal)   05/17/24 97 °F (36.1 °C)     BP Readings from Last 3 Encounters:   11/19/24 (!) 168/77   06/14/24 138/70   05/31/24 138/76     Pulse Readings from Last 3 Encounters:   11/19/24 84   06/14/24 81   05/31/24 83     VITAL SIGNS:  as above   GENERAL: appears well-built, well-nourished.  No anxiety, no agitation, and in no distress.  Patient is awake, alert, oriented and cooperative.  HEENT:  Showed no congestion. Trachea is central no obvious icterus or pallor noted no hoarseness. no obvious JVD   NECK:  Supple.  No JVD. No obvious cervical submental or supraclavicular  adenopathy.  RS:the visualized portion of  Chest expands well. chest appears symmetric, no audible wheezes.  No dyspnea recognized  ABDOMEN:  abdomen appears undistended.  EXTREMITIES:  Without edema.  NEUROLOGICAL:  The patient is appropriate, higher functions are normal.  No  obvious neurological deficits.  normal judgement normal thought content  No confusion, no speech impediment. Cranial nerves are intact and show no deficit. No gross motor deficits noted   SKIN MUSCULOSKELETAL: no joint or skeletal deformity, no clubbing of nails.  No visible rash ecchymosis or petechiae   LABORATORY EVALUATION:    Lab Results   Component Value Date    WBC 7.11 11/15/2024    HGB 13.4 11/15/2024    HCT 40.2 11/15/2024    MCV 93 11/15/2024     11/15/2024     CMP  Sodium   Date Value Ref Range Status   11/15/2024 133 (L) 136 - 145 mmol/L Final     Potassium   Date Value Ref Range Status   11/15/2024 4.4 3.5 - 5.1 mmol/L Final     Chloride   Date Value Ref Range Status   11/15/2024 97 95 - 110 mmol/L Final     CO2   Date Value Ref Range Status   11/15/2024 24 23 - 29 mmol/L Final     Glucose   Date Value Ref Range Status   11/15/2024 78 70 - 110 mg/dL Final     BUN   Date Value Ref Range Status   11/15/2024 15 8 - 23 mg/dL Final     Creatinine   Date Value Ref Range Status   11/15/2024 1.0 0.5 - 1.4 mg/dL Final   01/18/2013 0.9 0.5 - 1.4 mg/dL Final     Calcium   Date Value Ref Range Status   11/15/2024 9.8 8.7 - 10.5 mg/dL Final   01/18/2013 8.9 8.7 - 10.5 mg/dL Final     Total Protein   Date Value Ref Range Status   11/15/2024 7.1 6.0 - 8.4 g/dL Final     Albumin   Date Value Ref Range Status   11/15/2024 4.0 3.5 - 5.2 g/dL Final     Total Bilirubin   Date Value Ref Range Status   11/15/2024 0.6 0.1 - 1.0 mg/dL Final     Comment:     For infants and newborns, interpretation of results should be based  on gestational age, weight and in agreement with clinical  observations.    Premature Infant recommended reference  ranges:  Up to 24 hours.............<8.0 mg/dL  Up to 48 hours............<12.0 mg/dL  3-5 days..................<15.0 mg/dL  6-29 days.................<15.0 mg/dL       Alkaline Phosphatase   Date Value Ref Range Status   11/15/2024 76 40 - 150 U/L Final     AST   Date Value Ref Range Status   11/15/2024 40 10 - 40 U/L Final     ALT   Date Value Ref Range Status   11/15/2024 20 10 - 44 U/L Final     Anion Gap   Date Value Ref Range Status   11/15/2024 12 8 - 16 mmol/L Final   01/18/2013 9 5 - 15 meq/L Final     eGFR if    Date Value Ref Range Status   05/06/2022 54 (A) >60 mL/min/1.73 m^2 Final     eGFR if non    Date Value Ref Range Status   05/06/2022 47 (A) >60 mL/min/1.73 m^2 Final     Comment:     Calculation used to obtain the estimated glomerular filtration  rate (eGFR) is the CKD-EPI equation.        Echocardiogram, ejection fraction has dropped to 55%, but   PET scan April 27, 2021 -for metastases  CA 2729 :58.4 4./27/21 55.6 July 2021   mammo10/2021 wnl  Pet 5./2022 neg  11/2922 breast bx  Final Pathologic Diagnosis Breast, right, stereotactic-guided core needle biopsies of calcifications:   - Hyalinizing fibroadenomatoid nodules   - Columnar cell changes   - Apocrine metaplasia   - Dilated and ectatic ducts   - Coarse microcalcifications, calcium phosphate type, associated with   fibroadenomatoid nodules   - Negative for atypical hyperplasia or carcinoma      IMPRESSION:  1.  Triple positive breast cancer 2016 , T1, N0, M0, [facility]   Completed 1 year of herceptin, now on arimidex .      EF dropped somewhat followed by Cardiology.  This improved cards had a stress test , carotids checked  Pet 6/24neg next June 20, 2025  Osteoporosis; .    Next prolia 525 for next dose with cbc, cmp, [id number] next bone density due 6/2025   Mammo was gkkiaiem59/17/22  had a redo on rt side  and bx was negative for malignancy 1  Mammo 12/23 wnl next due  12/24  6.  The patient has dyslipidemia.  Continue with Zocor and primary care follow   up with Dr. Franco.  7.  Degenerative joint pains.  Continue with Ultram as needed, take trazodone   for sleep and anxiety along with Xanax.may go for pain stimulant  8.  Mild CKD.  Continue to monitor.  No intervention necessary at this time.  9.  Gastroesophageal reflux disease.  Continue with omeprazole.  No changes or   worsening of symptoms at this point    10.psoas muscle  Tumor possibly a schwannoma confirmed with DR. Tovar     aortic athrosclerosis stable follow with  pcp   covid vaccinations completed

## 2024-12-10 ENCOUNTER — TELEPHONE (OUTPATIENT)
Dept: HEMATOLOGY/ONCOLOGY | Facility: CLINIC | Age: 84
End: 2024-12-10
Payer: MEDICARE

## 2024-12-10 NOTE — TELEPHONE ENCOUNTER
Spoke to pt and request mammogram scheduled 12/17/24 be rescheduled to the middle of January 2025, notified mammogram/us reschedule to 01/21/24 @ 3:00 pm and 3:30 pm, pt agrees.

## 2024-12-10 NOTE — TELEPHONE ENCOUNTER
----- Message from Mary sent at 12/10/2024  1:31 PM CST -----  Type: Needs Medical Advice  Who Called:  PT  Best Call Back Number: [phone]   Additional Information: requesting a call back in regards to her upcoming appts

## 2025-02-13 ENCOUNTER — OFFICE VISIT (OUTPATIENT)
Dept: FAMILY MEDICINE | Facility: CLINIC | Age: 85
End: 2025-02-13
Payer: MEDICARE

## 2025-02-13 VITALS
OXYGEN SATURATION: 97 % | HEART RATE: 86 BPM | SYSTOLIC BLOOD PRESSURE: 130 MMHG | BODY MASS INDEX: 26.08 KG/M2 | DIASTOLIC BLOOD PRESSURE: 80 MMHG | TEMPERATURE: 98 F | WEIGHT: 156.5 LBS | HEIGHT: 65 IN

## 2025-02-13 DIAGNOSIS — G89.29 OTHER CHRONIC PAIN: ICD-10-CM

## 2025-02-13 DIAGNOSIS — Z00.00 ROUTINE PHYSICAL EXAMINATION: Primary | ICD-10-CM

## 2025-02-13 DIAGNOSIS — N18.31 CHRONIC KIDNEY DISEASE, STAGE 3A: ICD-10-CM

## 2025-02-13 DIAGNOSIS — C50.612 MALIGNANT NEOPLASM OF AXILLARY TAIL OF LEFT FEMALE BREAST, UNSPECIFIED ESTROGEN RECEPTOR STATUS: ICD-10-CM

## 2025-02-13 DIAGNOSIS — I10 ESSENTIAL HYPERTENSION: ICD-10-CM

## 2025-02-13 DIAGNOSIS — K59.00 CONSTIPATION, UNSPECIFIED CONSTIPATION TYPE: ICD-10-CM

## 2025-02-13 DIAGNOSIS — E78.2 MIXED HYPERLIPIDEMIA: ICD-10-CM

## 2025-02-13 PROCEDURE — 99999 PR PBB SHADOW E&M-EST. PATIENT-LVL III: CPT | Mod: PBBFAC,,, | Performed by: INTERNAL MEDICINE

## 2025-02-13 RX ORDER — LATANOPROST 50 UG/ML
1 SOLUTION/ DROPS OPHTHALMIC NIGHTLY
COMMUNITY
Start: 2025-02-10

## 2025-02-13 RX ORDER — TRAMADOL HYDROCHLORIDE 50 MG/1
50 TABLET ORAL EVERY 12 HOURS PRN
Qty: 60 TABLET | Refills: 5 | Status: SHIPPED | OUTPATIENT
Start: 2025-02-13

## 2025-02-13 NOTE — PROGRESS NOTES
Subjective:       Patient ID: Jing Tenorio is a 84 y.o. female.  Chief Complaint: Annual Exam     HPI    HRA: patient feels overall is healthy.  Psychosocial and behavioral risks discussed.  BMI - 26  Weight loss discussed.   Diet - well balanced.   ADL: self sufficient in all  Instrumental ADL: patient is able to manage things like their medications and finances.    Memory or cognitive function - Patient has no issues with either   Ambulates normal. No recent falls.  Exercise - none   Depression screening is negative.  Hearing--no deficits.  Vision - no deficits.   Incontinence - none    Preventative health needs discussed and patient was given a printed list of what they have received and what they will need with in the next 5-10 years.  Screening schedule reviewed with patient  Pneumovax - complete  Prevnar - complete  Shingrix - refuses   Flu vaccine - complete  Mammogram - pending  DEXA - pending     Advanced Care directive: Patient will address advanced care directives on their own at a later time.   I have reviewed and updated the patient's current list of providers.       Current Opioids Prescriptions listed in med list with sedation and addiction as them main risk factors.               In addition to the patient's preventative review and discussion today, the patient also has other issues to discuss today with a separate summary of plan below:     C/o constipation.  Taking laxative every 3 days.      Breast cancer history - s/p removal, chemo, radiation.  seeing oncology      CKD III - stable with her typical pattern for the past few years ie upper 40s to 50s.  She no longer takes NSAIDS and her BP is well controlled preventing further damage.       Chronic Low back pain/ inflammatory arthropathy. Controlled with 1/2 - 1 (increase) tab tramadol bid.  Recall:  MRI showed tumor on nerve, but benign.  Has leg pain.  Failed epidural and ablation.  Saw neurosurgeon in [place] who did not want to do surgery  as pt higher risk and thought may end up with chronic leg pain.  Recommended nerve stimulator.  Dr Rice had given meloxicam, which helps but pt has decreased kidney function so this was held.  Her new pain Dr, Dr Bean, offered Cymbalta? But pt scared of side affects. She also wants to avoid anything with potential addiction.  He also recommended a nerve stimulator but she is scared of this.      HTN -   controlled.       HLD - controlled     Insomnia - controlled     Thrombocytopenia - better   Atherosclerosis - no syncope    Assessment:       1. Routine physical examination    2. Essential hypertension    3. Mixed hyperlipidemia    4. Other chronic pain    5. Constipation, unspecified constipation type    6. Malignant neoplasm of axillary tail of left female breast, unspecified estrogen receptor status    7. Chronic kidney disease, stage 3a        Plan:       Routine physical examination    Essential hypertension    Mixed hyperlipidemia  -     Lipid Panel; Future; Expected date: 08/12/2025    Other chronic pain  -     traMADoL (ULTRAM) 50 mg tablet; Take 1 tablet (50 mg total) by mouth every 12 (twelve) hours as needed for Pain.  Dispense: 60 tablet; Refill: 5    Constipation, unspecified constipation type    Malignant neoplasm of axillary tail of left female breast, unspecified estrogen receptor status    Chronic kidney disease, stage 3a            Wellness reviewed          Discussed otc regiment for constipation and printed  Visit today included increased complexity associated with the care of the episodic problem HTN addressed and managing the longitudinal care of the patient due to the serious and/or complex managed problem(s) HTN.  Continue current management and monitor.  Other diagnoses were reviewed and found stable and will continue to monitor.  Counseled on regular exercise, maintenance of a healthy weight, balanced diet rich in fruits/vegetables and lean protein, and avoidance of unhealthy habits  like smoking and excessive alcohol intake.   Also, counseled on importance of being compliant with medication, health appointments, diet and exercise.     Follow up in about 6 months (around 8/20/2025).      Medication List with Changes/Refills   Current Medications    ANASTROZOLE (ARIMIDEX) 1 MG TAB    Take 1 tablet (1 mg total) by mouth once daily.    ASCORBIC ACID, VITAMIN C, (VITAMIN C) 500 MG TABLET    Take 500 mg by mouth once daily.    B COMPLEX VITAMINS TABLET    Take 1 tablet by mouth once daily.    CETIRIZINE (ZYRTEC) 10 MG TABLET    Take 1 tablet (10 mg total) by mouth once daily.    COENZYME Q10 100 MG CAPSULE    Take 100 mg by mouth once daily.    DENOSUMAB (PROLIA) 60 MG/ML SYRG    Inject 60 mg into the skin every 6 (six) months.    FLUTICASONE PROPIONATE (FLONASE) 50 MCG/ACTUATION NASAL SPRAY    1 spray (50 mcg total) by Each Nostril route once daily.    LATANOPROST 0.005 % OPHTHALMIC SOLUTION    Place 1 drop into both eyes every evening.    LISINOPRIL 10 MG TABLET    2 pills in the morning and 1 pill at night    MULTIVITAMIN ORAL    once daily. Every day    OMEPRAZOLE (PRILOSEC) 40 MG CAPSULE    TAKE 1 CAPSULE(40 MG) BY MOUTH EVERY MORNING    SIMVASTATIN (ZOCOR) 20 MG TABLET    Take 1 tablet (20 mg total) by mouth every evening.    TRAZODONE (DESYREL) 100 MG TABLET    Take 1 tablet (100 mg total) by mouth every evening.    VITAMIN D (VITAMIN D3) 1000 UNITS TAB    Take 2,000 Units by mouth once daily.    VITAMIN E 400 UNIT CAPSULE    Take 400 Units by mouth once daily.   Changed and/or Refilled Medications    Modified Medication Previous Medication    TRAMADOL (ULTRAM) 50 MG TABLET traMADoL (ULTRAM) 50 mg tablet       Take 1 tablet (50 mg total) by mouth every 12 (twelve) hours as needed for Pain.    Take 1 tablet (50 mg total) by mouth every 12 (twelve) hours as needed for Pain.   Discontinued Medications    FLUTICASONE PROPIONATE (FLONASE) 50 MCG/ACTUATION NASAL SPRAY    1 spray (50 mcg total) by  Each Nostril route once daily.       BP Readings from Last 3 Encounters:   02/13/25 130/80   11/19/24 (!) 168/77   11/19/24 (!) 168/77     Hemoglobin A1C   Date Value Ref Range Status   04/10/2006 5.6 4.5 - 6.2 % Final     Lab Results   Component Value Date    TSH 0.777 11/11/2020     Lab Results   Component Value Date    LDLCALC 75.8 05/14/2024    LDLCALC 63.0 12/01/2023    LDLCALC 62.6 (L) 05/25/2023     Lab Results   Component Value Date    TRIG 46 05/14/2024    TRIG 45 12/01/2023    TRIG 47 05/25/2023     Wt Readings from Last 3 Encounters:   02/13/25 71 kg (156 lb 8.4 oz)   11/19/24 70.7 kg (155 lb 13.8 oz)   11/19/24 70.7 kg (155 lb 13.8 oz)     Lab Results   Component Value Date    HGB 13.4 11/15/2024    HCT 40.2 11/15/2024    WBC 7.11 11/15/2024    ALT 20 11/15/2024    AST 40 11/15/2024     (L) 11/15/2024    K 4.4 11/15/2024    CREATININE 1.0 11/15/2024           Review of Systems   Constitutional:  Negative for diaphoresis and fever.   HENT:  Negative for drooling and nosebleeds.    Eyes:  Negative for discharge and redness.   Respiratory:  Negative for apnea and choking.    Cardiovascular:  Negative for chest pain and palpitations.   Gastrointestinal:  Negative for abdominal pain and nausea.   Skin:  Negative for color change.   Neurological:  Positive for numbness. Negative for seizures and syncope.   Psychiatric/Behavioral:  Negative for behavioral problems.            Objective:      Vitals:    02/13/25 1434   BP: 130/80   Pulse:    Temp:      Physical Exam  Vitals reviewed.   Eyes:      Conjunctiva/sclera: Conjunctivae normal.   Neck:      Thyroid: No thyromegaly.      Trachea: Trachea normal.   Cardiovascular:      Rate and Rhythm: Normal rate and regular rhythm.      Comments: Edema negative  Pulmonary:      Effort: Pulmonary effort is normal.      Breath sounds: Normal breath sounds.   Abdominal:      General: Bowel sounds are normal.      Palpations: Abdomen is soft. There is no hepatomegaly.    Musculoskeletal:      Cervical back: Normal range of motion.      Comments: ROM normal bilateral  Strength normal bilateral   Skin:     General: Skin is warm and dry.   Neurological:      Deep Tendon Reflexes: Reflexes are normal and symmetric.   Psychiatric:      Comments: Alert and Oriented

## 2025-02-18 ENCOUNTER — HOSPITAL ENCOUNTER (OUTPATIENT)
Dept: RADIOLOGY | Facility: HOSPITAL | Age: 85
Discharge: HOME | End: 2025-02-18
Attending: INTERNAL MEDICINE
Payer: MEDICARE

## 2025-02-18 VITALS — HEIGHT: 65 IN | WEIGHT: 156 LBS | BODY MASS INDEX: 25.99 KG/M2

## 2025-02-18 DIAGNOSIS — C50.612 MALIGNANT NEOPLASM OF AXILLARY TAIL OF LEFT FEMALE BREAST, UNSPECIFIED ESTROGEN RECEPTOR STATUS: ICD-10-CM

## 2025-02-18 DIAGNOSIS — I10 ESSENTIAL HYPERTENSION: ICD-10-CM

## 2025-02-18 DIAGNOSIS — M81.0 OSTEOPOROSIS, SENILE: ICD-10-CM

## 2025-02-18 PROCEDURE — 77066 DX MAMMO INCL CAD BI: CPT | Mod: TC,PO

## 2025-05-06 ENCOUNTER — TELEPHONE (OUTPATIENT)
Dept: HEMATOLOGY/ONCOLOGY | Facility: CLINIC | Age: 85
End: 2025-05-06
Payer: MEDICARE

## 2025-05-06 NOTE — TELEPHONE ENCOUNTER
Pt confirms that she has not had any dental work. Checklist is complete. Appts reviewed.     ----- Message from Stephenie sent at 5/6/2025  6:26 AM CDT -----  Regarding: Prolia Reminder  Please review patient's Appointment Desk for an upcoming PROLIA INJECTION appointment. The following are needed for this appointment. Reminding to please contact patient, if needed:  ORDER.  Current / active in the Epic Therapy Plan, signed within a year.LABS. Serum Calcium within 6 weeks of treatment.  DEXA SCAN within the last 2 years DENTAL PRECAUTION CONFIRMED WITH PATIENT. No recent invasive dental procedures within the last month (tooth extraction, etc). A patient will need to bring a Dental Clearance signed by a dental provider if there was any recent dental procedure within the last month. FOLLOW-UP APPOINTMENT within the last 12 months with the diagnosis mentioned in the visit notes.   Any item unavailable by the day prior to the scheduled appointment will cause the appointment to be CANCELLED.    To reschedule appointments, please contact [facility] INFUSION SCHEDULING POOL or call [phone]. Thank you,[facility], Infusion Department

## 2025-05-13 ENCOUNTER — TELEPHONE (OUTPATIENT)
Dept: HEMATOLOGY/ONCOLOGY | Facility: CLINIC | Age: 85
End: 2025-05-13
Payer: MEDICARE

## 2025-05-14 ENCOUNTER — HOSPITAL ENCOUNTER (OUTPATIENT)
Dept: RADIOLOGY | Facility: HOSPITAL | Age: 85
Discharge: HOME | End: 2025-05-14
Attending: INTERNAL MEDICINE
Payer: MEDICARE

## 2025-05-14 VITALS — HEIGHT: 65 IN | WEIGHT: 156 LBS | BODY MASS INDEX: 25.99 KG/M2

## 2025-05-14 DIAGNOSIS — I10 ESSENTIAL HYPERTENSION: ICD-10-CM

## 2025-05-14 LAB — POCT GLUCOSE: 92 MG/DL (ref 70–110)

## 2025-05-14 PROCEDURE — 78815 PET IMAGE W/CT SKULL-THIGH: CPT | Mod: TC,PO

## 2025-05-14 PROCEDURE — A9552 F18 FDG: HCPCS | Mod: PO | Performed by: INTERNAL MEDICINE

## 2025-05-14 PROCEDURE — 78815 PET IMAGE W/CT SKULL-THIGH: CPT | Mod: 26,PS,, | Performed by: RADIOLOGY

## 2025-05-14 RX ORDER — FLUDEOXYGLUCOSE F18 500 MCI/ML
13.9 INJECTION INTRAVENOUS
Status: COMPLETED | OUTPATIENT
Start: 2025-05-14 | End: 2025-05-14

## 2025-05-14 RX ADMIN — FLUDEOXYGLUCOSE F-18 13.9 MILLICURIE: 500 INJECTION INTRAVENOUS at 10:05

## 2025-05-16 ENCOUNTER — LAB VISIT (OUTPATIENT)
Dept: LAB | Facility: HOSPITAL | Age: 85
End: 2025-05-16
Attending: NURSE PRACTITIONER
Payer: MEDICARE

## 2025-05-16 DIAGNOSIS — C50.012 MALIGNANT NEOPLASM INVOLVING BOTH NIPPLE AND AREOLA OF LEFT BREAST IN FEMALE, UNSPECIFIED ESTROGEN RECEPTOR STATUS: ICD-10-CM

## 2025-05-16 LAB
ABSOLUTE EOSINOPHIL (SMH): 0.1 K/UL
ABSOLUTE MONOCYTE (SMH): 0.55 K/UL (ref 0.3–1)
ABSOLUTE NEUTROPHIL COUNT (SMH): 5.3 K/UL (ref 1.8–7.7)
ALBUMIN SERPL-MCNC: 4.1 G/DL (ref 3.5–5.2)
ALP SERPL-CCNC: 56 UNIT/L (ref 40–150)
ALT SERPL-CCNC: 17 UNIT/L (ref 10–44)
ANION GAP (SMH): 11 MMOL/L (ref 8–16)
AST SERPL-CCNC: 47 UNIT/L (ref 11–45)
BASOPHILS # BLD AUTO: 0.08 K/UL
BASOPHILS NFR BLD AUTO: 1.2 %
BILIRUB SERPL-MCNC: 0.6 MG/DL (ref 0.1–1)
BUN SERPL-MCNC: 15 MG/DL (ref 8–23)
CALCIUM SERPL-MCNC: 9.4 MG/DL (ref 8.7–10.5)
CHLORIDE SERPL-SCNC: 97 MMOL/L (ref 95–110)
CO2 SERPL-SCNC: 25 MMOL/L (ref 23–29)
CREAT SERPL-MCNC: 1.1 MG/DL (ref 0.5–1.4)
ERYTHROCYTE [DISTWIDTH] IN BLOOD BY AUTOMATED COUNT: 14.5 % (ref 11.5–14.5)
GFR SERPLBLD CREATININE-BSD FMLA CKD-EPI: 49 ML/MIN/1.73/M2
GLUCOSE SERPL-MCNC: 88 MG/DL (ref 70–110)
HCT VFR BLD AUTO: 39.8 % (ref 37–48.5)
HGB BLD-MCNC: 13.2 GM/DL (ref 12–16)
IMM GRANULOCYTES # BLD AUTO: 0.02 K/UL (ref 0–0.04)
IMM GRANULOCYTES NFR BLD AUTO: 0.3 % (ref 0–0.5)
LYMPHOCYTES # BLD AUTO: 0.65 K/UL (ref 1–4.8)
MCH RBC QN AUTO: 29.8 PG (ref 27–31)
MCHC RBC AUTO-ENTMCNC: 33.2 G/DL (ref 32–36)
MCV RBC AUTO: 90 FL (ref 82–98)
NUCLEATED RBC (/100WBC) (SMH): 0 /100 WBC
PLATELET # BLD AUTO: 385 K/UL (ref 150–450)
PMV BLD AUTO: 9.8 FL (ref 9.2–12.9)
POTASSIUM SERPL-SCNC: 4.7 MMOL/L (ref 3.5–5.1)
PROT SERPL-MCNC: 6.6 GM/DL (ref 6–8.4)
RBC # BLD AUTO: 4.43 M/UL (ref 4–5.4)
RELATIVE EOSINOPHIL (SMH): 1.5 % (ref 0–8)
RELATIVE LYMPHOCYTE (SMH): 9.6 % (ref 18–48)
RELATIVE MONOCYTE (SMH): 8.2 % (ref 4–15)
RELATIVE NEUTROPHIL (SMH): 79.2 % (ref 38–73)
SODIUM SERPL-SCNC: 133 MMOL/L (ref 136–145)
WBC # BLD AUTO: 6.74 K/UL (ref 3.9–12.7)

## 2025-05-16 PROCEDURE — 82040 ASSAY OF SERUM ALBUMIN: CPT

## 2025-05-16 PROCEDURE — 36415 COLL VENOUS BLD VENIPUNCTURE: CPT

## 2025-05-16 PROCEDURE — 85025 COMPLETE CBC W/AUTO DIFF WBC: CPT

## 2025-05-16 PROCEDURE — 86300 IMMUNOASSAY TUMOR CA 15-3: CPT

## 2025-05-19 LAB — CANCER AG27-29 SERPL-ACNC: 60.4 U/ML

## 2025-05-20 ENCOUNTER — OFFICE VISIT (OUTPATIENT)
Dept: HEMATOLOGY/ONCOLOGY | Facility: CLINIC | Age: 85
End: 2025-05-20
Payer: MEDICARE

## 2025-05-20 ENCOUNTER — INFUSION (OUTPATIENT)
Dept: INFUSION THERAPY | Facility: HOSPITAL | Age: 85
End: 2025-05-20
Attending: INTERNAL MEDICINE
Payer: MEDICARE

## 2025-05-20 VITALS
WEIGHT: 153.44 LBS | OXYGEN SATURATION: 98 % | DIASTOLIC BLOOD PRESSURE: 74 MMHG | TEMPERATURE: 98 F | HEIGHT: 65 IN | DIASTOLIC BLOOD PRESSURE: 74 MMHG | OXYGEN SATURATION: 98 % | TEMPERATURE: 98 F | SYSTOLIC BLOOD PRESSURE: 139 MMHG | BODY MASS INDEX: 25.56 KG/M2 | HEART RATE: 80 BPM | BODY MASS INDEX: 25.56 KG/M2 | HEART RATE: 80 BPM | WEIGHT: 153.44 LBS | RESPIRATION RATE: 18 BRPM | RESPIRATION RATE: 18 BRPM | HEIGHT: 65 IN | SYSTOLIC BLOOD PRESSURE: 139 MMHG

## 2025-05-20 DIAGNOSIS — M81.0 OSTEOPOROSIS, SENILE: ICD-10-CM

## 2025-05-20 DIAGNOSIS — E78.5 HYPERLIPIDEMIA LDL GOAL <130: ICD-10-CM

## 2025-05-20 DIAGNOSIS — M81.0 OSTEOPOROSIS, SENILE: Primary | ICD-10-CM

## 2025-05-20 DIAGNOSIS — Z85.3 HISTORY OF LEFT BREAST CANCER: ICD-10-CM

## 2025-05-20 DIAGNOSIS — I10 ESSENTIAL HYPERTENSION: ICD-10-CM

## 2025-05-20 DIAGNOSIS — C50.012 MALIGNANT NEOPLASM OF NIPPLE OF LEFT BREAST IN FEMALE, UNSPECIFIED ESTROGEN RECEPTOR STATUS: ICD-10-CM

## 2025-05-20 DIAGNOSIS — N18.31 CHRONIC KIDNEY DISEASE, STAGE 3A: Primary | ICD-10-CM

## 2025-05-20 PROCEDURE — 63600175 PHARM REV CODE 636 W HCPCS: Mod: JZ,TB | Performed by: INTERNAL MEDICINE

## 2025-05-20 PROCEDURE — 99999 PR PBB SHADOW E&M-EST. PATIENT-LVL V: CPT | Mod: PBBFAC,HCNC,, | Performed by: INTERNAL MEDICINE

## 2025-05-20 PROCEDURE — 96372 THER/PROPH/DIAG INJ SC/IM: CPT

## 2025-05-20 RX ADMIN — DENOSUMAB 60 MG: 60 INJECTION SUBCUTANEOUS at 11:05

## 2025-05-20 NOTE — PROGRESS NOTES
84 year-old  woman well known to me.  The patient was diagnosed n   May 2016 with triple positive breast cancer.  The patient had a 1.9 cm   malignancy of the left breast, sentinel lymph node negative in association with   DCIS.  She underwent lumpectomy, underwent adjuvant TCH chemotherapy for six   cycles   and Herceptin alone for a year, has done well, but she has   multiple other issues in association.  The patient had Echocardiogram during Herceptin that showed  showed a EF drop by 10%   from an ejection fraction of 65% to 55%.  Dr. Gray had cleared her to continue   with Herceptin and with the short-term echocardiogram follow up as the patient   had remained asymptomatic. And she successfully completed Herceptin  The patient also had some pulmonary nodules that are   being followed by a CT scan.  They are deemed to be benign, but need ongoing   followup.  She had been taking Boniva for postmenopausal osteopenia/osteoporosis   along with calcium and the patient has also had increased density on mammogram.    .  She receives Prolia and Arimidex      ..    PHYSICAL EXAM:   .  Wt Readings from Last 3 Encounters:   05/14/25 70.8 kg (156 lb)   02/18/25 70.8 kg (156 lb)   02/13/25 71 kg (156 lb 8.4 oz)     Temp Readings from Last 3 Encounters:   02/13/25 97.7 °F (36.5 °C) (Oral)   11/19/24 97.2 °F (36.2 °C)   11/19/24 97.2 °F (36.2 °C) (Temporal)     BP Readings from Last 3 Encounters:   02/13/25 130/80   11/19/24 (!) 168/77   11/19/24 (!) 168/77     Pulse Readings from Last 3 Encounters:   02/13/25 86   11/19/24 84   11/19/24 84     VITAL SIGNS:  as above   GENERAL: appears well-built, well-nourished.  No anxiety, no agitation, and in no distress.  Patient is awake, alert, oriented and cooperative.  HEENT:  Showed no congestion. Trachea is central no obvious icterus or pallor noted no hoarseness. no obvious JVD   NECK:  Supple.  No JVD. No obvious cervical submental or supraclavicular adenopathy.  RS:the  visualized portion of  Chest expands well. chest appears symmetric, no audible wheezes.  No dyspnea recognized  ABDOMEN:  abdomen appears undistended.  EXTREMITIES:  Without edema.  NEUROLOGICAL:  The patient is appropriate, higher functions are normal.  No  obvious neurological deficits.  normal judgement normal thought content  No confusion, no speech impediment. Cranial nerves are intact and show no deficit. No gross motor deficits noted   SKIN MUSCULOSKELETAL: no joint or skeletal deformity, no clubbing of nails.  No visible rash ecchymosis or petechiae   LABORATORY EVALUATION:    Lab Results   Component Value Date    WBC 6.74 05/16/2025    HGB 13.2 05/16/2025    HCT 39.8 05/16/2025    MCV 90 05/16/2025     05/16/2025     CMP  Sodium   Date Value Ref Range Status   05/16/2025 133 (L) 136 - 145 mmol/L Final     Potassium   Date Value Ref Range Status   05/16/2025 4.7 3.5 - 5.1 mmol/L Final     Chloride   Date Value Ref Range Status   05/16/2025 97 95 - 110 mmol/L Final     CO2   Date Value Ref Range Status   05/16/2025 25 23 - 29 mmol/L Final     Glucose   Date Value Ref Range Status   05/16/2025 88 70 - 110 mg/dL Final     BUN   Date Value Ref Range Status   05/16/2025 15 8 - 23 mg/dL Final     Creatinine   Date Value Ref Range Status   05/16/2025 1.1 0.5 - 1.4 mg/dL Final   01/18/2013 0.9 0.5 - 1.4 mg/dL Final     Calcium   Date Value Ref Range Status   05/16/2025 9.4 8.7 - 10.5 mg/dL Final   01/18/2013 8.9 8.7 - 10.5 mg/dL Final     Protein Total   Date Value Ref Range Status   05/16/2025 6.6 6.0 - 8.4 gm/dL Final     Albumin   Date Value Ref Range Status   05/16/2025 4.1 3.5 - 5.2 g/dL Final     Bilirubin Total   Date Value Ref Range Status   05/16/2025 0.6 0.1 - 1.0 mg/dL Final     Comment:     For infants and newborns, interpretation of results should be based   on gestational age, weight and in agreement with clinical   observations.    Premature Infant recommended reference ranges:   0-24 hours:   <8.0 mg/dL   24-48 hours: <12.0 mg/dL   3-5 days:    <15.0 mg/dL   6-29 days:   <15.0 mg/dL     ALP   Date Value Ref Range Status   05/16/2025 56 40 - 150 unit/L Final     AST   Date Value Ref Range Status   05/16/2025 47 (H) 11 - 45 unit/L Final     ALT   Date Value Ref Range Status   05/16/2025 17 10 - 44 unit/L Final     Anion Gap   Date Value Ref Range Status   05/16/2025 11 8 - 16 mmol/L Final   01/18/2013 9 5 - 15 meq/L Final     eGFR if    Date Value Ref Range Status   05/06/2022 54 (A) >60 mL/min/1.73 m^2 Final     eGFR if non    Date Value Ref Range Status   05/06/2022 47 (A) >60 mL/min/1.73 m^2 Final     Comment:     Calculation used to obtain the estimated glomerular filtration  rate (eGFR) is the CKD-EPI equation.        Echocardiogram, ejection fraction has dropped to 55%, but   PET scan April 27, 2021 -for metastases  CA 2729 :58.4 4./27/21 55.6 July 2021   mammo10/2021 wnl  Pet 5./2022 neg  11/2922 breast bx  Final Pathologic Diagnosis Breast, right, stereotactic-guided core needle biopsies of calcifications:   - Hyalinizing fibroadenomatoid nodules   - Columnar cell changes   - Apocrine metaplasia   - Dilated and ectatic ducts   - Coarse microcalcifications, calcium phosphate type, associated with   fibroadenomatoid nodules   - Negative for atypical hyperplasia or carcinoma      IMPRESSION:  1.  Triple positive breast cancer 2016 , T1, N0, M0, [facility]   Completed 1 year of herceptin, now on arimidex .      EF dropped somewhat followed by Cardiology.  This improved cards had a stress test , carotids checked  Pet 6/24neg next will scan only as needed get cxr  Osteoporosis; .    Next prolia 11/25 for next dose with cbc, cmp, [id number] next bone density due 6/2025   Mammo was hbfmdxgu81/17/22  had a redo on rt side  and bx was negative for malignancy 1  Mammo 12/23 wnl next due 12/25  6.  The patient has dyslipidemia.  Continue with Zocor and  primary care follow   up with Dr. Franco.  7.  Degenerative joint pains.  Continue with Ultram as needed, take trazodone   for sleep and anxiety along with Xanax.may go for pain stimulant  8.  Mild CKD.  Continue to monitor.  No intervention necessary at this time.  9.  Gastroesophageal reflux disease.  Continue with omeprazole.  No changes or   worsening of symptoms at this point    10.psoas muscle  Tumor possibly a schwannoma confirmed with DR. Tovar     aortic athrosclerosis stable follow with  pcp   covid vaccinations completed

## 2025-06-05 DIAGNOSIS — G47.00 INSOMNIA, UNSPECIFIED TYPE: ICD-10-CM

## 2025-06-05 NOTE — TELEPHONE ENCOUNTER
Care Due:                  Date            Visit Type   Department     Provider  --------------------------------------------------------------------------------                                EP -                              PRIMARY      [facility] FAMILY  Last Visit: 02-      CARE ([facility])   JONATHAN Franco                               -                              [facility]  Next Visit: 08-      CARE ([facility])   JONATHAN Franco                                                            Last  Test          Frequency    Reason                     Performed    Due Date  --------------------------------------------------------------------------------    Lipid Panel.  12 months..  simvastatin..............  05- 05-    [facility] Embedded Care Due Messages. Reference number: [id number].   6/05/2025 2:21:13 PM CDT   6

## 2025-06-09 RX ORDER — TRAZODONE HYDROCHLORIDE 100 MG/1
100 TABLET ORAL NIGHTLY
Qty: 90 TABLET | Refills: 3 | Status: SHIPPED | OUTPATIENT
Start: 2025-06-09

## 2025-06-09 RX ORDER — TRAZODONE HYDROCHLORIDE 100 MG/1
100 TABLET ORAL NIGHTLY
Qty: 90 TABLET | Refills: 3 | Status: SHIPPED | OUTPATIENT
Start: 2025-06-09 | End: 2025-06-09 | Stop reason: SDUPTHER

## 2025-06-13 DIAGNOSIS — E78.5 DYSLIPIDEMIA: ICD-10-CM

## 2025-06-13 RX ORDER — SIMVASTATIN 20 MG/1
20 TABLET, FILM COATED ORAL NIGHTLY
Qty: 90 TABLET | Refills: 3 | Status: SHIPPED | OUTPATIENT
Start: 2025-06-13

## 2025-06-13 NOTE — TELEPHONE ENCOUNTER
Refill Routing Note   Medication(s) are not appropriate for processing by [facility] for the following reason(s):        Required labs outdated    [facility] action(s):  Defer               Appointments  past 12m or future 3m with PCP    Date Provider   Last Visit   2/13/2025 Nakul Franco MD   Next Visit   8/12/2025 Nakul Franco MD   ED visits in past 90 days: 0        Note composed:1:04 PM 06/13/2025

## 2025-06-13 NOTE — TELEPHONE ENCOUNTER
No care due was identified.  [facility] Embedded Care Due Messages. Reference number: [id number].   6/13/2025 9:55:40 AM CDT

## 2025-06-27 ENCOUNTER — HOSPITAL ENCOUNTER (OUTPATIENT)
Dept: RADIOLOGY | Facility: HOSPITAL | Age: 85
Discharge: HOME | End: 2025-06-27
Attending: INTERNAL MEDICINE
Payer: MEDICARE

## 2025-06-27 DIAGNOSIS — Z85.3 HISTORY OF LEFT BREAST CANCER: ICD-10-CM

## 2025-06-27 DIAGNOSIS — I10 ESSENTIAL HYPERTENSION: ICD-10-CM

## 2025-06-27 DIAGNOSIS — M81.0 OSTEOPOROSIS, SENILE: ICD-10-CM

## 2025-06-27 DIAGNOSIS — E78.5 HYPERLIPIDEMIA LDL GOAL <130: ICD-10-CM

## 2025-06-27 DIAGNOSIS — N18.31 CHRONIC KIDNEY DISEASE, STAGE 3A: ICD-10-CM

## 2025-06-27 PROCEDURE — 77080 DXA BONE DENSITY AXIAL: CPT | Mod: 26,,, | Performed by: RADIOLOGY

## 2025-06-27 PROCEDURE — 71046 X-RAY EXAM CHEST 2 VIEWS: CPT | Mod: 26,,, | Performed by: RADIOLOGY

## 2025-06-27 PROCEDURE — 77080 DXA BONE DENSITY AXIAL: CPT | Mod: TC,PO

## 2025-06-27 PROCEDURE — 71046 X-RAY EXAM CHEST 2 VIEWS: CPT | Mod: TC,PO

## 2025-07-28 ENCOUNTER — TELEPHONE (OUTPATIENT)
Dept: HEMATOLOGY/ONCOLOGY | Facility: CLINIC | Age: 85
End: 2025-07-28
Payer: MEDICARE

## 2025-07-28 NOTE — TELEPHONE ENCOUNTER
I called pt and left vm. I stated that the we could not review imaging results via phone, but we could make her an appointment to discuss her concerns with  prior to the November appointment. Call back number is left.       ----- Message from Britney sent at 7/28/2025  9:59 AM CDT -----  Regarding: pt calling to check on imaging status  Pt [phone] home / [phone] cell    Pt wants to speak to staff regarding her last imaging bone density test.    She would like a CB, she is concerned

## 2025-07-29 DIAGNOSIS — C50.011 MALIGNANT NEOPLASM OF NIPPLE OF RIGHT BREAST IN FEMALE, UNSPECIFIED ESTROGEN RECEPTOR STATUS: ICD-10-CM

## 2025-07-29 RX ORDER — ANASTROZOLE 1 MG/1
TABLET ORAL
Qty: 90 TABLET | Refills: 6 | Status: SHIPPED | OUTPATIENT
Start: 2025-07-29

## 2025-07-31 DIAGNOSIS — I10 ESSENTIAL HYPERTENSION: ICD-10-CM

## 2025-07-31 RX ORDER — LISINOPRIL 10 MG/1
TABLET ORAL
Qty: 270 TABLET | Refills: 1 | Status: SHIPPED | OUTPATIENT
Start: 2025-07-31

## 2025-07-31 NOTE — TELEPHONE ENCOUNTER
Refill Decision Note   Jing Jona  is requesting a refill authorization.  Brief Assessment and Rationale for Refill:  Approve     Medication Therapy Plan:        Comments:     Note composed:12:01 PM 07/31/2025

## 2025-07-31 NOTE — TELEPHONE ENCOUNTER
No care due was identified.  [facility] Embedded Care Due Messages. Reference number: [id number].   7/31/2025 11:52:44 AM CDT

## 2025-08-06 ENCOUNTER — LAB VISIT (OUTPATIENT)
Dept: LAB | Facility: HOSPITAL | Age: 85
End: 2025-08-06
Attending: INTERNAL MEDICINE
Payer: MEDICARE

## 2025-08-06 DIAGNOSIS — E78.2 MIXED HYPERLIPIDEMIA: ICD-10-CM

## 2025-08-06 DIAGNOSIS — I10 ESSENTIAL HYPERTENSION: ICD-10-CM

## 2025-08-06 LAB
CHOLEST SERPL-MCNC: 157 MG/DL (ref 120–199)
CHOLEST/HDLC SERPL: 1.8 {RATIO} (ref 2–5)
HDLC SERPL-MCNC: 88 MG/DL (ref 40–75)
HDLC SERPL: 56.1 % (ref 20–50)
LDLC SERPL CALC-MCNC: 60.2 MG/DL (ref 63–159)
NONHDLC SERPL-MCNC: 69 MG/DL
TRIGL SERPL-MCNC: 44 MG/DL (ref 30–150)

## 2025-08-06 PROCEDURE — 36415 COLL VENOUS BLD VENIPUNCTURE: CPT

## 2025-08-06 PROCEDURE — 82465 ASSAY BLD/SERUM CHOLESTEROL: CPT

## 2025-08-06 RX ORDER — LISINOPRIL 10 MG/1
TABLET ORAL
Qty: 270 TABLET | Refills: 4 | Status: SHIPPED | OUTPATIENT
Start: 2025-08-06

## 2025-08-06 NOTE — TELEPHONE ENCOUNTER
Copied from CRM #[id number]. Topic: Medications - Medication Status Check   >> Aug 6, 2025  3:09 PM Jenn wrote:  Pt is calling to get a medication status pt states she is running out of med please call to advise Telephone Information:  Mobile          [phone]    lisinopriL 10 MG tablet please call to lotite

## 2025-08-12 ENCOUNTER — OFFICE VISIT (OUTPATIENT)
Dept: FAMILY MEDICINE | Facility: CLINIC | Age: 85
End: 2025-08-12
Payer: MEDICARE

## 2025-08-12 VITALS
TEMPERATURE: 98 F | WEIGHT: 152.31 LBS | OXYGEN SATURATION: 97 % | HEART RATE: 89 BPM | HEIGHT: 65 IN | SYSTOLIC BLOOD PRESSURE: 138 MMHG | DIASTOLIC BLOOD PRESSURE: 70 MMHG | BODY MASS INDEX: 25.38 KG/M2

## 2025-08-12 DIAGNOSIS — I10 ESSENTIAL HYPERTENSION: Primary | ICD-10-CM

## 2025-08-12 DIAGNOSIS — E78.2 MIXED HYPERLIPIDEMIA: ICD-10-CM

## 2025-08-12 PROCEDURE — 1157F ADVNC CARE PLAN IN RCRD: CPT | Mod: CPTII,HCNC,S$GLB, | Performed by: INTERNAL MEDICINE

## 2025-08-12 PROCEDURE — 3288F FALL RISK ASSESSMENT DOCD: CPT | Mod: CPTII,HCNC,S$GLB, | Performed by: INTERNAL MEDICINE

## 2025-08-12 PROCEDURE — 99999 PR PBB SHADOW E&M-EST. PATIENT-LVL IV: CPT | Mod: PBBFAC,HCNC,, | Performed by: INTERNAL MEDICINE

## 2025-08-12 PROCEDURE — 99214 OFFICE O/P EST MOD 30 MIN: CPT | Mod: HCNC,S$GLB,, | Performed by: INTERNAL MEDICINE

## 2025-08-12 PROCEDURE — 1159F MED LIST DOCD IN RCRD: CPT | Mod: CPTII,HCNC,S$GLB, | Performed by: INTERNAL MEDICINE

## 2025-08-12 PROCEDURE — 1125F AMNT PAIN NOTED PAIN PRSNT: CPT | Mod: CPTII,HCNC,S$GLB, | Performed by: INTERNAL MEDICINE

## 2025-08-12 PROCEDURE — G2211 COMPLEX E/M VISIT ADD ON: HCPCS | Mod: HCNC,S$GLB,, | Performed by: INTERNAL MEDICINE

## 2025-08-12 PROCEDURE — 1101F PT FALLS ASSESS-DOCD LE1/YR: CPT | Mod: CPTII,HCNC,S$GLB, | Performed by: INTERNAL MEDICINE

## 2025-08-12 PROCEDURE — 1160F RVW MEDS BY RX/DR IN RCRD: CPT | Mod: CPTII,HCNC,S$GLB, | Performed by: INTERNAL MEDICINE

## 2025-08-12 RX ORDER — AMOXICILLIN 500 MG/1
500 TABLET, FILM COATED ORAL EVERY 12 HOURS
COMMUNITY
Start: 2025-02-17

## 2025-08-12 RX ORDER — INFLUENZA A VIRUS A/VICTORIA/4897/2022 IVR-238 (H1N1) ANTIGEN (FORMALDEHYDE INACTIVATED), INFLUENZA A VIRUS A/CROATIA/10136/RV/2023 (H3N2) ANTIGEN (FORMALDEHYDE INACTIVATED), AND INFLUENZA B VIRUS B/MICHIGAN/01/2021 ANTIGEN (FORMALDEHYDE INACTIVATED) 60; 60; 60 UG/.5ML; UG/.5ML; UG/.5ML
0.5 INJECTION, SUSPENSION INTRAMUSCULAR
COMMUNITY
Start: 2025-08-02

## (undated) DEVICE — NDL SPINAL SPINOCAN 22GX3.5

## (undated) DEVICE — GLOVE SURG BIOGEL LATEX SZ 7.5

## (undated) DEVICE — ELECTRODE REM PLYHSV RETURN 9

## (undated) DEVICE — TRAY NERVE BLOCK

## (undated) DEVICE — SEE MEDLINE ITEM 152622

## (undated) DEVICE — Device

## (undated) DEVICE — NDL 18GA X1 1/2 REG BEVEL

## (undated) DEVICE — MARKER SKIN STND TIP BLUE BARR

## (undated) DEVICE — CANNULA VENOM RF 18GX100MM

## (undated) DEVICE — APPLICATOR CHLORAPREP CLR 10.5

## (undated) DEVICE — DRESSING TRANS 4X4 TEGADERM

## (undated) DEVICE — NDL SPINAL 25GX3.5 SPINOCAN

## (undated) DEVICE — GLOVE SURGICAL LATEX SZ 7

## (undated) DEVICE — CAUTERY BOVIE PENCIL

## (undated) DEVICE — DRAPE THYROID WITH ARMBOARD

## (undated) DEVICE — SPONGE DERMA 8PLY 2X2

## (undated) DEVICE — SOL SOD CHLORIDE 0.9% 10ML

## (undated) DEVICE — ELECTRODE BLADE W/SLEEVE 2.75

## (undated) DEVICE — APPLICATOR CHLORAPREP ORN 26ML

## (undated) DEVICE — SEE L#120831

## (undated) DEVICE — SUT MONOCRYL 4-0 PS-2

## (undated) DEVICE — CLOSURE SKIN STERI STRIP 1/2X4

## (undated) DEVICE — SUT 3-0 VICRYL / SH (J416)

## (undated) DEVICE — SEE MEDLINE ITEM 157128